# Patient Record
Sex: FEMALE | Race: WHITE | NOT HISPANIC OR LATINO | Employment: OTHER | URBAN - NONMETROPOLITAN AREA
[De-identification: names, ages, dates, MRNs, and addresses within clinical notes are randomized per-mention and may not be internally consistent; named-entity substitution may affect disease eponyms.]

---

## 2017-01-03 ENCOUNTER — OFFICE VISIT (OUTPATIENT)
Dept: CARDIAC SURGERY | Facility: CLINIC | Age: 72
End: 2017-01-03

## 2017-01-03 VITALS
OXYGEN SATURATION: 98 % | DIASTOLIC BLOOD PRESSURE: 60 MMHG | HEART RATE: 79 BPM | WEIGHT: 119.6 LBS | SYSTOLIC BLOOD PRESSURE: 125 MMHG | TEMPERATURE: 97.7 F | BODY MASS INDEX: 22.01 KG/M2 | HEIGHT: 62 IN

## 2017-01-03 DIAGNOSIS — Z09 FOLLOW-UP SURGERY CARE: Primary | ICD-10-CM

## 2017-01-03 DIAGNOSIS — I77.0 ARTERIOVENOUS FISTULA (HCC): ICD-10-CM

## 2017-01-03 PROCEDURE — 99024 POSTOP FOLLOW-UP VISIT: CPT | Performed by: NURSE PRACTITIONER

## 2017-01-03 RX ORDER — PAROXETINE 30 MG/1
30 TABLET, FILM COATED ORAL EVERY MORNING
Status: ON HOLD | COMMUNITY
End: 2017-10-05

## 2017-01-03 RX ORDER — HYDROXYZINE PAMOATE 25 MG/1
25 CAPSULE ORAL 2 TIMES DAILY PRN
Status: ON HOLD | COMMUNITY
End: 2017-10-05

## 2017-01-03 RX ORDER — HYDROCODONE BITARTRATE AND ACETAMINOPHEN 7.5; 325 MG/1; MG/1
1 TABLET ORAL EVERY 4 HOURS PRN
Qty: 50 TABLET | Refills: 0 | Status: ON HOLD | OUTPATIENT
Start: 2017-01-03 | End: 2017-11-16

## 2017-01-03 RX ORDER — OMEPRAZOLE 20 MG/1
20 CAPSULE, DELAYED RELEASE ORAL DAILY
Status: ON HOLD | COMMUNITY
End: 2017-10-05

## 2017-01-03 NOTE — MR AVS SNAPSHOT
Efrem Radford Crispin   1/3/2017 3:00 PM   Office Visit    Dept Phone:  [phone]   Encounter #:  [id number]    Provider:  ELI Alanis   Department:  [facility] CARDIOTHORACIC AND VASCULAR SURGERY                Your Full Care Plan              Today's Medication Changes          These changes are accurate as of: 1/3/17  3:47 PM.  If you have any questions, ask your nurse or doctor.               New Medication(s)Ordered:     HYDROcodone-acetaminophen 7.5-325 MG per tablet   Commonly known as:  NORCO   Take 1 tablet by mouth Every 4 (Four) Hours As Needed for moderate pain (4-6) (for LLE pain post).   Started by:  ELI Alanis         Medication(s)that have changed:     PARoxetine 30 MG tablet   Commonly known as:  PAXIL   Take 30 mg by mouth Every Morning.   What changed:  Another medication with the same name was removed. Continue taking this medication, and follow the directions you see here.   Changed by:  ELI Alanis         Stop taking medication(s)listed here:     DEXILANT 60 MG capsule   Generic drug:  dexlansoprazole   Stopped by:  ELI Alanis           PERCOCET PO   Stopped by:  ELI Alanis                Where to Get Your Medications      You can get these medications from any pharmacy     Bring a paper prescription for each of these medications     HYDROcodone-acetaminophen 7.5-325 MG per tablet                  Your Updated Medication List          This list is accurate as of: 1/3/17  3:47 PM.  Always use your most recent med list.                APRISO PO       aspirin 81 MG EC tablet       atorvastatin 40 MG tablet   Commonly known as:  LIPITOR       COLACE PO       ELIQUIS PO       fentaNYL 25 MCG/HR patch   Commonly known as:  DURAGESIC       gabapentin 100 MG capsule   Commonly known as:  NEURONTIN       hydrALAZINE 100 MG tablet   Commonly known as:  APRESOLINE       HYDROcodone-acetaminophen 7.5-325 MG per  tablet   Commonly known as:  NORCO   Take 1 tablet by mouth Every 4 (Four) Hours As Needed for moderate pain (4-6) (for LLE pain post).       hydrOXYzine 25 MG capsule   Commonly known as:  VISTARIL       levothyroxine 88 MCG tablet   Commonly known as:  SYNTHROID, LEVOTHROID       LORAZEPAM PO       nitroglycerin 0.4 MG SL tablet   Commonly known as:  NITROSTAT       * OMEPRAZOLE PO       * omeprazole 20 MG capsule   Commonly known as:  priLOSEC       PARoxetine 30 MG tablet   Commonly known as:  PAXIL       polyethylene glycol packet   Commonly known as:  MIRALAX       QUETIAPINE FUMARATE PO       NGA CAPS PO       vitamin D 90262 UNITS capsule capsule   Commonly known as:  ERGOCALCIFEROL       * Notice:  This list has 2 medication(s) that are the same as other medications prescribed for you. Read the directions carefully, and ask your doctor or other care provider to review them with you.            You Were Diagnosed With        Codes Comments    Follow-up surgery care    -  Primary ICD-10-CM: Z09  ICD-9-CM: V67.00     Arteriovenous fistula     ICD-10-CM: I77.0  ICD-9-CM: 447.0       Instructions    Fistula duplex shows good flow  Open fistula  1/9/17 May start to use fistula  After successfully used for 2 weeks, Dialysis Center will notify [facility] for return for tunnel catheter removal  Last RX for Norco for surgical pain      Patient Instructions History      Upcoming Appointments     Visit Type Date Time Department    OFFICE VISIT 1/3/2017  3:00 PM [facility] CT VAS SURGERY MAD    OFFICE VISIT 1/24/2017 11:00 AM [facility]  [facility]     Our records indicate that your [facility] [place] [facility] account has been deactivated. If you would like to reactivate your account, please email [email or web address] or call [phone] to talk to our [employer] staff.             Other Info from Your Visit           Your Appointments     Jan 24, 2017 11:00 AM CST   Office Visit with Marcos Cooney MD   [facility], [place]  "[facility] GASTROENTEROLOGY (--)    [address] [place] [address] [address]  [address] [facility] [address] [phone]           Arrive 15 minutes prior to appointment.              Allergies     Ambien [Zolpidem Tartrate]      Benadryl [Diphenhydramine]      Penicillins        Reason for Visit     F/U Fistula U/S           Vital Signs     Blood Pressure Pulse Temperature Height Weight Oxygen Saturation    125/60 (BP Location: Left arm, Patient Position: Sitting) 79 97.7 °F (36.5 °C) (Oral) 62\" (157.5 cm) 119 lb 9.6 oz (54.3 kg) 98%    Body Mass Index Smoking Status                21.88 kg/m2 Former Smoker          Problems and Diagnoses Noted     Arteriovenous fistula    Encounter for examination following surgery        "

## 2017-01-03 NOTE — PATIENT INSTRUCTIONS
Fistula duplex shows good flow  Open fistula  1/9/17 May start to use fistula  After successfully used for 2 weeks, Dialysis Center will notify [facility] for return for tunnel catheter removal  Last RX for Norco for surgical pain

## 2017-01-03 NOTE — LETTER
January 4, 2017     Rogers Perez MD  [address] [address]    Patient: Efrem Roa   YOB: 1945   Date of Visit: 1/3/2017       Dear Dr. Ana MD:    Efrem Roa was in my office today. Below are the relevant portions of my assessment and plan of care.      Independent Review of Radiographic Studies:    Fistula patent.  01/03/16   Left AV Fistula  Duplex:  Patent fistula. maxPSV 586cm/s (arterial anast). Size 3.4-5.9mm. Flows 767-1842 ml/m    1. Arteriovenous fistula  Healing  May cannulate 1/9/17  Orders will be sent to dialysis center.  After successfully using for 2 weeks, Dialysis center will notify [facility] for scheduling of tunnel catheter removal.      2. Follow-up surgery care  Wound care continue present care    PO LLE Pain Limited RX for norco for PO LLE pain  Last RX provided and pt notifed.    Detailed discussion regarding risks, benefits, and treatment plan.  Patient understands, agrees, and wishes to proceed with plan.                 If you have questions, please do not hesitate to call me. I look forward to following Efrem along with you.         Sincerely,        ELI Grossman        CC: No Recipients

## 2017-01-04 NOTE — PROGRESS NOTES
Subjective   Patient ID: Efrem Roa is a 71 y.o. female is here today for follow-up for Left thigh AV fistula.  CC:   LLE VAS 10  Bruising resolved.    History of Present Illness  Dialysis:  End stage renal disease, No clotted access or problems during dialysis, Tunnel cath femoral  L Femoral AV fistula  PCP:  Dr Perez  Nephrology:  Dr Mireles, [facility]    71yr woman with ESRD on hemodialysis, HTN, COPD, sleep apnea, Stroke, GERD, LEFT arm fracture 20yrs ago.  moderate chronic pain lower back, LEFT leg.  occasional bleeding after decannulation.  recent clotted graft, unable to declot.  possible central venous occlusion.  LEFT femoral tunnel catheter. Recent new AV fistula placement. Returns for wound check.     7/29/2013 Upper extremity vein mapping:  RIGHT cephalic 1.3-4.5mm, basilic 2.5-3.5mm.  LEFT cephalic 2.0-3.3mm, basilic 2.1-3.2mm.  8/19/13: Placement RIGHT chest Tunnel Cath  10/15/13 RIGHT Brachial-Axillary Vein AV graft  11/26/13 RIGHT AV duplex: Anast dist 517, dist 446, mid 93, prx 146, Ax Anast 183. Lg hematoma prx arm >7cm  3/24/14: RIGHT AV duplex: Patent fistula. maxPSV 870cm/s (distal). Size 3.2-6.6mm. Flows 887-2394ml/m. Prx hematoma 3.96cm  7/30/14  RIGHT AV duplex: Patent fistula. maxPSV 869cm/s (distal). Size 9.3mm. Flows 2472ml/m.  elevated velocity at elbow level  1/6/15: LEFT Brachial-axillary AV artegraft  3/11/15: LEFT AV Duplex: Patent fistula. maxPSV 605cm/s. Size 1.7-6.9mm. Flows 165-1219ml/m.   8/3/2015 LEFT AV Duplex: Patent fistula. maxPSV 255cm/s. Size 2.1-7.5mm. Flows 193-1007ml/m.   2/2016 LEFT fistulagram:  fistula end of life.  tunnel cath placed.  10/4/16: LEFT Leg AV graft SFA-SFV  12/08/16  Left AV Fistula  Duplex:  Patent fistula. maxPSV 446cm/s (arterial anast). Size 2.3-5.6mm. Flows 236-1319 ml/m.  01/03/16   Left AV Fistula  Duplex:  Patent fistula. maxPSV 586cm/s (arterial anast). Size 3.4-5.9mm. Flows 767-1842 ml/m.    The following portions of the  patient's history were reviewed and updated as appropriate: allergies, current medications, past family history, past medical history, past social history, past surgical history and problem list.    Current Outpatient Prescriptions:   •  Apixaban (ELIQUIS PO), Take 1 tablet by mouth 2 (two) times a day., Disp: , Rfl:   •  aspirin 81 MG EC tablet, Take 81 mg by mouth daily., Disp: , Rfl:   •  atorvastatin (LIPITOR) 40 MG tablet, Take 40 mg by mouth every night., Disp: , Rfl:   •  B Complex-C-Folic Acid (NGA CAPS PO), Take 1 capsule by mouth daily., Disp: , Rfl:   •  Docusate Sodium (COLACE PO), Take 1 capsule by mouth 3 (three) times a day as needed., Disp: , Rfl:   •  fentaNYL (DURAGESIC) 25 MCG/HR patch, Place 1 patch on the skin every third day., Disp: , Rfl:   •  gabapentin (NEURONTIN) 100 MG capsule, Take 100 mg by mouth 3 (three) times a day., Disp: , Rfl:   •  hydrALAZINE (APRESOLINE) 100 MG tablet, Take 100 mg by mouth 3 (three) times a day., Disp: , Rfl:   •  hydrOXYzine (VISTARIL) 25 MG capsule, Take 25 mg by mouth 2 (Two) Times a Day As Needed., Disp: , Rfl:   •  levothyroxine (SYNTHROID, LEVOTHROID) 88 MCG tablet, Take 88 mcg by mouth daily., Disp: , Rfl:   •  LORAZEPAM PO, Take 1 tablet by mouth daily., Disp: , Rfl:   •  Mesalamine (APRISO PO), Take 1 capsule by mouth daily., Disp: , Rfl:   •  nitroglycerin (NITROSTAT) 0.4 MG SL tablet, Place 0.4 mg under the tongue every 5 (five) minutes as needed for chest pain. Take no more than 3 doses in 15 minutes., Disp: , Rfl:   •  omeprazole (priLOSEC) 20 MG capsule, Take 20 mg by mouth Daily., Disp: , Rfl:   •  OMEPRAZOLE PO, Take 1 tablet by mouth daily., Disp: , Rfl:   •  PARoxetine (PAXIL) 30 MG tablet, Take 30 mg by mouth Every Morning., Disp: , Rfl:   •  polyethylene glycol (MIRALAX) packet, Take 17 g by mouth 2 (two) times a day., Disp: , Rfl:   •  QUETIAPINE FUMARATE PO, Take 1 tablet by mouth every night., Disp: , Rfl:   •  vitamin D (ERGOCALCIFEROL)  65534 UNITS capsule capsule, Take 50,000 Units by mouth 1 (one) time per week., Disp: , Rfl:   •  HYDROcodone-acetaminophen (NORCO) 7.5-325 MG per tablet, Take 1 tablet by mouth Every 4 (Four) Hours As Needed for moderate pain (4-6) (for LLE pain post)., Disp: 50 tablet, Rfl: 0    Review of Systems   Constitution: Negative for chills, decreased appetite and fever.   HENT: Negative for headaches and nosebleeds.    Eyes: Negative for visual disturbance.   Cardiovascular: Positive for leg swelling. Negative for chest pain, cyanosis and syncope.   Respiratory: Negative for cough and shortness of breath.    Hematologic/Lymphatic: Negative for bleeding problem. Bruises/bleeds easily.   Skin: Negative for color change, flushing and poor wound healing.   Musculoskeletal: Positive for myalgias. Negative for muscle cramps and muscle weakness. Falls: Recovering from phone.        LLE with discomfort in  incisional area.   States unchanged    Gastrointestinal: Negative for change in bowel habit, heartburn, hematemesis and melena.   Neurological: Negative for brief paralysis, focal weakness, numbness, paresthesias and tremors.        LLE        Objective   Physical Exam   Constitutional: She is oriented to person, place, and time. She appears well-nourished.   HENT:   Head: Normocephalic.   Bilateral facial bruising  In resolution    Eyes: Conjunctivae are normal.   Neck: No JVD present.   Cardiovascular: Normal rate, regular rhythm, normal heart sounds and intact distal pulses.    Fistula Present LLExtremity: (+)thrill/(+)bruit/(+)pulse. Aneurysmal (N). Water Hammer Pulse (N). Thinning (N).  Turner's Test <3sec (+). Flows <800ml/min (+).  Skin warm/dry/pink/intact (+). Radial Pulse (+).   Pulmonary/Chest: Effort normal and breath sounds normal.   Musculoskeletal: She exhibits tenderness (Left lat thigh). She exhibits no edema.   Neurological: She is alert and oriented to person, place, and time.   Skin: Skin is warm and dry. No  erythema. No pallor.   LLE femoral AV fistula well healed   Edema decrease  Hematoma decreased.    Tunnel Cath in place   Nursing note and vitals reviewed.      Assessment/Plan   Independent Review of Radiographic Studies:    Fistula patent.  01/03/16   Left AV Fistula  Duplex:  Patent fistula. maxPSV 586cm/s (arterial anast). Size 3.4-5.9mm. Flows 767-1842 ml/m    1. Arteriovenous fistula  Healing  May cannulate 1/9/17  Orders will be sent to dialysis center.  After successfully using for 2 weeks, Dialysis center will notify [facility] for scheduling of tunnel catheter removal.      2. Follow-up surgery care  Wound care continue present care    PO LLE Pain Limited RX for norco for PO LLE pain  Last RX provided and pt notifed.    Detailed discussion regarding risks, benefits, and treatment plan.  Patient understands, agrees, and wishes to proceed with plan.

## 2017-01-24 ENCOUNTER — OFFICE VISIT (OUTPATIENT)
Dept: GASTROENTEROLOGY | Facility: CLINIC | Age: 72
End: 2017-01-24

## 2017-01-24 VITALS — HEIGHT: 62 IN | DIASTOLIC BLOOD PRESSURE: 63 MMHG | HEART RATE: 68 BPM | SYSTOLIC BLOOD PRESSURE: 105 MMHG

## 2017-01-24 DIAGNOSIS — K59.00 CONSTIPATION, UNSPECIFIED CONSTIPATION TYPE: Primary | ICD-10-CM

## 2017-01-24 PROCEDURE — 99213 OFFICE O/P EST LOW 20 MIN: CPT | Performed by: INTERNAL MEDICINE

## 2017-01-24 NOTE — PROGRESS NOTES
[facility]      Chief Complaint:   Chief Complaint   Patient presents with   • Constipation     3 month follow up       Subjective     HPI:   Patient with constipation.  Symptoms have markedly improved with MiraLAX patient no longer complaint constipation at this time.  Patient has been eating well no abdominal pain.    Plan; while patient follow up as needed if any further problems.    Past Medical History:   Past Medical History   Diagnosis Date   • Abnormal x-ray      Standard chest x ray, f/u pneumonia xray   • Acquired hypothyroidism    • Amblyopia    • Anemia of renal disease    • Anxiety    • Arteriovenous fistula      Placed 10/15/2014   • Benign essential hypertension    • Bipolar affective disorder      Current episode depression   • Bipolar disorder    • Cerebrovascular accident    • Chest wall pain    • Chronic angle-closure glaucoma    • Chronic kidney disease      Stage 4-approaching hemodialysis   • Chronic pain syndrome    • Chronic renal failure    • Combined drug dependence excluding opioids    • Constipation    • COPD (chronic obstructive pulmonary disease)    • Coronary arteriosclerosis    • Cough    • Degeneration of cervical intervertebral disc    • Dementia      Multi-infarct, uncomplicated   • Depression    • Diabetes mellitus      No retinopathy   • Diabetes mellitus      Without mention of complication, type 2 or unspecified type, not stated as uncontrolled   • Diabetic renal disease    • Disc disorder of lumbar region    • DJD (degenerative joint disease)      Involving multiple joints   • Edema    • End stage renal failure on dialysis      LUE AV FISTULA 1/2015   • Epigastric pain    • Essential hypertension    • Exotropia    • Gastritis    • Generalized abdominal pain    • GERD (gastroesophageal reflux disease)      With Esophagitis   • Gout    • H/O screening mammography    • Hiatal hernia    • Hyperlipidemia    • Hypermetropia    • Insomnia    • Iron deficiency  anemia    • Low back pain    • Lumbago    • Non-cardiac chest pain    • Nuclear cataract    • Osteoporosis    • Panic disorder without agoraphobia    • Peripheral nervous system disorder    • Radiculitis    • RLQ abdominal pain    • RUQ pain    • Sprain of ankle    • Sprain of knee      Resolving   • Superficial injury of shoulder and upper arm    • Surgical follow-up care    • Type 2 diabetes mellitus    • UTI (urinary tract infection)    • Visit for suture removal    • Vitamin D deficiency    • Vulvovaginitis        Family History:  Family History   Problem Relation Age of Onset   • Coronary artery disease Other        Social History:   reports that she has quit smoking. She has never used smokeless tobacco. She reports that she does not drink alcohol or use illicit drugs.    Medications:   Current Outpatient Prescriptions   Medication Sig Dispense Refill   • Apixaban (ELIQUIS PO) Take 1 tablet by mouth 2 (two) times a day.     • aspirin 81 MG EC tablet Take 81 mg by mouth daily.     • atorvastatin (LIPITOR) 40 MG tablet Take 40 mg by mouth every night.     • B Complex-C-Folic Acid (NGA CAPS PO) Take 1 capsule by mouth daily.     • Docusate Sodium (COLACE PO) Take 1 capsule by mouth 3 (three) times a day as needed.     • fentaNYL (DURAGESIC) 25 MCG/HR patch Place 1 patch on the skin every third day.     • gabapentin (NEURONTIN) 100 MG capsule Take 100 mg by mouth 3 (three) times a day.     • hydrALAZINE (APRESOLINE) 100 MG tablet Take 100 mg by mouth 3 (three) times a day.     • HYDROcodone-acetaminophen (NORCO) 7.5-325 MG per tablet Take 1 tablet by mouth Every 4 (Four) Hours As Needed for moderate pain (4-6) (for LLE pain post). 50 tablet 0   • hydrOXYzine (VISTARIL) 25 MG capsule Take 25 mg by mouth 2 (Two) Times a Day As Needed.     • levothyroxine (SYNTHROID, LEVOTHROID) 88 MCG tablet Take 88 mcg by mouth daily.     • LORAZEPAM PO Take 1 tablet by mouth daily.     • Mesalamine (APRISO PO) Take 1 capsule by  "mouth daily.     • nitroglycerin (NITROSTAT) 0.4 MG SL tablet Place 0.4 mg under the tongue every 5 (five) minutes as needed for chest pain. Take no more than 3 doses in 15 minutes.     • omeprazole (priLOSEC) 20 MG capsule Take 20 mg by mouth Daily.     • OMEPRAZOLE PO Take 1 tablet by mouth daily.     • PARoxetine (PAXIL) 30 MG tablet Take 30 mg by mouth Every Morning.     • polyethylene glycol (MIRALAX) packet Take 17 g by mouth 2 (two) times a day.     • QUETIAPINE FUMARATE PO Take 1 tablet by mouth every night.     • vitamin D (ERGOCALCIFEROL) 33757 UNITS capsule capsule Take 50,000 Units by mouth 1 (one) time per week.       No current facility-administered medications for this visit.        Allergies:  Ambien [zolpidem tartrate]; Benadryl [diphenhydramine]; and Penicillins    ROS:    Review of Systems   HENT: Negative for congestion, sore throat and trouble swallowing.    Respiratory: Negative for apnea, cough, choking, chest tightness, shortness of breath, wheezing and stridor.    Cardiovascular: Negative for chest pain, palpitations and leg swelling.   Gastrointestinal: Negative for abdominal distention, abdominal pain, anal bleeding, blood in stool, constipation, diarrhea, nausea, rectal pain and vomiting.   Skin: Negative for color change, pallor, rash and wound.   Neurological: Negative for dizziness, syncope, weakness and headaches.   Psychiatric/Behavioral: Negative for agitation, behavioral problems, confusion and decreased concentration.     Objective     Blood pressure 105/63, pulse 68, height 62\" (157.5 cm).    Physical Exam   Constitutional: She is oriented to person, place, and time. She appears well-developed and well-nourished. No distress.   HENT:   Head: Normocephalic and atraumatic.   Cardiovascular: Normal rate, regular rhythm, normal heart sounds and intact distal pulses.  Exam reveals no gallop and no friction rub.    No murmur heard.  Pulmonary/Chest: Breath sounds normal. No respiratory " distress. She has no wheezes. She has no rales. She exhibits no tenderness.   Abdominal: Soft. Bowel sounds are normal. She exhibits no distension and no mass. There is no tenderness. There is no rebound and no guarding. No hernia.   Musculoskeletal: Normal range of motion. She exhibits no edema.   Neurological: She is alert and oriented to person, place, and time.   Skin: Skin is warm and dry. No rash noted. She is not diaphoretic. No erythema. No pallor.   Psychiatric: She has a normal mood and affect. Her behavior is normal. Judgment and thought content normal.        Assessment/Plan   Efrem was seen today for constipation.    Diagnoses and all orders for this visit:    Constipation, unspecified constipation type        * Surgery not found *     Diagnosis Plan   1. Constipation, unspecified constipation type         Anticipated Surgical Procedure:  No orders of the defined types were placed in this encounter.      The risks, benefits, and alternatives of this procedure have been discussed with the patient or the responsible party- the patient understands and agrees to proceed.

## 2017-01-24 NOTE — MR AVS SNAPSHOT
Erfem Roa   1/24/2017 11:00 AM   Office Visit    Dept Phone:  [phone]   Encounter #:  [id number]    Provider:  Marcos Cooney MD   Department:  [facility] GASTROENTEROLOGY                Your Full Care Plan              Your Updated Medication List          This list is accurate as of: 1/24/17 11:26 AM.  Always use your most recent med list.                APRISO PO       aspirin 81 MG EC tablet       atorvastatin 40 MG tablet   Commonly known as:  LIPITOR       COLACE PO       ELIQUIS PO       fentaNYL 25 MCG/HR patch   Commonly known as:  DURAGESIC       gabapentin 100 MG capsule   Commonly known as:  NEURONTIN       hydrALAZINE 100 MG tablet   Commonly known as:  APRESOLINE       HYDROcodone-acetaminophen 7.5-325 MG per tablet   Commonly known as:  NORCO   Take 1 tablet by mouth Every 4 (Four) Hours As Needed for moderate pain (4-6) (for LLE pain post).       hydrOXYzine 25 MG capsule   Commonly known as:  VISTARIL       levothyroxine 88 MCG tablet   Commonly known as:  SYNTHROID, LEVOTHROID       LORAZEPAM PO       nitroglycerin 0.4 MG SL tablet   Commonly known as:  NITROSTAT       * OMEPRAZOLE PO       * omeprazole 20 MG capsule   Commonly known as:  priLOSEC       PARoxetine 30 MG tablet   Commonly known as:  PAXIL       polyethylene glycol packet   Commonly known as:  MIRALAX       QUETIAPINE FUMARATE PO       NGA CAPS PO       vitamin D 27764 UNITS capsule capsule   Commonly known as:  ERGOCALCIFEROL       * Notice:  This list has 2 medication(s) that are the same as other medications prescribed for you. Read the directions carefully, and ask your doctor or other care provider to review them with you.            You Were Diagnosed With        Codes Comments    Constipation, unspecified constipation type    -  Primary ICD-10-CM: K59.00  ICD-9-CM: 564.00       Instructions     None    Patient Instructions History      Upcoming Appointments     Visit  "Type Date Time Department    OFFICE VISIT 2017 11:00 AM FILIPPO Mathias Signup     [facility] [employer] allows you to send messages to your doctor, view your test results, renew your prescriptions, schedule appointments, and more. To sign up, go to [employer] and click on the Sign Up Now link in the New User? box. Enter your [employer] Activation Code exactly as it appears below along with the last four digits of your Social Security Number and your Date of Birth () to complete the sign-up process. If you do not sign up before the expiration date, you must request a new code.    [employer] Activation Code: [email or web address]  Expires: 2017 11:26 AM    If you have questions, you can email [email or web address] or call [phone] to talk to our [employer] staff. Remember, [employer] is NOT to be used for urgent needs. For medical emergencies, dial 911.               Other Info from Your Visit           Allergies     Ambien [Zolpidem Tartrate]      Benadryl [Diphenhydramine]      Penicillins        Reason for Visit     Constipation 3 month follow up      Vital Signs     Blood Pressure Pulse Height Smoking Status          105/63 (BP Location: Right arm, Patient Position: Sitting, Cuff Size: Adult) 68 62\" (157.5 cm) Former Smoker        Problems and Diagnoses Noted     Constipation        "

## 2017-01-24 NOTE — LETTER
January 24, 2017     Rogers Perez MD  [address] [facility] [address]    Patient: Efrem Roa   YOB: 1945   Date of Visit: 1/24/2017       Dear Dr. Ana MD:    Thank you for referring Efrem Roa to me for evaluation. Below are the relevant portions of my assessment and plan of care.      Efrem was seen today for constipation.    Diagnoses and all orders for this visit:    Constipation, unspecified constipation type        * Surgery not found *     Diagnosis Plan   1. Constipation, unspecified constipation type         Anticipated Surgical Procedure:  No orders of the defined types were placed in this encounter.      The risks, benefits, and alternatives of this procedure have been discussed with the patient or the responsible party- the patient understands and agrees to proceed.                                                                              If you have questions, please do not hesitate to call me. I look forward to following Efrem along with you.         Sincerely,        Marcos Cooney MD        CC: No Recipients

## 2017-01-24 NOTE — LETTER
January 24, 2017     Rogers Perez MD  [address] [place] [id number]    Patient: Efrem Roa   YOB: 1945   Date of Visit: 1/24/2017       Dear Dr. Ana MD:    Thank you for referring Efrem Roa to me for evaluation. Below is a copy of my consult note.    If you have questions, please do not hesitate to call me. I look forward to following Efrem along with you.         Sincerely,        Marcos Cooney MD        CC: Ellen Gerard    [facility]      Chief Complaint:   Chief Complaint   Patient presents with   • Constipation     3 month follow up       Subjective     HPI:   Patient with constipation.  Symptoms have markedly improved with MiraLAX patient no longer complaint constipation at this time.  Patient has been eating well no abdominal pain.    Plan; while patient follow up as needed if any further problems.    Past Medical History:   Past Medical History   Diagnosis Date   • Abnormal x-ray      Standard chest x ray, f/u pneumonia xray   • Acquired hypothyroidism    • Amblyopia    • Anemia of renal disease    • Anxiety    • Arteriovenous fistula      Placed 10/15/2014   • Benign essential hypertension    • Bipolar affective disorder      Current episode depression   • Bipolar disorder    • Cerebrovascular accident    • Chest wall pain    • Chronic angle-closure glaucoma    • Chronic kidney disease      Stage 4-approaching hemodialysis   • Chronic pain syndrome    • Chronic renal failure    • Combined drug dependence excluding opioids    • Constipation    • COPD (chronic obstructive pulmonary disease)    • Coronary arteriosclerosis    • Cough    • Degeneration of cervical intervertebral disc    • Dementia      Multi-infarct, uncomplicated   • Depression    • Diabetes mellitus      No retinopathy   • Diabetes mellitus      Without mention of complication, type 2 or unspecified type, not stated as uncontrolled   • Diabetic renal disease    • Disc  disorder of lumbar region    • DJD (degenerative joint disease)      Involving multiple joints   • Edema    • End stage renal failure on dialysis      LUE AV FISTULA 1/2015   • Epigastric pain    • Essential hypertension    • Exotropia    • Gastritis    • Generalized abdominal pain    • GERD (gastroesophageal reflux disease)      With Esophagitis   • Gout    • H/O screening mammography    • Hiatal hernia    • Hyperlipidemia    • Hypermetropia    • Insomnia    • Iron deficiency anemia    • Low back pain    • Lumbago    • Non-cardiac chest pain    • Nuclear cataract    • Osteoporosis    • Panic disorder without agoraphobia    • Peripheral nervous system disorder    • Radiculitis    • RLQ abdominal pain    • RUQ pain    • Sprain of ankle    • Sprain of knee      Resolving   • Superficial injury of shoulder and upper arm    • Surgical follow-up care    • Type 2 diabetes mellitus    • UTI (urinary tract infection)    • Visit for suture removal    • Vitamin D deficiency    • Vulvovaginitis        Family History:  Family History   Problem Relation Age of Onset   • Coronary artery disease Other        Social History:   reports that she has quit smoking. She has never used smokeless tobacco. She reports that she does not drink alcohol or use illicit drugs.    Medications:   Current Outpatient Prescriptions   Medication Sig Dispense Refill   • Apixaban (ELIQUIS PO) Take 1 tablet by mouth 2 (two) times a day.     • aspirin 81 MG EC tablet Take 81 mg by mouth daily.     • atorvastatin (LIPITOR) 40 MG tablet Take 40 mg by mouth every night.     • B Complex-C-Folic Acid (NGA CAPS PO) Take 1 capsule by mouth daily.     • Docusate Sodium (COLACE PO) Take 1 capsule by mouth 3 (three) times a day as needed.     • fentaNYL (DURAGESIC) 25 MCG/HR patch Place 1 patch on the skin every third day.     • gabapentin (NEURONTIN) 100 MG capsule Take 100 mg by mouth 3 (three) times a day.     • hydrALAZINE (APRESOLINE) 100 MG tablet Take 100  mg by mouth 3 (three) times a day.     • HYDROcodone-acetaminophen (NORCO) 7.5-325 MG per tablet Take 1 tablet by mouth Every 4 (Four) Hours As Needed for moderate pain (4-6) (for LLE pain post). 50 tablet 0   • hydrOXYzine (VISTARIL) 25 MG capsule Take 25 mg by mouth 2 (Two) Times a Day As Needed.     • levothyroxine (SYNTHROID, LEVOTHROID) 88 MCG tablet Take 88 mcg by mouth daily.     • LORAZEPAM PO Take 1 tablet by mouth daily.     • Mesalamine (APRISO PO) Take 1 capsule by mouth daily.     • nitroglycerin (NITROSTAT) 0.4 MG SL tablet Place 0.4 mg under the tongue every 5 (five) minutes as needed for chest pain. Take no more than 3 doses in 15 minutes.     • omeprazole (priLOSEC) 20 MG capsule Take 20 mg by mouth Daily.     • OMEPRAZOLE PO Take 1 tablet by mouth daily.     • PARoxetine (PAXIL) 30 MG tablet Take 30 mg by mouth Every Morning.     • polyethylene glycol (MIRALAX) packet Take 17 g by mouth 2 (two) times a day.     • QUETIAPINE FUMARATE PO Take 1 tablet by mouth every night.     • vitamin D (ERGOCALCIFEROL) 47508 UNITS capsule capsule Take 50,000 Units by mouth 1 (one) time per week.       No current facility-administered medications for this visit.        Allergies:  Ambien [zolpidem tartrate]; Benadryl [diphenhydramine]; and Penicillins    ROS:    Review of Systems   HENT: Negative for congestion, sore throat and trouble swallowing.    Respiratory: Negative for apnea, cough, choking, chest tightness, shortness of breath, wheezing and stridor.    Cardiovascular: Negative for chest pain, palpitations and leg swelling.   Gastrointestinal: Negative for abdominal distention, abdominal pain, anal bleeding, blood in stool, constipation, diarrhea, nausea, rectal pain and vomiting.   Skin: Negative for color change, pallor, rash and wound.   Neurological: Negative for dizziness, syncope, weakness and headaches.   Psychiatric/Behavioral: Negative for agitation, behavioral problems, confusion and decreased  "concentration.     Objective     Blood pressure 105/63, pulse 68, height 62\" (157.5 cm).    Physical Exam   Constitutional: She is oriented to person, place, and time. She appears well-developed and well-nourished. No distress.   HENT:   Head: Normocephalic and atraumatic.   Cardiovascular: Normal rate, regular rhythm, normal heart sounds and intact distal pulses.  Exam reveals no gallop and no friction rub.    No murmur heard.  Pulmonary/Chest: Breath sounds normal. No respiratory distress. She has no wheezes. She has no rales. She exhibits no tenderness.   Abdominal: Soft. Bowel sounds are normal. She exhibits no distension and no mass. There is no tenderness. There is no rebound and no guarding. No hernia.   Musculoskeletal: Normal range of motion. She exhibits no edema.   Neurological: She is alert and oriented to person, place, and time.   Skin: Skin is warm and dry. No rash noted. She is not diaphoretic. No erythema. No pallor.   Psychiatric: She has a normal mood and affect. Her behavior is normal. Judgment and thought content normal.        Assessment/Plan   Efrem was seen today for constipation.    Diagnoses and all orders for this visit:    Constipation, unspecified constipation type        * Surgery not found *     Diagnosis Plan   1. Constipation, unspecified constipation type         Anticipated Surgical Procedure:  No orders of the defined types were placed in this encounter.      The risks, benefits, and alternatives of this procedure have been discussed with the patient or the responsible party- the patient understands and agrees to proceed.                                                                "

## 2017-01-31 ENCOUNTER — OUTSIDE FACILITY SERVICE (OUTPATIENT)
Dept: FAMILY MEDICINE CLINIC | Facility: CLINIC | Age: 72
End: 2017-01-31

## 2017-01-31 PROCEDURE — 99307 SBSQ NF CARE SF MDM 10: CPT | Performed by: FAMILY MEDICINE

## 2017-02-16 ENCOUNTER — OUTSIDE FACILITY SERVICE (OUTPATIENT)
Dept: FAMILY MEDICINE CLINIC | Facility: CLINIC | Age: 72
End: 2017-02-16

## 2017-02-16 PROCEDURE — 99308 SBSQ NF CARE LOW MDM 20: CPT | Performed by: FAMILY MEDICINE

## 2017-02-23 ENCOUNTER — OFFICE VISIT (OUTPATIENT)
Dept: CARDIAC SURGERY | Facility: CLINIC | Age: 72
End: 2017-02-23

## 2017-02-23 VITALS
OXYGEN SATURATION: 95 % | HEIGHT: 62 IN | HEART RATE: 62 BPM | DIASTOLIC BLOOD PRESSURE: 68 MMHG | SYSTOLIC BLOOD PRESSURE: 128 MMHG | WEIGHT: 115 LBS | TEMPERATURE: 97.7 F | BODY MASS INDEX: 21.16 KG/M2

## 2017-02-23 DIAGNOSIS — N18.6 END STAGE RENAL FAILURE ON DIALYSIS (HCC): ICD-10-CM

## 2017-02-23 DIAGNOSIS — Z99.2 END STAGE RENAL FAILURE ON DIALYSIS (HCC): ICD-10-CM

## 2017-02-23 DIAGNOSIS — E11.22 CONTROLLED TYPE 2 DIABETES MELLITUS WITH CHRONIC KIDNEY DISEASE ON CHRONIC DIALYSIS, WITHOUT LONG-TERM CURRENT USE OF INSULIN (HCC): ICD-10-CM

## 2017-02-23 DIAGNOSIS — N18.6 CONTROLLED TYPE 2 DIABETES MELLITUS WITH CHRONIC KIDNEY DISEASE ON CHRONIC DIALYSIS, WITHOUT LONG-TERM CURRENT USE OF INSULIN (HCC): ICD-10-CM

## 2017-02-23 DIAGNOSIS — I77.0 ARTERIOVENOUS FISTULA (HCC): Primary | ICD-10-CM

## 2017-02-23 DIAGNOSIS — Z99.2 CONTROLLED TYPE 2 DIABETES MELLITUS WITH CHRONIC KIDNEY DISEASE ON CHRONIC DIALYSIS, WITHOUT LONG-TERM CURRENT USE OF INSULIN (HCC): ICD-10-CM

## 2017-02-23 DIAGNOSIS — J43.1 PANLOBULAR EMPHYSEMA (HCC): ICD-10-CM

## 2017-02-23 DIAGNOSIS — K59.00 CONSTIPATION, UNSPECIFIED CONSTIPATION TYPE: ICD-10-CM

## 2017-02-23 DIAGNOSIS — I25.10 CORONARY ARTERY DISEASE INVOLVING NATIVE CORONARY ARTERY OF NATIVE HEART WITHOUT ANGINA PECTORIS: ICD-10-CM

## 2017-02-23 PROCEDURE — 99213 OFFICE O/P EST LOW 20 MIN: CPT | Performed by: THORACIC SURGERY (CARDIOTHORACIC VASCULAR SURGERY)

## 2017-02-23 PROCEDURE — 36589 REMOVAL TUNNELED CV CATH: CPT | Performed by: THORACIC SURGERY (CARDIOTHORACIC VASCULAR SURGERY)

## 2017-03-04 ENCOUNTER — DOCUMENTATION (OUTPATIENT)
Dept: CARDIAC SURGERY | Facility: CLINIC | Age: 72
End: 2017-03-04

## 2017-03-04 DIAGNOSIS — N18.6 ESRD (END STAGE RENAL DISEASE) (HCC): Primary | ICD-10-CM

## 2017-03-04 PROBLEM — I25.10 CORONARY ARTERY DISEASE INVOLVING NATIVE CORONARY ARTERY OF NATIVE HEART WITHOUT ANGINA PECTORIS: Status: ACTIVE | Noted: 2017-03-04

## 2017-03-04 PROBLEM — Z99.2 CONTROLLED TYPE 2 DIABETES MELLITUS WITH CHRONIC KIDNEY DISEASE ON CHRONIC DIALYSIS, WITHOUT LONG-TERM CURRENT USE OF INSULIN (HCC): Status: ACTIVE | Noted: 2017-03-04

## 2017-03-04 PROBLEM — E11.22 CONTROLLED TYPE 2 DIABETES MELLITUS WITH CHRONIC KIDNEY DISEASE ON CHRONIC DIALYSIS, WITHOUT LONG-TERM CURRENT USE OF INSULIN (HCC): Status: ACTIVE | Noted: 2017-03-04

## 2017-03-04 PROBLEM — J43.1 PANLOBULAR EMPHYSEMA (HCC): Status: ACTIVE | Noted: 2017-03-04

## 2017-03-04 NOTE — PROGRESS NOTES
OPERATIVE NOTE    Efrem Roa  1945 2/23/2017    PREOP DIAGNOSES:  ESRD    POSTOP DIAGNOSES:  ESRD    PROCEDURE:   Removal tunnelled central venous catheter with cuff    SURGEON: RASHAAD Fernandez MD [facility]    ASSISTANT: Lacey Muñoz CFA    ANESTHESIA: local 1% lidocaine    ESTIMATED BLOOD LOSS: minimal    COMPLICATIONS: none    DESCRIPTION OF OPERATION: taken to procedure room, placed supine position, prepped draped sterile fashion.  1% lidocaine local anesthesia around cuff.  1cm incision make over cuff.  Distal catheter dissected free, removed intact.  Manual pressure held at vein insertion site with good hemostasis.  Cuff dissected free, catheter divided.  Proximal portion removed out exit site intact.  Incision closed with 4-0 nylon vertical mattress sutures x2, exit site left open to drain.  Pressure dressing applied.  Tolerated procedure well, home.       Blane Fernandez MD     2/23/2017 1600

## 2017-03-04 NOTE — PROGRESS NOTES
Subjective   Patient ID: Efrem Roa is a 71 y.o. female is here today for follow-up for Left thigh AV fistula.  CC:   LLE VAS 3  Bruising resolved.    Chronic Renal Failure   This is a chronic problem. Associated symptoms include myalgias. Pertinent negatives include no change in bowel habit, chest pain, chills, coughing, fever, headaches or numbness.     PCP:  Dr Perez  Nephrology:  Dr Mireles, [facility]    71yr woman with ESRD on hemodialysis, HTN, COPD, sleep apnea, Stroke, GERD, LEFT arm fracture 20yrs ago.  moderate chronic pain lower back, LEFT leg.  occasional bleeding after decannulation.  recent clotted graft, unable to declot.  possible central venous occlusion.  LEFT femoral tunnel catheter. Recent new AV fistula placement. . End stage renal disease, No clotted access or problems during dialysis, Tunnel cath femoral  L Femoral AV graft functioning well.    7/29/2013 Upper extremity vein mapping:  RIGHT cephalic 1.3-4.5mm, basilic 2.5-3.5mm.  LEFT cephalic 2.0-3.3mm, basilic 2.1-3.2mm.  8/19/13: Placement RIGHT chest Tunnel Cath  10/15/13 RIGHT Brachial-Axillary Vein AV graft  11/26/13 RIGHT AV duplex: Anast dist 517, dist 446, mid 93, prx 146, Ax Anast 183. Lg hematoma prx arm >7cm  3/24/14: RIGHT AV duplex: Patent fistula. maxPSV 870cm/s (distal). Size 3.2-6.6mm. Flows 887-2394ml/m. Prx hematoma 3.96cm  7/30/14  RIGHT AV duplex: Patent fistula. maxPSV 869cm/s (distal). Size 9.3mm. Flows 2472ml/m.  elevated velocity at elbow level  1/6/15: LEFT Brachial-axillary AV artegraft  3/11/15: LEFT AV Duplex: Patent fistula. maxPSV 605cm/s. Size 1.7-6.9mm. Flows 165-1219ml/m.   8/3/2015 LEFT AV Duplex: Patent fistula. maxPSV 255cm/s. Size 2.1-7.5mm. Flows 193-1007ml/m.   2/2016 LEFT fistulagram:  fistula end of life.  tunnel cath placed.  10/4/16: LEFT Leg AV graft SFA-SFV  12/08/16  Left AV Fistula  Duplex:  Patent fistula. maxPSV 446cm/s (arterial anast). Size 2.3-5.6mm. Flows 236-1315  ml/m.  01/03/16   Left AV Fistula  Duplex:  Patent fistula. maxPSV 586cm/s (arterial anast). Size 3.4-5.9mm. Flows 767-1842 ml/m.    The following portions of the patient's history were reviewed and updated as appropriate: allergies, current medications, past family history, past medical history, past social history, past surgical history and problem list.    Current Outpatient Prescriptions:   •  Apixaban (ELIQUIS PO), Take 1 tablet by mouth 2 (two) times a day., Disp: , Rfl:   •  aspirin 81 MG EC tablet, Take 81 mg by mouth daily., Disp: , Rfl:   •  atorvastatin (LIPITOR) 40 MG tablet, Take 40 mg by mouth every night., Disp: , Rfl:   •  B Complex-C-Folic Acid (NGA CAPS PO), Take 1 capsule by mouth daily., Disp: , Rfl:   •  Docusate Sodium (COLACE PO), Take 1 capsule by mouth 3 (three) times a day as needed., Disp: , Rfl:   •  fentaNYL (DURAGESIC) 25 MCG/HR patch, Place 1 patch on the skin every third day., Disp: , Rfl:   •  gabapentin (NEURONTIN) 100 MG capsule, Take 100 mg by mouth 3 (three) times a day., Disp: , Rfl:   •  hydrALAZINE (APRESOLINE) 100 MG tablet, Take 100 mg by mouth 3 (three) times a day., Disp: , Rfl:   •  HYDROcodone-acetaminophen (NORCO) 7.5-325 MG per tablet, Take 1 tablet by mouth Every 4 (Four) Hours As Needed for moderate pain (4-6) (for LLE pain post)., Disp: 50 tablet, Rfl: 0  •  hydrOXYzine (VISTARIL) 25 MG capsule, Take 25 mg by mouth 2 (Two) Times a Day As Needed., Disp: , Rfl:   •  levothyroxine (SYNTHROID, LEVOTHROID) 88 MCG tablet, Take 88 mcg by mouth daily., Disp: , Rfl:   •  LORAZEPAM PO, Take 1 tablet by mouth daily., Disp: , Rfl:   •  Mesalamine (APRISO PO), Take 1 capsule by mouth daily., Disp: , Rfl:   •  nitroglycerin (NITROSTAT) 0.4 MG SL tablet, Place 0.4 mg under the tongue every 5 (five) minutes as needed for chest pain. Take no more than 3 doses in 15 minutes., Disp: , Rfl:   •  omeprazole (priLOSEC) 20 MG capsule, Take 20 mg by mouth Daily., Disp: , Rfl:   •   OMEPRAZOLE PO, Take 1 tablet by mouth daily., Disp: , Rfl:   •  PARoxetine (PAXIL) 30 MG tablet, Take 30 mg by mouth Every Morning., Disp: , Rfl:   •  polyethylene glycol (MIRALAX) packet, Take 17 g by mouth 2 (two) times a day., Disp: , Rfl:   •  QUETIAPINE FUMARATE PO, Take 1 tablet by mouth every night., Disp: , Rfl:   •  vitamin D (ERGOCALCIFEROL) 54813 UNITS capsule capsule, Take 50,000 Units by mouth 1 (one) time per week., Disp: , Rfl:     Review of Systems   Constitution: Negative for chills, decreased appetite and fever.   HENT: Negative for headaches and nosebleeds.    Eyes: Negative for visual disturbance.   Cardiovascular: Positive for leg swelling. Negative for chest pain, cyanosis and syncope.   Respiratory: Negative for cough and shortness of breath.    Hematologic/Lymphatic: Negative for bleeding problem. Bruises/bleeds easily.   Skin: Negative for color change, flushing and poor wound healing.   Musculoskeletal: Positive for myalgias. Negative for muscle cramps and muscle weakness. Falls: Recovering from phone.        LLE with discomfort in  incisional area.   States unchanged    Gastrointestinal: Negative for change in bowel habit, heartburn, hematemesis and melena.   Neurological: Negative for brief paralysis, focal weakness, numbness, paresthesias and tremors.        LLE        Objective   Physical Exam   Constitutional: She is oriented to person, place, and time. She appears well-nourished.   HENT:   Head: Normocephalic.   Bilateral facial bruising  In resolution    Eyes: Conjunctivae are normal.   Neck: No JVD present.   Cardiovascular: Normal rate, regular rhythm, normal heart sounds and intact distal pulses.    Fistula Present LLExtremity: (+)thrill/(+)bruit/(+)pulse. Aneurysmal (N). Water Hammer Pulse (N). Thinning (N).  Turner's Test <3sec (+). Flows <800ml/min (+).  Skin warm/dry/pink/intact (+). Radial Pulse (+).   Pulmonary/Chest: Effort normal and breath sounds normal.   Musculoskeletal:  She exhibits tenderness (Left lat thigh). She exhibits no edema.   Neurological: She is alert and oriented to person, place, and time.   Skin: Skin is warm and dry. No erythema. No pallor.   LLE femoral AV fistula well healed   Edema decrease  Hematoma decreased.    Tunnel Cath in place   Nursing note and vitals reviewed.      Assessment/Plan   Independent Review of Radiographic Studies:     Healing  May cannulate 1/9/17  Orders will be sent to dialysis center.  After successfully using for 2 weeks, Dialysis center will notify [facility] for scheduling of tunnel catheter removal.      Detailed discussion with Ms Roa regarding situation and options.  Functional long term dialysis access.  ready for tunnel cath removal.      Risks:  bleeding, catheter breakage which may require retrieval.  Benefits:  catheter removal, decrease incidence infection, blood clots.  Options:  none.  Understands and wishes to proceed.    Remove tunnel cath today.  Return in 3 months with fistula duplex.    Thank you for opportunity to participate in her care..

## 2017-03-19 ENCOUNTER — HOSPITAL ENCOUNTER (EMERGENCY)
Facility: HOSPITAL | Age: 72
Discharge: NURSING, REHAB OR LONG-TERM CARE | End: 2017-03-19
Attending: FAMILY MEDICINE | Admitting: FAMILY MEDICINE

## 2017-03-19 VITALS
OXYGEN SATURATION: 98 % | WEIGHT: 115 LBS | BODY MASS INDEX: 21.16 KG/M2 | RESPIRATION RATE: 20 BRPM | HEART RATE: 65 BPM | HEIGHT: 62 IN | SYSTOLIC BLOOD PRESSURE: 136 MMHG | TEMPERATURE: 98 F | DIASTOLIC BLOOD PRESSURE: 63 MMHG

## 2017-03-19 DIAGNOSIS — T14.8XXA HEMATOMA: Primary | ICD-10-CM

## 2017-03-19 PROCEDURE — 99283 EMERGENCY DEPT VISIT LOW MDM: CPT

## 2017-03-19 NOTE — ED PROVIDER NOTES
Subjective   HPI Comments: Pt reported she noticed a knot on her left thigh where Dr. Fernandez removed HD catheter.   Reviewed the records, Dr. Fernandez  Performed removal tunnelled central venous catheter with cuff on 02/23/2017.     Patient is a 71 y.o. female presenting with general illness.   History provided by:  Patient  Illness   Location:  Left thigh  Onset quality:  Sudden  Duration: since today.  Associated symptoms: no abdominal pain, no chest pain, no cough, no diarrhea, no fever, no nausea, no shortness of breath and no vomiting        Review of Systems   Constitutional: Negative.  Negative for chills and fever.   Eyes: Negative.    Respiratory: Negative.  Negative for cough and shortness of breath.    Cardiovascular: Negative.  Negative for chest pain, palpitations and leg swelling.   Gastrointestinal: Negative.  Negative for abdominal pain, diarrhea, nausea and vomiting.   Genitourinary: Negative.    Musculoskeletal: Negative.    Skin:        A knot on the left thigh   Hematological: Negative.    Psychiatric/Behavioral: Negative.    All other systems reviewed and are negative.      Past Medical History   Diagnosis Date   • Abnormal x-ray      Standard chest x ray, f/u pneumonia xray   • Acquired hypothyroidism    • Amblyopia    • Anemia of renal disease    • Anxiety    • Arteriovenous fistula      Placed 10/15/2014   • Benign essential hypertension    • Bipolar affective disorder      Current episode depression   • Bipolar disorder    • Cerebrovascular accident    • Chest wall pain    • Chronic angle-closure glaucoma    • Chronic kidney disease      Stage 4-approaching hemodialysis   • Chronic pain syndrome    • Chronic renal failure    • Combined drug dependence excluding opioids    • Constipation    • COPD (chronic obstructive pulmonary disease)    • Coronary arteriosclerosis    • Cough    • Degeneration of cervical intervertebral disc    • Dementia      Multi-infarct, uncomplicated   • Depression     • Diabetes mellitus      No retinopathy   • Diabetes mellitus      Without mention of complication, type 2 or unspecified type, not stated as uncontrolled   • Diabetic renal disease    • Disc disorder of lumbar region    • DJD (degenerative joint disease)      Involving multiple joints   • Edema    • End stage renal failure on dialysis      LUE AV FISTULA 2015   • Epigastric pain    • Essential hypertension    • Exotropia    • Gastritis    • Generalized abdominal pain    • GERD (gastroesophageal reflux disease)      With Esophagitis   • Gout    • H/O screening mammography    • Hiatal hernia    • Hyperlipidemia    • Hypermetropia    • Insomnia    • Iron deficiency anemia    • Low back pain    • Lumbago    • Non-cardiac chest pain    • Nuclear cataract    • Osteoporosis    • Panic disorder without agoraphobia    • Peripheral nervous system disorder    • Radiculitis    • RLQ abdominal pain    • RUQ pain    • Sprain of ankle    • Sprain of knee      Resolving   • Superficial injury of shoulder and upper arm    • Surgical follow-up care    • Type 2 diabetes mellitus    • UTI (urinary tract infection)    • Visit for suture removal    • Vitamin D deficiency    • Vulvovaginitis        Allergies   Allergen Reactions   • Ambien [Zolpidem Tartrate]    • Benadryl [Diphenhydramine]    • Penicillins        Past Surgical History   Procedure Laterality Date   • Appendectomy     • Injection of medication  2011     Aranesp   • Bypass graft  2015     Left brachial artery to axillary vein arteriovenous graft. Venous ultrasound unilateral extremity   •  section       x2   • Cholecystectomy     • Endoscopy and colonoscopy     • Colonoscopy w/ polypectomy  2015     Colitis found in the cecum. Biopsy taken. A diverticulum was found in the sigmoid colon.   • Removal peritoneal dialysis catheter  2014     Removal of tunneled hemodialysis catheter with cuff   • Transesophageal echocardiogram (hazel)   03/24/2016     Mild left atrial enlargement with mild to moderate CLVH with normal aortic root size.LV systolic function well preserved with Ef of 55-60%.Mitral valve thickened.Av thickened.Aortic sclerosis.Mild mitral regurg.mild to moderate tricuspid regurg.   • Transesophageal echocardiogram (hazel)  02/22/2012     Normal left ventricular systolic function. Moderate tricuspid regurgitation with evidenceof pulmonary hypertension   • Esophagoscopy / egd  06/17/2015     Normal esophagus. Gastritis found in the stomach. Biopsy taken. Duodenitis found in the duodenum. Biopsy take.   • Esophagoscopy / egd  1945     With tube-Esophagus appeared normal. Nonerosive gastritis. Duodenum appeared normal   • Injection of medication  06/06/2011     Kenalog   • Leg surgery  06/13/2000     Cancelled. Due to uncontrolled hypertension   • Back surgery       x2   • Other surgical history  07/07/2016     Tissue plasminogen activator catheter thrombolysis   • Hysterectomy         Family History   Problem Relation Age of Onset   • Coronary artery disease Other        Social History     Social History   • Marital status:      Spouse name: N/A   • Number of children: N/A   • Years of education: N/A     Social History Main Topics   • Smoking status: Former Smoker   • Smokeless tobacco: Never Used   • Alcohol use No   • Drug use: No   • Sexual activity: Not Asked     Other Topics Concern   • None     Social History Narrative           Objective   Physical Exam   Constitutional: She is oriented to person, place, and time. No distress.   Cardiovascular: Normal rate, regular rhythm, normal heart sounds and intact distal pulses.    Pulmonary/Chest: Effort normal and breath sounds normal.   Abdominal: Soft. Bowel sounds are normal.   Musculoskeletal: Normal range of motion. She exhibits no edema or tenderness.   Neurological: She is alert and oriented to person, place, and time.   Skin: Skin is warm and dry.        Psychiatric: She  has a normal mood and affect. Her behavior is normal. Judgment and thought content normal.   Nursing note and vitals reviewed.      Procedures         ED Course  ED Course   Comment By Time   I consulted Dr. Kraus regarding pt's condition, he recommended to call Dr. Fernandez, Per Dr. Fernandez, it is most likely a hematoma. Pt is on ASA and eliquis. He does not think US is helpful. He can f/u with pt in his office this week. APURVA Bose 03/19 1158   Dr. Kraus examed  pt and agreed it is hematoma, ok to d/c home. APURVA Bose 03/19 1304                  MDM  Number of Diagnoses or Management Options  Hematoma: new and does not require workup     Amount and/or Complexity of Data Reviewed  Discuss the patient with other providers: yes    Risk of Complications, Morbidity, and/or Mortality  Presenting problems: moderate  Diagnostic procedures: minimal  Management options: minimal    Patient Progress  Patient progress: stable      Final diagnoses:   Hematoma            APURVA Bose  03/19/17 1310

## 2017-03-21 ENCOUNTER — OUTSIDE FACILITY SERVICE (OUTPATIENT)
Dept: FAMILY MEDICINE CLINIC | Facility: CLINIC | Age: 72
End: 2017-03-21

## 2017-03-21 PROCEDURE — 99307 SBSQ NF CARE SF MDM 10: CPT | Performed by: FAMILY MEDICINE

## 2017-03-23 ENCOUNTER — OFFICE VISIT (OUTPATIENT)
Dept: CARDIAC SURGERY | Facility: CLINIC | Age: 72
End: 2017-03-23

## 2017-03-23 VITALS
HEART RATE: 69 BPM | SYSTOLIC BLOOD PRESSURE: 90 MMHG | DIASTOLIC BLOOD PRESSURE: 50 MMHG | HEIGHT: 62 IN | OXYGEN SATURATION: 93 % | WEIGHT: 116 LBS | BODY MASS INDEX: 21.35 KG/M2 | TEMPERATURE: 96.4 F

## 2017-03-23 DIAGNOSIS — Z99.2 END STAGE RENAL FAILURE ON DIALYSIS (HCC): Primary | ICD-10-CM

## 2017-03-23 DIAGNOSIS — N18.6 END STAGE RENAL FAILURE ON DIALYSIS (HCC): Primary | ICD-10-CM

## 2017-03-23 DIAGNOSIS — I77.0 ARTERIOVENOUS FISTULA (HCC): ICD-10-CM

## 2017-03-23 PROCEDURE — 99213 OFFICE O/P EST LOW 20 MIN: CPT | Performed by: NURSE PRACTITIONER

## 2017-03-23 NOTE — PROGRESS NOTES
Subjective   Patient ID: Efrem Roa is a 71 y.o. female is here today for follow-up.  NH called concerned about painful lump in LEFT thigh.    CC:  Painful lump in LEFT thigh.  VAS 10  Tender  Not pulsating   History of Present Illness  PCP:  Dr Perez  Nephrology:  Dr Mireles, [facility]    71yr woman with ESRD on hemodialysis, HTN, COPD, sleep apnea, Stroke, GERD, LEFT arm fracture 20yrs ago.  moderate chronic pain lower back, LEFT leg.  occasional bleeding after decannulation.  recent clotted graft, unable to declot.  possible central venous occlusion.  LEFT femoral tunnel catheter. Recent new AV fistula placement. . End stage renal disease, No clotted access or problems during dialysis,   L Femoral AV graft functioning well.  Noticed Saturday after Friday dialysis painful lump Left thigh.  Went to ED.  Lump has not resolved and Dialysis center and NH called requesting Ms Roa been seen.  She was directed to come in for evaluation.     7/29/2013 Upper extremity vein mapping:  RIGHT cephalic 1.3-4.5mm, basilic 2.5-3.5mm.  LEFT cephalic 2.0-3.3mm, basilic 2.1-3.2mm.  8/19/13: Placement RIGHT chest Tunnel Cath  10/15/13 RIGHT Brachial-Axillary Vein AV graft  11/26/13 RIGHT AV duplex: Anast dist 517, dist 446, mid 93, prx 146, Ax Anast 183. Lg hematoma prx arm >7cm  3/24/14: RIGHT AV duplex: Patent fistula. maxPSV 870cm/s (distal). Size 3.2-6.6mm. Flows 887-2394ml/m. Prx hematoma 3.96cm  7/30/14  RIGHT AV duplex: Patent fistula. maxPSV 869cm/s (distal). Size 9.3mm. Flows 2472ml/m.  elevated velocity at elbow level  1/6/15: LEFT Brachial-axillary AV artegraft  3/11/15: LEFT AV Duplex: Patent fistula. maxPSV 605cm/s. Size 1.7-6.9mm. Flows 165-1219ml/m.   8/3/2015 LEFT AV Duplex: Patent fistula. maxPSV 255cm/s. Size 2.1-7.5mm. Flows 193-1007ml/m.   2/2016 LEFT fistulagram:  fistula end of life.  tunnel cath placed.  10/4/16: LEFT Leg AV graft SFA-SFV  12/08/16  Left AV Fistula  Duplex:  Patent  fistula. maxPSV 446cm/s (arterial anast). Size 2.3-5.6mm. Flows 236-1319 ml/m.  01/03/16   Left AV Fistula  Duplex:  Patent fistula. maxPSV 586cm/s (arterial anast). Size 3.4-5.9mm. Flows 767-1567 ml/m.  02/23/17  Tunneled catheter removed.  03/23/17   Left AV Fistula  Duplex:  Patent fistula. maxPSV 398cm/s (mid ). Size 0.31-0.59 cm. Flows 549-1842 ml/m.  Sm pseudoaneurysm 1.5cm  Hematoma distal surrounding graft 5.64cm   The following portions of the patient's history were reviewed and updated as appropriate: allergies, current medications, past family history, past medical history, past social history, past surgical history and problem list.    Current Outpatient Prescriptions:   •  Apixaban (ELIQUIS PO), Take 1 tablet by mouth 2 (two) times a day., Disp: , Rfl:   •  aspirin 81 MG EC tablet, Take 81 mg by mouth daily., Disp: , Rfl:   •  atorvastatin (LIPITOR) 40 MG tablet, Take 40 mg by mouth every night., Disp: , Rfl:   •  B Complex-C-Folic Acid (NGA CAPS PO), Take 1 capsule by mouth daily., Disp: , Rfl:   •  Docusate Sodium (COLACE PO), Take 1 capsule by mouth 3 (three) times a day as needed., Disp: , Rfl:   •  fentaNYL (DURAGESIC) 25 MCG/HR patch, Place 1 patch on the skin every third day., Disp: , Rfl:   •  gabapentin (NEURONTIN) 100 MG capsule, Take 100 mg by mouth 3 (three) times a day., Disp: , Rfl:   •  hydrALAZINE (APRESOLINE) 100 MG tablet, Take 100 mg by mouth 3 (three) times a day., Disp: , Rfl:   •  HYDROcodone-acetaminophen (NORCO) 7.5-325 MG per tablet, Take 1 tablet by mouth Every 4 (Four) Hours As Needed for moderate pain (4-6) (for LLE pain post)., Disp: 50 tablet, Rfl: 0  •  hydrOXYzine (VISTARIL) 25 MG capsule, Take 25 mg by mouth 2 (Two) Times a Day As Needed., Disp: , Rfl:   •  levothyroxine (SYNTHROID, LEVOTHROID) 88 MCG tablet, Take 88 mcg by mouth daily., Disp: , Rfl:   •  LORAZEPAM PO, Take 1 tablet by mouth daily., Disp: , Rfl:   •  Mesalamine (APRISO PO), Take 1 capsule by mouth  daily., Disp: , Rfl:   •  nitroglycerin (NITROSTAT) 0.4 MG SL tablet, Place 0.4 mg under the tongue every 5 (five) minutes as needed for chest pain. Take no more than 3 doses in 15 minutes., Disp: , Rfl:   •  omeprazole (priLOSEC) 20 MG capsule, Take 20 mg by mouth Daily., Disp: , Rfl:   •  PARoxetine (PAXIL) 30 MG tablet, Take 30 mg by mouth Every Morning., Disp: , Rfl:   •  polyethylene glycol (MIRALAX) packet, Take 17 g by mouth 2 (two) times a day., Disp: , Rfl:   •  QUETIAPINE FUMARATE PO, Take 1 tablet by mouth every night., Disp: , Rfl:   •  vitamin D (ERGOCALCIFEROL) 72808 UNITS capsule capsule, Take 50,000 Units by mouth 1 (one) time per week., Disp: , Rfl:     Review of Systems   Constitution: Positive for weakness and malaise/fatigue. Negative for chills, decreased appetite and fever.   HENT: Negative for congestion, ear pain, headaches, hoarse voice, nosebleeds and sore throat.    Eyes: Negative for visual disturbance.   Cardiovascular: Positive for leg swelling. Negative for chest pain, claudication, cyanosis and dyspnea on exertion.   Respiratory: Positive for cough and shortness of breath. Negative for hemoptysis.    Hematologic/Lymphatic: Bruises/bleeds easily.   Skin: Positive for dry skin. Negative for color change, flushing, itching, poor wound healing and rash.   Musculoskeletal: Positive for arthritis, back pain and myalgias. Negative for falls, joint swelling and muscle cramps.   Gastrointestinal: Negative for abdominal pain, anorexia, change in bowel habit, hematemesis, melena and nausea.   Neurological: Negative for brief paralysis, focal weakness, numbness, paresthesias and sensory change.   Psychiatric/Behavioral: Negative for altered mental status.        Objective   Physical Exam   Constitutional: She is oriented to person, place, and time. She appears well-nourished.   HENT:   Head: Normocephalic.   Mouth/Throat: Oropharynx is clear and moist.   Eyes: Conjunctivae are normal. Pupils are  equal, round, and reactive to light.   Constricted    Neck: Neck supple. No JVD present.   Cardiovascular: Normal rate, regular rhythm, normal heart sounds and intact distal pulses.    Fistula Present LL Extremity: (Y)thrill/(Y)bruit/(Y)pulse. Aneurysmal (N). Water Hammer Pulse (Y). Thinning (N).  Flows <800ml/min (Y). Flows < previous (Y). Skin warm/dry/pink/intact (Y).   Pulmonary/Chest: Effort normal and breath sounds normal. No stridor. She has no wheezes.   Abdominal: Soft. Bowel sounds are normal.   Musculoskeletal: She exhibits edema (ankle pedal ) and tenderness (Left thigh hematoma site. ).   Neurological: She is alert and oriented to person, place, and time.   Skin: Skin is warm and dry. No rash noted. No erythema. No pallor.   Left thigh AV fistula well healed  Distal hematoma 5.6 cm circular  NON pulsating  + tender   Confirmed by US.   L.    Fistula +thrill bruit pulse.  Distal  pulse +  + sensation and mobility LEG   warm pink    Psychiatric: Judgment normal.   Nursing note and vitals reviewed.            Assessment/Plan   Independent Review of Radiographic Studies:    03/23/17   Left AV Fistula  Duplex:  Patent fistula. maxPSV 398cm/s (mid ). Size 0.31-0.59 cm. Flows 549-1842 ml/m.  Sm pseudoaneurysm 1.5cm  Hematoma distal surrounding graft 5.64cm   1. End stage renal failure on dialysis  Fistula functioning well with dialysis.  May continue to use fistula.  Access above hematoma.      2. Arteriovenous fistula  With hematoma.  Apply ice for 72 hrs then nothing for 24 hrs then mild heat.  Orders sent to NH.    Continue to monitor size of hematoma.   - Duplex Hemodialysis Access CAR    Follow up as scheduled in June.  Detailed discussion regarding risks, benefits, and treatment plan.  Patient understands, agrees, and wishes to proceed with plan.

## 2017-03-23 NOTE — PATIENT INSTRUCTIONS
Vascular test:  Fistula is open and flowing well.  Sm 1.5 cm pseudo mid thigh  Distal thigh hematoma  Not active  Stick above hematoma  ICE to area 4 times a day thru MOnday  Mild heat Tuesday 4 times a day to help absorption

## 2017-04-03 LAB
BH CV DIALYSIS ACCESS ARTERIAL ANASTOMOSIS DIAMETER MID: 0.31 CM
BH CV DIALYSIS ACCESS ARTERIAL ANASTOMOSIS FLOW VOLUME MID: 549.5 ML/MIN
BH CV DIALYSIS ACCESS ARTERIAL ANASTOMOSIS VELOCITY MID: 398.2 CM/SEC
BH CV DIALYSIS ACCESS GRAFT DIAMETER DISTAL: 0.58 CM
BH CV DIALYSIS ACCESS GRAFT DIAMETER MID DISTAL: 0.57 CM
BH CV DIALYSIS ACCESS GRAFT DIAMETER MID: 0.57 CM
BH CV DIALYSIS ACCESS GRAFT DIAMETER PROXIMAL MID: 0.59 CM
BH CV DIALYSIS ACCESS GRAFT DIAMETER PROXIMAL: 0.5 CM
BH CV DIALYSIS ACCESS GRAFT FLOW VOLUME DISTAL: 11.57 ML/MIN
BH CV DIALYSIS ACCESS GRAFT FLOW VOLUME MID DISTAL: 1027 ML/MIN
BH CV DIALYSIS ACCESS GRAFT FLOW VOLUME MID: 1048 ML/MIN
BH CV DIALYSIS ACCESS GRAFT FLOW VOLUME PROXIMAL MID: 1567 ML/MIN
BH CV DIALYSIS ACCESS GRAFT FLOW VOLUME PROXIMAL: 972.8 ML/MIN
BH CV DIALYSIS ACCESS GRAFT VELOCITY DISTAL: 160.3 CM/SEC
BH CV DIALYSIS ACCESS GRAFT VELOCITY MID DISTAL: 183.9 CM/SEC
BH CV DIALYSIS ACCESS GRAFT VELOCITY MID: 197.5 CM/SEC
BH CV DIALYSIS ACCESS GRAFT VELOCITY PROXIMAL MID: 249.7 CM/SEC
BH CV DIALYSIS ACCESS GRAFT VELOCITY PROXIMAL: 262.7 CM/SEC
BH CV DIALYSIS ACCESS VENOUS ANASTOMOSIS DIAMETER MID: 0.33 CM
BH CV DIALYSIS ACCESS VENOUS ANASTOMOSIS FLOW VOLUME MID: 1346 ML/MIN
BH CV DIALYSIS ACCESS VENOUS ANASTOMOSIS VELOCITY MID: 416.1 CM/SEC

## 2017-04-18 ENCOUNTER — OUTSIDE FACILITY SERVICE (OUTPATIENT)
Dept: FAMILY MEDICINE CLINIC | Facility: CLINIC | Age: 72
End: 2017-04-18

## 2017-04-18 PROCEDURE — 99309 SBSQ NF CARE MODERATE MDM 30: CPT | Performed by: FAMILY MEDICINE

## 2017-05-09 ENCOUNTER — OUTSIDE FACILITY SERVICE (OUTPATIENT)
Dept: FAMILY MEDICINE CLINIC | Facility: CLINIC | Age: 72
End: 2017-05-09

## 2017-05-09 PROCEDURE — 99307 SBSQ NF CARE SF MDM 10: CPT | Performed by: FAMILY MEDICINE

## 2017-06-06 ENCOUNTER — OFFICE VISIT (OUTPATIENT)
Dept: CARDIAC SURGERY | Facility: CLINIC | Age: 72
End: 2017-06-06

## 2017-06-06 VITALS
OXYGEN SATURATION: 97 % | HEART RATE: 66 BPM | HEIGHT: 62 IN | TEMPERATURE: 97.4 F | DIASTOLIC BLOOD PRESSURE: 70 MMHG | BODY MASS INDEX: 21.16 KG/M2 | WEIGHT: 115 LBS | SYSTOLIC BLOOD PRESSURE: 130 MMHG

## 2017-06-06 DIAGNOSIS — Z99.2 END STAGE RENAL FAILURE ON DIALYSIS (HCC): ICD-10-CM

## 2017-06-06 DIAGNOSIS — N18.6 END STAGE RENAL FAILURE ON DIALYSIS (HCC): ICD-10-CM

## 2017-06-06 DIAGNOSIS — I77.0 ARTERIOVENOUS FISTULA (HCC): Primary | ICD-10-CM

## 2017-06-06 PROCEDURE — 99213 OFFICE O/P EST LOW 20 MIN: CPT | Performed by: NURSE PRACTITIONER

## 2017-06-06 RX ORDER — LIDOCAINE AND PRILOCAINE 25; 25 MG/G; MG/G
CREAM TOPICAL
COMMUNITY
End: 2017-11-16 | Stop reason: HOSPADM

## 2017-06-06 NOTE — PATIENT INSTRUCTIONS
Fistula open and flowing well.  Sm inner pouching (pseudoaneuyrsm) at top portion  Please avoid sticking there.  Repeat 3 months to check   Continue dialysis as scheduled. If problems should arise including difficulty with access, high pressure alarms, or inability to complete dialysis please [facility] immediately for evaluation.

## 2017-06-12 NOTE — PROGRESS NOTES
Subjective   Patient ID: Efrem Roa is a 71 y.o. female is here today for scheduled follow-up for fistula evaluation.   CC:  Twisted/sprained LEFT ankle.  Denies any problems with dialysis.   History of Present Illness  PCP:  Dr Perez  Nephrology:  Dr Mireles, [facility]    71yr woman with ESRD on hemodialysis, HTN, COPD, sleep apnea, Stroke, GERD, LEFT arm fracture 20yrs ago.  moderate chronic pain lower back, LEFT leg.  occasional bleeding after decannulation.  recent clotted graft, unable to declot.  possible central venous occlusion.  LEFT femoral tunnel catheter. Recent new AV fistula placement. . End stage renal disease, No clotted access or problems during dialysis,   L Femoral AV graft functioning well.  Returning in scheduled FU.     7/29/2013 Upper extremity vein mapping:  RIGHT cephalic 1.3-4.5mm, basilic 2.5-3.5mm.  LEFT cephalic 2.0-3.3mm, basilic 2.1-3.2mm.  8/19/13: Placement RIGHT chest Tunnel Cath  10/15/13 RIGHT Brachial-Axillary Vein AV graft  11/26/13 RIGHT AV duplex: Anast dist 517, dist 446, mid 93, prx 146, Ax Anast 183. Lg hematoma prx arm >7cm  3/24/14: RIGHT AV duplex: Patent fistula. maxPSV 870cm/s (distal). Size 3.2-6.6mm. Flows 887-2394ml/m. Prx hematoma 3.96cm  7/30/14  RIGHT AV duplex: Patent fistula. maxPSV 869cm/s (distal). Size 9.3mm. Flows 2472ml/m.  elevated velocity at elbow level  1/6/15: LEFT Brachial-axillary AV artegraft  3/11/15: LEFT AV Duplex: Patent fistula. maxPSV 605cm/s. Size 1.7-6.9mm. Flows 165-1219ml/m.   8/3/2015 LEFT AV Duplex: Patent fistula. maxPSV 255cm/s. Size 2.1-7.5mm. Flows 193-1007ml/m.   2/2016 LEFT fistulagram:  fistula end of life.  tunnel cath placed.  10/4/16: LEFT Leg AV graft SFA-SFV  12/08/16  Left AV Fistula  Duplex:  Patent fistula. maxPSV 446cm/s (arterial anast). Size 2.3-5.6mm. Flows 236-1319 ml/m.  01/03/16   Left AV Fistula  Duplex:  Patent fistula. maxPSV 586cm/s (arterial anast). Size 3.4-5.9mm. Flows 767-1567  ml/m.  02/23/17  Tunneled catheter removed.  03/23/17   Left AV Fistula  Duplex:  Patent fistula. maxPSV 398cm/s (mid ). Size 0.31-0.59 cm. Flows 549-1842 ml/m.  Sm pseudoaneurysm 1.5cm  Hematoma distal surrounding graft 5.64cm   06/06/17  Left AV Fistula  Duplex:  Patent fistula. maxPSV 384cm/s (mid ). Size 0.31-0.59 cm. Flows 549-1842 ml/m.  Sm pseudoaneurysm 1.1cm  Hematoma distal surrounding graft 4.1cm, second hematoma 2.2 (distal)     The following portions of the patient's history were reviewed and updated as appropriate: allergies, current medications, past family history, past medical history, past social history, past surgical history and problem list.    Current Outpatient Prescriptions:   •  Apixaban (ELIQUIS PO), Take 1 tablet by mouth 2 (two) times a day., Disp: , Rfl:   •  aspirin 81 MG EC tablet, Take 81 mg by mouth daily., Disp: , Rfl:   •  atorvastatin (LIPITOR) 40 MG tablet, Take 40 mg by mouth every night., Disp: , Rfl:   •  B Complex-C-Folic Acid (NGA CAPS PO), Take 1 capsule by mouth daily., Disp: , Rfl:   •  Docusate Sodium (COLACE PO), Take 1 capsule by mouth 3 (three) times a day as needed., Disp: , Rfl:   •  fentaNYL (DURAGESIC) 25 MCG/HR patch, Place 1 patch on the skin every third day., Disp: , Rfl:   •  gabapentin (NEURONTIN) 100 MG capsule, Take 100 mg by mouth 3 (three) times a day., Disp: , Rfl:   •  hydrALAZINE (APRESOLINE) 100 MG tablet, Take 100 mg by mouth 3 (three) times a day., Disp: , Rfl:   •  HYDROcodone-acetaminophen (NORCO) 7.5-325 MG per tablet, Take 1 tablet by mouth Every 4 (Four) Hours As Needed for moderate pain (4-6) (for LLE pain post)., Disp: 50 tablet, Rfl: 0  •  hydrOXYzine (VISTARIL) 25 MG capsule, Take 25 mg by mouth 2 (Two) Times a Day As Needed., Disp: , Rfl:   •  levothyroxine (SYNTHROID, LEVOTHROID) 88 MCG tablet, Take 88 mcg by mouth daily., Disp: , Rfl:   •  lidocaine-prilocaine (EMLA) 2.5-2.5 % cream, Apply  topically Every 2 (Two) Hours As Needed for  Mild Pain (1-3) (as needed prior to dialysis)., Disp: , Rfl:   •  LORAZEPAM PO, Take 1 tablet by mouth daily., Disp: , Rfl:   •  Mesalamine (APRISO PO), Take 1 capsule by mouth daily., Disp: , Rfl:   •  nitroglycerin (NITROSTAT) 0.4 MG SL tablet, Place 0.4 mg under the tongue every 5 (five) minutes as needed for chest pain. Take no more than 3 doses in 15 minutes., Disp: , Rfl:   •  omeprazole (priLOSEC) 20 MG capsule, Take 20 mg by mouth Daily., Disp: , Rfl:   •  PARoxetine (PAXIL) 30 MG tablet, Take 30 mg by mouth Every Morning., Disp: , Rfl:   •  polyethylene glycol (MIRALAX) packet, Take 17 g by mouth 2 (two) times a day., Disp: , Rfl:   •  QUETIAPINE FUMARATE PO, Take 1 tablet by mouth every night., Disp: , Rfl:   •  vitamin D (ERGOCALCIFEROL) 82442 UNITS capsule capsule, Take 50,000 Units by mouth 1 (one) time per week., Disp: , Rfl:     Review of Systems   Constitution: Positive for weakness and malaise/fatigue. Negative for chills, decreased appetite and fever.   HENT: Negative for congestion, ear pain, headaches, hoarse voice, nosebleeds and sore throat.    Eyes: Negative for visual disturbance.   Cardiovascular: Positive for leg swelling. Negative for chest pain, claudication, cyanosis and dyspnea on exertion.        Fistula bleeding:  N   Fistula pain:Discomfort  Extremity pain:  N Extremity discoloration:  N   Extremity numbness/tingling:  N   Respiratory: Positive for cough and shortness of breath. Negative for hemoptysis.    Hematologic/Lymphatic: Bruises/bleeds easily.   Skin: Positive for dry skin. Negative for color change, flushing, itching, poor wound healing and rash.   Musculoskeletal: Positive for arthritis, back pain and myalgias. Negative for falls, joint swelling and muscle cramps.   Gastrointestinal: Negative for abdominal pain, anorexia, change in bowel habit, hematemesis, melena and nausea.   Neurological: Negative for brief paralysis, focal weakness, numbness, paresthesias and sensory  change.   Psychiatric/Behavioral: Negative for altered mental status.        Objective   Physical Exam   Constitutional: She is oriented to person, place, and time. She appears well-nourished.   HENT:   Head: Normocephalic.   Mouth/Throat: Oropharynx is clear and moist.   Eyes: Conjunctivae are normal. Pupils are equal, round, and reactive to light.   Constricted    Neck: Neck supple. No JVD present.   Cardiovascular: Normal rate, regular rhythm, normal heart sounds and intact distal pulses.    Fistula Present LL Extremity: (Y)thrill/(Y)bruit/(Y)pulse. Aneurysmal (N). Water Hammer Pulse (Y). Thinning (N).  Flows <800ml/min (Y). Flows < previous (N). Skin warm/dry/pink/intact (Y).   Pulmonary/Chest: Effort normal and breath sounds normal. No stridor. She has no wheezes.   Abdominal: Soft. Bowel sounds are normal.   Musculoskeletal: She exhibits edema (ankle pedal ) and tenderness (Left thigh hematoma site. ).   Neurological: She is alert and oriented to person, place, and time.   Skin: Skin is warm and dry. No rash noted. No erythema. No pallor.   Left thigh AV fistula well healed  Distal hematoma 4.1cm circular  NON pulsating.   Fistula +thrill bruit pulse.  Distal  pulse +  + sensation and mobility LEG   warm pink    Psychiatric: Judgment normal.   Nursing note and vitals reviewed.            Assessment/Plan   Independent Review of Radiographic Studies:    Left AV Fistula  Duplex:  Patent fistula. maxPSV 384cm/s (mid ). Size 0.31-0.59 cm. Flows 549-1842 ml/m.  Sm pseudoaneurysm 1.1cm  Hematoma distal surrounding graft 4.1cm, second hematoma 2.2 (distal)     1. End stage renal failure on dialysis  Fistula functioning well with dialysis.  May continue to use fistula.      2. Arteriovenous fistula  Hematoma improved.  Second sm hematoma at cannulation site.   Fistula open and flowing well.  Sm inner pouching (pseudoaneuyrsm) at top portion  Please avoid sticking there.  Repeat 3 months to check   Continue dialysis as  scheduled. If problems should arise including difficulty with access, high pressure alarms, or inability to complete dialysis please [facility] immediately for evaluation.

## 2017-06-15 ENCOUNTER — OUTSIDE FACILITY SERVICE (OUTPATIENT)
Dept: FAMILY MEDICINE CLINIC | Facility: CLINIC | Age: 72
End: 2017-06-15

## 2017-06-15 PROCEDURE — 99308 SBSQ NF CARE LOW MDM 20: CPT | Performed by: FAMILY MEDICINE

## 2017-08-15 ENCOUNTER — OUTSIDE FACILITY SERVICE (OUTPATIENT)
Dept: FAMILY MEDICINE CLINIC | Facility: CLINIC | Age: 72
End: 2017-08-15

## 2017-08-15 PROCEDURE — 99308 SBSQ NF CARE LOW MDM 20: CPT | Performed by: FAMILY MEDICINE

## 2017-09-12 ENCOUNTER — OUTSIDE FACILITY SERVICE (OUTPATIENT)
Dept: FAMILY MEDICINE CLINIC | Facility: CLINIC | Age: 72
End: 2017-09-12

## 2017-09-12 PROCEDURE — 99307 SBSQ NF CARE SF MDM 10: CPT | Performed by: FAMILY MEDICINE

## 2017-09-26 DIAGNOSIS — Z99.2 ESRD (END STAGE RENAL DISEASE) ON DIALYSIS (HCC): Primary | ICD-10-CM

## 2017-09-26 DIAGNOSIS — N18.6 ESRD (END STAGE RENAL DISEASE) ON DIALYSIS (HCC): Primary | ICD-10-CM

## 2017-09-27 PROBLEM — Z99.2 ESRD (END STAGE RENAL DISEASE) ON DIALYSIS (HCC): Status: ACTIVE | Noted: 2017-09-27

## 2017-09-27 PROBLEM — N18.6 ESRD (END STAGE RENAL DISEASE) ON DIALYSIS (HCC): Status: ACTIVE | Noted: 2017-09-27

## 2017-10-05 ENCOUNTER — HOSPITAL ENCOUNTER (OUTPATIENT)
Facility: HOSPITAL | Age: 72
Setting detail: HOSPITAL OUTPATIENT SURGERY
Discharge: NURSING, REHAB OR LONG-TERM CARE | End: 2017-10-05
Attending: THORACIC SURGERY (CARDIOTHORACIC VASCULAR SURGERY) | Admitting: THORACIC SURGERY (CARDIOTHORACIC VASCULAR SURGERY)

## 2017-10-05 VITALS
DIASTOLIC BLOOD PRESSURE: 64 MMHG | TEMPERATURE: 96.8 F | RESPIRATION RATE: 20 BRPM | HEART RATE: 77 BPM | BODY MASS INDEX: 19.88 KG/M2 | HEIGHT: 62 IN | SYSTOLIC BLOOD PRESSURE: 135 MMHG | WEIGHT: 108 LBS | OXYGEN SATURATION: 100 %

## 2017-10-05 DIAGNOSIS — N18.6 ESRD (END STAGE RENAL DISEASE) ON DIALYSIS (HCC): ICD-10-CM

## 2017-10-05 DIAGNOSIS — Z99.2 ESRD (END STAGE RENAL DISEASE) ON DIALYSIS (HCC): ICD-10-CM

## 2017-10-05 PROCEDURE — 36902 INTRO CATH DIALYSIS CIRCUIT: CPT | Performed by: THORACIC SURGERY (CARDIOTHORACIC VASCULAR SURGERY)

## 2017-10-05 PROCEDURE — 25010000002 MIDAZOLAM PER 1 MG: Performed by: THORACIC SURGERY (CARDIOTHORACIC VASCULAR SURGERY)

## 2017-10-05 PROCEDURE — 25010000002 FENTANYL CITRATE (PF) 100 MCG/2ML SOLUTION: Performed by: THORACIC SURGERY (CARDIOTHORACIC VASCULAR SURGERY)

## 2017-10-05 PROCEDURE — C1894 INTRO/SHEATH, NON-LASER: HCPCS | Performed by: THORACIC SURGERY (CARDIOTHORACIC VASCULAR SURGERY)

## 2017-10-05 PROCEDURE — 0 IOPAMIDOL 61 % SOLUTION: Performed by: THORACIC SURGERY (CARDIOTHORACIC VASCULAR SURGERY)

## 2017-10-05 PROCEDURE — 25010000002 HEPARIN (PORCINE) PER 1000 UNITS: Performed by: THORACIC SURGERY (CARDIOTHORACIC VASCULAR SURGERY)

## 2017-10-05 PROCEDURE — C1769 GUIDE WIRE: HCPCS | Performed by: THORACIC SURGERY (CARDIOTHORACIC VASCULAR SURGERY)

## 2017-10-05 PROCEDURE — C2623 CATH, TRANSLUMIN, DRUG-COAT: HCPCS | Performed by: THORACIC SURGERY (CARDIOTHORACIC VASCULAR SURGERY)

## 2017-10-05 RX ORDER — HEPARIN SODIUM 1000 [USP'U]/ML
INJECTION, SOLUTION INTRAVENOUS; SUBCUTANEOUS AS NEEDED
Status: DISCONTINUED | OUTPATIENT
Start: 2017-10-05 | End: 2017-10-05 | Stop reason: HOSPADM

## 2017-10-05 RX ORDER — ACETAMINOPHEN 325 MG/1
650 TABLET ORAL EVERY 4 HOURS PRN
Status: CANCELLED | OUTPATIENT
Start: 2017-10-05

## 2017-10-05 RX ORDER — HYDROCODONE BITARTRATE AND ACETAMINOPHEN 7.5; 325 MG/1; MG/1
1 TABLET ORAL EVERY 4 HOURS PRN
Status: CANCELLED | OUTPATIENT
Start: 2017-10-05 | End: 2017-10-15

## 2017-10-05 RX ORDER — MIRTAZAPINE 15 MG/1
15 TABLET, FILM COATED ORAL NIGHTLY
COMMUNITY
End: 2018-01-01

## 2017-10-05 RX ORDER — MIDAZOLAM HYDROCHLORIDE 1 MG/ML
INJECTION INTRAMUSCULAR; INTRAVENOUS AS NEEDED
Status: DISCONTINUED | OUTPATIENT
Start: 2017-10-05 | End: 2017-10-05 | Stop reason: HOSPADM

## 2017-10-05 RX ORDER — FAMOTIDINE 40 MG/1
40 TABLET, FILM COATED ORAL DAILY
COMMUNITY
End: 2018-01-01

## 2017-10-05 RX ORDER — FENTANYL CITRATE 50 UG/ML
INJECTION, SOLUTION INTRAMUSCULAR; INTRAVENOUS AS NEEDED
Status: DISCONTINUED | OUTPATIENT
Start: 2017-10-05 | End: 2017-10-05 | Stop reason: HOSPADM

## 2017-10-05 NOTE — H&P
Patient ID: Efrem Roa is a 71 y.o. female is here today for scheduled follow-up for fistula evaluation.   CC:  Twisted/sprained LEFT ankle.  Denies any problems with dialysis.   History of Present Illness  PCP:  Dr Perez  Nephrology:  Dr Mireles, [facility]     71yr woman with ESRD on hemodialysis, HTN, COPD, sleep apnea, Stroke, GERD, LEFT arm fracture 20yrs ago.  moderate chronic pain lower back, LEFT leg.  occasional bleeding after decannulation.  recent clotted graft, unable to declot.  possible central venous occlusion.  LEFT femoral tunnel catheter. Recent new AV fistula placement. . End stage renal disease, No clotted access or problems during dialysis,   L Femoral AV graft functioning well.  Returning in scheduled FU.      7/29/2013 Upper extremity vein mapping:  RIGHT cephalic 1.3-4.5mm, basilic 2.5-3.5mm.  LEFT cephalic 2.0-3.3mm, basilic 2.1-3.2mm.  8/19/13: Placement RIGHT chest Tunnel Cath  10/15/13 RIGHT Brachial-Axillary Vein AV graft  11/26/13 RIGHT AV duplex: Anast dist 517, dist 446, mid 93, prx 146, Ax Anast 183. Lg hematoma prx arm >7cm  3/24/14: RIGHT AV duplex: Patent fistula. maxPSV 870cm/s (distal). Size 3.2-6.6mm. Flows 887-2394ml/m. Prx hematoma 3.96cm  7/30/14  RIGHT AV duplex: Patent fistula. maxPSV 869cm/s (distal). Size 9.3mm. Flows 2472ml/m.  elevated velocity at elbow level  1/6/15: LEFT Brachial-axillary AV artegraft  3/11/15: LEFT AV Duplex: Patent fistula. maxPSV 605cm/s. Size 1.7-6.9mm. Flows 165-1219ml/m.   8/3/2015 LEFT AV Duplex: Patent fistula. maxPSV 255cm/s. Size 2.1-7.5mm. Flows 193-1007ml/m.   2/2016 LEFT fistulagram:  fistula end of life.  tunnel cath placed.  10/4/16: LEFT Leg AV graft SFA-SFV  12/08/16  Left AV Fistula  Duplex:  Patent fistula. maxPSV 446cm/s (arterial anast). Size 2.3-5.6mm. Flows 236-1319 ml/m.  01/03/16   Left AV Fistula  Duplex:  Patent fistula. maxPSV 586cm/s (arterial anast). Size 3.4-5.9mm. Flows 767-1567 ml/m.  02/23/17   Tunneled catheter removed.  03/23/17   Left AV Fistula  Duplex:  Patent fistula. maxPSV 398cm/s (mid ). Size 0.31-0.59 cm. Flows 549-1842 ml/m.  Sm pseudoaneurysm 1.5cm  Hematoma distal surrounding graft 5.64cm   06/06/17  Left AV Fistula  Duplex:  Patent fistula. maxPSV 384cm/s (mid ). Size 0.31-0.59 cm. Flows 549-1842 ml/m.  Sm pseudoaneurysm 1.1cm  Hematoma distal surrounding graft 4.1cm, second hematoma 2.2 (distal)      The following portions of the patient's history were reviewed and updated as appropriate: allergies, current medications, past family history, past medical history, past social history, past surgical history and problem list.     Current Outpatient Prescriptions:   •  Apixaban (ELIQUIS PO), Take 1 tablet by mouth 2 (two) times a day., Disp: , Rfl:   •  aspirin 81 MG EC tablet, Take 81 mg by mouth daily., Disp: , Rfl:   •  atorvastatin (LIPITOR) 40 MG tablet, Take 40 mg by mouth every night., Disp: , Rfl:   •  B Complex-C-Folic Acid (NGA CAPS PO), Take 1 capsule by mouth daily., Disp: , Rfl:   •  Docusate Sodium (COLACE PO), Take 1 capsule by mouth 3 (three) times a day as needed., Disp: , Rfl:   •  fentaNYL (DURAGESIC) 25 MCG/HR patch, Place 1 patch on the skin every third day., Disp: , Rfl:   •  gabapentin (NEURONTIN) 100 MG capsule, Take 100 mg by mouth 3 (three) times a day., Disp: , Rfl:   •  hydrALAZINE (APRESOLINE) 100 MG tablet, Take 100 mg by mouth 3 (three) times a day., Disp: , Rfl:   •  HYDROcodone-acetaminophen (NORCO) 7.5-325 MG per tablet, Take 1 tablet by mouth Every 4 (Four) Hours As Needed for moderate pain (4-6) (for LLE pain post)., Disp: 50 tablet, Rfl: 0  •  hydrOXYzine (VISTARIL) 25 MG capsule, Take 25 mg by mouth 2 (Two) Times a Day As Needed., Disp: , Rfl:   •  levothyroxine (SYNTHROID, LEVOTHROID) 88 MCG tablet, Take 88 mcg by mouth daily., Disp: , Rfl:   •  lidocaine-prilocaine (EMLA) 2.5-2.5 % cream, Apply  topically Every 2 (Two) Hours As Needed for Mild Pain (1-3)  (as needed prior to dialysis)., Disp: , Rfl:   •  LORAZEPAM PO, Take 1 tablet by mouth daily., Disp: , Rfl:   •  Mesalamine (APRISO PO), Take 1 capsule by mouth daily., Disp: , Rfl:   •  nitroglycerin (NITROSTAT) 0.4 MG SL tablet, Place 0.4 mg under the tongue every 5 (five) minutes as needed for chest pain. Take no more than 3 doses in 15 minutes., Disp: , Rfl:   •  omeprazole (priLOSEC) 20 MG capsule, Take 20 mg by mouth Daily., Disp: , Rfl:   •  PARoxetine (PAXIL) 30 MG tablet, Take 30 mg by mouth Every Morning., Disp: , Rfl:   •  polyethylene glycol (MIRALAX) packet, Take 17 g by mouth 2 (two) times a day., Disp: , Rfl:   •  QUETIAPINE FUMARATE PO, Take 1 tablet by mouth every night., Disp: , Rfl:   •  vitamin D (ERGOCALCIFEROL) 07364 UNITS capsule capsule, Take 50,000 Units by mouth 1 (one) time per week., Disp: , Rfl:      Review of Systems   Constitution: Positive for weakness and malaise/fatigue. Negative for chills, decreased appetite and fever.   HENT: Negative for congestion, ear pain, headaches, hoarse voice, nosebleeds and sore throat.    Eyes: Negative for visual disturbance.   Cardiovascular: Positive for leg swelling. Negative for chest pain, claudication, cyanosis and dyspnea on exertion.        Fistula bleeding:  N   Fistula pain:Discomfort  Extremity pain:  N Extremity discoloration:  N   Extremity numbness/tingling:  N   Respiratory: Positive for cough and shortness of breath. Negative for hemoptysis.    Hematologic/Lymphatic: Bruises/bleeds easily.   Skin: Positive for dry skin. Negative for color change, flushing, itching, poor wound healing and rash.   Musculoskeletal: Positive for arthritis, back pain and myalgias. Negative for falls, joint swelling and muscle cramps.   Gastrointestinal: Negative for abdominal pain, anorexia, change in bowel habit, hematemesis, melena and nausea.   Neurological: Negative for brief paralysis, focal weakness, numbness, paresthesias and sensory change.    Psychiatric/Behavioral: Negative for altered mental status.            Objective       Physical Exam   Constitutional: She is oriented to person, place, and time. She appears well-nourished.   HENT:   Head: Normocephalic.   Mouth/Throat: Oropharynx is clear and moist.   Eyes: Conjunctivae are normal. Pupils are equal, round, and reactive to light.   Constricted    Neck: Neck supple. No JVD present.   Cardiovascular: Normal rate, regular rhythm, normal heart sounds and intact distal pulses.    Fistula Present LL Extremity: (Y)thrill/(Y)bruit/(Y)pulse. Aneurysmal (N). Water Hammer Pulse (Y). Thinning (N).  Flows <800ml/min (Y). Flows < previous (N). Skin warm/dry/pink/intact (Y).   Pulmonary/Chest: Effort normal and breath sounds normal. No stridor. She has no wheezes.   Abdominal: Soft. Bowel sounds are normal.   Musculoskeletal: She exhibits edema (ankle pedal ) and tenderness (Left thigh hematoma site. ).   Neurological: She is alert and oriented to person, place, and time.   Skin: Skin is warm and dry. No rash noted. No erythema. No pallor.   Left thigh AV fistula well healed  Distal hematoma 4.1cm circular  NON pulsating.   Fistula +thrill bruit pulse.  Distal  pulse +  + sensation and mobility LEG   warm pink    Psychiatric: Judgment normal.   Nursing note and vitals reviewed.     Assessment/Plan      Independent Review of Radiographic Studies:    Left AV Fistula  Duplex:  Patent fistula. maxPSV 384cm/s (mid ). Size 0.31-0.59 cm. Flows 549-1842 ml/m.  Sm pseudoaneurysm 1.1cm  Hematoma distal surrounding graft 4.1cm, second hematoma 2.2 (distal)      1. End stage renal failure on dialysis  Fistula functioning well with dialysis.  May continue to use fistula.       2. Arteriovenous fistula  Sm inner pouching (pseudoaneuyrsm) at top portion  Please avoid sticking there.  Continue dialysis as scheduled. If problems should arise including difficulty with access, high pressure alarms, or inability to complete  dialysis please [facility] immediately for evaluation.   Detailed discussion with Efrem Roa regarding situation and options.  increased velocity by duplex ultrasound indicating significant stenosis and impending graft occlusion. and functional problems at dialysis (increased venous pressures, recirculation, low flows on circuit, difficulty with bleeding after treatment)    Risks including but not limited to bleeding, infection, blood vessel or nerve injury, inability to maintain patent graft, need for tunnel catheter placement, open revision of fistula.  Benefits:  maintenance of patent dialysis access.  Options:  surgical vs endovascular evaluation and treatment.  Understands and wishes to proceed.    LEFT fistulagram, possible thrombolysis, angioplasty, stent, tunnel cath.  Local/IVsedation.  SDS.  10/5/2017

## 2017-10-10 ENCOUNTER — OUTSIDE FACILITY SERVICE (OUTPATIENT)
Dept: FAMILY MEDICINE CLINIC | Facility: CLINIC | Age: 72
End: 2017-10-10

## 2017-10-10 PROCEDURE — 99307 SBSQ NF CARE SF MDM 10: CPT | Performed by: FAMILY MEDICINE

## 2017-10-17 ENCOUNTER — OUTSIDE FACILITY SERVICE (OUTPATIENT)
Dept: FAMILY MEDICINE CLINIC | Facility: CLINIC | Age: 72
End: 2017-10-17

## 2017-10-17 PROCEDURE — 99308 SBSQ NF CARE LOW MDM 20: CPT | Performed by: FAMILY MEDICINE

## 2017-10-30 ENCOUNTER — APPOINTMENT (OUTPATIENT)
Dept: CT IMAGING | Facility: HOSPITAL | Age: 72
End: 2017-10-30

## 2017-10-30 ENCOUNTER — APPOINTMENT (OUTPATIENT)
Dept: GENERAL RADIOLOGY | Facility: HOSPITAL | Age: 72
End: 2017-10-30

## 2017-10-30 ENCOUNTER — HOSPITAL ENCOUNTER (INPATIENT)
Facility: HOSPITAL | Age: 72
LOS: 17 days | Discharge: NURSING, REHAB OR LONG-TERM CARE | End: 2017-11-16
Attending: EMERGENCY MEDICINE | Admitting: INTERNAL MEDICINE

## 2017-10-30 DIAGNOSIS — J18.9 HCAP (HEALTHCARE-ASSOCIATED PNEUMONIA): Primary | ICD-10-CM

## 2017-10-30 DIAGNOSIS — Z74.09 IMPAIRED FUNCTIONAL MOBILITY, BALANCE, GAIT, AND ENDURANCE: ICD-10-CM

## 2017-10-30 DIAGNOSIS — I10 ESSENTIAL HYPERTENSION: ICD-10-CM

## 2017-10-30 DIAGNOSIS — Z78.9 IMPAIRED MOBILITY AND ADLS: ICD-10-CM

## 2017-10-30 DIAGNOSIS — Z78.9 IMPAIRED MOBILITY AND ACTIVITIES OF DAILY LIVING: ICD-10-CM

## 2017-10-30 DIAGNOSIS — Z74.09 IMPAIRED MOBILITY AND ADLS: ICD-10-CM

## 2017-10-30 DIAGNOSIS — N18.6 ESRD (END STAGE RENAL DISEASE) (HCC): ICD-10-CM

## 2017-10-30 DIAGNOSIS — I11.9 HYPERTENSIVE HEART DISEASE WITHOUT HEART FAILURE: ICD-10-CM

## 2017-10-30 DIAGNOSIS — R13.12 OROPHARYNGEAL DYSPHAGIA: ICD-10-CM

## 2017-10-30 DIAGNOSIS — I21.4 NSTEMI (NON-ST ELEVATED MYOCARDIAL INFARCTION) (HCC): ICD-10-CM

## 2017-10-30 DIAGNOSIS — Z74.09 IMPAIRED MOBILITY AND ACTIVITIES OF DAILY LIVING: ICD-10-CM

## 2017-10-30 DIAGNOSIS — A41.9 SEPSIS, DUE TO UNSPECIFIED ORGANISM: ICD-10-CM

## 2017-10-30 LAB
ALBUMIN SERPL-MCNC: 3.8 G/DL (ref 3.4–4.8)
ALBUMIN SERPL-MCNC: 3.9 G/DL (ref 3.4–4.8)
ALBUMIN/GLOB SERPL: 1 G/DL (ref 1.1–1.8)
ALP SERPL-CCNC: 141 U/L (ref 38–126)
ALT SERPL W P-5'-P-CCNC: 42 U/L (ref 9–52)
ANION GAP SERPL CALCULATED.3IONS-SCNC: 16 MMOL/L (ref 5–15)
ANION GAP SERPL CALCULATED.3IONS-SCNC: 19 MMOL/L (ref 5–15)
APTT PPP: 60.4 SECONDS (ref 20–40.3)
ARTERIAL PATENCY WRIST A: ABNORMAL
AST SERPL-CCNC: 86 U/L (ref 14–36)
ATMOSPHERIC PRESS: ABNORMAL MMHG
BASE EXCESS BLDA CALC-SCNC: -0.8 MMOL/L (ref -2.4–2.4)
BASOPHILS # BLD AUTO: 0.01 10*3/MM3 (ref 0–0.2)
BASOPHILS # BLD AUTO: 0.02 10*3/MM3 (ref 0–0.2)
BASOPHILS NFR BLD AUTO: 0.1 % (ref 0–2)
BASOPHILS NFR BLD AUTO: 0.1 % (ref 0–2)
BDY SITE: ABNORMAL
BILIRUB SERPL-MCNC: 0.7 MG/DL (ref 0.2–1.3)
BUN BLD-MCNC: 65 MG/DL (ref 7–21)
BUN BLD-MCNC: 65 MG/DL (ref 7–21)
BUN/CREAT SERPL: 13.1 (ref 7–25)
BUN/CREAT SERPL: 13.3 (ref 7–25)
CA-I BLD-MCNC: 4.3 MG/DL (ref 4.5–4.9)
CALCIUM SPEC-SCNC: 8.3 MG/DL (ref 8.4–10.2)
CALCIUM SPEC-SCNC: 8.4 MG/DL (ref 8.4–10.2)
CHLORIDE SERPL-SCNC: 96 MMOL/L (ref 95–110)
CHLORIDE SERPL-SCNC: 99 MMOL/L (ref 95–110)
CK MB SERPL-CCNC: 11.9 NG/ML (ref 0–5)
CK SERPL-CCNC: 674 U/L (ref 30–135)
CO2 BLDA-SCNC: 25.2 MMOL/L (ref 23–27)
CO2 SERPL-SCNC: 22 MMOL/L (ref 22–31)
CO2 SERPL-SCNC: 22 MMOL/L (ref 22–31)
CREAT BLD-MCNC: 4.88 MG/DL (ref 0.5–1)
CREAT BLD-MCNC: 4.98 MG/DL (ref 0.5–1)
D-LACTATE SERPL-SCNC: 1.1 MMOL/L (ref 0.5–2)
D-LACTATE SERPL-SCNC: 2.4 MMOL/L (ref 0.5–2)
DEPRECATED RDW RBC AUTO: 56.9 FL (ref 36.4–46.3)
DEPRECATED RDW RBC AUTO: 58.9 FL (ref 36.4–46.3)
EOSINOPHIL # BLD AUTO: 0 10*3/MM3 (ref 0–0.7)
EOSINOPHIL # BLD AUTO: 0 10*3/MM3 (ref 0–0.7)
EOSINOPHIL NFR BLD AUTO: 0 % (ref 0–7)
EOSINOPHIL NFR BLD AUTO: 0 % (ref 0–7)
ERYTHROCYTE [DISTWIDTH] IN BLOOD BY AUTOMATED COUNT: 16.9 % (ref 11.5–14.5)
ERYTHROCYTE [DISTWIDTH] IN BLOOD BY AUTOMATED COUNT: 17.2 % (ref 11.5–14.5)
GFR SERPL CREATININE-BSD FRML MDRD: 9 ML/MIN/1.73 (ref 39–90)
GFR SERPL CREATININE-BSD FRML MDRD: 9 ML/MIN/1.73 (ref 60–90)
GLOBULIN UR ELPH-MCNC: 4 GM/DL (ref 2.3–3.5)
GLUCOSE BLD-MCNC: 148 MG/DL (ref 60–100)
GLUCOSE BLD-MCNC: 152 MG/DL (ref 60–100)
GLUCOSE BLDA-MCNC: 153 MMOL/L
HCO3 BLDA-SCNC: 23.9 MMOL/L (ref 22–26)
HCT VFR BLD AUTO: 35.7 % (ref 35–45)
HCT VFR BLD AUTO: 37.8 % (ref 35–45)
HCT VFR BLD CALC: 37 % (ref 38–47)
HGB BLD-MCNC: 11.3 G/DL (ref 12–15.5)
HGB BLD-MCNC: 12.3 G/DL (ref 12–15.5)
HGB BLDA-MCNC: 12.5 G/DL (ref 12–16)
HOLD SPECIMEN: NORMAL
IMM GRANULOCYTES # BLD: 0.29 10*3/MM3 (ref 0–0.02)
IMM GRANULOCYTES # BLD: 0.54 10*3/MM3 (ref 0–0.02)
IMM GRANULOCYTES NFR BLD: 2.2 % (ref 0–0.5)
IMM GRANULOCYTES NFR BLD: 3.9 % (ref 0–0.5)
INR PPP: 1.86 (ref 0.8–1.2)
LIPASE SERPL-CCNC: <10 U/L (ref 23–300)
LYMPHOCYTES # BLD AUTO: 0.69 10*3/MM3 (ref 0.6–4.2)
LYMPHOCYTES # BLD AUTO: 0.73 10*3/MM3 (ref 0.6–4.2)
LYMPHOCYTES NFR BLD AUTO: 5.2 % (ref 10–50)
LYMPHOCYTES NFR BLD AUTO: 5.3 % (ref 10–50)
MCH RBC QN AUTO: 29.6 PG (ref 26.5–34)
MCH RBC QN AUTO: 30.1 PG (ref 26.5–34)
MCHC RBC AUTO-ENTMCNC: 31.7 G/DL (ref 31.4–36)
MCHC RBC AUTO-ENTMCNC: 32.5 G/DL (ref 31.4–36)
MCV RBC AUTO: 92.4 FL (ref 80–98)
MCV RBC AUTO: 93.5 FL (ref 80–98)
MODALITY: ABNORMAL
MONOCYTES # BLD AUTO: 0.92 10*3/MM3 (ref 0–0.9)
MONOCYTES # BLD AUTO: 0.94 10*3/MM3 (ref 0–0.9)
MONOCYTES NFR BLD AUTO: 6.6 % (ref 0–12)
MONOCYTES NFR BLD AUTO: 7.3 % (ref 0–12)
NEUTROPHILS # BLD AUTO: 11.01 10*3/MM3 (ref 2–8.6)
NEUTROPHILS # BLD AUTO: 11.71 10*3/MM3 (ref 2–8.6)
NEUTROPHILS NFR BLD AUTO: 84.2 % (ref 37–80)
NEUTROPHILS NFR BLD AUTO: 85.1 % (ref 37–80)
PCO2 BLDA: 40.1 MM HG (ref 35–45)
PH BLDA: 7.39 PH UNITS (ref 7.35–7.45)
PHOSPHATE SERPL-MCNC: 4.3 MG/DL (ref 2.4–4.4)
PLATELET # BLD AUTO: 208 10*3/MM3 (ref 150–450)
PLATELET # BLD AUTO: 217 10*3/MM3 (ref 150–450)
PMV BLD AUTO: 10.8 FL (ref 8–12)
PMV BLD AUTO: 11.2 FL (ref 8–12)
PO2 BLDA: 69.4 MM HG (ref 80–105)
POTASSIUM BLD-SCNC: 5.1 MMOL/L (ref 3.5–5.1)
POTASSIUM BLD-SCNC: 5.2 MMOL/L (ref 3.5–5.1)
POTASSIUM BLDA-SCNC: 5.13 MMOL/L (ref 3.6–4.9)
PROT SERPL-MCNC: 7.9 G/DL (ref 6.3–8.6)
PROTHROMBIN TIME: 21.5 SECONDS (ref 11.1–15.3)
RBC # BLD AUTO: 3.82 10*6/MM3 (ref 3.77–5.16)
RBC # BLD AUTO: 4.09 10*6/MM3 (ref 3.77–5.16)
SAO2 % BLDCOA: 93.1 % (ref 94–100)
SODIUM BLD-SCNC: 137 MMOL/L (ref 137–145)
SODIUM BLD-SCNC: 137 MMOL/L (ref 137–145)
SODIUM BLDA-SCNC: 135.2 MMOL/L (ref 138–146)
TROPONIN I SERPL-MCNC: 0.29 NG/ML
TROPONIN I SERPL-MCNC: 0.3 NG/ML
TROPONIN I SERPL-MCNC: 0.32 NG/ML
TROPONIN I SERPL-MCNC: 0.4 NG/ML
WBC NRBC COR # BLD: 12.94 10*3/MM3 (ref 3.2–9.8)
WBC NRBC COR # BLD: 13.92 10*3/MM3 (ref 3.2–9.8)
WHOLE BLOOD HOLD SPECIMEN: NORMAL

## 2017-10-30 PROCEDURE — 85610 PROTHROMBIN TIME: CPT | Performed by: EMERGENCY MEDICINE

## 2017-10-30 PROCEDURE — 70450 CT HEAD/BRAIN W/O DYE: CPT

## 2017-10-30 PROCEDURE — 63510000001 EPOETIN ALFA PER 1000 UNITS: Performed by: INTERNAL MEDICINE

## 2017-10-30 PROCEDURE — 70486 CT MAXILLOFACIAL W/O DYE: CPT

## 2017-10-30 PROCEDURE — 87040 BLOOD CULTURE FOR BACTERIA: CPT | Performed by: EMERGENCY MEDICINE

## 2017-10-30 PROCEDURE — 87150 DNA/RNA AMPLIFIED PROBE: CPT | Performed by: EMERGENCY MEDICINE

## 2017-10-30 PROCEDURE — 83605 ASSAY OF LACTIC ACID: CPT | Performed by: EMERGENCY MEDICINE

## 2017-10-30 PROCEDURE — 83690 ASSAY OF LIPASE: CPT | Performed by: EMERGENCY MEDICINE

## 2017-10-30 PROCEDURE — 94640 AIRWAY INHALATION TREATMENT: CPT

## 2017-10-30 PROCEDURE — 94760 N-INVAS EAR/PLS OXIMETRY 1: CPT

## 2017-10-30 PROCEDURE — 85025 COMPLETE CBC W/AUTO DIFF WBC: CPT | Performed by: INTERNAL MEDICINE

## 2017-10-30 PROCEDURE — 99285 EMERGENCY DEPT VISIT HI MDM: CPT

## 2017-10-30 PROCEDURE — 71010 HC CHEST PA OR AP: CPT

## 2017-10-30 PROCEDURE — 25010000002 LEVOFLOXACIN PER 250 MG: Performed by: EMERGENCY MEDICINE

## 2017-10-30 PROCEDURE — 80053 COMPREHEN METABOLIC PANEL: CPT | Performed by: EMERGENCY MEDICINE

## 2017-10-30 PROCEDURE — 87340 HEPATITIS B SURFACE AG IA: CPT | Performed by: INTERNAL MEDICINE

## 2017-10-30 PROCEDURE — 82553 CREATINE MB FRACTION: CPT | Performed by: EMERGENCY MEDICINE

## 2017-10-30 PROCEDURE — 96367 TX/PROPH/DG ADDL SEQ IV INF: CPT

## 2017-10-30 PROCEDURE — 87147 CULTURE TYPE IMMUNOLOGIC: CPT | Performed by: EMERGENCY MEDICINE

## 2017-10-30 PROCEDURE — 72125 CT NECK SPINE W/O DYE: CPT

## 2017-10-30 PROCEDURE — 93010 ELECTROCARDIOGRAM REPORT: CPT | Performed by: INTERNAL MEDICINE

## 2017-10-30 PROCEDURE — 94799 UNLISTED PULMONARY SVC/PX: CPT

## 2017-10-30 PROCEDURE — 87186 SC STD MICRODIL/AGAR DIL: CPT | Performed by: EMERGENCY MEDICINE

## 2017-10-30 PROCEDURE — 5A1D70Z PERFORMANCE OF URINARY FILTRATION, INTERMITTENT, LESS THAN 6 HOURS PER DAY: ICD-10-PCS | Performed by: INTERNAL MEDICINE

## 2017-10-30 PROCEDURE — 87077 CULTURE AEROBIC IDENTIFY: CPT | Performed by: EMERGENCY MEDICINE

## 2017-10-30 PROCEDURE — 83605 ASSAY OF LACTIC ACID: CPT | Performed by: INTERNAL MEDICINE

## 2017-10-30 PROCEDURE — 82550 ASSAY OF CK (CPK): CPT | Performed by: EMERGENCY MEDICINE

## 2017-10-30 PROCEDURE — 25010000002 VANCOMYCIN PER 500 MG: Performed by: EMERGENCY MEDICINE

## 2017-10-30 PROCEDURE — 85730 THROMBOPLASTIN TIME PARTIAL: CPT | Performed by: EMERGENCY MEDICINE

## 2017-10-30 PROCEDURE — 93005 ELECTROCARDIOGRAM TRACING: CPT | Performed by: EMERGENCY MEDICINE

## 2017-10-30 PROCEDURE — 85025 COMPLETE CBC W/AUTO DIFF WBC: CPT | Performed by: EMERGENCY MEDICINE

## 2017-10-30 PROCEDURE — 80069 RENAL FUNCTION PANEL: CPT | Performed by: INTERNAL MEDICINE

## 2017-10-30 PROCEDURE — 82803 BLOOD GASES ANY COMBINATION: CPT | Performed by: EMERGENCY MEDICINE

## 2017-10-30 PROCEDURE — 84484 ASSAY OF TROPONIN QUANT: CPT | Performed by: INTERNAL MEDICINE

## 2017-10-30 PROCEDURE — 96365 THER/PROPH/DIAG IV INF INIT: CPT

## 2017-10-30 PROCEDURE — 84484 ASSAY OF TROPONIN QUANT: CPT | Performed by: EMERGENCY MEDICINE

## 2017-10-30 RX ORDER — ASPIRIN 81 MG/1
81 TABLET ORAL DAILY
Status: DISCONTINUED | OUTPATIENT
Start: 2017-10-30 | End: 2017-11-16 | Stop reason: HOSPADM

## 2017-10-30 RX ORDER — IPRATROPIUM BROMIDE AND ALBUTEROL SULFATE 2.5; .5 MG/3ML; MG/3ML
3 SOLUTION RESPIRATORY (INHALATION)
Status: DISCONTINUED | OUTPATIENT
Start: 2017-10-30 | End: 2017-11-09

## 2017-10-30 RX ORDER — SODIUM CHLORIDE 9 MG/ML
30 INJECTION, SOLUTION INTRAVENOUS CONTINUOUS
Status: DISCONTINUED | OUTPATIENT
Start: 2017-10-30 | End: 2017-11-01

## 2017-10-30 RX ORDER — LEVOFLOXACIN 5 MG/ML
250 INJECTION, SOLUTION INTRAVENOUS ONCE
Status: COMPLETED | OUTPATIENT
Start: 2017-10-30 | End: 2017-10-30

## 2017-10-30 RX ORDER — POLYETHYLENE GLYCOL 3350 17 G/17G
17 POWDER, FOR SOLUTION ORAL 2 TIMES DAILY
Status: DISCONTINUED | OUTPATIENT
Start: 2017-10-30 | End: 2017-11-16 | Stop reason: HOSPADM

## 2017-10-30 RX ORDER — ACETAMINOPHEN 650 MG/1
650 SUPPOSITORY RECTAL ONCE
Status: COMPLETED | OUTPATIENT
Start: 2017-10-30 | End: 2017-10-30

## 2017-10-30 RX ORDER — SODIUM CHLORIDE 0.9 % (FLUSH) 0.9 %
10 SYRINGE (ML) INJECTION AS NEEDED
Status: DISCONTINUED | OUTPATIENT
Start: 2017-10-30 | End: 2017-11-16 | Stop reason: HOSPADM

## 2017-10-30 RX ORDER — LEVOTHYROXINE SODIUM 88 UG/1
88 TABLET ORAL DAILY
Status: DISCONTINUED | OUTPATIENT
Start: 2017-10-30 | End: 2017-11-16 | Stop reason: HOSPADM

## 2017-10-30 RX ORDER — ASPIRIN 300 MG/1
300 SUPPOSITORY RECTAL ONCE
Status: COMPLETED | OUTPATIENT
Start: 2017-10-30 | End: 2017-10-30

## 2017-10-30 RX ORDER — HYDROCODONE BITARTRATE AND ACETAMINOPHEN 7.5; 325 MG/1; MG/1
1 TABLET ORAL EVERY 4 HOURS PRN
Status: DISCONTINUED | OUTPATIENT
Start: 2017-10-30 | End: 2017-11-16 | Stop reason: HOSPADM

## 2017-10-30 RX ORDER — SODIUM CHLORIDE 9 MG/ML
65 INJECTION, SOLUTION INTRAVENOUS CONTINUOUS
Status: DISCONTINUED | OUTPATIENT
Start: 2017-10-30 | End: 2017-10-30

## 2017-10-30 RX ORDER — FENTANYL 25 UG/H
1 PATCH TRANSDERMAL
Status: DISCONTINUED | OUTPATIENT
Start: 2017-10-30 | End: 2017-10-30

## 2017-10-30 RX ORDER — GABAPENTIN 100 MG/1
100 CAPSULE ORAL 3 TIMES DAILY
Status: DISCONTINUED | OUTPATIENT
Start: 2017-10-30 | End: 2017-10-30

## 2017-10-30 RX ORDER — ATORVASTATIN CALCIUM 40 MG/1
40 TABLET, FILM COATED ORAL NIGHTLY
Status: DISCONTINUED | OUTPATIENT
Start: 2017-10-30 | End: 2017-11-16 | Stop reason: HOSPADM

## 2017-10-30 RX ORDER — HYDRALAZINE HYDROCHLORIDE 50 MG/1
100 TABLET, FILM COATED ORAL 3 TIMES DAILY
Status: DISCONTINUED | OUTPATIENT
Start: 2017-10-30 | End: 2017-11-06

## 2017-10-30 RX ORDER — FAMOTIDINE 40 MG/1
40 TABLET, FILM COATED ORAL DAILY
Status: DISCONTINUED | OUTPATIENT
Start: 2017-10-30 | End: 2017-11-16 | Stop reason: HOSPADM

## 2017-10-30 RX ORDER — NITROGLYCERIN 0.4 MG/1
0.4 TABLET SUBLINGUAL
Status: DISCONTINUED | OUTPATIENT
Start: 2017-10-30 | End: 2017-11-16 | Stop reason: HOSPADM

## 2017-10-30 RX ORDER — MIRTAZAPINE 15 MG/1
15 TABLET, FILM COATED ORAL NIGHTLY
Status: DISCONTINUED | OUTPATIENT
Start: 2017-10-30 | End: 2017-11-16 | Stop reason: HOSPADM

## 2017-10-30 RX ORDER — LEVOFLOXACIN 5 MG/ML
250 INJECTION, SOLUTION INTRAVENOUS EVERY 24 HOURS
Status: DISCONTINUED | OUTPATIENT
Start: 2017-10-31 | End: 2017-11-01

## 2017-10-30 RX ADMIN — VANCOMYCIN HYDROCHLORIDE 1000 MG: 1 INJECTION, POWDER, LYOPHILIZED, FOR SOLUTION INTRAVENOUS at 16:05

## 2017-10-30 RX ADMIN — ERYTHROPOIETIN 8000 UNITS: 10000 INJECTION, SOLUTION INTRAVENOUS; SUBCUTANEOUS at 19:12

## 2017-10-30 RX ADMIN — ASPIRIN 300 MG: 300 SUPPOSITORY RECTAL at 15:24

## 2017-10-30 RX ADMIN — IPRATROPIUM BROMIDE AND ALBUTEROL SULFATE 3 ML: 2.5; .5 SOLUTION RESPIRATORY (INHALATION) at 19:40

## 2017-10-30 RX ADMIN — SODIUM CHLORIDE 500 MG: 9 INJECTION, SOLUTION INTRAVENOUS at 15:43

## 2017-10-30 RX ADMIN — ACETAMINOPHEN 650 MG: 650 SUPPOSITORY RECTAL at 14:22

## 2017-10-30 RX ADMIN — SODIUM CHLORIDE 65 ML/HR: 9 INJECTION, SOLUTION INTRAVENOUS at 14:27

## 2017-10-30 RX ADMIN — IPRATROPIUM BROMIDE AND ALBUTEROL SULFATE 3 ML: 2.5; .5 SOLUTION RESPIRATORY (INHALATION) at 23:00

## 2017-10-30 RX ADMIN — SODIUM CHLORIDE 30 ML/HR: 900 INJECTION, SOLUTION INTRAVENOUS at 17:26

## 2017-10-30 RX ADMIN — LEVOFLOXACIN 250 MG: 5 INJECTION, SOLUTION INTRAVENOUS at 15:08

## 2017-10-31 ENCOUNTER — OUTSIDE FACILITY SERVICE (OUTPATIENT)
Dept: FAMILY MEDICINE CLINIC | Facility: CLINIC | Age: 72
End: 2017-10-31

## 2017-10-31 ENCOUNTER — APPOINTMENT (OUTPATIENT)
Dept: CARDIOLOGY | Facility: HOSPITAL | Age: 72
End: 2017-10-31
Attending: INTERNAL MEDICINE

## 2017-10-31 LAB
ALBUMIN SERPL-MCNC: 3.4 G/DL (ref 3.4–4.8)
ANION GAP SERPL CALCULATED.3IONS-SCNC: 12 MMOL/L (ref 5–15)
ANISOCYTOSIS BLD QL: ABNORMAL
BACTERIA BLD CULT: ABNORMAL
BASOPHILS # BLD MANUAL: 0.15 10*3/MM3 (ref 0–0.2)
BASOPHILS NFR BLD AUTO: 1 % (ref 0–2)
BUN BLD-MCNC: 31 MG/DL (ref 7–21)
BUN/CREAT SERPL: 10.8 (ref 7–25)
CALCIUM SPEC-SCNC: 8.6 MG/DL (ref 8.4–10.2)
CHLORIDE SERPL-SCNC: 99 MMOL/L (ref 95–110)
CO2 SERPL-SCNC: 28 MMOL/L (ref 22–31)
CREAT BLD-MCNC: 2.87 MG/DL (ref 0.5–1)
DEPRECATED RDW RBC AUTO: 56.8 FL (ref 36.4–46.3)
ERYTHROCYTE [DISTWIDTH] IN BLOOD BY AUTOMATED COUNT: 16.7 % (ref 11.5–14.5)
GFR SERPL CREATININE-BSD FRML MDRD: 16 ML/MIN/1.73 (ref 60–90)
GLUCOSE BLD-MCNC: 94 MG/DL (ref 60–100)
HCT VFR BLD AUTO: 32.8 % (ref 35–45)
HGB BLD-MCNC: 10.6 G/DL (ref 12–15.5)
HYPOCHROMIA BLD QL: ABNORMAL
LYMPHOCYTES # BLD MANUAL: 0.44 10*3/MM3 (ref 0.6–4.2)
LYMPHOCYTES NFR BLD MANUAL: 14 % (ref 0–12)
LYMPHOCYTES NFR BLD MANUAL: 3 % (ref 10–50)
MCH RBC QN AUTO: 29.8 PG (ref 26.5–34)
MCHC RBC AUTO-ENTMCNC: 32.3 G/DL (ref 31.4–36)
MCV RBC AUTO: 92.1 FL (ref 80–98)
METAMYELOCYTES NFR BLD MANUAL: 2 % (ref 0–0)
MONOCYTES # BLD AUTO: 2.06 10*3/MM3 (ref 0–0.9)
NEUTROPHILS # BLD AUTO: 11.64 10*3/MM3 (ref 2–8.6)
NEUTROPHILS NFR BLD MANUAL: 57 % (ref 37–80)
NEUTS BAND NFR BLD MANUAL: 22 % (ref 0–5)
OVALOCYTES BLD QL SMEAR: ABNORMAL
PHOSPHATE SERPL-MCNC: 3.5 MG/DL (ref 2.4–4.4)
PLATELET # BLD AUTO: 166 10*3/MM3 (ref 150–450)
PMV BLD AUTO: 10.3 FL (ref 8–12)
POLYCHROMASIA BLD QL SMEAR: ABNORMAL
POTASSIUM BLD-SCNC: 3.8 MMOL/L (ref 3.5–5.1)
RBC # BLD AUTO: 3.56 10*6/MM3 (ref 3.77–5.16)
SMALL PLATELETS BLD QL SMEAR: ADEQUATE
SODIUM BLD-SCNC: 139 MMOL/L (ref 137–145)
TARGETS BLD QL SMEAR: ABNORMAL
TROPONIN I SERPL-MCNC: 0.24 NG/ML
TROPONIN I SERPL-MCNC: 0.25 NG/ML
TROPONIN I SERPL-MCNC: 0.27 NG/ML
TROPONIN I SERPL-MCNC: 0.29 NG/ML
TROPONIN I SERPL-MCNC: 0.31 NG/ML
TROPONIN I SERPL-MCNC: 0.31 NG/ML
VARIANT LYMPHS NFR BLD MANUAL: 1 % (ref 0–5)
WBC MORPH BLD: NORMAL
WBC NRBC COR # BLD: 14.73 10*3/MM3 (ref 3.2–9.8)

## 2017-10-31 PROCEDURE — 84484 ASSAY OF TROPONIN QUANT: CPT | Performed by: INTERNAL MEDICINE

## 2017-10-31 PROCEDURE — 94799 UNLISTED PULMONARY SVC/PX: CPT

## 2017-10-31 PROCEDURE — 85007 BL SMEAR W/DIFF WBC COUNT: CPT | Performed by: INTERNAL MEDICINE

## 2017-10-31 PROCEDURE — 80069 RENAL FUNCTION PANEL: CPT | Performed by: INTERNAL MEDICINE

## 2017-10-31 PROCEDURE — 93306 TTE W/DOPPLER COMPLETE: CPT

## 2017-10-31 PROCEDURE — 25010000002 LEVOFLOXACIN PER 250 MG: Performed by: INTERNAL MEDICINE

## 2017-10-31 PROCEDURE — 94760 N-INVAS EAR/PLS OXIMETRY 1: CPT

## 2017-10-31 PROCEDURE — 85025 COMPLETE CBC W/AUTO DIFF WBC: CPT | Performed by: INTERNAL MEDICINE

## 2017-10-31 RX ADMIN — SODIUM CHLORIDE 30 ML/HR: 900 INJECTION, SOLUTION INTRAVENOUS at 18:35

## 2017-10-31 RX ADMIN — MIRTAZAPINE 15 MG: 15 TABLET, FILM COATED ORAL at 21:36

## 2017-10-31 RX ADMIN — IPRATROPIUM BROMIDE AND ALBUTEROL SULFATE 3 ML: 2.5; .5 SOLUTION RESPIRATORY (INHALATION) at 19:07

## 2017-10-31 RX ADMIN — APIXABAN 2.5 MG: 2.5 TABLET, FILM COATED ORAL at 18:32

## 2017-10-31 RX ADMIN — LEVOTHYROXINE SODIUM 88 MCG: 88 TABLET ORAL at 08:41

## 2017-10-31 RX ADMIN — APIXABAN 2.5 MG: 2.5 TABLET, FILM COATED ORAL at 08:19

## 2017-10-31 RX ADMIN — POLYETHYLENE GLYCOL 3350 17 G: 17 POWDER, FOR SOLUTION ORAL at 18:31

## 2017-10-31 RX ADMIN — HYDRALAZINE HYDROCHLORIDE 100 MG: 50 TABLET ORAL at 08:18

## 2017-10-31 RX ADMIN — ASPIRIN 81 MG: 81 TABLET, COATED ORAL at 08:18

## 2017-10-31 RX ADMIN — IPRATROPIUM BROMIDE AND ALBUTEROL SULFATE 3 ML: 2.5; .5 SOLUTION RESPIRATORY (INHALATION) at 14:19

## 2017-10-31 RX ADMIN — HYDROCODONE BITARTRATE AND ACETAMINOPHEN 1 TABLET: 7.5; 325 TABLET ORAL at 11:45

## 2017-10-31 RX ADMIN — SERTRALINE HYDROCHLORIDE 50 MG: 50 TABLET ORAL at 08:18

## 2017-10-31 RX ADMIN — ATORVASTATIN CALCIUM 40 MG: 40 TABLET, FILM COATED ORAL at 21:36

## 2017-10-31 RX ADMIN — POLYETHYLENE GLYCOL 3350 17 G: 17 POWDER, FOR SOLUTION ORAL at 08:18

## 2017-10-31 RX ADMIN — FAMOTIDINE 40 MG: 40 TABLET ORAL at 08:41

## 2017-10-31 RX ADMIN — IPRATROPIUM BROMIDE AND ALBUTEROL SULFATE 3 ML: 2.5; .5 SOLUTION RESPIRATORY (INHALATION) at 06:57

## 2017-10-31 RX ADMIN — IPRATROPIUM BROMIDE AND ALBUTEROL SULFATE 3 ML: 2.5; .5 SOLUTION RESPIRATORY (INHALATION) at 10:34

## 2017-10-31 RX ADMIN — HYDRALAZINE HYDROCHLORIDE 100 MG: 50 TABLET ORAL at 21:36

## 2017-10-31 RX ADMIN — IPRATROPIUM BROMIDE AND ALBUTEROL SULFATE 3 ML: 2.5; .5 SOLUTION RESPIRATORY (INHALATION) at 03:42

## 2017-10-31 RX ADMIN — IPRATROPIUM BROMIDE AND ALBUTEROL SULFATE 3 ML: 2.5; .5 SOLUTION RESPIRATORY (INHALATION) at 23:04

## 2017-10-31 RX ADMIN — LEVOFLOXACIN 250 MG: 5 INJECTION, SOLUTION INTRAVENOUS at 18:31

## 2017-11-01 LAB
ALBUMIN SERPL-MCNC: 3.3 G/DL (ref 3.4–4.8)
ANION GAP SERPL CALCULATED.3IONS-SCNC: 14 MMOL/L (ref 5–15)
BASOPHILS # BLD AUTO: 0.05 10*3/MM3 (ref 0–0.2)
BASOPHILS NFR BLD AUTO: 0.2 % (ref 0–2)
BH CV ECHO MEAS - BSA(HAYCOCK): 1.4 M^2
BH CV ECHO MEAS - BSA: 1.4 M^2
BH CV ECHO MEAS - BZI_BMI: 21.2 KILOGRAMS/M^2
BH CV ECHO MEAS - BZI_METRIC_HEIGHT: 152 CM
BH CV ECHO MEAS - BZI_METRIC_WEIGHT: 49 KG
BUN BLD-MCNC: 54 MG/DL (ref 7–21)
BUN/CREAT SERPL: 13.6 (ref 7–25)
CALCIUM SPEC-SCNC: 8.6 MG/DL (ref 8.4–10.2)
CHLORIDE SERPL-SCNC: 97 MMOL/L (ref 95–110)
CO2 SERPL-SCNC: 25 MMOL/L (ref 22–31)
CREAT BLD-MCNC: 3.98 MG/DL (ref 0.5–1)
DEPRECATED RDW RBC AUTO: 55.4 FL (ref 36.4–46.3)
EOSINOPHIL # BLD AUTO: 0 10*3/MM3 (ref 0–0.7)
EOSINOPHIL NFR BLD AUTO: 0 % (ref 0–7)
ERYTHROCYTE [DISTWIDTH] IN BLOOD BY AUTOMATED COUNT: 16.8 % (ref 11.5–14.5)
GFR SERPL CREATININE-BSD FRML MDRD: 11 ML/MIN/1.73 (ref 60–90)
GLUCOSE BLD-MCNC: 115 MG/DL (ref 60–100)
HBV SURFACE AG SERPL QL IA: NEGATIVE
HCT VFR BLD AUTO: 31.7 % (ref 35–45)
HGB BLD-MCNC: 10.1 G/DL (ref 12–15.5)
IMM GRANULOCYTES # BLD: 0.19 10*3/MM3 (ref 0–0.02)
IMM GRANULOCYTES NFR BLD: 0.8 % (ref 0–0.5)
LYMPHOCYTES # BLD AUTO: 0.36 10*3/MM3 (ref 0.6–4.2)
LYMPHOCYTES NFR BLD AUTO: 1.6 % (ref 10–50)
MAXIMAL PREDICTED HEART RATE: 147 BPM
MCH RBC QN AUTO: 29.1 PG (ref 26.5–34)
MCHC RBC AUTO-ENTMCNC: 31.9 G/DL (ref 31.4–36)
MCV RBC AUTO: 91.4 FL (ref 80–98)
MONOCYTES # BLD AUTO: 1.33 10*3/MM3 (ref 0–0.9)
MONOCYTES NFR BLD AUTO: 5.8 % (ref 0–12)
NEUTROPHILS # BLD AUTO: 21 10*3/MM3 (ref 2–8.6)
NEUTROPHILS NFR BLD AUTO: 91.6 % (ref 37–80)
PHOSPHATE SERPL-MCNC: 4.3 MG/DL (ref 2.4–4.4)
PLATELET # BLD AUTO: 191 10*3/MM3 (ref 150–450)
PMV BLD AUTO: 10.3 FL (ref 8–12)
POTASSIUM BLD-SCNC: 4.4 MMOL/L (ref 3.5–5.1)
RBC # BLD AUTO: 3.47 10*6/MM3 (ref 3.77–5.16)
SODIUM BLD-SCNC: 136 MMOL/L (ref 137–145)
STRESS TARGET HR: 125 BPM
WBC NRBC COR # BLD: 22.93 10*3/MM3 (ref 3.2–9.8)

## 2017-11-01 PROCEDURE — 63510000001 EPOETIN ALFA PER 1000 UNITS: Performed by: INTERNAL MEDICINE

## 2017-11-01 PROCEDURE — 94799 UNLISTED PULMONARY SVC/PX: CPT

## 2017-11-01 PROCEDURE — 85025 COMPLETE CBC W/AUTO DIFF WBC: CPT | Performed by: INTERNAL MEDICINE

## 2017-11-01 PROCEDURE — 80069 RENAL FUNCTION PANEL: CPT | Performed by: INTERNAL MEDICINE

## 2017-11-01 PROCEDURE — 94760 N-INVAS EAR/PLS OXIMETRY 1: CPT

## 2017-11-01 PROCEDURE — 25010000002 LEVOFLOXACIN PER 250 MG: Performed by: INTERNAL MEDICINE

## 2017-11-01 RX ORDER — LEVOFLOXACIN 5 MG/ML
250 INJECTION, SOLUTION INTRAVENOUS
Status: DISCONTINUED | OUTPATIENT
Start: 2017-11-03 | End: 2017-11-02

## 2017-11-01 RX ORDER — QUETIAPINE FUMARATE 25 MG/1
50 TABLET, FILM COATED ORAL NIGHTLY
Status: DISCONTINUED | OUTPATIENT
Start: 2017-11-01 | End: 2017-11-16 | Stop reason: HOSPADM

## 2017-11-01 RX ADMIN — LEVOFLOXACIN 250 MG: 5 INJECTION, SOLUTION INTRAVENOUS at 14:55

## 2017-11-01 RX ADMIN — APIXABAN 2.5 MG: 2.5 TABLET, FILM COATED ORAL at 12:13

## 2017-11-01 RX ADMIN — Medication 10 ML: at 17:28

## 2017-11-01 RX ADMIN — IPRATROPIUM BROMIDE AND ALBUTEROL SULFATE 3 ML: 2.5; .5 SOLUTION RESPIRATORY (INHALATION) at 14:37

## 2017-11-01 RX ADMIN — SERTRALINE HYDROCHLORIDE 50 MG: 50 TABLET ORAL at 12:13

## 2017-11-01 RX ADMIN — IPRATROPIUM BROMIDE AND ALBUTEROL SULFATE 3 ML: 2.5; .5 SOLUTION RESPIRATORY (INHALATION) at 02:51

## 2017-11-01 RX ADMIN — POLYETHYLENE GLYCOL 3350 17 G: 17 POWDER, FOR SOLUTION ORAL at 12:20

## 2017-11-01 RX ADMIN — APIXABAN 2.5 MG: 2.5 TABLET, FILM COATED ORAL at 17:39

## 2017-11-01 RX ADMIN — ERYTHROPOIETIN 6000 UNITS: 10000 INJECTION, SOLUTION INTRAVENOUS; SUBCUTANEOUS at 09:49

## 2017-11-01 RX ADMIN — MIRTAZAPINE 15 MG: 15 TABLET, FILM COATED ORAL at 20:44

## 2017-11-01 RX ADMIN — LEVOTHYROXINE SODIUM 88 MCG: 88 TABLET ORAL at 12:12

## 2017-11-01 RX ADMIN — ASPIRIN 81 MG: 81 TABLET, COATED ORAL at 12:16

## 2017-11-01 RX ADMIN — QUETIAPINE FUMARATE 50 MG: 25 TABLET, FILM COATED ORAL at 20:44

## 2017-11-01 RX ADMIN — HYDRALAZINE HYDROCHLORIDE 100 MG: 50 TABLET ORAL at 20:44

## 2017-11-01 RX ADMIN — IPRATROPIUM BROMIDE AND ALBUTEROL SULFATE 3 ML: 2.5; .5 SOLUTION RESPIRATORY (INHALATION) at 22:44

## 2017-11-01 RX ADMIN — HYDRALAZINE HYDROCHLORIDE 100 MG: 50 TABLET ORAL at 17:38

## 2017-11-01 RX ADMIN — ATORVASTATIN CALCIUM 40 MG: 40 TABLET, FILM COATED ORAL at 20:44

## 2017-11-01 RX ADMIN — IPRATROPIUM BROMIDE AND ALBUTEROL SULFATE 3 ML: 2.5; .5 SOLUTION RESPIRATORY (INHALATION) at 07:33

## 2017-11-01 RX ADMIN — FAMOTIDINE 40 MG: 40 TABLET ORAL at 12:12

## 2017-11-01 NOTE — PROGRESS NOTES
[facility] Medicine Services  INPATIENT PROGRESS NOTE    Length of Stay: 2  Date of Admission: 10/30/2017  Primary Care Physician: Rogers Perez MD    Subjective   Chief Complaint: back pain  HPI:  72 year old  female with past medical history of ESRD on HD, Bipolar disorder, multi-infarct dementia, CVA, Type 2 DM, HTN, Hyperlipidemia, Paroxysmal atrial fibrillation, chronic pain, CAD who presented from nursing home with altered mental status and shortness of breath.  She was noted to have fever of 101.6 as well in dialysis and was sent to hospital for further workup.  She is currently being treated for HCAP as well as MRSA bacteremia which was noted on blood cultures this morning.  The patient has no complaints during today's visit other than her chronic back and hip pain.  She remains confused but is much more alert than was documented by previous providers.      Review of Systems   Unable to perform ROS: Dementia        All pertinent negatives and positives are as above. All other systems have been reviewed and are negative unless otherwise stated.     Objective    Temp:  [97.8 °F (36.6 °C)] 97.8 °F (36.6 °C)  Heart Rate:  [] 108  Resp:  [16-20] 20  BP: (106-158)/(54-82) 158/65    Physical Exam   HENT:   Head: Normocephalic.   Eyes: Conjunctivae are normal.   Neck: Neck supple.   Cardiovascular: Normal rate, normal heart sounds and intact distal pulses.    Pulmonary/Chest: Effort normal. She has decreased breath sounds in the right lower field and the left lower field.   Abdominal: Soft. Bowel sounds are normal. She exhibits no distension. There is no tenderness.   Musculoskeletal: Normal range of motion. She exhibits no edema.   Neurological: She is alert.   Oriented to person only    Skin: Skin is warm and dry. There is pallor.   Psychiatric: She has a normal mood and affect.   Vitals reviewed.          Results Review:  I have reviewed the labs,  radiology results, and diagnostic studies.    Laboratory Data:     Results from last 7 days  Lab Units 11/01/17  0610 10/31/17  0208 10/30/17  1349   SODIUM mmol/L 136* 139 137  137   SODIUM, ARTERIAL mmol/L  --   --  135.2*   POTASSIUM mmol/L 4.4 3.8 5.2*  5.1   CHLORIDE mmol/L 97 99 99  96   CO2 mmol/L 25.0 28.0 22.0  22.0   BUN mg/dL 54* 31* 65*  65*   CREATININE mg/dL 3.98* 2.87* 4.98*  4.88*   GLUCOSE mg/dL 115* 94 148*  152*   GLUCOSE, ARTERIAL mmol/L  --   --  153   CALCIUM mg/dL 8.6 8.6 8.3*  8.4   BILIRUBIN mg/dL  --   --  0.7   ALK PHOS U/L  --   --  141*   ALT (SGPT) U/L  --   --  42   AST (SGOT) U/L  --   --  86*   ANION GAP mmol/L 14.0 12.0 16.0*  19.0*     Estimated Creatinine Clearance: 10.1 mL/min (by C-G formula based on Cr of 3.98).    Results from last 7 days  Lab Units 11/01/17  0610 10/31/17  0208 10/30/17  1349   PHOSPHORUS mg/dL 4.3 3.5 4.3           Results from last 7 days  Lab Units 11/01/17  0610 10/31/17  0208 10/30/17  1922 10/30/17  1349   WBC 10*3/mm3 22.93* 14.73* 12.94* 13.92*   HEMOGLOBIN g/dL 10.1* 10.6* 12.3 11.3*   HEMATOCRIT % 31.7* 32.8* 37.8 35.7   PLATELETS 10*3/mm3 191 166 217 208       Results from last 7 days  Lab Units 10/30/17  1349   INR  1.86*       Culture Data:   Blood Culture   Date Value Ref Range Status   10/30/2017 No growth at 24 hours  Preliminary   10/30/2017 Staphylococcus aureus, MRSA (C)  Preliminary     Comment:       Methicillin resistant Staphylococcus aureus, Patient may be an isolation risk.     No results found for: URINECX  No results found for: RESPCX  No results found for: WOUNDCX  No results found for: STOOLCX  No components found for: BODYFLD    Radiology Data:   Imaging Results (last 24 hours)     ** No results found for the last 24 hours. **          I have reviewed the patient current medications.     Assessment/Plan     1. MRSA bacteremia: Vancomycin per pharmacy.  Medication to be dosed post dialysis.  Follow cultures.   2. HCAP:  Vancomycin per pharmacy, Levaquin.  Levaquin dose adjusted per pharmacy recommendations r/t renal function.   3. ESRD on HD: Dr. Mireles following.  Dialysis schedule on Monday, Wednesday, Fridays.  4. HTN: Hydralazine  5. CAD: ASA, statin,   6. Hx Multi-infarct dementia  7. Hx Bipolar disorder:   8. Hyperlipidemia: Lipitor  9. Type 2 DM: FSBS AC and HS with SSI.    10. Hypothyroidism: continue home dose of Synthroid  11. Paroxysmal atrial fibrillation: rate controlled.  Currently on Eliquis for anticoagulation.       Discharge Planning: Discharge plan will depend upon when clearance of MRSA from blood cultures is achieved.  Patient will require at least 14 days of IV antibiotics after first negative blood culture.         This document has been electronically signed by ELI Sylvester on November 1, 2017 3:01 PM

## 2017-11-01 NOTE — PROGRESS NOTES
"Pharmacokinetics by Pharmacy - Vancomycin    Efrem Roa is a 72 y.o. female  [Ht: 60\" (152.4 cm); Wt: 111 lb 8 oz (50.6 kg)]    Estimated Creatinine Clearance: 10.1 mL/min (by C-G formula based on Cr of 3.98).   Lab Results   Component Value Date    CREATININE 3.98 (H) 11/01/2017    CREATININE 2.87 (H) 10/31/2017    CREATININE 4.88 (H) 10/30/2017    CREATININE 4.98 (H) 10/30/2017      Lab Results   Component Value Date    WBC 22.93 (H) 11/01/2017    WBC 14.73 (H) 10/31/2017    WBC 12.94 (H) 10/30/2017      Temp Readings from Last 1 Encounters:   11/01/17 97.8 °F (36.6 °C) (Temporal Artery )      Lab Results   Component Value Date    VANCORANDOM 18.0 03/29/2016         Culture Results:  Microbiology Results (last 10 days)       Procedure Component Value - Date/Time      Blood Culture - Blood, [[id number]]  (Normal) Collected:  10/30/17 1505    Lab Status:  Preliminary result Specimen:  Blood from Arm, Right Updated:  10/31/17 1516     Blood Culture No growth at 24 hours    Blood Culture - Blood, [[id number]]  (Abnormal) Collected:  10/30/17 1349    Lab Status:  Preliminary result Specimen:  Blood from Arm, Right Updated:  11/01/17 0648     Blood Culture --      Staphylococcus aureus, MRSA (C)        Methicillin resistant Staphylococcus aureus, Patient may be an isolation risk.        Isolated from Anaerobic Bottle     Gram Stain Result Anaerobic Bottle Gram positive cocci in clusters      1 positive of 4 drawn       Blood Culture ID, PCR - Blood, [[id number]]  (Abnormal) Collected:  10/30/17 1349    Lab Status:  Final result Specimen:  Blood from Arm, Right Updated:  10/31/17 1522     BCID, PCR Staphylococcus aureus. mecA (methicillin resistance gene) detected. Identification by BCID PCR. (C)    Narrative:         Sensitivity to Follow Lab Comment: CONTACT PRECAUTIONS INITIATED    Multi Drug Resistant Organism                  Indication for use: MRSA pna    Current Vancomycin Dose:  pulse dosing with HD, day 3 of " therapy.      Assessment/Plan:  Pharmacy consult added today for vancomycin dosing. Patient received a 1000 mg dose on 10/30 @ 1600. Last HD session was on 10/30, per progress notes. Will get a random tomorrow morning with AM labs and dose appropriately.     Pharmacy will continue to monitor renal function and adjust dose accordingly.    Jonatan Harding RPH   11/01/17 3:04 PM

## 2017-11-01 NOTE — PLAN OF CARE
Problem: Sepsis (Adult)  Goal: Signs and Symptoms of Listed Potential Problems Will be Absent or Manageable (Sepsis)  Outcome: Ongoing (interventions implemented as appropriate)    Problem: Fall Risk (Adult)  Goal: Absence of Falls  Outcome: Ongoing (interventions implemented as appropriate)    Problem: Renal Replacement, Continuous (Adult)  Goal: Signs and Symptoms of Listed Potential Problems Will be Absent or Manageable (Renal Replacement, Continuous)  Outcome: Ongoing (interventions implemented as appropriate)    Problem: Patient Care Overview (Adult)  Goal: Plan of Care Review  Outcome: Ongoing (interventions implemented as appropriate)    11/01/17 0358   Coping/Psychosocial Response Interventions   Plan Of Care Reviewed With patient   Patient Care Overview   Progress improving   Outcome Evaluation   Outcome Summary/Follow up Plan Pt rested well throughout the shift; no s/s pain or distress noted; pt requested seroquel and zoloft be added to her PRN medication list; vital signs stable; will continue to monitor       Goal: Adult Individualization and Mutuality  Outcome: Ongoing (interventions implemented as appropriate)  Goal: Discharge Needs Assessment  Outcome: Ongoing (interventions implemented as appropriate)

## 2017-11-01 NOTE — PROGRESS NOTES
Nephrology Progress Note:    Patient had hemodialysis earlier today.  She states feeling better.  States cough with no significant expectoration.  Appetite is poor.  No nausea or vomiting.  Blood cultures are growing Staphylococcus aureus.    Patient Active Problem List   Diagnosis   • Constipation   • Arteriovenous fistula   • End stage renal failure on dialysis   • Controlled type 2 diabetes mellitus with chronic kidney disease on chronic dialysis, without long-term current use of insulin   • Coronary artery disease involving native coronary artery of native heart without angina pectoris   • Panlobular emphysema   • ESRD (end stage renal disease) on dialysis   • HCAP (healthcare-associated pneumonia)       Medications:    apixaban 2.5 mg Oral BID   aspirin 81 mg Oral Daily   atorvastatin 40 mg Oral Nightly   famotidine 40 mg Oral Daily   hydrALAZINE 100 mg Oral TID   ipratropium-albuterol 3 mL Nebulization Q4H - RT   [START ON 11/3/2017] levoFLOXacin 250 mg Intravenous Q48H   levothyroxine 88 mcg Oral Daily   mirtazapine 15 mg Oral Nightly   polyethylene glycol 17 g Oral BID   sertraline 50 mg Oral Daily       Pharmacy to dose vancomycin     sodium chloride 30 mL/hr Last Rate: 30 mL/hr (10/31/17 1835)     Vitals:    11/01/17 0733 11/01/17 0753 11/01/17 1437 11/01/17 1632   BP:    113/48   BP Location:    Right leg   Patient Position:    Lying   Pulse: 100 98 108 98   Resp: 16  20 18   Temp:    99.6 °F (37.6 °C)   TempSrc:    Oral   SpO2: 93%  92% 97%   Weight:       Height:         I/O last 3 completed shifts:  In: 1080 [P.O.:540; I.V.:540]  Out: 1800 [Other:1800]  I/O this shift:  In: -   Out: 1700 [Other:1700]    On examination:  General:Comfortable, alert and oriented, lying down in bed  HEENT: Pallor, no icterus, eye movements are normal.  Chest: Decreased breath sounds at the lung bases.  Occasional wheeze at the left lung base.  CVS: Heart sounds are irregular, no pericardial rub or gallop  Abdomen: Abdomen  is soft, nontender, normal bowel sounds  Extremities: No edema of the lower extremities.  Left thigh AV graft  Neuro: No change in neurological status.  Grade 4 power  Mentation: She is alert and oriented    Past medical illness, social history, medications, previous notes reviewed.       Laboratory results:      Recent Results (from the past 24 hour(s))   Renal Function Panel    Collection Time: 11/01/17  6:10 AM   Result Value Ref Range    Glucose 115 (H) 60 - 100 mg/dL    BUN 54 (H) 7 - 21 mg/dL    Creatinine 3.98 (H) 0.50 - 1.00 mg/dL    Sodium 136 (L) 137 - 145 mmol/L    Potassium 4.4 3.5 - 5.1 mmol/L    Chloride 97 95 - 110 mmol/L    CO2 25.0 22.0 - 31.0 mmol/L    Calcium 8.6 8.4 - 10.2 mg/dL    Albumin 3.30 (L) 3.40 - 4.80 g/dL    Phosphorus 4.3 2.4 - 4.4 mg/dL    Anion Gap 14.0 5.0 - 15.0 mmol/L    BUN/Creatinine Ratio 13.6 7.0 - 25.0    eGFR Non African Amer 11 (L) >60 mL/min/1.73   CBC Auto Differential    Collection Time: 11/01/17  6:10 AM   Result Value Ref Range    WBC 22.93 (H) 3.20 - 9.80 10*3/mm3    RBC 3.47 (L) 3.77 - 5.16 10*6/mm3    Hemoglobin 10.1 (L) 12.0 - 15.5 g/dL    Hematocrit 31.7 (L) 35.0 - 45.0 %    MCV 91.4 80.0 - 98.0 fL    MCH 29.1 26.5 - 34.0 pg    MCHC 31.9 31.4 - 36.0 g/dL    RDW 16.8 (H) 11.5 - 14.5 %    RDW-SD 55.4 (H) 36.4 - 46.3 fl    MPV 10.3 8.0 - 12.0 fL    Platelets 191 150 - 450 10*3/mm3    Neutrophil % 91.6 (H) 37.0 - 80.0 %    Lymphocyte % 1.6 (L) 10.0 - 50.0 %    Monocyte % 5.8 0.0 - 12.0 %    Eosinophil % 0.0 0.0 - 7.0 %    Basophil % 0.2 0.0 - 2.0 %    Immature Grans % 0.8 (H) 0.0 - 0.5 %    Neutrophils, Absolute 21.00 (H) 2.00 - 8.60 10*3/mm3    Lymphocytes, Absolute 0.36 (L) 0.60 - 4.20 10*3/mm3    Monocytes, Absolute 1.33 (H) 0.00 - 0.90 10*3/mm3    Eosinophils, Absolute 0.00 0.00 - 0.70 10*3/mm3    Basophils, Absolute 0.05 0.00 - 0.20 10*3/mm3    Immature Grans, Absolute 0.19 (H) 0.00 - 0.02 10*3/mm3   ]    Imaging Results (last 24 hours)     ** No results found  for the last 24 hours. **            Assessment:     End-stage renal disease  Left lower lobe pneumonia  Staphylococcal bacteremia  Altered mental status, improved  Essential hypertension  Febrile illness  Type 2 diabetes mellitus    Plan:     ESRD:  Patient had hemodialysis today.  Potassium and fluid status are stable.  We are using left thigh AV graft.  No signs or symptoms of infection around the graft site.  Patient has staphylococcal bacteremia, and access site to be closely monitored.  Next    Staphylococcal bacteremia, left lower lobe pneumonia.  Patient is on IV antibiotics.  I have discussed the dosing of vancomycin with the primary team.  Repeat cultures need to be monitored.  If persistent bacteremia, the left thigh AV graft site may need to be evaluated.    Pneumonia: On IV antibiotic.  Follow cultures.    Altered mental status, delirium.  Much improved.  Imaging studies were negative.      Anemia of chronic kidney disease: Hemoglobin and hematocrit levels need to be closely monitored with acute illness.  Patient is on erythropoietin injections with dialysis.    Once oral intake is resumed, discontinue IV fluids.    Essential hypertension: Patient is on hydralazine.  Blood pressure readings are stable.      Chi Holden MD

## 2017-11-02 ENCOUNTER — APPOINTMENT (OUTPATIENT)
Dept: CT IMAGING | Facility: HOSPITAL | Age: 72
End: 2017-11-02

## 2017-11-02 LAB
ALBUMIN SERPL-MCNC: 3.6 G/DL (ref 3.4–4.8)
ANION GAP SERPL CALCULATED.3IONS-SCNC: 18 MMOL/L (ref 5–15)
BACTERIA SPEC AEROBE CULT: ABNORMAL
BACTERIA SPEC AEROBE CULT: ABNORMAL
BACTERIA UR QL AUTO: ABNORMAL /HPF
BASOPHILS # BLD AUTO: 0.03 10*3/MM3 (ref 0–0.2)
BASOPHILS NFR BLD AUTO: 0.1 % (ref 0–2)
BILIRUB UR QL STRIP: ABNORMAL
BUN BLD-MCNC: 29 MG/DL (ref 7–21)
BUN/CREAT SERPL: 10.2 (ref 7–25)
CALCIUM SPEC-SCNC: 9 MG/DL (ref 8.4–10.2)
CHLORIDE SERPL-SCNC: 98 MMOL/L (ref 95–110)
CLARITY UR: ABNORMAL
CO2 SERPL-SCNC: 25 MMOL/L (ref 22–31)
COARSE GRAN CASTS URNS QL MICRO: ABNORMAL /LPF
COLOR UR: ABNORMAL
CREAT BLD-MCNC: 2.85 MG/DL (ref 0.5–1)
DEPRECATED RDW RBC AUTO: 60.5 FL (ref 36.4–46.3)
EOSINOPHIL # BLD AUTO: 0 10*3/MM3 (ref 0–0.7)
EOSINOPHIL NFR BLD AUTO: 0 % (ref 0–7)
ERYTHROCYTE [DISTWIDTH] IN BLOOD BY AUTOMATED COUNT: 17.5 % (ref 11.5–14.5)
GFR SERPL CREATININE-BSD FRML MDRD: 16 ML/MIN/1.73 (ref 39–90)
GLUCOSE BLD-MCNC: 96 MG/DL (ref 60–100)
GLUCOSE UR STRIP-MCNC: NEGATIVE MG/DL
GRAM STN SPEC: ABNORMAL
HCT VFR BLD AUTO: 37.3 % (ref 35–45)
HGB BLD-MCNC: 12 G/DL (ref 12–15.5)
HGB UR QL STRIP.AUTO: NEGATIVE
HOLD SPECIMEN: NORMAL
HYALINE CASTS UR QL AUTO: ABNORMAL /LPF
IMM GRANULOCYTES # BLD: 0.13 10*3/MM3 (ref 0–0.02)
IMM GRANULOCYTES NFR BLD: 0.6 % (ref 0–0.5)
ISOLATED FROM: ABNORMAL
KETONES UR QL STRIP: ABNORMAL
L PNEUMO1 AG UR QL IA: NEGATIVE
LEUKOCYTE ESTERASE UR QL STRIP.AUTO: ABNORMAL
LYMPHOCYTES # BLD AUTO: 0.45 10*3/MM3 (ref 0.6–4.2)
LYMPHOCYTES NFR BLD AUTO: 2.1 % (ref 10–50)
MCH RBC QN AUTO: 30.2 PG (ref 26.5–34)
MCHC RBC AUTO-ENTMCNC: 32.2 G/DL (ref 31.4–36)
MCV RBC AUTO: 94 FL (ref 80–98)
MONOCYTES # BLD AUTO: 1.63 10*3/MM3 (ref 0–0.9)
MONOCYTES NFR BLD AUTO: 7.7 % (ref 0–12)
NEUTROPHILS # BLD AUTO: 18.92 10*3/MM3 (ref 2–8.6)
NEUTROPHILS NFR BLD AUTO: 89.5 % (ref 37–80)
NITRITE UR QL STRIP: NEGATIVE
NRBC BLD MANUAL-RTO: 0 /100 WBC (ref 0–0)
PH UR STRIP.AUTO: <=5 [PH] (ref 5–9)
PHOSPHATE SERPL-MCNC: 2.8 MG/DL (ref 2.4–4.4)
PLATELET # BLD AUTO: 213 10*3/MM3 (ref 150–450)
PMV BLD AUTO: 11 FL (ref 8–12)
POTASSIUM BLD-SCNC: 3.6 MMOL/L (ref 3.5–5.1)
PROT UR QL STRIP: ABNORMAL
RBC # BLD AUTO: 3.97 10*6/MM3 (ref 3.77–5.16)
RBC # UR: ABNORMAL /HPF
REF LAB TEST METHOD: ABNORMAL
S PNEUM AG SPEC QL LA: NEGATIVE
SODIUM BLD-SCNC: 141 MMOL/L (ref 137–145)
SP GR UR STRIP: 1.03 (ref 1–1.03)
SQUAMOUS #/AREA URNS HPF: ABNORMAL /HPF
UROBILINOGEN UR QL STRIP: ABNORMAL
VANCOMYCIN SERPL-MCNC: 5.78 MCG/ML
WBC NRBC COR # BLD: 21.16 10*3/MM3 (ref 3.2–9.8)
WBC UR QL AUTO: ABNORMAL /HPF
WHOLE BLOOD HOLD SPECIMEN: NORMAL

## 2017-11-02 PROCEDURE — 80069 RENAL FUNCTION PANEL: CPT | Performed by: INTERNAL MEDICINE

## 2017-11-02 PROCEDURE — 97162 PT EVAL MOD COMPLEX 30 MIN: CPT

## 2017-11-02 PROCEDURE — 87040 BLOOD CULTURE FOR BACTERIA: CPT | Performed by: PHYSICIAN ASSISTANT

## 2017-11-02 PROCEDURE — 94799 UNLISTED PULMONARY SVC/PX: CPT

## 2017-11-02 PROCEDURE — 87581 M.PNEUMON DNA AMP PROBE: CPT | Performed by: PHYSICIAN ASSISTANT

## 2017-11-02 PROCEDURE — 87150 DNA/RNA AMPLIFIED PROBE: CPT | Performed by: PHYSICIAN ASSISTANT

## 2017-11-02 PROCEDURE — 25010000002 VANCOMYCIN PER 500 MG: Performed by: INTERNAL MEDICINE

## 2017-11-02 PROCEDURE — G8978 MOBILITY CURRENT STATUS: HCPCS

## 2017-11-02 PROCEDURE — G8996 SWALLOW CURRENT STATUS: HCPCS | Performed by: SPEECH-LANGUAGE PATHOLOGIST

## 2017-11-02 PROCEDURE — G8997 SWALLOW GOAL STATUS: HCPCS | Performed by: SPEECH-LANGUAGE PATHOLOGIST

## 2017-11-02 PROCEDURE — 85025 COMPLETE CBC W/AUTO DIFF WBC: CPT | Performed by: INTERNAL MEDICINE

## 2017-11-02 PROCEDURE — 94760 N-INVAS EAR/PLS OXIMETRY 1: CPT

## 2017-11-02 PROCEDURE — G8979 MOBILITY GOAL STATUS: HCPCS

## 2017-11-02 PROCEDURE — 87086 URINE CULTURE/COLONY COUNT: CPT | Performed by: PHYSICIAN ASSISTANT

## 2017-11-02 PROCEDURE — 93010 ELECTROCARDIOGRAM REPORT: CPT | Performed by: INTERNAL MEDICINE

## 2017-11-02 PROCEDURE — 87147 CULTURE TYPE IMMUNOLOGIC: CPT | Performed by: PHYSICIAN ASSISTANT

## 2017-11-02 PROCEDURE — 0 IOPAMIDOL 61 % SOLUTION: Performed by: INTERNAL MEDICINE

## 2017-11-02 PROCEDURE — 97166 OT EVAL MOD COMPLEX 45 MIN: CPT

## 2017-11-02 PROCEDURE — 93005 ELECTROCARDIOGRAM TRACING: CPT | Performed by: PHYSICIAN ASSISTANT

## 2017-11-02 PROCEDURE — 80202 ASSAY OF VANCOMYCIN: CPT | Performed by: INTERNAL MEDICINE

## 2017-11-02 PROCEDURE — G8988 SELF CARE GOAL STATUS: HCPCS

## 2017-11-02 PROCEDURE — 87077 CULTURE AEROBIC IDENTIFY: CPT | Performed by: PHYSICIAN ASSISTANT

## 2017-11-02 PROCEDURE — G8998 SWALLOW D/C STATUS: HCPCS | Performed by: SPEECH-LANGUAGE PATHOLOGIST

## 2017-11-02 PROCEDURE — 87899 AGENT NOS ASSAY W/OPTIC: CPT | Performed by: PHYSICIAN ASSISTANT

## 2017-11-02 PROCEDURE — 87186 SC STD MICRODIL/AGAR DIL: CPT | Performed by: PHYSICIAN ASSISTANT

## 2017-11-02 PROCEDURE — 25010000002 CEFTRIAXONE PER 250 MG: Performed by: PHYSICIAN ASSISTANT

## 2017-11-02 PROCEDURE — 71275 CT ANGIOGRAPHY CHEST: CPT

## 2017-11-02 PROCEDURE — 81001 URINALYSIS AUTO W/SCOPE: CPT | Performed by: PHYSICIAN ASSISTANT

## 2017-11-02 PROCEDURE — 92610 EVALUATE SWALLOWING FUNCTION: CPT | Performed by: SPEECH-LANGUAGE PATHOLOGIST

## 2017-11-02 PROCEDURE — G8987 SELF CARE CURRENT STATUS: HCPCS

## 2017-11-02 RX ADMIN — IPRATROPIUM BROMIDE AND ALBUTEROL SULFATE 3 ML: 2.5; .5 SOLUTION RESPIRATORY (INHALATION) at 11:23

## 2017-11-02 RX ADMIN — IPRATROPIUM BROMIDE AND ALBUTEROL SULFATE 3 ML: 2.5; .5 SOLUTION RESPIRATORY (INHALATION) at 02:45

## 2017-11-02 RX ADMIN — SERTRALINE HYDROCHLORIDE 50 MG: 50 TABLET ORAL at 09:18

## 2017-11-02 RX ADMIN — IPRATROPIUM BROMIDE AND ALBUTEROL SULFATE 3 ML: 2.5; .5 SOLUTION RESPIRATORY (INHALATION) at 22:48

## 2017-11-02 RX ADMIN — APIXABAN 2.5 MG: 2.5 TABLET, FILM COATED ORAL at 18:33

## 2017-11-02 RX ADMIN — FAMOTIDINE 40 MG: 40 TABLET ORAL at 09:16

## 2017-11-02 RX ADMIN — IOPAMIDOL 50 ML: 612 INJECTION, SOLUTION INTRAVENOUS at 16:15

## 2017-11-02 RX ADMIN — MIRTAZAPINE 15 MG: 15 TABLET, FILM COATED ORAL at 21:56

## 2017-11-02 RX ADMIN — IPRATROPIUM BROMIDE AND ALBUTEROL SULFATE 3 ML: 2.5; .5 SOLUTION RESPIRATORY (INHALATION) at 07:14

## 2017-11-02 RX ADMIN — ASPIRIN 81 MG: 81 TABLET, COATED ORAL at 09:16

## 2017-11-02 RX ADMIN — HYDRALAZINE HYDROCHLORIDE 100 MG: 50 TABLET ORAL at 09:16

## 2017-11-02 RX ADMIN — IPRATROPIUM BROMIDE AND ALBUTEROL SULFATE 3 ML: 2.5; .5 SOLUTION RESPIRATORY (INHALATION) at 18:46

## 2017-11-02 RX ADMIN — QUETIAPINE FUMARATE 50 MG: 25 TABLET, FILM COATED ORAL at 21:56

## 2017-11-02 RX ADMIN — CEFTRIAXONE 1 G: 1 INJECTION, POWDER, FOR SOLUTION INTRAMUSCULAR; INTRAVENOUS at 17:10

## 2017-11-02 RX ADMIN — APIXABAN 2.5 MG: 2.5 TABLET, FILM COATED ORAL at 09:16

## 2017-11-02 RX ADMIN — HYDRALAZINE HYDROCHLORIDE 100 MG: 50 TABLET ORAL at 18:33

## 2017-11-02 RX ADMIN — Medication 10 ML: at 14:38

## 2017-11-02 RX ADMIN — VANCOMYCIN HYDROCHLORIDE 1000 MG: 1 INJECTION, POWDER, LYOPHILIZED, FOR SOLUTION INTRAVENOUS at 17:56

## 2017-11-02 RX ADMIN — HYDRALAZINE HYDROCHLORIDE 100 MG: 50 TABLET ORAL at 21:56

## 2017-11-02 RX ADMIN — LEVOTHYROXINE SODIUM 88 MCG: 88 TABLET ORAL at 10:22

## 2017-11-02 RX ADMIN — ATORVASTATIN CALCIUM 40 MG: 40 TABLET, FILM COATED ORAL at 22:14

## 2017-11-02 NOTE — PLAN OF CARE
Problem: Patient Care Overview (Adult)  Goal: Plan of Care Review  Outcome: Ongoing (interventions implemented as appropriate)    11/02/17 1042   Coping/Psychosocial Response Interventions   Plan Of Care Reviewed With patient   Patient Care Overview   Progress improving   Outcome Evaluation   Outcome Summary/Follow up Plan Evaluation completed; no tx recommended at this time. Pt is independent with regular diet consistencies with loose fornt dentition. Pt aware of need for dentition removal. Pt can masticate and prepare bolus without difficulty at this time.

## 2017-11-02 NOTE — PLAN OF CARE
Problem: Patient Care Overview (Adult)  Goal: Plan of Care Review  Outcome: Ongoing (interventions implemented as appropriate)    11/02/17 1713   Coping/Psychosocial Response Interventions   Plan Of Care Reviewed With patient   Outcome Evaluation   Outcome Summary/Follow up Plan PT evaluation completed. Function limited primarily by decreased strength and tolerance for functional mobility and activities. Pt will benefi from skilled PT to regain lost function as pt improves medically.. Anticipate return to SNF at discharge with continued therapy services.         Problem: Inpatient Physical Therapy  Goal: Transfer Training Goal 1 LTG- PT  Outcome: Ongoing (interventions implemented as appropriate)    11/02/17 1713   Transfer Training PT LTG   Transfer Training PT LTG, Date Established 11/02/17   Transfer Training PT LTG, Time to Achieve by discharge   Transfer Training PT LTG, Activity Type bed to chair /chair to bed;sit to stand/stand to sit   Transfer Training PT LTG, [place] Level conditional independence   Transfer Training PT LTG, Outcome goal ongoing       Goal: Gait Training Goal LTG- PT  Outcome: Ongoing (interventions implemented as appropriate)    11/02/17 1713   Gait Training PT LTG   Gait Training Goal PT LTG, Date Established 11/02/17   Gait Training Goal PT LTG, Time to Achieve by discharge   Gait Training Goal PT LTG, [place] Level conditional independence   Gait Training Goal PT LTG, Distance to Achieve 150ft;O2 sats will not drop below 92%   Gait Training Goal PT LTG, Outcome goal ongoing       Goal: Strength Goal LTG- PT  Outcome: Ongoing (interventions implemented as appropriate)    11/02/17 1713   Strength Goal PT LTG   Strength Goal PT LTG, Date Established 11/02/17   Strength Goal PT LTG, Time to Achieve by discharge   Strength Goal PT LTG, Measure to Achieve Pt will tolerate 15 reps of AROM exercises in sitting   Strength Goal PT LTG, Outcome goal ongoing       Goal: Patient Education  Goal LTG- PT  Outcome: Ongoing (interventions implemented as appropriate)    11/02/17 1713   Patient Education PT LTG   Patient Education PT LTG, Date Established 11/02/17   Patient Education PT LTG, Time to Achieve by discharge   Patient Education PT LTG, Education Type gait;transfers;home safety   Patient Education PT LTG Outcome goal ongoing

## 2017-11-02 NOTE — PLAN OF CARE
Problem: Sepsis (Adult)  Goal: Signs and Symptoms of Listed Potential Problems Will be Absent or Manageable (Sepsis)  Outcome: Ongoing (interventions implemented as appropriate)    Problem: Fall Risk (Adult)  Goal: Absence of Falls  Outcome: Ongoing (interventions implemented as appropriate)    Problem: Renal Replacement, Continuous (Adult)  Goal: Signs and Symptoms of Listed Potential Problems Will be Absent or Manageable (Renal Replacement, Continuous)  Outcome: Ongoing (interventions implemented as appropriate)    Problem: Patient Care Overview (Adult)  Goal: Plan of Care Review  Outcome: Ongoing (interventions implemented as appropriate)    11/02/17 0245   Patient Care Overview   Progress declining   Outcome Evaluation   Outcome Summary/Follow up Plan pt has become increasingly confused during the shift and has attempted to get out of bed without assistance several times; pt reoriented after each instance; currently sleeping after PM dose of seroquel; vital signs stable; will continue to monitor       Goal: Adult Individualization and Mutuality  Outcome: Ongoing (interventions implemented as appropriate)  Goal: Discharge Needs Assessment  Outcome: Ongoing (interventions implemented as appropriate)

## 2017-11-02 NOTE — PLAN OF CARE
Problem: Patient Care Overview (Adult)  Goal: Plan of Care Review  Outcome: Ongoing (interventions implemented as appropriate)    11/02/17 1556   Coping/Psychosocial Response Interventions   Plan Of Care Reviewed With patient   Outcome Evaluation   Outcome Summary/Follow up Plan OT eval complete on this date. Pt required min A for sup to sit to sup. Pt deferred standing due to pain and fatigue. Pt participated with bath/dress @ NH - required min A . She states she was independent with toileting. Pt could benefit from OT services to increase independence with ADLs and functional mobilitiy/transfers.       Goal: Discharge Needs Assessment  Outcome: Ongoing (interventions implemented as appropriate)    11/02/17 1556   Discharge Needs Assessment   Discharge Facility/Level Of Care Needs nursing facility, skilled         Problem: Inpatient Occupational Therapy  Goal: Transfer Training Goal 2 LTG- OT  Outcome: Ongoing (interventions implemented as appropriate)    11/02/17 1556   Transfer Training 2 OT LTG   Transfer Training 2 OT LTG, Date Established 11/02/17   Transfer Training 2 OT LTG, Time to Achieve by discharge   Transfer Training 2 OT LTG, Activity Type all transfers   Transfer Training 2 OT LTG, [place] Level supervision required;contact guard assist   Transfer Training 2 OT LTG, Assist Device walker, rolling       Goal: Strength Goal LTG- OT  Outcome: Ongoing (interventions implemented as appropriate)    11/02/17 1556   Strength OT LTG   Strength Goal OT LTG, Date Established 11/02/17   Strength Goal OT LTG, Measure to Achieve 1-2 sets of 10 reps all planes (except L shoulder flexion) with 1 lb wrist wt or dumbell for B UE strengthening to increase independence with functional mobility and ADLs.       Goal: Static Standing Balance Goal LTG- OT  Outcome: Ongoing (interventions implemented as appropriate)    11/02/17 1556   Static Standing Balance OT LTG   Static Standing Balance OT LTG, Date Established  11/02/17   Static Standing Balance OT LTG, Time to Achieve by discharge   Static Standing Balance OT LTG, [place] Level supervision required;contact guard assist  (5 minutes with functional activity)   Static Standing Balance OT LTG, Assist Device assistive device  (R/W.)       Goal: ADL Goal LTG- OT  Outcome: Ongoing (interventions implemented as appropriate)    11/02/17 1556   ADL OT LTG   ADL OT LTG, Date Established 11/02/17   ADL OT LTG, Time to Achieve by discharge   ADL OT LTG, Activity Type ADL skills  (Sponge bath and dress or walk-in shower.)   ADL OT LTG, [place] Level min assist;contact guard;assistive device  (R/W.)

## 2017-11-02 NOTE — THERAPY DISCHARGE NOTE
Acute Care - Speech Language Pathology Initial Eval/Discharge  [facility]     Patient Name: Efrem Roa  : 1945  MRN: [id number]  Today's Date: 2017        Referring Physician: Zofia Ba      Admit Date: 10/30/2017     Visit Dx:    ICD-10-CM ICD-9-CM   1. HCAP (healthcare-associated pneumonia) J18.9 486   2. Sepsis, due to unspecified organism A41.9 038.9     995.91   3. NSTEMI (non-ST elevated myocardial infarction) I21.4 410.70   4. ESRD (end stage renal disease) N18.6 585.6   5. Essential hypertension I10 401.9   6. Hypertensive heart disease without heart failure I11.9 402.90   7. Oropharyngeal dysphagia R13.12 787.22     Patient Active Problem List   Diagnosis   • Constipation   • Arteriovenous fistula   • End stage renal failure on dialysis   • Controlled type 2 diabetes mellitus with chronic kidney disease on chronic dialysis, without long-term current use of insulin   • Coronary artery disease involving native coronary artery of native heart without angina pectoris   • Panlobular emphysema   • ESRD (end stage renal disease) on dialysis   • HCAP (healthcare-associated pneumonia)     Past Medical History:   Diagnosis Date   • Abnormal x-ray     Standard chest x ray, f/u pneumonia xray   • Acquired hypothyroidism    • Amblyopia    • Anemia of renal disease    • Anxiety    • Arteriovenous fistula     Placed 10/15/2014   • Benign essential hypertension    • Bipolar affective disorder     Current episode depression   • Bipolar disorder    • Cerebrovascular accident    • Chest wall pain    • Chronic angle-closure glaucoma    • Chronic kidney disease     Stage 4-approaching hemodialysis   • Chronic pain syndrome    • Chronic renal failure    • Combined drug dependence excluding opioids    • Constipation    • COPD (chronic obstructive pulmonary disease)    • Coronary arteriosclerosis    • Cough    • Degeneration of cervical intervertebral disc    • Dementia     Multi-infarct, uncomplicated    • Depression    • Diabetes mellitus     No retinopathy   • Diabetes mellitus     Without mention of complication, type 2 or unspecified type, not stated as uncontrolled   • Diabetic renal disease    • Disc disorder of lumbar region    • DJD (degenerative joint disease)     Involving multiple joints   • Edema    • End stage renal failure on dialysis     LUE AV FISTULA 2015   • Epigastric pain    • Essential hypertension    • Exotropia    • Gastritis    • Generalized abdominal pain    • GERD (gastroesophageal reflux disease)     With Esophagitis   • Gout    • H/O screening mammography    • Hiatal hernia    • Hyperlipidemia    • Hypermetropia    • Insomnia    • Iron deficiency anemia    • Low back pain    • Lumbago    • Non-cardiac chest pain    • Nuclear cataract    • Osteoporosis    • Panic disorder without agoraphobia    • Peripheral nervous system disorder    • Radiculitis    • RLQ abdominal pain    • RUQ pain    • Sprain of ankle    • Sprain of knee     Resolving   • Superficial injury of shoulder and upper arm    • Surgical follow-up care    • Type 2 diabetes mellitus    • UTI (urinary tract infection)    • Visit for suture removal    • Vitamin D deficiency    • Vulvovaginitis      Past Surgical History:   Procedure Laterality Date   • APPENDECTOMY     • BACK SURGERY      x2   • BYPASS GRAFT  2015    Left brachial artery to axillary vein arteriovenous graft. Venous ultrasound unilateral extremity   •  SECTION      x2   • CHOLECYSTECTOMY     • COLONOSCOPY W/ POLYPECTOMY  2015    Colitis found in the cecum. Biopsy taken. A diverticulum was found in the sigmoid colon.   • ENDOSCOPY AND COLONOSCOPY     • ESOPHAGOSCOPY / EGD  2015    Normal esophagus. Gastritis found in the stomach. Biopsy taken. Duodenitis found in the duodenum. Biopsy take.   • ESOPHAGOSCOPY / EGD  1945    With tube-Esophagus appeared normal. Nonerosive gastritis. Duodenum appeared normal   • HYSTERECTOMY      • INJECTION OF MEDICATION  06/06/2011    Aranesp   • INJECTION OF MEDICATION  06/06/2011    Kenalog   • INTERVENTIONAL RADIOLOGY PROCEDURE Left 10/5/2017    Procedure: IR dialysis fistulagram;  Surgeon: Blane Fernandez MD;  Location: [facility] LOCATION;  Service:    • LEG SURGERY  06/13/2000    Cancelled. Due to uncontrolled hypertension   • OTHER SURGICAL HISTORY  07/07/2016    Tissue plasminogen activator catheter thrombolysis   • REMOVAL PERITONEAL DIALYSIS CATHETER  01/16/2014    Removal of tunneled hemodialysis catheter with cuff   • TRANSESOPHAGEAL ECHOCARDIOGRAM (ANTHONY)  03/24/2016    Mild left atrial enlargement with mild to moderate CLVH with normal aortic root size.LV systolic function well preserved with Ef of 55-60%.Mitral valve thickened.Av thickened.Aortic sclerosis.Mild mitral regurg.mild to moderate tricuspid regurg.   • TRANSESOPHAGEAL ECHOCARDIOGRAM (ANTHONY)  02/22/2012    Normal left ventricular systolic function. Moderate tricuspid regurgitation with evidenceof pulmonary hypertension            EDUCATION  The patient has been educated in the following areas:   Dysphagia (Swallowing Impairment).    SLP Recommendation and Plan              Anticipated Discharge Disposition: skilled nursing facility     Plan of Care Review  Plan Of Care Reviewed With: (P) patient  Progress: (P) improving  Outcome Summary/Follow up Plan: (P) Evaluation completed; no tx recommended at this time. Pt is independent with regular diet consistencies with loose fornt dentition. Pt aware of need for dentition removal. Pt can masticate and prepare bolus without difficulty at this time.             Time Calculation:         Time Calculation- SLP       11/02/17 1033          Time Calculation- SLP    SLP Start Time 1025  -EK      SLP Stop Time 1043  -EK      SLP Time Calculation (min) 18 min  -EK      SLP Received On 11/02/17  -EK        User Key  (r) = Recorded By, (t) = Taken By, (c) = Cosigned By    Initials  Name Provider Type    EK ADDIE Gutierrez Speech and Language Pathologist          Therapy Charges for Today     Code Description Service Date Service Provider Modifiers Qty    89075949552 HC ST EVAL ORAL PHARYNG SWALLOW 1 11/2/2017 ADDIE Gutierrez GN 1    14910975596 HC ST SWALLOWING CURRENT STATUS 11/2/2017 ADDIE Gutierrez GN, CI 1    [id number] HC ST SWALLOWING PROJECTED 11/2/2017 ADDIE Gutierrez GN, CI 1    [id number] HC ST SWALLOWING DISCHARGE 11/2/2017 ADDIE Gutierrez GN, CI 1              SLP G-Codes  Functional Limitations: Swallowing  Swallow Current Status (): At least 1 percent but less than 20 percent impaired, limited or restricted  Swallow Goal Status (): At least 1 percent but less than 20 percent impaired, limited or restricted  Swallow Discharge Status (): At least 1 percent but less than 20 percent impaired, limited or restricted    SLP Discharge Summary  Anticipated Discharge Disposition: skilled nursing facility  Reason for Discharge: All goals achieved  Discharge Destination: SNF    ADDIE Gutierrez  11/2/2017

## 2017-11-02 NOTE — THERAPY EVALUATION
Acute Care - Physical Therapy Initial Evaluation  [facility]     Patient Name: Efrem Roa  : 1945  MRN: [id number]  Today's Date: 2017   Onset of Illness/Injury or Date of Surgery Date: 10/30/17  Date of Referral to PT: 17  Referring Physician: LOS Rose      Admit Date: 10/30/2017     Visit Dx:    ICD-10-CM ICD-9-CM   1. HCAP (healthcare-associated pneumonia) J18.9 486   2. Sepsis, due to unspecified organism A41.9 038.9     995.91   3. NSTEMI (non-ST elevated myocardial infarction) I21.4 410.70   4. ESRD (end stage renal disease) N18.6 585.6   5. Essential hypertension I10 401.9   6. Hypertensive heart disease without heart failure I11.9 402.90   7. Oropharyngeal dysphagia R13.12 787.22   8. Impaired mobility and activities of daily living Z74.09 799.89   9. Impaired functional mobility, balance, gait, and endurance Z74.09 V49.89     Patient Active Problem List   Diagnosis   • Constipation   • Arteriovenous fistula   • End stage renal failure on dialysis   • Controlled type 2 diabetes mellitus with chronic kidney disease on chronic dialysis, without long-term current use of insulin   • Coronary artery disease involving native coronary artery of native heart without angina pectoris   • Panlobular emphysema   • ESRD (end stage renal disease) on dialysis   • HCAP (healthcare-associated pneumonia)     Past Medical History:   Diagnosis Date   • Abnormal x-ray     Standard chest x ray, f/u pneumonia xray   • Acquired hypothyroidism    • Amblyopia    • Anemia of renal disease    • Anxiety    • Arteriovenous fistula     Placed 10/15/2014   • Benign essential hypertension    • Bipolar affective disorder     Current episode depression   • Bipolar disorder    • Cerebrovascular accident    • Chest wall pain    • Chronic angle-closure glaucoma    • Chronic kidney disease     Stage 4-approaching hemodialysis   • Chronic pain syndrome    • Chronic renal failure    • Combined drug dependence  excluding opioids    • Constipation    • COPD (chronic obstructive pulmonary disease)    • Coronary arteriosclerosis    • Cough    • Degeneration of cervical intervertebral disc    • Dementia     Multi-infarct, uncomplicated   • Depression    • Diabetes mellitus     No retinopathy   • Diabetes mellitus     Without mention of complication, type 2 or unspecified type, not stated as uncontrolled   • Diabetic renal disease    • Disc disorder of lumbar region    • DJD (degenerative joint disease)     Involving multiple joints   • Edema    • End stage renal failure on dialysis     LUE AV FISTULA 2015   • Epigastric pain    • Essential hypertension    • Exotropia    • Gastritis    • Generalized abdominal pain    • GERD (gastroesophageal reflux disease)     With Esophagitis   • Gout    • H/O screening mammography    • Hiatal hernia    • Hyperlipidemia    • Hypermetropia    • Insomnia    • Iron deficiency anemia    • Low back pain    • Lumbago    • Non-cardiac chest pain    • Nuclear cataract    • Osteoporosis    • Panic disorder without agoraphobia    • Peripheral nervous system disorder    • Radiculitis    • RLQ abdominal pain    • RUQ pain    • Sprain of ankle    • Sprain of knee     Resolving   • Superficial injury of shoulder and upper arm    • Surgical follow-up care    • Type 2 diabetes mellitus    • UTI (urinary tract infection)    • Visit for suture removal    • Vitamin D deficiency    • Vulvovaginitis      Past Surgical History:   Procedure Laterality Date   • APPENDECTOMY     • BACK SURGERY      x2   • BYPASS GRAFT  2015    Left brachial artery to axillary vein arteriovenous graft. Venous ultrasound unilateral extremity   •  SECTION      x2   • CHOLECYSTECTOMY     • COLONOSCOPY W/ POLYPECTOMY  2015    Colitis found in the cecum. Biopsy taken. A diverticulum was found in the sigmoid colon.   • ENDOSCOPY AND COLONOSCOPY     • ESOPHAGOSCOPY / EGD  2015    Normal esophagus. Gastritis  found in the stomach. Biopsy taken. Duodenitis found in the duodenum. Biopsy take.   • ESOPHAGOSCOPY / EGD  1945    With tube-Esophagus appeared normal. Nonerosive gastritis. Duodenum appeared normal   • HYSTERECTOMY     • INJECTION OF MEDICATION  06/06/2011    Aranesp   • INJECTION OF MEDICATION  06/06/2011    Kenalog   • INTERVENTIONAL RADIOLOGY PROCEDURE Left 10/5/2017    Procedure: IR dialysis fistulagram;  Surgeon: Blane Fernandez MD;  Location: [facility]GIO INVASIVE LOCATION;  Service:    • LEG SURGERY  06/13/2000    Cancelled. Due to uncontrolled hypertension   • OTHER SURGICAL HISTORY  07/07/2016    Tissue plasminogen activator catheter thrombolysis   • REMOVAL PERITONEAL DIALYSIS CATHETER  01/16/2014    Removal of tunneled hemodialysis catheter with cuff   • TRANSESOPHAGEAL ECHOCARDIOGRAM (ANTHONY)  03/24/2016    Mild left atrial enlargement with mild to moderate CLVH with normal aortic root size.LV systolic function well preserved with Ef of 55-60%.Mitral valve thickened.Av thickened.Aortic sclerosis.Mild mitral regurg.mild to moderate tricuspid regurg.   • TRANSESOPHAGEAL ECHOCARDIOGRAM (ANTHONY)  02/22/2012    Normal left ventricular systolic function. Moderate tricuspid regurgitation with evidenceof pulmonary hypertension          PT ASSESSMENT (last 72 hours)      PT Evaluation       11/02/17 1445 11/02/17 1444    Rehab Evaluation    Document Type evaluation  -KORI evaluation  -RB    Subjective Information agree to therapy;complains of;weakness;pain  -KORI agree to therapy;complains of;pain;weakness  -RB    Patient Effort, Rehab Treatment adequate  -KORI adequate  -RB    Symptoms Noted During/After Treatment fatigue  -KORI fatigue  -RB    General Information    Patient Profile Review yes  -KORI yes  -RB    Onset of Illness/Injury or Date of Surgery Date 10/30/17  -KORI 10/30/17  -RB    Referring Physician LOS Rose  -KORI Land  -SHIV    General Observations Pt supine;noted O2 per nasal cannula  on 1 1/2 L/min, IV, telemetry, bed exit armed  -KORI Supine in bed with telemetry, IV and nasal canula.  -RB    Pertinent History Of Current Problem Pt admitted to [facility] with worsening AMS with HCAP and MRSA bacteremia  -KORI Hosp with SOB, fever and AMS.  Dx with community aquired pneumonia.  Pt on dialysis.  -RB    Precautions/Limitations fall precautions;oxygen therapy device and L/min  -KORI fall precautions;oxygen therapy device and L/min  -RB    Prior Level of Function independent:;feeding;transfer;gait;min assist:;bathing;dressing  -KORI     Equipment Currently Used at Home rollator;wheelchair;hospital bed;shower chair;grab bar  -KORI rollator;wheelchair;hospital bed;shower chair;grab bar  -RB    Plans/Goals Discussed With patient  -KORI patient  -RB    Risks Reviewed patient:  -KORI patient:  -RB    Benefits Reviewed patient:  -KORI patient:  -RB    Barriers to Rehab medically complex  -KORI medically complex  -RB    Living Environment    Lives With facility resident  -KORI facility resident  -RB    Living Arrangements residential facility   [facility]  -KORI residential facility  -RB    Home Accessibility no concerns  -KORI no concerns  -RB    Transportation Available  ambulance  -RB    Clinical Impression    Date of Referral to PT 11/02/17  -KORI     PT Diagnosis impaired mobility due to HCAP  -KORI     Prognosis per MD  -KORI     Patient/Family Goals Statement Be stronger;able to assist with care  -KORI     Criteria for Skilled Therapeutic Interventions Met yes  -KORI     Rehab Potential good, to achieve stated therapy goals  -KORI     Predicted Duration of Therapy Intervention (days/wks) until discharge or goals met  -KORI     Vital Signs    Pre Systolic BP Rehab 138  -KORI 138  -RB    Pre Treatment Diastolic BP 62  -KORI 62  -RB    Post Systolic BP Rehab 130  -KORI 130  -RB    Post Treatment Diastolic BP 68  -KORI 68  -RB    Pretreatment Heart Rate (beats/min) 101  -KORI 101  -RB    Posttreatment Heart Rate (beats/min) 102  -KORI 102  -RB     Pre SpO2 (%) 90  -KORI 90   1.5 L  -RB    O2 Delivery Pre Treatment supplemental O2  -KORI supplemental O2  -RB    Intra SpO2 (%) 94  -KORI     O2 Delivery Intra Treatment supplemental O2  -KORI     Post SpO2 (%) 92  -KORI 92  -RB    O2 Delivery Post Treatment supplemental O2  -KORI supplemental O2  -RB    Pre Patient Position Supine  -KORI Supine  -RB    Intra Patient Position Sitting  -KORI Sitting  -RB    Post Patient Position Supine  -KORI Supine  -RB    Pain Assessment    Pain Assessment 0-10  -KORI 0-10  -RB    Pain Score 9  -KORI 9  -RB    Post Pain Score 8  -KORI 8  -RB    Pain Type  Chronic pain  -RB    Pain Location Generalized  -KORI Generalized   All over.  -RB    Pain Intervention(s) Repositioned  -KORI Repositioned   Called for pain meds.  -RB    Vision Assessment/Intervention    Visual Impairment WFL with corrective lenses  -KORI WFL with corrective lenses  -RB    Cognitive Assessment/Intervention    Current Cognitive/Communication Assessment functional  -KORI functional  -RB    Orientation Status oriented x 4  -KORI oriented x 4  -RB    Follows Commands/Answers Questions 100% of the time  -KORI 100% of the time  -RB    Personal Safety WNL/WFL  -KORI WNL/WFL  -RB    Personal Safety Interventions fall prevention program maintained  -KORI supervised activity  -RB    ROM (Range of Motion)    General ROM upper extremity range of motion deficits identified  -KORI upper extremity range of motion deficits identified  -RB    General ROM Detail AROM WFL BLE's  -KORI Trace movement only for L shld flex, internal/external rotation.  Rest is WFL.  -RB    General UE Assessment    ROM Detail Please refer to OT evaluation for BUE range detail  -KORI     MMT (Manual Muscle Testing)    General MMT Assessment upper extremity strength deficits identified;lower extremity strength deficits identified  -KORI upper extremity strength deficits identified  -RB    General MMT Assessment Detail  1/5 for L shoulder flex. and rest of B UE str is 4-/5.  -RB    Upper Extremity     Upper Ext Manual Muscle Testing Detail Please refer to OT evaluation for BUE strength detail  -     Lower Extremity    Lower Ext Manual Muscle Testing Detail RLE: 4-/5 dori/foot, knee, hip;LLE: 3+/5 ankle/foot, 3/5 knee, hip  -     Muscle Tone Assessment    Muscle Tone Assessment LUE  -     Shoulder Subluxation left shoulder:;moderate impairment  -     LUE Muscle Tone Assessment moderately increased tone   from past CVA  -KORI     Bed Mobility, Assessment/Treatment    Bed Mobility, Assistive Device bed rails;head of bed elevated  -KORI bed rails;head of bed elevated  -RB    Bed Mob, Sit to Supine, Gary minimum assist (75% patient effort)  - minimum assist (75% patient effort)  -RB    Bed Mob, Sidelying to Sit, Gary minimum assist (75% patient effort)  - minimum assist (75% patient effort)  -RB    Transfer Assessment/Treatment    Transfer, Comment deferred transfer;ambulation per pt request due to pain and weakness.   -KORI Deferred standing per pt request   -RB    Sensory Assessment/Intervention    Light Touch LLE;RLE  -KORI LUE;RUE  -RB    LUE Light Touch WNL  -KORI WNL  -RB    RUE Light Touch WNL  - WNL  -RB    Edema Management    Edema Amount none  -     Positioning and Restraints    Pre-Treatment Position in bed  -KORI in bed  -RB    Post Treatment Position bed  - bed  -RB    In Bed call light within reach;encouraged to call for assist;exit alarm on  -KORI supine;call light within reach;encouraged to call for assist  -RB      11/02/17 1025 10/31/17 1055    Rehab Evaluation    Document Type evaluation  -EK     Subjective Information no complaints;agree to therapy  -EK     General Information    Equipment Currently Used at Home  wheelchair;walker, standard;hospital bed;commode;shower chair  -CM    Living Environment    Lives With  facility resident   [facility]  -CM    Home Accessibility  no concerns  -CM    Type of Financial/Environmental Concern  none  -CM    Transportation  Available  ambulance  -CM    Pain Assessment    Pain Assessment 0-10  -EK     Pain Score 8  -EK     Post Pain Score 8  -EK     Pain Type Chronic pain  -EK     Pain Location Back  -EK     Pain Intervention(s) Repositioned  -EK     Positioning and Restraints    Pre-Treatment Position in bed  -EK     Post Treatment Position bed  -EK     In Bed exit alarm on  -EK       10/30/17 1800       General Information    Equipment Currently Used at Home walker, rolling  -WB     Living Environment    Lives With other (see comments)   nursing home  -WB     Living Arrangements extended care facility  -WB     Home Accessibility other (see comments)  -WB     Stair Railings at Home other (see comments)  -WB     Type of Financial/Environmental Concern other (see comments)  -WB     Transportation Available other (see comments)  -WB       User Key  (r) = Recorded By, (t) = Taken By, (c) = Cosigned By    Initials Name Provider Type    RB Quan Tapia, OT Occupational Therapist    EK Alka Padron, CCC-SLP Speech and Language Pathologist    KORI Andrade, PT Physical Therapist    WB Amador Arrieta, RN Registered Nurse    KATERYNA ChowdhuryW               PT Recommendation and Plan  Anticipated Discharge Disposition: skilled nursing facility  Planned Therapy Interventions: balance training, bed mobility training, gait training, patient/family education, strengthening, transfer training  PT Frequency: other (see comments) (5-14 times per week)  Plan of Care Review  Plan Of Care Reviewed With: patient  Outcome Summary/Follow up Plan: PT evaluation completed. Function limited primarily by decreased strength and tolerance for functional mobility and activities. Pt will benefi from skilled PT to regain lost function as pt improves medically.. Anticipate return to SNF at discharge with continued therapy services.          IP PT Goals       11/02/17 1713          Transfer Training PT LTG    Transfer Training PT  LTG, Date Established 11/02/17  -      Transfer Training PT LTG, Time to Achieve by discharge  -      Transfer Training PT LTG, Activity Type bed to chair /chair to bed;sit to stand/stand to sit  -      Transfer Training PT LTG, [place] Level conditional independence  -      Transfer Training PT LTG, Outcome goal ongoing  -      Gait Training PT LTG    Gait Training Goal PT LTG, Date Established 11/02/17  -      Gait Training Goal PT LTG, Time to Achieve by discharge  -      Gait Training Goal PT LTG, [place] Level conditional independence  -      Gait Training Goal PT LTG, Distance to Achieve 150ft;O2 sats will not drop below 92%  -      Gait Training Goal PT LTG, Outcome goal ongoing  [facility]      Strength Goal PT LTG    Strength Goal PT LTG, Date Established 11/02/17  -      Strength Goal PT LTG, Time to Achieve by discharge  -      Strength Goal PT LTG, Measure to Achieve Pt will tolerate 15 reps of AROM exercises in sitting  -      Strength Goal PT LTG, Outcome goal [facility]      Patient Education PT LTG    Patient Education PT LTG, Date Established 11/02/17  -      Patient Education PT LTG, Time to Achieve by discharge  -      Patient Education PT LTG, Education Type gait;transfers;home safety  -      Patient Education PT LTG Outcome goal [facility]        User Key  (r) = Recorded By, (t) = Taken By, (c) = Cosigned By    Initials Name Provider Type    KORI Andrade, PT Physical Therapist                Outcome Measures       11/02/17 1445 11/02/17 1444       How much help from another person do you currently need...    Turning from your back to your side while in flat bed without using bedrails? 4  -KORI      Moving from lying on back to sitting on the side of a flat bed without bedrails? 3  -KORI      Moving to and from a bed to a chair (including a wheelchair)? 3  -KORI      Standing up from a chair using your arms (e.g., wheelchair, bedside chair)? 3  -KORI      Climbing  3-5 steps with a railing? 2  -KORI      To walk in hospital room? 3  -KORI      AM-PAC 6 Clicks Score 18  -KORI      How much help from another is currently needed...    Putting on and taking off regular lower body clothing?  2  -RB     Bathing (including washing, rinsing, and drying)  2  -RB     Toileting (which includes using toilet bed pan or urinal)  2  -RB     Putting on and taking off regular upper body clothing  3  -RB     Taking care of personal grooming (such as brushing teeth)  3  -RB     Eating meals  4  -RB     Score  16  -RB     Functional Assessment    Outcome Measure Options AM-PAC 6 Clicks Basic Mobility (PT)  -KORI AM-PAC 6 Clicks Daily Activity (OT)  -RB       User Key  (r) = Recorded By, (t) = Taken By, (c) = Cosigned By    Initials Name Provider Type    SHIV Tapia, OT Occupational Therapist    KORI Andrade, PT Physical Therapist           Time Calculation:         PT Charges       11/02/17 1717          Time Calculation    Start Time 1445  -KORI      Stop Time 1509  -      Time Calculation (min) 24 min  -      PT Received On 11/02/17  -      PT Goal Re-Cert Due Date 11/15/17  -      Time Calculation- PT    Total Timed Code Minutes- PT 0 minute(s)   co-eval with OT  -KORI        User Key  (r) = Recorded By, (t) = Taken By, (c) = Cosigned By    Initials Name Provider Type    KORI Trupti Andrade, PT Physical Therapist          Therapy Charges for Today     Code Description Service Date Service Provider Modifiers Qty    14890381940 HC PT MOBILITY CURRENT 11/2/2017 Trupti Andrade, PT GP, CK 1    23997324815 HC PT MOBILITY PROJECTED 11/2/2017 Trupti Andrade, PT GP, CI 1    73841181389 HC PT EVAL MOD COMPLEXITY 1 11/2/2017 Trupti Andrade, PT GP 1          PT G-Codes  PT Professional Judgement Used?: Yes  Outcome Measure Options: AM-PAC 6 Clicks Basic Mobility (PT)  Score: 18  Functional Limitation: Mobility: Walking and moving around  Mobility: Walking and Moving Around Current Status (): At least  40 percent but less than 60 percent impaired, limited or restricted  Mobility: Walking and Moving Around Goal Status (): At least 1 percent but less than 20 percent impaired, limited or restricted      Trupti Andrade, PT  11/2/2017

## 2017-11-02 NOTE — PROGRESS NOTES
"Pharmacokinetics by Pharmacy - Vancomycin    Efrem Roa is a 72 y.o. female  [Ht: 60\" (152.4 cm); Wt: 105 lb 8 oz (47.9 kg)]    Estimated Creatinine Clearance: 13.3 mL/min (by C-G formula based on Cr of 2.85).   Lab Results   Component Value Date    CREATININE 2.85 (H) 11/02/2017    CREATININE 3.98 (H) 11/01/2017    CREATININE 2.87 (H) 10/31/2017      Lab Results   Component Value Date    WBC 21.16 (H) 11/02/2017    WBC 22.93 (H) 11/01/2017    WBC 14.73 (H) 10/31/2017      Temp Readings from Last 1 Encounters:   11/02/17 97.6 °F (36.4 °C) (Temporal Artery )      Lab Results   Component Value Date    VANCORANDOM 5.78 11/02/2017         Culture Results:  Microbiology Results (last 10 days)       Procedure Component Value - Date/Time      Blood Culture - Blood, [[id number]]  (Normal) Collected:  10/30/17 1505    Lab Status:  Preliminary result Specimen:  Blood from Arm, Right Updated:  11/01/17 1516     Blood Culture No growth at 2 days    Blood Culture - Blood, [[id number]]  (Abnormal)  (Susceptibility) Collected:  10/30/17 1349    Lab Status:  Final result Specimen:  Blood from Arm, Right Updated:  11/02/17 0603     Blood Culture --      Staphylococcus aureus, MRSA (C)        Methicillin resistant Staphylococcus aureus, Patient may be an isolation risk.        Isolated from Anaerobic Bottle     Gram Stain Result Anaerobic Bottle Gram positive cocci in clusters      1 positive of 4 drawn       Susceptibility        Staphylococcus aureus, MRSA     [place]     Clindamycin <=0.5 ug/ml Susceptible     Erythromycin >4 ug/ml Resistant     Gentamicin <=4 ug/ml Susceptible     Oxacillin >2 ug/ml Resistant     Penicillin G >8 ug/ml Resistant     Rifampin <=1 ug/ml Susceptible     Trimethoprim + Sulfamethoxazole <=0.5/9.5 ug/ml Susceptible     Vancomycin 1 ug/ml Susceptible                      Blood Culture ID, PCR - Blood, [[id number]]  (Abnormal) Collected:  10/30/17 1349    Lab Status:  Final result Specimen:  Blood from " Arm, Right Updated:  10/31/17 1522     BCID, PCR Staphylococcus aureus. mecA (methicillin resistance gene) detected. Identification by BCID PCR. (C)    Narrative:         Sensitivity to Follow Lab Comment: CONTACT PRECAUTIONS INITIATED    Multi Drug Resistant Organism                  Indication for use: PNA, MRSA bacteremia    Current Vancomycin Dose:  1000 mg IVPB once, day 2 of therapy.      Assessment/Plan:  Random level this morning was subtherapeutic. Will order a one time dose of 1000mg and recheck random tomorrow morning with AM labs due to poor renal function.     Pharmacy will continue to monitor renal function and adjust dose accordingly.    Jonatan Harding RPH   11/02/17 2:49 PM

## 2017-11-02 NOTE — PLAN OF CARE
Problem: Sepsis (Adult)  Goal: Signs and Symptoms of Listed Potential Problems Will be Absent or Manageable (Sepsis)  Outcome: Ongoing (interventions implemented as appropriate)    11/01/17 2015   Sepsis   Problems Assessed (Sepsis) all         Problem: Fall Risk (Adult)  Goal: Absence of Falls  Outcome: Ongoing (interventions implemented as appropriate)    Problem: Renal Replacement, Continuous (Adult)  Goal: Signs and Symptoms of Listed Potential Problems Will be Absent or Manageable (Renal Replacement, Continuous)  Outcome: Ongoing (interventions implemented as appropriate)    Problem: Patient Care Overview (Adult)  Goal: Adult Individualization and Mutuality  Outcome: Ongoing (interventions implemented as appropriate)  Goal: Discharge Needs Assessment  Outcome: Ongoing (interventions implemented as appropriate)    Problem: Pressure Ulcer (Adult)  Goal: Signs and Symptoms of Listed Potential Problems Will be Absent or Manageable (Pressure Ulcer)  Outcome: Ongoing (interventions implemented as appropriate)

## 2017-11-02 NOTE — THERAPY EVALUATION
Acute Care - Occupational Therapy Initial Evaluation  [facility]     Patient Name: Efrem Roa  : 1945  MRN: [id number]  Today's Date: 2017  Onset of Illness/Injury or Date of Surgery Date: 10/30/17  Date of Referral to OT: 17  Referring Physician: LOS Rose    Admit Date: 10/30/2017       ICD-10-CM ICD-9-CM   1. HCAP (healthcare-associated pneumonia) J18.9 486   2. Sepsis, due to unspecified organism A41.9 038.9     995.91   3. NSTEMI (non-ST elevated myocardial infarction) I21.4 410.70   4. ESRD (end stage renal disease) N18.6 585.6   5. Essential hypertension I10 401.9   6. Hypertensive heart disease without heart failure I11.9 402.90   7. Oropharyngeal dysphagia R13.12 787.22   8. Impaired mobility and activities of daily living Z74.09 799.89     Patient Active Problem List   Diagnosis   • Constipation   • Arteriovenous fistula   • End stage renal failure on dialysis   • Controlled type 2 diabetes mellitus with chronic kidney disease on chronic dialysis, without long-term current use of insulin   • Coronary artery disease involving native coronary artery of native heart without angina pectoris   • Panlobular emphysema   • ESRD (end stage renal disease) on dialysis   • HCAP (healthcare-associated pneumonia)     Past Medical History:   Diagnosis Date   • Abnormal x-ray     Standard chest x ray, f/u pneumonia xray   • Acquired hypothyroidism    • Amblyopia    • Anemia of renal disease    • Anxiety    • Arteriovenous fistula     Placed 10/15/2014   • Benign essential hypertension    • Bipolar affective disorder     Current episode depression   • Bipolar disorder    • Cerebrovascular accident    • Chest wall pain    • Chronic angle-closure glaucoma    • Chronic kidney disease     Stage 4-approaching hemodialysis   • Chronic pain syndrome    • Chronic renal failure    • Combined drug dependence excluding opioids    • Constipation    • COPD (chronic obstructive pulmonary disease)     • Coronary arteriosclerosis    • Cough    • Degeneration of cervical intervertebral disc    • Dementia     Multi-infarct, uncomplicated   • Depression    • Diabetes mellitus     No retinopathy   • Diabetes mellitus     Without mention of complication, type 2 or unspecified type, not stated as uncontrolled   • Diabetic renal disease    • Disc disorder of lumbar region    • DJD (degenerative joint disease)     Involving multiple joints   • Edema    • End stage renal failure on dialysis     LUE AV FISTULA 2015   • Epigastric pain    • Essential hypertension    • Exotropia    • Gastritis    • Generalized abdominal pain    • GERD (gastroesophageal reflux disease)     With Esophagitis   • Gout    • H/O screening mammography    • Hiatal hernia    • Hyperlipidemia    • Hypermetropia    • Insomnia    • Iron deficiency anemia    • Low back pain    • Lumbago    • Non-cardiac chest pain    • Nuclear cataract    • Osteoporosis    • Panic disorder without agoraphobia    • Peripheral nervous system disorder    • Radiculitis    • RLQ abdominal pain    • RUQ pain    • Sprain of ankle    • Sprain of knee     Resolving   • Superficial injury of shoulder and upper arm    • Surgical follow-up care    • Type 2 diabetes mellitus    • UTI (urinary tract infection)    • Visit for suture removal    • Vitamin D deficiency    • Vulvovaginitis      Past Surgical History:   Procedure Laterality Date   • APPENDECTOMY     • BACK SURGERY      x2   • BYPASS GRAFT  2015    Left brachial artery to axillary vein arteriovenous graft. Venous ultrasound unilateral extremity   •  SECTION      x2   • CHOLECYSTECTOMY     • COLONOSCOPY W/ POLYPECTOMY  2015    Colitis found in the cecum. Biopsy taken. A diverticulum was found in the sigmoid colon.   • ENDOSCOPY AND COLONOSCOPY     • ESOPHAGOSCOPY / EGD  2015    Normal esophagus. Gastritis found in the stomach. Biopsy taken. Duodenitis found in the duodenum. Biopsy take.   •  ESOPHAGOSCOPY / EGD  1945    With tube-Esophagus appeared normal. Nonerosive gastritis. Duodenum appeared normal   • HYSTERECTOMY     • INJECTION OF MEDICATION  06/06/2011    Aranesp   • INJECTION OF MEDICATION  06/06/2011    Kenalog   • INTERVENTIONAL RADIOLOGY PROCEDURE Left 10/5/2017    Procedure: IR dialysis fistulagram;  Surgeon: Blane Fernandez MD;  Location: [facility] LOCATION;  Service:    • LEG SURGERY  06/13/2000    Cancelled. Due to uncontrolled hypertension   • OTHER SURGICAL HISTORY  07/07/2016    Tissue plasminogen activator catheter thrombolysis   • REMOVAL PERITONEAL DIALYSIS CATHETER  01/16/2014    Removal of tunneled hemodialysis catheter with cuff   • TRANSESOPHAGEAL ECHOCARDIOGRAM (ANTHONY)  03/24/2016    Mild left atrial enlargement with mild to moderate CLVH with normal aortic root size.LV systolic function well preserved with Ef of 55-60%.Mitral valve thickened.Av thickened.Aortic sclerosis.Mild mitral regurg.mild to moderate tricuspid regurg.   • TRANSESOPHAGEAL ECHOCARDIOGRAM (ANTHONY)  02/22/2012    Normal left ventricular systolic function. Moderate tricuspid regurgitation with evidenceof pulmonary hypertension          OT ASSESSMENT FLOWSHEET (last 72 hours)      OT Evaluation       11/02/17 1445 11/02/17 1444 11/02/17 1025 10/31/17 1055 10/31/17 1054    Rehab Evaluation    Document Type evaluation  -KORI evaluation  -RB evaluation  -EK      Subjective Information agree to therapy;complains of;weakness;pain  -KORI agree to therapy;complains of;pain;weakness  -RB no complaints;agree to therapy  -EK      Patient Effort, Rehab Treatment adequate  -KORI adequate  -RB       Symptoms Noted During/After Treatment fatigue  -KORI fatigue  -RB       General Information    Patient Profile Review yes  -KORI yes  -RB       Onset of Illness/Injury or Date of Surgery Date 10/30/17  -KORI 10/30/17  -RB       Referring Physician LOS Rose  -RB       General Observations  Pt supine;noted O2 per nasal cannula on 1 1/2 L/min, IV, telemetry, bed exit armed  -KORI Supine in bed with telemetry, IV and nasal canula.  -RB       Pertinent History Of Current Problem Pt admitted to [facility] with worsening AMS with HCAP and MRSA bacteremia  - Hosp with SOB, fever and AMS.  Dx with community aquired pneumonia.  Pt on dialysis.  -RB       Precautions/Limitations fall precautions;oxygen therapy device and L/min  -KORI fall precautions;oxygen therapy device and L/min  -RB       Prior Level of Function independent:;feeding;transfer;gait;min assist:;bathing;dressing  -KORI        Equipment Currently Used at Home rollator;wheelchair;hospital bed;shower chair;grab bar  -KORI rollator;wheelchair;hospital bed;shower chair;grab bar  -RB  wheelchair;walker, standard;hospital bed;commode;shower chair  -CM     Plans/Goals Discussed With patient  -KORI patient  -RB       Risks Reviewed patient:  -KORI patient:  -RB       Benefits Reviewed patient:  -KORI patient:  -RB       Barriers to Rehab medically complex  -KORI medically complex  -RB       Living Environment    Lives With facility resident  -KORI facility resident  -RB  facility resident   [facility]  -     Living Arrangements residential facility   [facility]  - residential facility  -RB       Home Accessibility no concerns  -KORI no concerns  -RB  no concerns  -CM     Type of Financial/Environmental Concern    none  -CM     Transportation Available  ambulance  -RB  ambulance  -CM     Clinical Impression    Date of Referral to OT  11/02/17  -RB       OT Diagnosis  Impaired mobility and ADLs.  -RB       Functional Level At Time Of Evaluation  Impaired mobility and ADLs.  -RB       Impairments Found (describe specific impairments)  gait, locomotion, and balance;ROM  -RB       Criteria for Skilled Therapeutic Interventions Met  yes;treatment indicated  -RB       Rehab Potential  fair, will monitor progress closely  -RB       Therapy Frequency  --    3-14x/wk.  -RB       Predicted Duration of Therapy Intervention (days/wks)  Until D/C.  -RB       Anticipated Discharge Disposition  skilled nursing facility  -RB       Functional Level Prior    Ambulation  3-->assistive equipment and person  -RB   3-->assistive equipment and person  -CM    Transferring  3-->assistive equipment and person  -RB   3-->assistive equipment and person  -CM    Toileting  1-->assistive equipment  -RB   3-->assistive equipment and person  -CM    Bathing  3-->assistive equipment and person  -RB   3-->assistive equipment and person  -CM    Dressing  3-->assistive equipment and person  -RB   2-->assistive person  -CM    Eating  0-->independent  -RB   2-->assistive person  -CM    Communication  0-->understands/communicates without difficulty  -RB   2-->difficulty understanding (not related to language barrier)  -CM    Swallowing  0-->swallows foods/liquids without difficulty  -RB   0-->swallows foods/liquids without difficulty  -CM    Vital Signs    Pre Systolic BP Rehab  138  -RB       Pre Treatment Diastolic BP  62  -RB       Post Systolic BP Rehab  130  -RB       Post Treatment Diastolic BP  68  -RB       Pretreatment Heart Rate (beats/min)  101  -RB       Posttreatment Heart Rate (beats/min)  102  -RB       Pre SpO2 (%)  90   1.5 L  -RB       O2 Delivery Pre Treatment  supplemental O2  -RB       Post SpO2 (%)  92  -RB       O2 Delivery Post Treatment  supplemental O2  -RB       Pre Patient Position  Supine  -RB       Intra Patient Position  Sitting  -RB       Post Patient Position  Supine  -RB       Pain Assessment    Pain Assessment  0-10  -RB 0-10  -EK      Pain Score  9  -RB 8  -EK      Post Pain Score  8  -RB 8  -EK      Pain Type  Chronic pain  -RB Chronic pain  -EK      Pain Location  Generalized   All over.  -RB Back  -EK      Pain Intervention(s)  Repositioned   Called for pain meds.  -RB Repositioned  -EK      Vision Assessment/Intervention    Visual Impairment  WFL with  corrective lenses  -RB       Cognitive Assessment/Intervention    Current Cognitive/Communication Assessment  functional  -RB       Orientation Status  oriented x 4  -RB       Follows Commands/Answers Questions  100% of the time  -RB       Personal Safety  WNL/WFL  -RB       Personal Safety Interventions  supervised activity  -RB       ROM (Range of Motion)    General ROM  upper extremity range of motion deficits identified  -RB       General ROM Detail  Trace movement only for L shld flex, internal/external rotation.  Rest is WFL.  -RB       MMT (Manual Muscle Testing)    General MMT Assessment  upper extremity strength deficits identified  -RB       General MMT Assessment Detail  1/5 for L shoulder flex. and rest of B UE str is 4-/5.  -RB       Bed Mobility, Assessment/Treatment    Bed Mobility, Assistive Device  bed rails;head of bed elevated  -RB       Bed Mob, Sit to Supine, [place]  minimum assist (75% patient effort)  -RB       Bed Mob, Sidelying to Sit, [place]  minimum assist (75% patient effort)  -RB       Transfer Assessment/Treatment    Transfer, Comment  Deferred standing per pt request   -RB       Sensory Assessment/Intervention    Light Touch  LUE;RUE  -RB       LUE Light Touch  WNL  -RB       RUE Light Touch  WNL  -RB       General Therapy Interventions    Planned Therapy Interventions  activity intolerance;ADL retraining;balance training;bed mobility training;energy conservation;transfer training;strengthening  -RB       Activity Intolerance  fair  -RB       Positioning and Restraints    Pre-Treatment Position  in bed  -RB in bed  -EK      Post Treatment Position  bed  -RB bed  -EK      In Bed  supine;call light within reach;encouraged to call for assist  -RB exit alarm on  -EK        10/30/17 1800                General Information    Equipment Currently Used at Home walker, rolling  -WB        Living Environment    Lives With other (see comments)   nursing home  -WB        Living  Arrangements extended care facility  -WB        Home Accessibility other (see comments)  -WB        Stair Railings at Home other (see comments)  -WB        Type of Financial/Environmental Concern other (see comments)  -WB        Transportation Available other (see comments)  -WB        Functional Level Prior    Ambulation 2-->assistive person  -WB        Transferring 2-->assistive person  -WB        Toileting 2-->assistive person  -WB        Bathing 2-->assistive person  -WB        Dressing 2-->assistive person  -WB        Eating 0-->independent  -WB        Communication 2-->difficulty understanding (not related to language barrier)  -WB        Swallowing 0-->swallows foods/liquids without difficulty  -WB        Prior Functional Level Comment interdependent  -WB          User Key  (r) = Recorded By, (t) = Taken By, (c) = Cosigned By    Initials Name Effective Dates     Quan Tapia OT 06/15/16 -     EK Alka Padron, [facility]-SLP 04/06/17 -     KORI Andrade, PT 10/17/16 -     WB Amador Arrieta, RN 10/17/16 -     HAMILTON Leija, [facility] 01/04/17 -            Occupational Therapy Education     Title: PT OT SLP Therapies (Active)     Topic: Occupational Therapy (Active)     Point: Precautions (Done)    Description: Instruct learner(s) on prescribed precautions during self-care and functional transfers.    Learning Progress Summary    Learner Readiness Method Response Comment Documented by Status   Patient Acceptance E VU,NR Edu on no out of bed without assist.  11/02/17 1553 Done                      User Key     Initials Effective Dates Name Provider Type Discipline     06/15/16 -  Quan Tapia OT Occupational Therapist OT                  OT Recommendation and Plan  Anticipated Discharge Disposition: skilled nursing facility  Planned Therapy Interventions: activity intolerance, ADL retraining, balance training, bed mobility training, energy conservation, transfer training, strengthening  Therapy  Frequency:  (3-14x/wk.)  Plan of Care Review  Plan Of Care Reviewed With: patient  Outcome Summary/Follow up Plan: OT eval complete on this date.  Pt required min A for sup to sit to sup.  Pt deferred standing due to pain and fatigue.  Pt participated with bath/dress @ NH - required min A .  She states she was independent with toileting.  Pt could benefit from OT services to increase independence with ADLs and functional mobilitiy/transfers.          OT Goals       11/02/17 1556          Transfer Training 2 OT LTG    Transfer Training 2 OT LTG, Date Established 11/02/17  -RB      Transfer Training 2 OT LTG, Time to Achieve by discharge  -RB      Transfer Training 2 OT LTG, Activity Type all transfers  -RB      Transfer Training 2 OT LTG, [place] Level supervision required;contact guard assist  -RB      Transfer Training 2 OT LTG, Assist Device walker, rolling  -RB      Strength OT LTG    Strength Goal OT LTG, Date Established 11/02/17  -RB      Strength Goal OT LTG, Measure to Achieve 1-2 sets of 10 reps all planes (except L shoulder flexion) with 1 lb wrist wt or dumbell for B UE strengthening to increase independence with functional mobility and ADLs.  -RB      Static Standing Balance OT LTG    Static Standing Balance OT LTG, Date Established 11/02/17  -RB      Static Standing Balance OT LTG, Time to Achieve by discharge  -RB      Static Standing Balance OT LTG, [place] Level supervision required;contact guard assist   5 minutes with functional activity  -RB      Static Standing Balance OT LTG, Assist Device assistive device   R/W.  -RB      ADL OT LTG    ADL OT LTG, Date Established 11/02/17  -RB      ADL OT LTG, Time to Achieve by discharge  -RB      ADL OT LTG, Activity Type ADL skills   Sponge bath and dress or walk-in shower.  -RB      ADL OT LTG, [place] Level min assist;contact guard;assistive device   R/W.  -RB        User Key  (r) = Recorded By, (t) = Taken By, (c) = Cosigned By    Initials  Name Provider Type    SIHV Tapia OT Occupational Therapist                Outcome Measures       11/02/17 1444          How much help from another is currently needed...    Putting on and taking off regular lower body clothing? 2  -RB      Bathing (including washing, rinsing, and drying) 2  -RB      Toileting (which includes using toilet bed pan or urinal) 2  -RB      Putting on and taking off regular upper body clothing 3  -RB      Taking care of personal grooming (such as brushing teeth) 3  -RB      Eating meals 4  -RB      Score 16  -RB      Functional Assessment    Outcome Measure Options AM-PAC 6 Clicks Daily Activity (OT)  -RB        User Key  (r) = Recorded By, (t) = Taken By, (c) = Cosigned By    Initials Name Provider Type    RB Quan Tapia OT Occupational Therapist          Time Calculation:   OT Start Time: 1444  OT Stop Time: 1509  OT Time Calculation (min): 25 min    Therapy Charges for Today     Code Description Service Date Service Provider Modifiers Qty    [id number]  OT SELFCARE CURRENT 11/2/2017 SANTI Tyson,  1    [id number]  OT SELFCARE PROJECTED 11/2/2017 SANTI Tyson  1    [id number]  OT EVAL MOD COMPLEXITY 1 11/2/2017 Quan Tapia OT GO 1          OT G-codes  OT Professional Judgement Used?: Yes  OT Functional Scales Options: AM-PAC 6 Clicks Daily Activity (OT)  Score: 16  Functional Limitation: Self care  Self Care Current Status (): At least 40 percent but less than 60 percent impaired, limited or restricted  Self Care Goal Status (): At least 20 percent but less than 40 percent impaired, limited or restricted    Quan Tapia OT  11/2/2017

## 2017-11-02 NOTE — PROGRESS NOTES
Nephrology Progress Note:    Complains of cough with no significant expectoration.  No shortness of breath.  Patient had a CT angiogram earlier today which did not show any pulmonary embolism.  Has left lower lobe pneumonia.  Left thigh AV graft without any pain.  She complained about the bed, and being sore.    Patient Active Problem List   Diagnosis   • Constipation   • Arteriovenous fistula   • End stage renal failure on dialysis   • Controlled type 2 diabetes mellitus with chronic kidney disease on chronic dialysis, without long-term current use of insulin   • Coronary artery disease involving native coronary artery of native heart without angina pectoris   • Panlobular emphysema   • ESRD (end stage renal disease) on dialysis   • HCAP (healthcare-associated pneumonia)       Medications:    apixaban 2.5 mg Oral BID   aspirin 81 mg Oral Daily   atorvastatin 40 mg Oral Nightly   ceftriaxone 1 g Intravenous Q24H   famotidine 40 mg Oral Daily   hydrALAZINE 100 mg Oral TID   ipratropium-albuterol 3 mL Nebulization Q4H - RT   levothyroxine 88 mcg Oral Daily   mirtazapine 15 mg Oral Nightly   polyethylene glycol 17 g Oral BID   QUEtiapine 50 mg Oral Nightly   sertraline 50 mg Oral Daily   vancomycin 20 mg/kg Intravenous Once       Pharmacy to dose vancomycin      Vitals:    11/02/17 0839 11/02/17 1123 11/02/17 1137 11/02/17 1507   BP: 140/62      BP Location: Right arm      Patient Position: Lying      Pulse: 111 (P) 107  98   Resp: 18 (P) 18     Temp: 97.6 °F (36.4 °C)      TempSrc: Temporal Artery       SpO2: 93% (P) 92% (P) 92%    Weight:       Height:         I/O last 3 completed shifts:  In: 360 [P.O.:360]  Out: 1700 [Other:1700]  I/O this shift:  In: 480 [P.O.:480]  Out: 100 [Urine:100]    On examination:  General:Comfortable, alert and oriented, lying down in bed  HEENT: Pallor, no icterus, eye movements are normal.  Chest: Decreased breath sounds at the lung bases.  Coarse breath sounds at the left lung  base..  CVS: Heart sounds are irregular, no pericardial rub or gallop  Abdomen: Abdomen is soft, nontender, normal bowel sounds, no tenderness  Extremities: No edema of the lower extremities.  Left thigh AV graft, no tenderness, good bruit  Neuro: No change in neurological status.  Grade 4 power  Mentation: She is alert and oriented    Past medical illness, social history, medications, previous notes reviewed.       Laboratory results:      Recent Results (from the past 24 hour(s))   Renal Function Panel    Collection Time: 11/02/17  5:30 AM   Result Value Ref Range    Glucose 96 60 - 100 mg/dL    BUN 29 (H) 7 - 21 mg/dL    Creatinine 2.85 (H) 0.50 - 1.00 mg/dL    Sodium 141 137 - 145 mmol/L    Potassium 3.6 3.5 - 5.1 mmol/L    Chloride 98 95 - 110 mmol/L    CO2 25.0 22.0 - 31.0 mmol/L    Calcium 9.0 8.4 - 10.2 mg/dL    Albumin 3.60 3.40 - 4.80 g/dL    Phosphorus 2.8 2.4 - 4.4 mg/dL    Anion Gap 18.0 (H) 5.0 - 15.0 mmol/L    BUN/Creatinine Ratio 10.2 7.0 - 25.0    eGFR Non  Amer 16 (L) 39 - 90 mL/min/1.73   CBC Auto Differential    Collection Time: 11/02/17  5:30 AM   Result Value Ref Range    WBC 21.16 (H) 3.20 - 9.80 10*3/mm3    RBC 3.97 3.77 - 5.16 10*6/mm3    Hemoglobin 12.0 12.0 - 15.5 g/dL    Hematocrit 37.3 35.0 - 45.0 %    MCV 94.0 80.0 - 98.0 fL    MCH 30.2 26.5 - 34.0 pg    MCHC 32.2 31.4 - 36.0 g/dL    RDW 17.5 (H) 11.5 - 14.5 %    RDW-SD 60.5 (H) 36.4 - 46.3 fl    MPV 11.0 8.0 - 12.0 fL    Platelets 213 150 - 450 10*3/mm3    Neutrophil % 89.5 (H) 37.0 - 80.0 %    Lymphocyte % 2.1 (L) 10.0 - 50.0 %    Monocyte % 7.7 0.0 - 12.0 %    Eosinophil % 0.0 0.0 - 7.0 %    Basophil % 0.1 0.0 - 2.0 %    Immature Grans % 0.6 (H) 0.0 - 0.5 %    Neutrophils, Absolute 18.92 (H) 2.00 - 8.60 10*3/mm3    Lymphocytes, Absolute 0.45 (L) 0.60 - 4.20 10*3/mm3    Monocytes, Absolute 1.63 (H) 0.00 - 0.90 10*3/mm3    Eosinophils, Absolute 0.00 0.00 - 0.70 10*3/mm3    Basophils, Absolute 0.03 0.00 - 0.20 10*3/mm3     Immature Grans, Absolute 0.13 (H) 0.00 - 0.02 10*3/mm3    nRBC 0.0 0.0 - 0.0 /100 WBC   Vancomycin, Random    Collection Time: 11/02/17  7:31 AM   Result Value Ref Range    Vancomycin Random 5.78 mcg/mL   Lavender Top    Collection Time: 11/02/17  2:10 PM   Result Value Ref Range    Extra Tube hold for add-on    Gold Top - SST    Collection Time: 11/02/17  2:10 PM   Result Value Ref Range    Extra Tube Hold for add-ons.    S. Pneumo Ag Urine or CSF - Urine, Urine, Clean Catch    Collection Time: 11/02/17  2:37 PM   Result Value Ref Range    Strep Pneumo Ag Negative Negative   Legionella Antigen, Urine - Urine, Clean Catch    Collection Time: 11/02/17  2:37 PM   Result Value Ref Range    LEGIONELLA ANTIGEN, URINE Negative Negative   Urinalysis With / Microscopic If Indicated - Urine, Clean Catch    Collection Time: 11/02/17  2:37 PM   Result Value Ref Range    Color, UA Red (A) Yellow, Straw, Dark Yellow, Jaye    Appearance, UA Cloudy (A) Clear    pH, UA <=5.0 5.0 - 9.0    Specific Huntley, UA 1.026 1.003 - 1.030    Glucose, UA Negative Negative    Ketones, UA Trace (A) Negative    Bilirubin, UA Large (3+) (A) Negative    Blood, UA Negative Negative    Protein,  mg/dL (2+) (A) Negative    Leuk Esterase, UA Moderate (2+) (A) Negative    Nitrite, UA Negative Negative    Urobilinogen, UA 1.0 E.U./dL 0.2 - 1.0 E.U./dL   Urinalysis, Microscopic Only - Urine, Clean Catch    Collection Time: 11/02/17  2:37 PM   Result Value Ref Range    RBC, UA 0-2 (A) None Seen /HPF    WBC, UA 13-20 (A) None Seen, 0-2, 3-5 /HPF    Bacteria, UA Trace (A) None Seen /HPF    Squamous Epithelial Cells, UA 3-5 (A) None Seen, 0-2 /HPF    Hyaline Casts, UA 7-12 None Seen /LPF    Coarse Granular Casts, UA 7-12 None Seen /LPF    Methodology Manual Light Microscopy    ]    Imaging Results (last 24 hours)     Procedure Component Value Units Date/Time    CT Angiogram Chest With Contrast [[id number]] Collected:  11/02/17 1530     Updated:   11/02/17 1610    Narrative:       CT chest with contrast. CTA thorax. Pulmonary embolism study.    CLINICAL INDICATION: Hypoxia and tachycardia. Shortness of breath    COMPARISON: Chest x-ray October 30, 2017    chest x-ray November 24, 2016.    TECHNIQUE: Nonionic IV contrast. 50 mL Isovue 370. Helical  scanning with axial and coronal reformations. Soft tissue, lung,  liver, and bone windows reviewed. Computer generated  3-D images,  CT angiography including MIP images are obtained.    This exam was performed according to our departmental  dose-optimization program, which includes automated exposure  control, adjustment of the mA and/or kV according to patient size  and/or use of iterative reconstruction technique.    CHEST CT FINDINGS:  No evidence for pulmonary emboli. Normal  aorta. No evidence of pathologically enlarged nodes.     Dense consolidation left lower lobe basilar segments especially  the lateral anterior basal segment as well as infiltrative  changes, consolidation superior segment. Small left-sided pleural  effusion. Fluid dissecting into the major fissure on the left.  Moderate size hiatus hernia. More subtle infiltrative changes  right lower lobe and right middle lobe.      Impression:       CONCLUSION: No evidence for pulmonary emboli. Extensive  infiltrative changes, dense consolidation mostly in the left  lower lobe compatible with extensive pneumonitis. Unfavorable  change since prior exams.    Electronically signed by:  Wiliam Pearce MD  11/2/2017 4:09 PM CDT  Workstation: [id number]            Assessment:     End-stage renal disease  Left lower lobe pneumonia  Staphylococcal bacteremia  Altered mental status, improved  Essential hypertension  Febrile illness  Type 2 diabetes mellitus    Plan:     ESRD:   Patient is on hemodialysis on Monday Wednesday and Friday.  Potassium levels are stable.  Fluid status appears stable.  Patient had CT angiogram this afternoon, we will plan dialysis tomorrow.   Patient has a left thigh AV graft.    Staphylococcal bacteremia, left lower lobe pneumonia.  Echocardiogram was negative.  Follow-up repeat cultures.  Patient is on IV antibiotics.    Pneumonia: On IV antibiotic.  Follow cultures.    Altered mental status, delirium.  Much improved.  Imaging studies were negative.      Anemia of chronic kidney disease:   Hemoglobin and hematocrit levels are stable.    Essential hypertension: Patient is on hydralazine.  Blood pressure readings are stable.    Discussed with patient.      Chi Holden MD

## 2017-11-02 NOTE — PROGRESS NOTES
[facility] Medicine Services  INPATIENT PROGRESS NOTE    Length of Stay: 3  Date of Admission: 10/30/2017  Primary Care Physician: Rogers Perez MD    Subjective   Chief Complaint/HPI:  This 72-year-old  female was admitted to hospitalist services secondary to a worsening level of altered mental status.  Patient also had fever.  CT of the head was within normal limits.  Chest x-ray demonstrated left lower lobe pneumonia.  Patient has been treated with empiric antibiotics.  Patient was found to have MRSA bacteremia, no vegetation demonstrated on echocardiogram.  Echocardiogram is transthoracic.  Though patient is in no apparent distress, she continues to be tachycardic and hypoxic.  Patient currently on vancomycin and Levaquin.  Patient will be changed to vancomycin and Zosyn per renal dosing.  EKG reordered.  Initial EKG demonstrated sinus tachycardia.  EKG rule out atrial fibrillation with RVR.  No chest pain, did have an elevated troponin, however troponin has been fairly consistent and is actually trending down.  Troponin likely secondary to ESRD and sepsis.  Have spoken with patient's nephrologist, Dr. Mireles, he is agreeable to CTA today.  We'll also order physical therapy and occupational therapy.    Review of Systems   Constitutional: Negative for chills and fever.   Gastrointestinal: Negative for abdominal pain, nausea and vomiting.   Genitourinary: Negative for dysuria.   Musculoskeletal: Positive for arthralgias and myalgias.   Neurological: Negative for dizziness, tremors, seizures, syncope, facial asymmetry, speech difficulty, weakness, light-headedness, numbness and headaches.   Psychiatric/Behavioral: Positive for confusion.      Review of symptoms is partially compromised by the fact the patient is confused.  This confusion may be baseline or maybe worse than baseline.      Objective    Temp:  [97.6 °F (36.4 °C)-99.6 °F (37.6 °C)] 97.6 °F (36.4  °C)  Heart Rate:  [] (P) 107  Resp:  [16-20] (P) 18  BP: (113-140)/(45-62) 140/62    Physical Exam   Constitutional: She appears well-developed. No distress.   HENT:   Head: Normocephalic and atraumatic.   Eyes: Conjunctivae are normal. Pupils are equal, round, and reactive to light.   Cardiovascular:   Tachycardia   Pulmonary/Chest: Effort normal. No respiratory distress. She has no wheezes.   Abdominal: Soft. Bowel sounds are normal. She exhibits no distension. There is no tenderness.   Neurological: She is alert.   Skin: Skin is warm and dry. She is not diaphoretic.     Results Review:  I have reviewed the labs, radiology results, and diagnostic studies.    Laboratory Data:     Results from last 7 days  Lab Units 11/02/17  0530 11/01/17  0610 10/31/17  0208 10/30/17  1349   SODIUM mmol/L 141 136* 139 137  137   SODIUM, ARTERIAL mmol/L  --   --   --  135.2*   POTASSIUM mmol/L 3.6 4.4 3.8 5.2*  5.1   CHLORIDE mmol/L 98 97 99 99  96   CO2 mmol/L 25.0 25.0 28.0 22.0  22.0   BUN mg/dL 29* 54* 31* 65*  65*   CREATININE mg/dL 2.85* 3.98* 2.87* 4.98*  4.88*   GLUCOSE mg/dL 96 115* 94 148*  152*   GLUCOSE, ARTERIAL mmol/L  --   --   --  153   CALCIUM mg/dL 9.0 8.6 8.6 8.3*  8.4   BILIRUBIN mg/dL  --   --   --  0.7   ALK PHOS U/L  --   --   --  141*   ALT (SGPT) U/L  --   --   --  42   AST (SGOT) U/L  --   --   --  86*   ANION GAP mmol/L 18.0* 14.0 12.0 16.0*  19.0*     Estimated Creatinine Clearance: 13.3 mL/min (by C-G formula based on Cr of 2.85).    Results from last 7 days  Lab Units 11/02/17  0530 11/01/17  0610 10/31/17  0208   PHOSPHORUS mg/dL 2.8 4.3 3.5           Results from last 7 days  Lab Units 11/02/17  0530 11/01/17  0610 10/31/17  0208 10/30/17  1922 10/30/17  1349   WBC 10*3/mm3 21.16* 22.93* 14.73* 12.94* 13.92*   HEMOGLOBIN g/dL 12.0 10.1* 10.6* 12.3 11.3*   HEMATOCRIT % 37.3 31.7* 32.8* 37.8 35.7   PLATELETS 10*3/mm3 213 191 166 217 208       Results from last 7 days  Lab Units  10/30/17  1349   INR  1.86*       Culture Data:   Blood Culture   Date Value Ref Range Status   10/30/2017 No growth at 2 days  Preliminary   10/30/2017 Staphylococcus aureus, MRSA (C)  Final     Comment:       Methicillin resistant Staphylococcus aureus, Patient may be an isolation risk.     No results found for: URINECX  No results found for: RESPCX  No results found for: WOUNDCX  No results found for: STOOLCX  No components found for: BODYFLD    Radiology Data:   Imaging Results (last 24 hours)     ** No results found for the last 24 hours. **          I have reviewed the patient current medications.     Assessment/Plan     Hospital Problem List     HCAP (healthcare-associated pneumonia)          Plan:  Repeat blood cultures, EKG, CTA of pulmonary arteries, escalate from Levaquin to Zosyn, continue vancomycin, PT and OT, continue to treat as hospital course dictates.                This document has been electronically signed by LOS Paredes on November 2, 2017 1:44 PM

## 2017-11-03 LAB
ALBUMIN SERPL-MCNC: 3.3 G/DL (ref 3.4–4.8)
ANION GAP SERPL CALCULATED.3IONS-SCNC: 15 MMOL/L (ref 5–15)
BACTERIA BLD CULT: ABNORMAL
BACTERIA SPEC AEROBE CULT: NORMAL
BACTERIA SPEC AEROBE CULT: NORMAL
BASOPHILS # BLD AUTO: 0.03 10*3/MM3 (ref 0–0.2)
BASOPHILS NFR BLD AUTO: 0.2 % (ref 0–2)
BUN BLD-MCNC: 45 MG/DL (ref 7–21)
BUN/CREAT SERPL: 11.8 (ref 7–25)
CALCIUM SPEC-SCNC: 8.4 MG/DL (ref 8.4–10.2)
CHLORIDE SERPL-SCNC: 99 MMOL/L (ref 95–110)
CO2 SERPL-SCNC: 26 MMOL/L (ref 22–31)
CREAT BLD-MCNC: 3.82 MG/DL (ref 0.5–1)
DEPRECATED RDW RBC AUTO: 59.1 FL (ref 36.4–46.3)
EOSINOPHIL # BLD AUTO: 0 10*3/MM3 (ref 0–0.7)
EOSINOPHIL NFR BLD AUTO: 0 % (ref 0–7)
ERYTHROCYTE [DISTWIDTH] IN BLOOD BY AUTOMATED COUNT: 17.5 % (ref 11.5–14.5)
GFR SERPL CREATININE-BSD FRML MDRD: 12 ML/MIN/1.73 (ref 39–90)
GLUCOSE BLD-MCNC: 110 MG/DL (ref 60–100)
HCT VFR BLD AUTO: 30.9 % (ref 35–45)
HGB BLD-MCNC: 10 G/DL (ref 12–15.5)
IMM GRANULOCYTES # BLD: 0.1 10*3/MM3 (ref 0–0.02)
IMM GRANULOCYTES NFR BLD: 0.8 % (ref 0–0.5)
LYMPHOCYTES # BLD AUTO: 0.57 10*3/MM3 (ref 0.6–4.2)
LYMPHOCYTES NFR BLD AUTO: 4.3 % (ref 10–50)
MCH RBC QN AUTO: 29.5 PG (ref 26.5–34)
MCHC RBC AUTO-ENTMCNC: 32.4 G/DL (ref 31.4–36)
MCV RBC AUTO: 91.2 FL (ref 80–98)
MONOCYTES # BLD AUTO: 1.48 10*3/MM3 (ref 0–0.9)
MONOCYTES NFR BLD AUTO: 11.1 % (ref 0–12)
MYCOPLASMAE PNEUMONIAE BY PCR: NEGATIVE
NEUTROPHILS # BLD AUTO: 11.15 10*3/MM3 (ref 2–8.6)
NEUTROPHILS NFR BLD AUTO: 83.6 % (ref 37–80)
NRBC BLD MANUAL-RTO: 0 /100 WBC (ref 0–0)
PHOSPHATE SERPL-MCNC: 3.1 MG/DL (ref 2.4–4.4)
PLATELET # BLD AUTO: 202 10*3/MM3 (ref 150–450)
PMV BLD AUTO: 10.4 FL (ref 8–12)
POTASSIUM BLD-SCNC: 3.7 MMOL/L (ref 3.5–5.1)
RBC # BLD AUTO: 3.39 10*6/MM3 (ref 3.77–5.16)
SODIUM BLD-SCNC: 140 MMOL/L (ref 137–145)
VANCOMYCIN SERPL-MCNC: 16.55 MCG/ML
WBC NRBC COR # BLD: 13.33 10*3/MM3 (ref 3.2–9.8)

## 2017-11-03 PROCEDURE — 97110 THERAPEUTIC EXERCISES: CPT

## 2017-11-03 PROCEDURE — 85025 COMPLETE CBC W/AUTO DIFF WBC: CPT | Performed by: INTERNAL MEDICINE

## 2017-11-03 PROCEDURE — 80202 ASSAY OF VANCOMYCIN: CPT | Performed by: INTERNAL MEDICINE

## 2017-11-03 PROCEDURE — 94799 UNLISTED PULMONARY SVC/PX: CPT

## 2017-11-03 PROCEDURE — 94640 AIRWAY INHALATION TREATMENT: CPT

## 2017-11-03 PROCEDURE — 97530 THERAPEUTIC ACTIVITIES: CPT

## 2017-11-03 PROCEDURE — 80069 RENAL FUNCTION PANEL: CPT | Performed by: INTERNAL MEDICINE

## 2017-11-03 PROCEDURE — 63510000001 EPOETIN ALFA PER 1000 UNITS: Performed by: INTERNAL MEDICINE

## 2017-11-03 PROCEDURE — 25010000002 CEFTRIAXONE: Performed by: PHYSICIAN ASSISTANT

## 2017-11-03 PROCEDURE — 94760 N-INVAS EAR/PLS OXIMETRY 1: CPT

## 2017-11-03 RX ADMIN — FAMOTIDINE 40 MG: 40 TABLET ORAL at 12:25

## 2017-11-03 RX ADMIN — IPRATROPIUM BROMIDE AND ALBUTEROL SULFATE 3 ML: 2.5; .5 SOLUTION RESPIRATORY (INHALATION) at 22:42

## 2017-11-03 RX ADMIN — HYDROCODONE BITARTRATE AND ACETAMINOPHEN 1 TABLET: 7.5; 325 TABLET ORAL at 20:15

## 2017-11-03 RX ADMIN — APIXABAN 2.5 MG: 2.5 TABLET, FILM COATED ORAL at 17:50

## 2017-11-03 RX ADMIN — ATORVASTATIN CALCIUM 40 MG: 40 TABLET, FILM COATED ORAL at 20:15

## 2017-11-03 RX ADMIN — HYDRALAZINE HYDROCHLORIDE 100 MG: 50 TABLET ORAL at 21:19

## 2017-11-03 RX ADMIN — VANCOMYCIN HYDROCHLORIDE 1000 MG: 1 INJECTION, POWDER, LYOPHILIZED, FOR SOLUTION INTRAVENOUS at 17:50

## 2017-11-03 RX ADMIN — LEVOTHYROXINE SODIUM 88 MCG: 88 TABLET ORAL at 16:26

## 2017-11-03 RX ADMIN — ERYTHROPOIETIN 8000 UNITS: 10000 INJECTION, SOLUTION INTRAVENOUS; SUBCUTANEOUS at 08:52

## 2017-11-03 RX ADMIN — CEFTRIAXONE 1 G: 1 INJECTION, POWDER, FOR SOLUTION INTRAMUSCULAR; INTRAVENOUS at 13:57

## 2017-11-03 RX ADMIN — SERTRALINE HYDROCHLORIDE 50 MG: 50 TABLET ORAL at 12:25

## 2017-11-03 RX ADMIN — QUETIAPINE FUMARATE 50 MG: 25 TABLET, FILM COATED ORAL at 21:19

## 2017-11-03 RX ADMIN — IPRATROPIUM BROMIDE AND ALBUTEROL SULFATE 3 ML: 2.5; .5 SOLUTION RESPIRATORY (INHALATION) at 18:30

## 2017-11-03 RX ADMIN — MIRTAZAPINE 15 MG: 15 TABLET, FILM COATED ORAL at 20:15

## 2017-11-03 RX ADMIN — IPRATROPIUM BROMIDE AND ALBUTEROL SULFATE 3 ML: 2.5; .5 SOLUTION RESPIRATORY (INHALATION) at 06:47

## 2017-11-03 RX ADMIN — IPRATROPIUM BROMIDE AND ALBUTEROL SULFATE 3 ML: 2.5; .5 SOLUTION RESPIRATORY (INHALATION) at 02:47

## 2017-11-03 RX ADMIN — HYDRALAZINE HYDROCHLORIDE 100 MG: 50 TABLET ORAL at 16:25

## 2017-11-03 RX ADMIN — ASPIRIN 81 MG: 81 TABLET, COATED ORAL at 12:25

## 2017-11-03 NOTE — SIGNIFICANT NOTE
11/03/17 0935   Rehab Treatment   Discipline occupational therapist   Treatment Not Performed patient unavailable for treatment  (pt off floor for dialysis. OT will check back at a later time. )

## 2017-11-03 NOTE — PLAN OF CARE
Problem: Sepsis (Adult)  Goal: Signs and Symptoms of Listed Potential Problems Will be Absent or Manageable (Sepsis)  Outcome: Ongoing (interventions implemented as appropriate)    Problem: Fall Risk (Adult)  Goal: Absence of Falls  Outcome: Ongoing (interventions implemented as appropriate)    Problem: Renal Replacement, Continuous (Adult)  Goal: Signs and Symptoms of Listed Potential Problems Will be Absent or Manageable (Renal Replacement, Continuous)  Outcome: Ongoing (interventions implemented as appropriate)    Problem: Patient Care Overview (Adult)  Goal: Plan of Care Review  Outcome: Ongoing (interventions implemented as appropriate)    11/03/17 0414   Coping/Psychosocial Response Interventions   Plan Of Care Reviewed With patient   Patient Care Overview   Progress no change   Outcome Evaluation   Outcome Summary/Follow up Plan pt has rested well throughout the shift after admin of seroquel; has been alert and oriented when awake; vital signs stable; will continue to monitor       Goal: Adult Individualization and Mutuality  Outcome: Ongoing (interventions implemented as appropriate)  Goal: Discharge Needs Assessment  Outcome: Ongoing (interventions implemented as appropriate)

## 2017-11-03 NOTE — THERAPY TREATMENT NOTE
Acute Care - Physical Therapy Treatment Note  [facility]     Patient Name: Efrem Roa  : 1945  MRN: [id number]  Today's Date: 11/3/2017  Onset of Illness/Injury or Date of Surgery Date: 10/30/17  Date of Referral to PT: 17  Referring Physician: LOS Rose    Admit Date: 10/30/2017    Visit Dx:    ICD-10-CM ICD-9-CM   1. HCAP (healthcare-associated pneumonia) J18.9 486   2. Sepsis, due to unspecified organism A41.9 038.9     995.91   3. NSTEMI (non-ST elevated myocardial infarction) I21.4 410.70   4. ESRD (end stage renal disease) N18.6 585.6   5. Essential hypertension I10 401.9   6. Hypertensive heart disease without heart failure I11.9 402.90   7. Oropharyngeal dysphagia R13.12 787.22   8. Impaired mobility and activities of daily living Z74.09 799.89   9. Impaired functional mobility, balance, gait, and endurance Z74.09 V49.89     Patient Active Problem List   Diagnosis   • Constipation   • Arteriovenous fistula   • End stage renal failure on dialysis   • Controlled type 2 diabetes mellitus with chronic kidney disease on chronic dialysis, without long-term current use of insulin   • Coronary artery disease involving native coronary artery of native heart without angina pectoris   • Panlobular emphysema   • ESRD (end stage renal disease) on dialysis   • HCAP (healthcare-associated pneumonia)               Adult Rehabilitation Note       17 1321 17 1025       Rehab Assessment/Intervention    Discipline physical therapy assistant  -KORI speech language pathologist  -EK     Document Type therapy note (daily note)  -KORI      Subjective Information agree to therapy  -KORI      Patient Effort, Rehab Treatment adequate  -KORI      Patient Effort, Rehab Treatment Comment pt states she is weak from dialysis and defers OOB despite encouragement.   -KORI      Symptoms Noted During/After Treatment fatigue;other (see comments)   pt w/ rattled cough  -KORI      Precautions/Limitations fall  precautions;oxygen therapy device and L/min  -KORI      Recorded by [KORI] Nemseio Malik PTA [EK] Alka Padron, CCC-SLP     Vital Signs    Pre Systolic BP Rehab 128  -KORI      Pre Treatment Diastolic BP 70  -KORI      Post Systolic BP Rehab --   pt declines ending BP taken due to the pain it causes.   -KORI      Pretreatment Heart Rate (beats/min) 82  -KORI      Posttreatment Heart Rate (beats/min) 110  -KORI      Pre SpO2 (%) 98  -KORI      O2 Delivery Pre Treatment supplemental O2  -KORI      Post SpO2 (%) 90  -KORI      O2 Delivery Post Treatment supplemental O2  -KORI      Recorded by [KORI] Nemesio Malik PTA      Pain Assessment    Pain Assessment --   pt did not give pain rating. no c/o pain  -KORI      Recorded by [KORI] Nemesio Malik PTA      Vision Assessment/Intervention    Visual Impairment WFL with corrective lenses  -KORI      Recorded by [KORI] Nemesio Malik PTA      Cognitive Assessment/Intervention    Current Cognitive/Communication Assessment functional  -KORI      Orientation Status oriented x 4  -KORI      Follows Commands/Answers Questions 100% of the time  -KORI      Personal Safety WNL/WFL  -KORI      Personal Safety Interventions gait belt;muscle strengthening facilitated;nonskid shoes/slippers when out of bed;supervised activity  -KORI      Recorded by [KORI] Nemesio Malik PTA      Bed Mobility, Assessment/Treatment    Bed Mobility, Assistive Device bed rails;head of bed elevated  -      Bed Mobility, Roll Left, Dunn moderate assist (50% patient effort)  -      Bed Mobility, Roll Right, Dunn moderate assist (50% patient effort)  -      Bed Mobility, Scoot/Bridge, Dunn dependent (less than 25% patient effort);2 person assist required  -      Bed Mob, Supine to Sit, Dunn moderate assist (50% patient effort);maximum assist (25% patient effort)  -      Bed Mob, Sit to Supine, Dunn maximum assist (25% patient effort)  -      Bed Mobility, Comment pt sat EOB  20 mins  -KORI      Recorded by [KORI] Nemesio Malik PTA      Transfer Assessment/Treatment    Transfers, Bed-Chair Alpine not tested  -KORI      Recorded by [KORI] Nemesio Malik PTA      Gait Assessment/Treatment    Gait, Alpine Level not tested  -KORI      Recorded by [KORI] Nemesio Malik PTA      Therapy Exercises    Bilateral Lower Extremities AROM:;15 reps;sitting;ankle pumps/circles;LAQ;hip flexion;hip abduction/adduction;glut sets  -KORI      Recorded by [KORI] Nemesio Malik PTA      Positioning and Restraints    Pre-Treatment Position in bed  -KORI      Post Treatment Position bed  -KORI      In Bed supine;call light within reach;encouraged to call for assist;exit alarm on   all needs isaias.  -KORI      Recorded by [KORI] Nemesio Malik PTA        User Key  (r) = Recorded By, (t) = Taken By, (c) = Cosigned By    Initials Name Effective Dates    EK Alka Padron, CCC-SLP 04/06/17 -     KORI Nemesio Malik PTA 10/17/16 -                 IP PT Goals       11/03/17 1321 11/02/17 1713       Transfer Training PT LTG    Transfer Training PT LTG, Date Established  11/02/17  -     Transfer Training PT LTG, Time to Achieve  by discharge  -     Transfer Training PT LTG, Activity Type  bed to chair /chair to bed;sit to stand/stand to sit  -     Transfer Training PT LTG, Alpine Level  conditional independence  -     Transfer Training PT  LTG, Date Goal Reviewed 11/03/17  -A      Transfer Training PT LTG, Outcome goal ongoing  -[facility] goal ongoing  -     Gait Training PT LTG    Gait Training Goal PT LTG, Date Established  11/02/17  -     Gait Training Goal PT LTG, Time to Achieve  by discharge  -     Gait Training Goal PT LTG, Alpine Level  conditional independence  -KORI     Gait Training Goal PT LTG, Distance to Achieve  150ft;O2 sats will not drop below 92%  -     Gait Training Goal PT LTG, Date Goal Reviewed 11/03/17  -A      Gait Training Goal PT LTG, Outcome goal ongoing   -A goal ongoing  -     Strength Goal PT LTG    Strength Goal PT LTG, Date Established  11/02/17  -     Strength Goal PT LTG, Time to Achieve  by discharge  -     Strength Goal PT LTG, Measure to Achieve  Pt will tolerate 15 reps of AROM exercises in sitting  -     Strength Goal PT LTG, Date Goal Reviewed 11/03/17  -A      Strength Goal PT LTG, Outcome goal met  -A goal ongoing  -     Patient Education PT LTG    Patient Education PT LTG, Date Established  11/02/17  -     Patient Education PT LTG, Time to Achieve  by discharge  -     Patient Education PT LTG, Education Type  gait;transfers;home safety  -     Patient Education PT LTG, Date Goal Reviewed 11/03/17  -[facility] [place]      Patient Education PT LTG Outcome goal ongoing  -A goal ongoing  -       User Key  (r) = Recorded By, (t) = Taken By, (c) = Cosigned By    Initials Name Provider Type    KORI Trupti Andrade, PT Physical Therapist    CONNIE Malik, NIYAH Physical Therapy Assistant          Physical Therapy Education     Title: PT OT SLP Therapies (Active)     Topic: Physical Therapy (Active)     Point: Mobility training (Active)    Learning Progress Summary    Learner Readiness Method Response Comment Documented by Status   Patient Acceptance E NR pt edu on benefits of OOB  11/03/17 1409 Active                      User Key     Initials Effective Dates Name Provider Type Discipline     10/17/16 -  Nemesio Malik PTA Physical Therapy Assistant PT                    PT Recommendation and Plan  Anticipated Discharge Disposition: skilled nursing facility  Planned Therapy Interventions: balance training, bed mobility training, gait training, patient/family education, strengthening, transfer training  PT Frequency: other (see comments) (5-14 times per week)  Plan of Care Review  Plan Of Care Reviewed With: patient  Progress: progress toward functional goals as expected  Outcome Summary/Follow up Plan: tx limited by pt participation. pt  required increased assist for bed mob. pt met strength LTG this tx. pt would continue to benefit from PT services. Recommend SNF upon DC.           Outcome Measures       11/03/17 1321 11/02/17 1445 11/02/17 1444    How much help from another person do you currently need...    Turning from your back to your side while in flat bed without using bedrails? 2  -KORI 4  -JCA     Moving from lying on back to sitting on the side of a flat bed without bedrails? 2  -KORI 3  -JCA     Moving to and from a bed to a chair (including a wheelchair)? 2  -KORI 3  -JCA     Standing up from a chair using your arms (e.g., wheelchair, bedside chair)? 2  -KORI 3  -JCA     Climbing 3-5 steps with a railing? 1  -KORI 2  -JCA     To walk in hospital room? 2  -KORI 3  -JCA     AM-PAC 6 Clicks Score 11  -KORI 18  -JCA     How much help from another is currently needed...    Putting on and taking off regular lower body clothing?   2  -RB    Bathing (including washing, rinsing, and drying)   2  -RB    Toileting (which includes using toilet bed pan or urinal)   2  -RB    Putting on and taking off regular upper body clothing   3  -RB    Taking care of personal grooming (such as brushing teeth)   3  -RB    Eating meals   4  -RB    Score   16  -RB    Functional Assessment    Outcome Measure Options AM-PAC 6 Clicks Basic Mobility (PT)  -KORI AM-PAC 6 Clicks Basic Mobility (PT)  -JCA AM-PAC 6 Clicks Daily Activity (OT)  -RB      User Key  (r) = Recorded By, (t) = Taken By, (c) = Cosigned By    Initials Name Provider Type    RB Quan Tapia, OT Occupational Therapist    CONNIE Andrade, PT Physical Therapist    KORI Malik, PTA Physical Therapy Assistant           Time Calculation:         PT Charges       11/03/17 1412          Time Calculation    Start Time 1321  -KORI      Stop Time 1355  -KORI      Time Calculation (min) 34 min  -KORI      Time Calculation- PT    Total Timed Code Minutes- PT 34 minute(s)  -KORI        User Key  (r) = Recorded By, (t) = Taken By,  (c) = Cosigned By    Initials Name Provider Type    KORI Nemesio Malik PTA Physical Therapy Assistant          Therapy Charges for Today     Code Description Service Date Service Provider Modifiers Qty    98600382611 HC PT THER PROC EA 15 MIN 11/3/2017 Nemesio Malik PTA GP 1    02318489208 HC PT THERAPEUTIC ACT EA 15 MIN 11/3/2017 Nemesio Malik PTA GP 1          PT G-Codes  PT Professional Judgement Used?: Yes  Outcome Measure Options: AM-PAC 6 Clicks Basic Mobility (PT)  Score: 18  Functional Limitation: Mobility: Walking and moving around  Mobility: Walking and Moving Around Current Status (): At least 40 percent but less than 60 percent impaired, limited or restricted  Mobility: Walking and Moving Around Goal Status (): At least 1 percent but less than 20 percent impaired, limited or restricted    Nemesio Malik PTA  11/3/2017

## 2017-11-03 NOTE — CONSULTS
Cardiology Consultation Note.        Patient Name: Efrem Roa  Age/Sex: 72 y.o. female  : 1945  MRN: [id number]    Date of consultation: 11/3/2017  Consulting Physician: Sung Adan MD  Primary care Physician: Rogers Perez MD  Requesting Physician:  LOS Paredes     Reason for consultation: Elevated troponin rule out vegetation with persistent bacteremia.      Subjective:       Chief Complaint: Shortness of breath     History of Present Illness:  Efrem Roa is a 72 y.o. female     Body mass index is 20.33 kg/(m^2). with a past medical history significant for chest pain with previous coronary angiogram done in , which had not revealed any evidence of any obstructive epicardial coronary artery disease and a negative adenosine  Cardiolite stress test done in  for any evidence of any stress- induced ischemia. History of arterial hypertension, hypertensive heart disease, preserved left ventricular systolic function with an ejection fraction of 55%, with mild-to-moderate mitral regurgitation, mild tricuspid regurgitation, concentric left ventricular hypertrophy. History of hyperlipidemia, cerebrovascular accident with right temporooccipital infarct, chronic kidney disease on hemodialysis, vitamin D deficiency, hiatal hernia, chronic obstructive lung disease, gastroesophageal reflux disease, anxiety, depression, lumbar stenosis with chronic lumbar pain who was hospitalized with symptoms of shortness of breath.    Patient was hospitalized on 10/30/2017 with fever or shortness of breath and altered mental status.  Patient subsequently had bacteremia.  Patient underwent a transthoracic echocardiogram earlier which did not reveal off any evidence of any vegetation.  Patient has had episodes of MRSA bacteremia and currently does not have any indwelling lines or catheters.  Cardiology was consulted for possible vegetation.  Patient has remained tachycardic without any fever.    On my  evaluation patient currently appears to be comfortable.  Patient denies any symptoms of chest pain.  Patient is end-stage renal disease on hemodialysis.  Patient denies any symptoms of chest pain.  Patient mental status is awake alert and oriented ×3.  Patient denies any open sores or any wound.  Patient denies any productive cough.  Patient does complain of having fatigue.  Patient had undergone previous coronary angiogram which had not revealed off any evidence of any obstructive epicardial coronary artery disease and had undergone a nuclear stress tests in 2012 which was also negative.  Patient does not appear to be toxic and has not had any febrile episode.    Patient denies any bleeding diastasis.  Patient 10 point review of system except for what is stated in the history of present illness is negative.          Past Medical History:  1.   Shortness of breath  2.   Paroxysmal atrial fibrillation.  3.   Previous coronary angiogram done in 2005, which had not revealed any evidence of any obstructive epicardial coronary artery disease.  4.   Negative adenosine Cardiolite stress test done in 2012 for any evidence of any stress-induced ischemia.  5.   Arterial hypertension.  6.   Hypertensive heart disease.  7.   Concentric left ventricular hypertrophy with diastolic dysfunction.  8.   Mitral valve thickening with mitral annular calcification.  9.   Mild tricuspid regurgitation on the last echocardiogram with mild pulmonary hypertension.  10.  Hyperlipidemia.  11.  Poorly controlled diabetes.  12.  Cerebrovascular accident with right temporooccipital infarct.  13.  Chronic kidney disease, on hemodialysis, via the right femoral [place] dialysis catheter with left upper extremity dialysis shunt catheter in place.  14.  Vitamin D deficiency.  15.  Hiatal hernia.  16.  Chronic obstructive lung disease with previous tobacco abuse.  17.  Gastroesophageal reflux disease.  18.  Anxiety and depression.  19.  Lumbar  stenosis with lower back pain.          PAST SURGICAL HISTORY:  1.   Colonoscopy.  2.   Esophagogastroduodenoscopy.  3.   Cholecystectomy.  4.   Hysterectomy.  5.   Right breast biopsy.  6.   Right ankle surgery.  7.   Vertebroplasty.  8.   Back surgery.  9.   Right femoral non-tunneled dialysis catheter.  10.  Left upper extremity dialysis catheter.  11.  Coronary angiogram done in 2005.          SOCIAL HISTORY:  Previous history of tobacco abuse. Denies any current tobacco or alcohol intake.      FAMILY HISTORY:  Significant for brother who had coronary artery disease with bypass surgery and pacemaker.      CARDIAC RISK FACTORS:  1.   Postmenopausal.  2.   Arterial hypertension.  3.   Hyperlipidemia.  4.   Diabetes.  5.   Previous history of tobacco abuse.  6.   Family history for coronary artery disease.    Allergies:  Allergies   Allergen Reactions   • Ambien [Zolpidem Tartrate]    • Benadryl [Diphenhydramine]    • Penicillins        Medication::  Prescriptions Prior to Admission   Medication Sig Dispense Refill Last Dose   • Apixaban (ELIQUIS PO) Take 2.5 mg by mouth 2 (Two) Times a Day.   10/2/2017 at 0700   • aspirin 81 MG EC tablet Take 81 mg by mouth daily.   10/4/2017 at 0700   • atorvastatin (LIPITOR) 40 MG tablet Take 40 mg by mouth every night.   10/4/2017 at 1930   • B Complex-C-Folic Acid (NGA CAPS PO) Take 1 capsule by mouth daily.   10/4/2017 at 0700   • Docusate Sodium (COLACE PO) Take 1 capsule by mouth 3 (three) times a day as needed.   More than a month at Unknown time   • famotidine (PEPCID) 40 MG tablet Take 40 mg by mouth Daily.   10/4/2017 at 1930   • fentaNYL (DURAGESIC) 25 MCG/HR patch Place 1 patch on the skin every third day.   10/3/2017   • gabapentin (NEURONTIN) 100 MG capsule Take 100 mg by mouth 3 (three) times a day.   10/4/2017 at 1930   • hydrALAZINE (APRESOLINE) 100 MG tablet Take 100 mg by mouth 3 (three) times a day.   10/5/2017 at 0630   • HYDROcodone-acetaminophen (NORCO)  7.5-325 MG per tablet Take 1 tablet by mouth Every 4 (Four) Hours As Needed for moderate pain (4-6) (for LLE pain post). 50 tablet 0 10/4/2017 at 2300   • levothyroxine (SYNTHROID, LEVOTHROID) 88 MCG tablet Take 88 mcg by mouth daily.   10/4/2017 at 0600   • lidocaine-prilocaine (EMLA) 2.5-2.5 % cream Apply  topically Every 2 (Two) Hours As Needed for Mild Pain (1-3) (as needed prior to dialysis).   10/4/2017   • Mesalamine (APRISO PO) Take 1 capsule by mouth daily.   10/4/2017 at 0700   • mirtazapine (REMERON) 15 MG tablet Take 15 mg by mouth Every Night.   10/4/2017 at 1930   • nitroglycerin (NITROSTAT) 0.4 MG SL tablet Place 0.4 mg under the tongue every 5 (five) minutes as needed for chest pain. Take no more than 3 doses in 15 minutes.   More than a month at Unknown time   • polyethylene glycol (MIRALAX) packet Take 17 g by mouth 2 (two) times a day.   10/4/2017 at 1930   • QUETIAPINE FUMARATE PO Take 50 mg by mouth Every Night.   10/4/2017 at 1930   • sertraline (ZOLOFT) 50 MG tablet Take 50 mg by mouth Daily.   10/4/2017 at 0700   • vitamin D (ERGOCALCIFEROL) 82726 UNITS capsule capsule Take 50,000 Units by mouth 1 (one) time per week.   10/2/2017 at 0900           Review of Systems:       Constitutional:  Denies recent weight loss, weight gain, fever or chills, no change in exercise tolerance     HENT:  Denies any hearing loss, epistaxis, hoarseness, or difficulty speaking.     Eyes: Wears eyeglasses or contact lenses     Respiratory:  Denies dyspnea with exertion,no cough, wheezing, or hemoptysis.     Cardiovascular: Negative for palpitations, chest pain, orthopnea, PND, peripheral edema, syncope, or claudication.     Gastrointestinal:  Denies change in bowel habits, dyspepsia, ulcer disease, hematochezia, or melena.  No nausea, no vomiting, no hematemesis, no diarrhea or constipation, no melena      Endocrine: Negative for cold intolerance, heat intolerance, polydipsia, polyphagia and polyuria. Denies any  history of weight change, heat/cold intolerance, polydipsia, polyuria     Genitourinary: Negative for hematuria.      Musculoskeletal: Denies any history of arthritic symptoms or back problems .  No joint pain, joint stiffness, joint swelling, muscle pain, muscle weakness and neck pain    Skin:  Denies any change in hair or nails, rashes, or skin lesions.     Allergic/Immunologic: Negative.  Negative for environmental allergies, food allergies and immunocompromised state.     Neurological:  Denies any history of recurrent headaches, strokes, TIA, or seizure disorder.     Hematological: Denies excessive bleeding, easy bruising, fatigue, lymphadenopathy and petechiae or any bleeding disorders, or lymphadenopathy.     Psychiatric/Behavioral: Denies any history of depression, substance abuse, or change in cognitive function. Denies any psychomotor reaction or tangential thought.  No depression, homicidal ideations and suicidal ideations    Endocrine: No frequent urination and nocturia, temperature intolerance, weight gain, unintended and weight loss, unintended            Objective:     Objective:  Vitals:    11/03/17 1549   BP: 139/56   Pulse: 104   Resp: 18   Temp: 98.2 °F (36.8 °C)   SpO2: 98%     .    Body mass index is 20.33 kg/(m^2).           Physical Exam:   General Appearance:    Alert, oriented, cooperative, in no acute distress   Head:    Normocephalic, atraumatic, without obvious abnormality   Eyes:           LIZA  Lids and lashes normal, conjunctivae and sclerae normal, no icterus, no pallor   Ears:    Ears appear intact with no abnormalities noted   Throat:   Mucous membranes pink and moist   Neck:   Supple, trachea midline, no carotid bruit, no organomegaly or JVD   Lungs:     Clear to auscultation and percussion, respirations regular, even and Unlabored. No wheezes, rales, rhonchi    Heart:    Regular rhythm and normal rate, normal S1 and S2, no            murmur, no gallop, no rub, no click   Abdomen:      Soft, non-tender, non-distended, no guarding, no rebound tenderness, Normal bowel sounds in all four quadrants, no masses, liver and spleen nonpalpable,    Genitalia:    Deferred   Extremities:   Moves all extremities well, no edema, no cyanosis, no              Redness, no clubbing   Pulses:   Pulses palpable and equal bilaterally   Skin:   Moist and warm. No bleeding, bruising or rash   Neurologic/Psychiatric:   Alert and oriented to person, place, and time.  Motor, power and tone in upper and lower extremities are grossly intact.  No focal neurological deficits. Normal cognitive function. No psychomotor reaction or tangential thought. No depression, homicidal ideations and suicidal ideations           Lab Review:       Results from last 7 days  Lab Units 11/03/17  0557  10/30/17  1349   SODIUM mmol/L 140  < > 137  137   SODIUM, ARTERIAL mmol/L  --   --  135.2*   POTASSIUM mmol/L 3.7  < > 5.2*  5.1   CHLORIDE mmol/L 99  < > 99  96   CO2 mmol/L 26.0  < > 22.0  22.0   BUN mg/dL 45*  < > 65*  65*   CREATININE mg/dL 3.82*  < > 4.98*  4.88*   CALCIUM mg/dL 8.4  < > 8.3*  8.4   BILIRUBIN mg/dL  --   --  0.7   ALK PHOS U/L  --   --  141*   ALT (SGPT) U/L  --   --  42   AST (SGOT) U/L  --   --  86*   GLUCOSE mg/dL 110*  < > 148*  152*   GLUCOSE, ARTERIAL mmol/L  --   --  153   < > = values in this interval not displayed.    Results from last 7 days  Lab Units 10/31/17  1645 10/31/17  1346 10/31/17  1137  10/30/17  1349   CK TOTAL U/L  --   --   --   --  674*   TROPONIN I ng/mL 0.236* 0.251* 0.274*  < > 0.319*   < > = values in this interval not displayed.        Results from last 7 days  Lab Units 11/03/17  0557   WBC 10*3/mm3 13.33*   HEMOGLOBIN g/dL 10.0*   HEMATOCRIT % 30.9*   PLATELETS 10*3/mm3 202       Results from last 7 days  Lab Units 10/30/17  1349   INR  1.86*   APTT seconds 60.4*                   Invalid input(s):  T4,  FREET4    EKG:   ECG/EMG Results (last 24 hours)     Procedure Component Value  Units Date/Time    SCANNED EKG [[id number]] Resulted:  10/30/17      Updated:  11/03/17 1009          Imaging:  Imaging Results (last 24 hours)     ** No results found for the last 24 hours. **          I personally viewed and interpreted the patient's EKG/Telemetry data.    Assessment:   1.  MRSA bacteremia.  2.  Indeterminate troponin suggestive for non-Q myocardial infarction.  3.  Previous coronary angiogram which had not revealed off any evidence of any obstructive epicardial coronary artery disease.  4.  End-stage renal disease on hemodialysis.  5.  CTA of the chest revealed dense consolidation in the left lower lobe with small left-sided pleural effusion.          Plan:   1.  Indeterminate troponin suggestive of non-Q myocardial infarction.  Patient currently is not having any symptoms of severe substernal chest pain suggestive of angina.  Patient indeterminate troponin is probably secondary to end-stage renal disease with hypoxemia on initial presentation.  Patient had undergone previous coronary angiogram which had not revealed off any evidence of any obstructive epicardial coronary artery disease.  Patient at the present time would not be subjected to any invasive evaluation to rule out coronary artery disease.  2.  MRSA bacteremia with initial transthoracic echocardiogram which had not revealed off any evidence of any vegetation.  Patient does not have any fever patient white blood cell count is decreasing and patient has left lower lobe consolidation suggestive of pneumonia.  Doubt the patient has any vegetation as the patient does not have any new heart murmur.  At the present time have discussed with the patient the treatment option for the transesophageal echocardiogram but the patient is not very keen to proceed with the transesophageal echocardiogram.  As the index of suspicion is low at the present time would repeat a transthoracic echocardiogram.  As the patient white blood cell count is decreasing  and the patient does have objective finding on a CT scan which revealed dense consolidation of the left lower lobe with not consider a transesophageal echo echocardiogram at the present time.     3.  Paroxysmal atrial fibrillation.  Patient resting cardiogram had revealed sinus tachycardia.  Patient has not complained of having any symptoms of palpitation.  4.  Arterial hypertension.  Patient blood pressure is currently 131/59.  Patient at the present time would be continued on the present antihypertensive medication.  5.  Hypertensive heart disease with mitral and tricuspid regurgitation.  Clinically patient is not in congestive heart failure.  6.  End-stage renal disease on hemodialysis.  Patient is currently being followed by Dr. Mireles.  7.  Chronic obstructive lung disease with previous history of tobacco abuse is noted.    Patient at the present time would be subjected to a repeat transthoracic echocardiogram.  As the patient did not have any stigmata for endocarditis and does not have any new heart murmur and the patient white blood cell count is decreasing and patient does not appear to be toxic doubt the patient has bacteremia with endocarditis.  At the present time patient would not be subjected to a transesophageal echocardiogram.  Next    The above plan of management were discussed with the patient.          Time: time spent in face-to-face evaluation of greater than 55 minutes and interacting and formulating examining and discussing the plan with the patient with 50% of greater time spent in face-to-face interaction.    Sung Adan MD  11/03/17  5:35 PM    EMR Dragon/Transcription disclaimer:   Some of this note may be an electronic transcription/translation of spoken language to printed text. The electronic translation of spoken language may permit erroneous, or at times, nonsensical words or phrases to be inadvertently transcribed; Although I have reviewed the note for such errors, some may still  exist.

## 2017-11-03 NOTE — PLAN OF CARE
Problem: Patient Care Overview (Adult)  Goal: Plan of Care Review  Outcome: Ongoing (interventions implemented as appropriate)    11/03/17 1321   Coping/Psychosocial Response Interventions   Plan Of Care Reviewed With patient   Patient Care Overview   Progress progress toward functional goals as expected   Outcome Evaluation   Outcome Summary/Follow up Plan tx limited by pt participation. pt required increased assist for bed mob. pt met strength LTG this tx. pt would continue to benefit from PT services. Recommend SNF upon DC.        Goal: Discharge Needs Assessment  Outcome: Ongoing (interventions implemented as appropriate)    10/31/17 1055 11/02/17 1444 11/02/17 1445   Discharge Needs Assessment   Concerns To Be Addressed adjustment to diagnosis/illness concerns --  --    [facility] Name[facility]) [facility] Pt travels via PACS.. --  --    Discharge Facility/Level Of Care Needs --  --  --    Current Discharge Risk chronically ill --  --    Discharge Disposition still a patient --  --    Discharge Planning Comments LSW spoke with [facility] pt has bed hold and may return to facility once cleared medically. --  --    Current Health   Anticipated Changes Related to Illness none --  --    Living Environment   Transportation Available --  ambulance --    Self-Care   Equipment Currently Used at Home --  --  rollator;wheelchair;hospital bed;shower chair;grab bar     11/02/17 1228   Discharge Needs Assessment   Concerns To Be Addressed --    Community Agency Name(S) --    Discharge Facility/Level Of Care Needs nursing facility, skilled   Current Discharge Risk --    Discharge Disposition --    Discharge Planning Comments --    Current Health   Anticipated Changes Related to Illness --    Living Environment   Transportation Available --    Self-Care   Equipment Currently Used at Home --          Problem: Inpatient Physical Therapy  Goal: Transfer Training Goal 1 LTG- PT  Outcome: Ongoing (interventions implemented as  appropriate)    11/02/17 1713 11/03/17 1321   Transfer Training PT LTG   Transfer Training PT LTG, Date Established 11/02/17 --    Transfer Training PT LTG, Time to Achieve by discharge --    Transfer Training PT LTG, Activity Type bed to chair /chair to bed;sit to stand/stand to sit --    Transfer Training PT LTG, [place] Level conditional independence --    Transfer Training PT LTG, Date Goal Reviewed --  11/03/17   Transfer Training PT LTG, Outcome --  goal ongoing       Goal: Gait Training Goal LTG- PT  Outcome: Ongoing (interventions implemented as appropriate)    11/02/17 1713 11/03/17 1321   Gait Training PT LTG   Gait Training Goal PT LTG, Date Established 11/02/17 --    Gait Training Goal PT LTG, Time to Achieve by discharge --    Gait Training Goal PT LTG, [place] Level conditional independence --    Gait Training Goal PT LTG, Distance to Achieve 150ft;O2 sats will not drop below 92% --    Gait Training Goal PT LTG, Date Goal Reviewed --  11/03/17   Gait Training Goal PT LTG, Outcome --  goal ongoing       Goal: Strength Goal LTG- PT  Outcome: Outcome(s) achieved Date Met:  11/03/17 11/02/17 1713 11/03/17 1321   Strength Goal PT LTG   Strength Goal PT LTG, Date Established 11/02/17 --    Strength Goal PT LTG, Time to Achieve by discharge --    Strength Goal PT LTG, Measure to Achieve Pt will tolerate 15 reps of AROM exercises in sitting --    Strength Goal PT LTG, Date Goal Reviewed --  11/03/17   Strength Goal PT LTG, Outc       11/02/17 1713 11/03/17 1321   Strength Goal PT LTG   Strength Goal PT LTG, Date Established 11/02/17 --    Strength Goal PT LTG, Time to Achieve by discharge --    Strength Goal PT LTG, Measure to Achieve Pt will tolerate 15 reps of AROM exercises in sitting --    Strength Goal PT LTG, Date Goal Reviewed --  11/03/17   Strength Goal PT LTG, Outcome --  goal met   ome --  goal ongoing       Goal: Patient Education Goal LTG- PT  Outcome: Ongoing (interventions  implemented as appropriate)    11/02/17 1713 11/03/17 1321   Patient Education PT LTG   Patient Education PT LTG, Date Established 11/02/17 --    Patient Education PT LTG, Time to Achieve by discharge --    Patient Education PT LTG, Education Type gait;transfers;home safety --    Patient Education PT LTG, Date Goal Reviewed --  11/03/17   Patient Education PT LTG Outcome --  goal ongoing

## 2017-11-03 NOTE — PROGRESS NOTES
Nephrology Progress Note:    Patient was seen and examined on dialysis.  Complains of cough.  No significant expectoration.  No shortness of breath.  CT scan of the chest was negative for PE.  Denies abdominal pain or discomfort.    Patient Active Problem List   Diagnosis   • Constipation   • Arteriovenous fistula   • End stage renal failure on dialysis   • Controlled type 2 diabetes mellitus with chronic kidney disease on chronic dialysis, without long-term current use of insulin   • Coronary artery disease involving native coronary artery of native heart without angina pectoris   • Panlobular emphysema   • ESRD (end stage renal disease) on dialysis   • HCAP (healthcare-associated pneumonia)       Medications:    apixaban 2.5 mg Oral BID   aspirin 81 mg Oral Daily   atorvastatin 40 mg Oral Nightly   ceftriaxone 1 g Intravenous Q24H   famotidine 40 mg Oral Daily   hydrALAZINE 100 mg Oral TID   ipratropium-albuterol 3 mL Nebulization Q4H - RT   levothyroxine 88 mcg Oral Daily   mirtazapine 15 mg Oral Nightly   polyethylene glycol 17 g Oral BID   QUEtiapine 50 mg Oral Nightly   sertraline 50 mg Oral Daily   vancomycin 20 mg/kg Intravenous Once       Pharmacy to dose vancomycin      Vitals:    11/03/17 0656 11/03/17 0724 11/03/17 0751 11/03/17 1549   BP:   137/60 139/56   BP Location:   Right leg Right leg   Patient Position:   Lying Sitting   Pulse: 95 104 101 104   Resp: 18  18 18   Temp:   97.6 °F (36.4 °C) 98.2 °F (36.8 °C)   TempSrc:   Temporal Artery  Temporal Artery    SpO2:   94% 98%   Weight:       Height:         I/O last 3 completed shifts:  In: 480 [P.O.:480]  Out: 100 [Urine:100]  I/O this shift:  In: 400 [P.O.:400]  Out: 2000 [Other:2000]    On examination:  General:Comfortable, alert and oriented, lying down in bed, Seen and examined on dialysis  HEENT: Pallor, no icterus, eye movements are normal.  Chest: Decreased breath sounds at the lung bases.  Coarse breath sounds at the left lung base..  CVS:  Heart sounds are irregular, no pericardial rub or gallop  Abdomen: Abdomen is soft, nontender, normal bowel sounds, no tenderness  Extremities: No edema of the lower extremities.  Left thigh AV graft, no tenderness, good bruit  Neuro: No change in neurological status.  Grade 3 power both upper and lower extremities  Mentation: She is alert and oriented    Past medical illness, social history, medications, previous notes reviewed.       Laboratory results:      Recent Results (from the past 24 hour(s))   Renal Function Panel    Collection Time: 11/03/17  5:57 AM   Result Value Ref Range    Glucose 110 (H) 60 - 100 mg/dL    BUN 45 (H) 7 - 21 mg/dL    Creatinine 3.82 (H) 0.50 - 1.00 mg/dL    Sodium 140 137 - 145 mmol/L    Potassium 3.7 3.5 - 5.1 mmol/L    Chloride 99 95 - 110 mmol/L    CO2 26.0 22.0 - 31.0 mmol/L    Calcium 8.4 8.4 - 10.2 mg/dL    Albumin 3.30 (L) 3.40 - 4.80 g/dL    Phosphorus 3.1 2.4 - 4.4 mg/dL    Anion Gap 15.0 5.0 - 15.0 mmol/L    BUN/Creatinine Ratio 11.8 7.0 - 25.0    eGFR Non  Amer 12 (L) 39 - 90 mL/min/1.73   Vancomycin, Random    Collection Time: 11/03/17  5:57 AM   Result Value Ref Range    Vancomycin Random 16.55 mcg/mL   CBC Auto Differential    Collection Time: 11/03/17  5:57 AM   Result Value Ref Range    WBC 13.33 (H) 3.20 - 9.80 10*3/mm3    RBC 3.39 (L) 3.77 - 5.16 10*6/mm3    Hemoglobin 10.0 (L) 12.0 - 15.5 g/dL    Hematocrit 30.9 (L) 35.0 - 45.0 %    MCV 91.2 80.0 - 98.0 fL    MCH 29.5 26.5 - 34.0 pg    MCHC 32.4 31.4 - 36.0 g/dL    RDW 17.5 (H) 11.5 - 14.5 %    RDW-SD 59.1 (H) 36.4 - 46.3 fl    MPV 10.4 8.0 - 12.0 fL    Platelets 202 150 - 450 10*3/mm3    Neutrophil % 83.6 (H) 37.0 - 80.0 %    Lymphocyte % 4.3 (L) 10.0 - 50.0 %    Monocyte % 11.1 0.0 - 12.0 %    Eosinophil % 0.0 0.0 - 7.0 %    Basophil % 0.2 0.0 - 2.0 %    Immature Grans % 0.8 (H) 0.0 - 0.5 %    Neutrophils, Absolute 11.15 (H) 2.00 - 8.60 10*3/mm3    Lymphocytes, Absolute 0.57 (L) 0.60 - 4.20 10*3/mm3     Monocytes, Absolute 1.48 (H) 0.00 - 0.90 10*3/mm3    Eosinophils, Absolute 0.00 0.00 - 0.70 10*3/mm3    Basophils, Absolute 0.03 0.00 - 0.20 10*3/mm3    Immature Grans, Absolute 0.10 (H) 0.00 - 0.02 10*3/mm3    nRBC 0.0 0.0 - 0.0 /100 WBC   ]    Imaging Results (last 24 hours)     Procedure Component Value Units Date/Time    CT Angiogram Chest With Contrast [[id number]] Collected:  11/02/17 1530     Updated:  11/02/17 1610    Narrative:       CT chest with contrast. CTA thorax. Pulmonary embolism study.    CLINICAL INDICATION: Hypoxia and tachycardia. Shortness of breath    COMPARISON: Chest x-ray October 30, 2017    chest x-ray November 24, 2016.    TECHNIQUE: Nonionic IV contrast. 50 mL Isovue 370. Helical  scanning with axial and coronal reformations. Soft tissue, lung,  liver, and bone windows reviewed. Computer generated  3-D images,  CT angiography including MIP images are obtained.    This exam was performed according to our departmental  dose-optimization program, which includes automated exposure  control, adjustment of the mA and/or kV according to patient size  and/or use of iterative reconstruction technique.    CHEST CT FINDINGS:  No evidence for pulmonary emboli. Normal  aorta. No evidence of pathologically enlarged nodes.     Dense consolidation left lower lobe basilar segments especially  the lateral anterior basal segment as well as infiltrative  changes, consolidation superior segment. Small left-sided pleural  effusion. Fluid dissecting into the major fissure on the left.  Moderate size hiatus hernia. More subtle infiltrative changes  right lower lobe and right middle lobe.      Impression:       CONCLUSION: No evidence for pulmonary emboli. Extensive  infiltrative changes, dense consolidation mostly in the left  lower lobe compatible with extensive pneumonitis. Unfavorable  change since prior exams.    Electronically signed by:  Wiliam Pearce MD  11/2/2017 4:09 PM CDT  Workstation: [id number]             Assessment:     End-stage renal disease  Left lower lobe pneumonia  Staphylococcal bacteremia  Altered mental status, improved  Essential hypertension  Febrile illness  Type 2 diabetes mellitus    Plan:     ESRD:   Patient is on hemodialysis on Monday Wednesday and Friday. Patient was seen and examined on hemodialysis.  We are using left thigh AV graft.  Hemodialysis orders were reviewed.  Fluid removal on hemodialysis as tolerated.  Discussed with patient in dialysis.    Staphylococcal bacteremia, left lower lobe pneumonia.  Echocardiogram was negative.  Follow-up repeat cultures.  Patient is on IV antibiotics, Vancomycin    Pneumonia: On IV antibiotic.  Follow cultures.    Altered mental status, delirium: Improved.   imaging studies were negative.    Anemia of chronic kidney disease:   Hemoglobin and hematocrit levels are stable.  On 8000 units of Epogen with dialysis.    Essential hypertension: Patient is on hydralazine.  Blood pressure readings are stable.    Discussed with patient.      Chi Holden MD

## 2017-11-03 NOTE — PLAN OF CARE
Problem: Fall Risk (Adult)  Goal: Absence of Falls  Outcome: Ongoing (interventions implemented as appropriate)    11/02/17 2030   Fall Risk (Adult)   Absence of Falls making progress toward outcome         Problem: Renal Replacement, Continuous (Adult)  Goal: Signs and Symptoms of Listed Potential Problems Will be Absent or Manageable (Renal Replacement, Continuous)  Outcome: Ongoing (interventions implemented as appropriate)    Problem: Patient Care Overview (Adult)  Goal: Adult Individualization and Mutuality  Outcome: Ongoing (interventions implemented as appropriate)    Problem: Pressure Ulcer (Adult)  Goal: Signs and Symptoms of Listed Potential Problems Will be Absent or Manageable (Pressure Ulcer)  Outcome: Ongoing (interventions implemented as appropriate)

## 2017-11-03 NOTE — PROGRESS NOTES
[facility]  INPATIENT PROGRESS NOTE    Length of Stay: 4  Date of Admission: 10/30/2017  Primary Care Physician: Rogers Perez MD    Subjective   Please note that all previous progress notes, physical exam findings, medication changes, lab findings, and radiographical findings have been updated and noted as appropriate.    11/3/2017: Patient has no acute complaints today.  Patient continues to demonstrate MRSA bacteremia despite appropriate antibiotic treatment.  Have spoken with Dr. Adan of cardiology who is agreed to evaluate the patient for possible ANTHONY.  No indwelling lines or catheters.  All other possible sources fistula.  Continue antibiotic treatment.  Attempt sputum culture if possible.  No fevers or chills, dialysis is today.  Patient tolerating by mouth intake.  Essentially complains of soreness.  Patient tachycardia much improved, heart rate is 101.  Respirations are normal with no fever.  Patient hypoxia improving, 94 on 1.5 L.    Chief Complaint/HPI:  This 72-year-old  female was admitted to hospitalist services secondary to a worsening level of altered mental status.  Patient also had fever.  CT of the head was within normal limits.  Chest x-ray demonstrated left lower lobe pneumonia.  Patient has been treated with empiric antibiotics.  Patient was found to have MRSA bacteremia, no vegetation demonstrated on echocardiogram.  Echocardiogram is transthoracic.  Though patient is in no apparent distress, she continues to be tachycardic and hypoxic.  Patient currently on vancomycin and Levaquin.  Patient will be changed to vancomycin and Zosyn per renal dosing.  EKG reordered.  Initial EKG demonstrated sinus tachycardia.  EKG rule out atrial fibrillation with RVR.  No chest pain, did have an elevated troponin, however troponin has been fairly consistent and is actually trending down.  Troponin likely secondary to ESRD and sepsis.   Have spoken with patient's nephrologist, Dr. Mireles, he is agreeable to CTA today.  We'll also order physical therapy and occupational therapy.    Review of Systems   Constitutional: Negative for chills and fever.   Gastrointestinal: Negative for abdominal pain, nausea and vomiting.   Genitourinary: Negative for dysuria.   Musculoskeletal: Positive for arthralgias and myalgias.   Neurological: Negative for dizziness, tremors, seizures, syncope, facial asymmetry, speech difficulty, weakness, light-headedness, numbness and headaches.      Review of symptoms is partially compromised by dementia      Objective    Temp:  [97.6 °F (36.4 °C)-98.5 °F (36.9 °C)] 97.6 °F (36.4 °C)  Heart Rate:  [] 101  Resp:  [16-18] 18  BP: (112-137)/(60-86) 137/60    Physical Exam   Constitutional: She appears well-developed. No distress.   HENT:   Head: Normocephalic and atraumatic.   Eyes: Conjunctivae are normal. Pupils are equal, round, and reactive to light.   Cardiovascular:   Tachycardia   Pulmonary/Chest: Effort normal. No respiratory distress. She has no wheezes.   Abdominal: Soft. Bowel sounds are normal. She exhibits no distension. There is no tenderness.   Neurological: She is alert.   Skin: Skin is warm and dry. She is not diaphoretic.     Results Review:  I have reviewed the labs, radiology results, and diagnostic studies.    Laboratory Data:     Results from last 7 days  Lab Units 11/03/17  0557 11/02/17  0530 11/01/17  0610  10/30/17  1349   SODIUM mmol/L 140 141 136*  < > 137  137   SODIUM, ARTERIAL mmol/L  --   --   --   --  135.2*   POTASSIUM mmol/L 3.7 3.6 4.4  < > 5.2*  5.1   CHLORIDE mmol/L 99 98 97  < > 99  96   CO2 mmol/L 26.0 25.0 25.0  < > 22.0  22.0   BUN mg/dL 45* 29* 54*  < > 65*  65*   CREATININE mg/dL 3.82* 2.85* 3.98*  < > 4.98*  4.88*   GLUCOSE mg/dL 110* 96 115*  < > 148*  152*   GLUCOSE, ARTERIAL mmol/L  --   --   --   --  153   CALCIUM mg/dL 8.4 9.0 8.6  < > 8.3*  8.4   BILIRUBIN mg/dL  --    --   --   --  0.7   ALK PHOS U/L  --   --   --   --  141*   ALT (SGPT) U/L  --   --   --   --  42   AST (SGOT) U/L  --   --   --   --  86*   ANION GAP mmol/L 15.0 18.0* 14.0  < > 16.0*  19.0*   < > = values in this interval not displayed.  Estimated Creatinine Clearance: 9.8 mL/min (by C-G formula based on Cr of 3.82).    Results from last 7 days  Lab Units 11/03/17  0557 11/02/17  0530 11/01/17  0610   PHOSPHORUS mg/dL 3.1 2.8 4.3           Results from last 7 days  Lab Units 11/03/17  0557 11/02/17  0530 11/01/17  0610 10/31/17  0208 10/30/17  1922   WBC 10*3/mm3 13.33* 21.16* 22.93* 14.73* 12.94*   HEMOGLOBIN g/dL 10.0* 12.0 10.1* 10.6* 12.3   HEMATOCRIT % 30.9* 37.3 31.7* 32.8* 37.8   PLATELETS 10*3/mm3 202 213 191 166 217       Results from last 7 days  Lab Units 10/30/17  1349   INR  1.86*       Culture Data:   Blood Culture   Date Value Ref Range Status   11/02/2017 No growth at 24 hours  Preliminary   11/02/2017 No growth at less than 24 hours  Preliminary     Urine Culture   Date Value Ref Range Status   11/02/2017 Mixed Umu Isolated  Corrected     No results found for: RESPCX  No results found for: WOUNDCX  No results found for: STOOLCX  No components found for: BODYFLD    Radiology Data:   Imaging Results (last 24 hours)     Procedure Component Value Units Date/Time    CT Angiogram Chest With Contrast [[id number]] Collected:  11/02/17 1530     Updated:  11/02/17 1610    Narrative:       CT chest with contrast. CTA thorax. Pulmonary embolism study.    CLINICAL INDICATION: Hypoxia and tachycardia. Shortness of breath    COMPARISON: Chest x-ray October 30, 2017    chest x-ray November 24, 2016.    TECHNIQUE: Nonionic IV contrast. 50 mL Isovue 370. Helical  scanning with axial and coronal reformations. Soft tissue, lung,  liver, and bone windows reviewed. Computer generated  3-D images,  CT angiography including MIP images are obtained.    This exam was performed according to our  departmental  dose-optimization program, which includes automated exposure  control, adjustment of the mA and/or kV according to patient size  and/or use of iterative reconstruction technique.    CHEST CT FINDINGS:  No evidence for pulmonary emboli. Normal  aorta. No evidence of pathologically enlarged nodes.     Dense consolidation left lower lobe basilar segments especially  the lateral anterior basal segment as well as infiltrative  changes, consolidation superior segment. Small left-sided pleural  effusion. Fluid dissecting into the major fissure on the left.  Moderate size hiatus hernia. More subtle infiltrative changes  right lower lobe and right middle lobe.      Impression:       CONCLUSION: No evidence for pulmonary emboli. Extensive  infiltrative changes, dense consolidation mostly in the left  lower lobe compatible with extensive pneumonitis. Unfavorable  change since prior exams.    Electronically signed by:  Wiliam Pearce MD  11/2/2017 4:09 PM CDT  Workstation: [id number]          I have reviewed the patient current medications.     Assessment/Plan     Hospital Problem List     HCAP (healthcare-associated pneumonia)      MRSA bacteremia      Plan:  Due to persistent MRSA bacteremia, Dr. Adan of cardiology has agreed to see the patient evaluate for possible need of ANTHONY.  Patient has no indwelling catheters or lines.  CTA was negative for pulmonary malaise and, however demonstrated severe pneumonia.              This document has been electronically signed by LOS Paredes on November 3, 2017 3:12 PM

## 2017-11-03 NOTE — PROGRESS NOTES
"Pharmacokinetics by Pharmacy - Vancomycin    Efrem Roa is a 72 y.o. female  [Ht: 60\" (152.4 cm); Wt: 104 lb 1.6 oz (47.2 kg)]    Estimated Creatinine Clearance: 9.8 mL/min (by C-G formula based on Cr of 3.82).   Lab Results   Component Value Date    CREATININE 3.82 (H) 11/03/2017    CREATININE 2.85 (H) 11/02/2017    CREATININE 3.98 (H) 11/01/2017      Lab Results   Component Value Date    WBC 13.33 (H) 11/03/2017    WBC 21.16 (H) 11/02/2017    WBC 22.93 (H) 11/01/2017      Temp Readings from Last 1 Encounters:   11/03/17 97.6 °F (36.4 °C) (Temporal Artery )      Lab Results   Component Value Date    VANCORANDOM 16.55 11/03/2017         Culture Results:  Microbiology Results (last 10 days)       Procedure Component Value - Date/Time      S. Pneumo Ag Urine or CSF - Urine, Urine, Clean Catch [[id number]]  (Normal) Collected:  11/02/17 1437    Lab Status:  Final result Specimen:  Urine from Urine, Clean Catch Updated:  11/02/17 1526     Strep Pneumo Ag Negative    Urine Culture - Urine, Urine, Clean Catch [[id number]] Collected:  11/02/17 1437    Lab Status:  Edited Result - FINAL Specimen:  Urine from Urine, Clean Catch Updated:  11/03/17 1352     Urine Culture --      Mixed Umu Isolated    Narrative:         Specimen contains mixed organisms of questionable pathogenicity which indicates contamination with commensal umu.  Further identification is unlikely to provide clinically useful information.  Suggest recollection.    Blood Culture - Blood, [[id number]]  (Normal) Collected:  11/02/17 1422    Lab Status:  Preliminary result Specimen:  Blood from Hand, Left Updated:  11/03/17 0246     Blood Culture No growth at less than 24 hours    Blood Culture - Blood, [[id number]]  (Normal) Collected:  11/02/17 1410    Lab Status:  Preliminary result Specimen:  Blood from Arm, Left Updated:  11/03/17 0301     Blood Culture No growth at less than 24 hours    Blood Culture ID, PCR - Blood, [[id number]]  (Abnormal) " Collected:  11/02/17 1410    Lab Status:  Final result Specimen:  Blood from Arm, Left Updated:  11/03/17 1352     BCID, PCR Staphylococcus aureus. mecA (methicillin resistance gene) detected. Identification by BCID PCR. (C)    Narrative:         Sensitivity to Follow    Mycoplasma Pneumoniae PCR - Swab, Throat [[id number]] Collected:  11/02/17 1353    Lab Status:  Final result Specimen:  Swab from Throat Updated:  11/03/17 0839     MYCOPLASMAE PNEUMONIAE BY PCR Negative    Narrative:       This test is performed by utilizing real time polymerace chain recation (PCR).   This test is performed by utilizing real time polymerace chain recation (PCR).     Blood Culture - Blood, [[id number]]  (Normal) Collected:  10/30/17 1505    Lab Status:  Preliminary result Specimen:  Blood from Arm, Right Updated:  11/02/17 1516     Blood Culture No growth at 3 days    Blood Culture - Blood, [[id number]]  (Abnormal)  (Susceptibility) Collected:  10/30/17 1349    Lab Status:  Final result Specimen:  Blood from Arm, Right Updated:  11/02/17 0603     Blood Culture --      Staphylococcus aureus, MRSA (C)        Methicillin resistant Staphylococcus aureus, Patient may be an isolation risk.        Isolated from Anaerobic Bottle     Gram Stain Result Anaerobic Bottle Gram positive cocci in clusters      1 positive of 4 drawn       Susceptibility        Staphylococcus aureus, MRSA     [place]     Clindamycin <=0.5 ug/ml Susceptible     Erythromycin >4 ug/ml Resistant     Gentamicin <=4 ug/ml Susceptible     Oxacillin >2 ug/ml Resistant     Penicillin G >8 ug/ml Resistant     Rifampin <=1 ug/ml Susceptible     Trimethoprim + Sulfamethoxazole <=0.5/9.5 ug/ml Susceptible     Vancomycin 1 ug/ml Susceptible                      Blood Culture ID, PCR - Blood, [[id number]]  (Abnormal) Collected:  10/30/17 1349    Lab Status:  Final result Specimen:  Blood from Arm, Right Updated:  10/31/17 1522     BCID, PCR Staphylococcus aureus. mecA (methicillin  resistance gene) detected. Identification by BCID PCR. (C)    Narrative:         Sensitivity to Follow Lab Comment: CONTACT PRECAUTIONS INITIATED    Multi Drug Resistant Organism                  Indication for use: PNA    Current Vancomycin Dose:  1000 mg IVPB post dialysis today. day 3 of therapy.      Assessment/Plan:  Vancomycin random level 16.55, post-dialysis level estimated to be 11.6. Will give vancomycin 1 gm today and get a random level on Monday prior to dialysis. Pharmacy will continue to monitor renal function and adjust dose accordingly.    Arabella Flower RPH   11/03/17 2:18 PM

## 2017-11-03 NOTE — PLAN OF CARE
Problem: Inpatient Occupational Therapy  Goal: Transfer Training Goal 2 LTG- OT  Outcome: Ongoing (interventions implemented as appropriate)    11/03/17 1526   Transfer Training 2 OT LTG   Transfer Training 2 OT LTG, Date Goal Reviewed 11/03/17   Transfer Training 2 OT LTG, Outcome goal ongoing         11/03/17 1526   Transfer Training 2 OT LTG   Transfer Training 2 OT LTG, Date Goal Reviewed 11/03/17   Transfer Training 2 OT LTG, Outcome goal ongoing       Goal: Strength Goal LTG- OT  Outcome: Ongoing (interventions implemented as appropriate)    11/03/17 1526   Strength OT LTG   Strength Goal OT LTG, Date Goal Reviewed 11/03/17   Strength Goal OT LTG, Outcome goal ongoing       Goal: Static Standing Balance Goal LTG- OT  Outcome: Ongoing (interventions implemented as appropriate)    11/03/17 1526   Static Standing Balance OT LTG   Static Standing Balance OT LTG, Date Established 11/03/17   Static Standing Balance OT LTG, Outcome goal ongoing       Goal: ADL Goal LTG- OT    11/02/17 1556 11/03/17 1526   ADL OT LTG   ADL OT LTG, Date Established --  11/03/17   ADL OT LTG, Time to Achieve by discharge --    ADL OT LTG, Activity Type ADL skills  (Sponge bath and dress or walk-in shower.) --    ADL OT LTG, [place] Level min assist;contact guard;assistive device  (R/W.) --    ADL OT LTG, Date Goal Reviewed --  11/03/17   ADL OT LTG, Outcome --  goal partially met

## 2017-11-04 ENCOUNTER — APPOINTMENT (OUTPATIENT)
Dept: CARDIOLOGY | Facility: HOSPITAL | Age: 72
End: 2017-11-04
Attending: INTERNAL MEDICINE

## 2017-11-04 LAB
ALBUMIN SERPL-MCNC: 3.3 G/DL (ref 3.4–4.8)
ANION GAP SERPL CALCULATED.3IONS-SCNC: 12 MMOL/L (ref 5–15)
BACTERIA SPEC AEROBE CULT: NORMAL
BASOPHILS # BLD AUTO: 0.03 10*3/MM3 (ref 0–0.2)
BASOPHILS NFR BLD AUTO: 0.2 % (ref 0–2)
BH CV ECHO MEAS - BSA(HAYCOCK): 1.4 M^2
BH CV ECHO MEAS - BSA: 1.4 M^2
BH CV ECHO MEAS - BZI_BMI: 19.5 KILOGRAMS/M^2
BH CV ECHO MEAS - BZI_METRIC_HEIGHT: 152.4 CM
BH CV ECHO MEAS - BZI_METRIC_WEIGHT: 45.4 KG
BUN BLD-MCNC: 27 MG/DL (ref 7–21)
BUN/CREAT SERPL: 9.4 (ref 7–25)
CALCIUM SPEC-SCNC: 8.8 MG/DL (ref 8.4–10.2)
CHLORIDE SERPL-SCNC: 102 MMOL/L (ref 95–110)
CO2 SERPL-SCNC: 28 MMOL/L (ref 22–31)
CREAT BLD-MCNC: 2.88 MG/DL (ref 0.5–1)
DEPRECATED RDW RBC AUTO: 56.1 FL (ref 36.4–46.3)
EOSINOPHIL # BLD AUTO: 0.04 10*3/MM3 (ref 0–0.7)
EOSINOPHIL NFR BLD AUTO: 0.3 % (ref 0–7)
ERYTHROCYTE [DISTWIDTH] IN BLOOD BY AUTOMATED COUNT: 16.9 % (ref 11.5–14.5)
GFR SERPL CREATININE-BSD FRML MDRD: 16 ML/MIN/1.73 (ref 60–90)
GLUCOSE BLD-MCNC: 110 MG/DL (ref 60–100)
HCT VFR BLD AUTO: 30 % (ref 35–45)
HGB BLD-MCNC: 9.5 G/DL (ref 12–15.5)
IMM GRANULOCYTES # BLD: 0.09 10*3/MM3 (ref 0–0.02)
IMM GRANULOCYTES NFR BLD: 0.7 % (ref 0–0.5)
LV EF 2D ECHO EST: 60 %
LYMPHOCYTES # BLD AUTO: 0.97 10*3/MM3 (ref 0.6–4.2)
LYMPHOCYTES NFR BLD AUTO: 7.8 % (ref 10–50)
MAXIMAL PREDICTED HEART RATE: 147 BPM
MCH RBC QN AUTO: 29 PG (ref 26.5–34)
MCHC RBC AUTO-ENTMCNC: 31.7 G/DL (ref 31.4–36)
MCV RBC AUTO: 91.5 FL (ref 80–98)
MONOCYTES # BLD AUTO: 1.06 10*3/MM3 (ref 0–0.9)
MONOCYTES NFR BLD AUTO: 8.6 % (ref 0–12)
NEUTROPHILS # BLD AUTO: 10.18 10*3/MM3 (ref 2–8.6)
NEUTROPHILS NFR BLD AUTO: 82.4 % (ref 37–80)
PHOSPHATE SERPL-MCNC: 2.5 MG/DL (ref 2.4–4.4)
PLATELET # BLD AUTO: 218 10*3/MM3 (ref 150–450)
PMV BLD AUTO: 11 FL (ref 8–12)
POTASSIUM BLD-SCNC: 3.2 MMOL/L (ref 3.5–5.1)
RBC # BLD AUTO: 3.28 10*6/MM3 (ref 3.77–5.16)
SODIUM BLD-SCNC: 142 MMOL/L (ref 137–145)
STRESS TARGET HR: 125 BPM
WBC NRBC COR # BLD: 12.37 10*3/MM3 (ref 3.2–9.8)

## 2017-11-04 PROCEDURE — 97110 THERAPEUTIC EXERCISES: CPT

## 2017-11-04 PROCEDURE — 94799 UNLISTED PULMONARY SVC/PX: CPT

## 2017-11-04 PROCEDURE — 80069 RENAL FUNCTION PANEL: CPT | Performed by: INTERNAL MEDICINE

## 2017-11-04 PROCEDURE — 93308 TTE F-UP OR LMTD: CPT

## 2017-11-04 PROCEDURE — 25010000002 CEFTRIAXONE PER 250 MG: Performed by: INTERNAL MEDICINE

## 2017-11-04 PROCEDURE — 85025 COMPLETE CBC W/AUTO DIFF WBC: CPT | Performed by: INTERNAL MEDICINE

## 2017-11-04 PROCEDURE — 94760 N-INVAS EAR/PLS OXIMETRY 1: CPT

## 2017-11-04 PROCEDURE — 97535 SELF CARE MNGMENT TRAINING: CPT

## 2017-11-04 RX ORDER — POTASSIUM CHLORIDE 750 MG/1
20 CAPSULE, EXTENDED RELEASE ORAL ONCE
Status: COMPLETED | OUTPATIENT
Start: 2017-11-04 | End: 2017-11-04

## 2017-11-04 RX ORDER — HYDRALAZINE HYDROCHLORIDE 20 MG/ML
10 INJECTION INTRAMUSCULAR; INTRAVENOUS EVERY 6 HOURS PRN
Status: DISCONTINUED | OUTPATIENT
Start: 2017-11-04 | End: 2017-11-16 | Stop reason: HOSPADM

## 2017-11-04 RX ADMIN — IPRATROPIUM BROMIDE AND ALBUTEROL SULFATE 3 ML: 2.5; .5 SOLUTION RESPIRATORY (INHALATION) at 02:57

## 2017-11-04 RX ADMIN — MIRTAZAPINE 15 MG: 15 TABLET, FILM COATED ORAL at 21:04

## 2017-11-04 RX ADMIN — IPRATROPIUM BROMIDE AND ALBUTEROL SULFATE 3 ML: 2.5; .5 SOLUTION RESPIRATORY (INHALATION) at 10:45

## 2017-11-04 RX ADMIN — ASPIRIN 81 MG: 81 TABLET, COATED ORAL at 09:23

## 2017-11-04 RX ADMIN — POLYETHYLENE GLYCOL 3350 17 G: 17 POWDER, FOR SOLUTION ORAL at 09:22

## 2017-11-04 RX ADMIN — IPRATROPIUM BROMIDE AND ALBUTEROL SULFATE 3 ML: 2.5; .5 SOLUTION RESPIRATORY (INHALATION) at 18:39

## 2017-11-04 RX ADMIN — APIXABAN 2.5 MG: 2.5 TABLET, FILM COATED ORAL at 18:21

## 2017-11-04 RX ADMIN — IPRATROPIUM BROMIDE AND ALBUTEROL SULFATE 3 ML: 2.5; .5 SOLUTION RESPIRATORY (INHALATION) at 06:45

## 2017-11-04 RX ADMIN — HYDRALAZINE HYDROCHLORIDE 100 MG: 50 TABLET ORAL at 09:23

## 2017-11-04 RX ADMIN — QUETIAPINE FUMARATE 50 MG: 25 TABLET, FILM COATED ORAL at 21:04

## 2017-11-04 RX ADMIN — POTASSIUM CHLORIDE 20 MEQ: 750 CAPSULE, EXTENDED RELEASE ORAL at 11:13

## 2017-11-04 RX ADMIN — HYDRALAZINE HYDROCHLORIDE 100 MG: 50 TABLET ORAL at 21:04

## 2017-11-04 RX ADMIN — HYDROCODONE BITARTRATE AND ACETAMINOPHEN 1 TABLET: 7.5; 325 TABLET ORAL at 00:06

## 2017-11-04 RX ADMIN — FAMOTIDINE 40 MG: 40 TABLET ORAL at 09:23

## 2017-11-04 RX ADMIN — IPRATROPIUM BROMIDE AND ALBUTEROL SULFATE 3 ML: 2.5; .5 SOLUTION RESPIRATORY (INHALATION) at 14:28

## 2017-11-04 RX ADMIN — APIXABAN 2.5 MG: 2.5 TABLET, FILM COATED ORAL at 09:22

## 2017-11-04 RX ADMIN — ATORVASTATIN CALCIUM 40 MG: 40 TABLET, FILM COATED ORAL at 21:04

## 2017-11-04 RX ADMIN — HYDROCODONE BITARTRATE AND ACETAMINOPHEN 1 TABLET: 7.5; 325 TABLET ORAL at 21:04

## 2017-11-04 RX ADMIN — CEFTRIAXONE 1 G: 1 INJECTION, POWDER, FOR SOLUTION INTRAMUSCULAR; INTRAVENOUS at 14:14

## 2017-11-04 RX ADMIN — LEVOTHYROXINE SODIUM 88 MCG: 88 TABLET ORAL at 11:13

## 2017-11-04 RX ADMIN — SERTRALINE HYDROCHLORIDE 50 MG: 50 TABLET ORAL at 09:23

## 2017-11-04 NOTE — PLAN OF CARE
Problem: Sepsis (Adult)  Goal: Signs and Symptoms of Listed Potential Problems Will be Absent or Manageable (Sepsis)  Outcome: Ongoing (interventions implemented as appropriate)    11/04/17 0409   Sepsis   Problems Assessed (Sepsis) all   Problems Present (Sepsis) hypoxia/hypoxemia         Problem: Fall Risk (Adult)  Goal: Absence of Falls  Outcome: Ongoing (interventions implemented as appropriate)    Problem: Renal Replacement, Continuous (Adult)  Goal: Signs and Symptoms of Listed Potential Problems Will be Absent or Manageable (Renal Replacement, Continuous)  Outcome: Ongoing (interventions implemented as appropriate)    Problem: Pressure Ulcer (Adult)  Goal: Signs and Symptoms of Listed Potential Problems Will be Absent or Manageable (Pressure Ulcer)  Outcome: Ongoing (interventions implemented as appropriate)

## 2017-11-04 NOTE — PROGRESS NOTES
Nephrology Progress Note:    Feeling better.   Cough.   Therapy at bedside.   No fever.   Did not want to have ANTHONY.       Patient Active Problem List   Diagnosis   • Constipation   • Arteriovenous fistula   • End stage renal failure on dialysis   • Controlled type 2 diabetes mellitus with chronic kidney disease on chronic dialysis, without long-term current use of insulin   • Coronary artery disease involving native coronary artery of native heart without angina pectoris   • Panlobular emphysema   • ESRD (end stage renal disease) on dialysis   • HCAP (healthcare-associated pneumonia)       Medications:    apixaban 2.5 mg Oral BID   aspirin 81 mg Oral Daily   atorvastatin 40 mg Oral Nightly   ceftriaxone 1 g Intravenous Q24H   [START ON 11/5/2017] ceftriaxone 1 g Intravenous Q24H   famotidine 40 mg Oral Daily   hydrALAZINE 100 mg Oral TID   ipratropium-albuterol 3 mL Nebulization Q4H - RT   levothyroxine 88 mcg Oral Daily   mirtazapine 15 mg Oral Nightly   polyethylene glycol 17 g Oral BID   QUEtiapine 50 mg Oral Nightly   sertraline 50 mg Oral Daily       Pharmacy to dose vancomycin      Vitals:    11/04/17 0731 11/04/17 0738 11/04/17 0922 11/04/17 1046   BP:   (!) 151/118    BP Location: Right leg      Patient Position: Lying      Pulse: 93 92  102   Resp: 17 17   Temp: 97.9 °F (36.6 °C)      TempSrc: Temporal Artery       SpO2: 91%   94%   Weight:       Height:         I/O last 3 completed shifts:  In: 400 [P.O.:400]  Out: 2000 [Other:2000]  I/O this shift:  In: 250 [P.O.:250]  Out: -     On examination:  General:Comfortable, alert and oriented, sitting up at edge of bed.    HEENT: Pallor, no icterus, eye movements are normal.  Chest: Decreased breath sounds at the lung bases.  Coarse breath sounds at the left lung base. Occ wheeze  CVS: Heart sounds are irregular, no pericardial rub or gallop  Abdomen: Abdomen is soft, nontender, normal bowel sounds, no tenderness  Extremities: No edema of the lower  extremities.  Left thigh AV graft, no tenderness, good bruit  Neuro: No change in neurological status.  Grade 3 power both upper and lower extremities  Mentation: She is alert and oriented    Past medical illness, social history, medications, previous notes reviewed.       Laboratory results:      Recent Results (from the past 24 hour(s))   Renal Function Panel    Collection Time: 11/04/17  6:05 AM   Result Value Ref Range    Glucose 110 (H) 60 - 100 mg/dL    BUN 27 (H) 7 - 21 mg/dL    Creatinine 2.88 (H) 0.50 - 1.00 mg/dL    Sodium 142 137 - 145 mmol/L    Potassium 3.2 (L) 3.5 - 5.1 mmol/L    Chloride 102 95 - 110 mmol/L    CO2 28.0 22.0 - 31.0 mmol/L    Calcium 8.8 8.4 - 10.2 mg/dL    Albumin 3.30 (L) 3.40 - 4.80 g/dL    Phosphorus 2.5 2.4 - 4.4 mg/dL    Anion Gap 12.0 5.0 - 15.0 mmol/L    BUN/Creatinine Ratio 9.4 7.0 - 25.0    eGFR Non African Amer 16 (L) >60 mL/min/1.73   CBC Auto Differential    Collection Time: 11/04/17  6:05 AM   Result Value Ref Range    WBC 12.37 (H) 3.20 - 9.80 10*3/mm3    RBC 3.28 (L) 3.77 - 5.16 10*6/mm3    Hemoglobin 9.5 (L) 12.0 - 15.5 g/dL    Hematocrit 30.0 (L) 35.0 - 45.0 %    MCV 91.5 80.0 - 98.0 fL    MCH 29.0 26.5 - 34.0 pg    MCHC 31.7 31.4 - 36.0 g/dL    RDW 16.9 (H) 11.5 - 14.5 %    RDW-SD 56.1 (H) 36.4 - 46.3 fl    MPV 11.0 8.0 - 12.0 fL    Platelets 218 150 - 450 10*3/mm3    Neutrophil % 82.4 (H) 37.0 - 80.0 %    Lymphocyte % 7.8 (L) 10.0 - 50.0 %    Monocyte % 8.6 0.0 - 12.0 %    Eosinophil % 0.3 0.0 - 7.0 %    Basophil % 0.2 0.0 - 2.0 %    Immature Grans % 0.7 (H) 0.0 - 0.5 %    Neutrophils, Absolute 10.18 (H) 2.00 - 8.60 10*3/mm3    Lymphocytes, Absolute 0.97 0.60 - 4.20 10*3/mm3    Monocytes, Absolute 1.06 (H) 0.00 - 0.90 10*3/mm3    Eosinophils, Absolute 0.04 0.00 - 0.70 10*3/mm3    Basophils, Absolute 0.03 0.00 - 0.20 10*3/mm3    Immature Grans, Absolute 0.09 (H) 0.00 - 0.02 10*3/mm3   Adult Transthoracic Echo Limited W/ Cont if Necessary Per Protocol    Collection  Time: 11/04/17 10:20 AM   Result Value Ref Range    BSA 1.4 m^2     CV ECHO URSULA - BZI_BMI 19.5 kilograms/m^2     CV ECHO URSULA - BSA(HAYCOCK) 1.4 m^2     CV ECHO URSULA - BZI_METRIC_WEIGHT 45.4 kg     CV ECHO URSULA - BZI_METRIC_HEIGHT 152.4 cm    Target HR (85%) 125 bpm    Max. Pred. HR (100%) 147 bpm    Echo EF Estimated 60 %   ]    Imaging Results (last 24 hours)     ** No results found for the last 24 hours. **            Assessment:     End-stage renal disease  Left lower lobe pneumonia  Staphylococcal bacteremia  Altered mental status, improved  Essential hypertension  Febrile illness  Type 2 diabetes mellitus    Plan:     ESRD:   Patient is on hemodialysis on Monday Wednesday and Friday. Had HD yesterday.  K is low.  Fluid status stable.      Staphylococcal bacteremia, left lower lobe pneumonia.  Echocardiogram was negative.  Follow-up repeat cultures.  Patient is on IV antibiotics, Vancomycin.  Repeat TTE was done today.  Pt refused ANTHONY.   The thigh graft is without any signs of infection.  There is a prominent 'bulge' on the graft, which has been chronic.  If persistent bacteremia, will request vascular to evaluate the site.      Pneumonia: On IV antibiotic.  Follow cultures.    Hypokalemia:   K 3.2.  20 mEq of KCL given today.    Follow lytes.      Altered mental status, delirium: Improved.   imaging studies were negative.    Anemia of chronic kidney disease:   Hemoglobin and hematocrit levels are stable.  On 8000 units of Epogen with dialysis.    Essential hypertension: Patient is on hydralazine.  Blood pressure readings are high today, but was close to 100 systolic yesterday.  Will follow.      Discussed with patient.      Chi Holden MD

## 2017-11-04 NOTE — THERAPY TREATMENT NOTE
Acute Care - Occupational Therapy Treatment Note  [facility]     Patient Name: Efrem Roa  : 1945  MRN: [id number]  Today's Date: 2017  Onset of Illness/Injury or Date of Surgery Date: 10/30/17  Date of Referral to OT: 17  Referring Physician: LOS Rose      Admit Date: 10/30/2017    Visit Dx:     ICD-10-CM ICD-9-CM   1. HCAP (healthcare-associated pneumonia) J18.9 486   2. Sepsis, due to unspecified organism A41.9 038.9     995.91   3. NSTEMI (non-ST elevated myocardial infarction) I21.4 410.70   4. ESRD (end stage renal disease) N18.6 585.6   5. Essential hypertension I10 401.9   6. Hypertensive heart disease without heart failure I11.9 402.90   7. Oropharyngeal dysphagia R13.12 787.22   8. Impaired mobility and activities of daily living Z74.09 799.89   9. Impaired functional mobility, balance, gait, and endurance Z74.09 V49.89   10. Impaired mobility and ADLs Z74.09 799.89     Patient Active Problem List   Diagnosis   • Constipation   • Arteriovenous fistula   • End stage renal failure on dialysis   • Controlled type 2 diabetes mellitus with chronic kidney disease on chronic dialysis, without long-term current use of insulin   • Coronary artery disease involving native coronary artery of native heart without angina pectoris   • Panlobular emphysema   • ESRD (end stage renal disease) on dialysis   • HCAP (healthcare-associated pneumonia)             Adult Rehabilitation Note       17 1315 17 0939 17 1321    Rehab Assessment/Intervention    Discipline occupational therapy assistant  -LW physical therapy assistant  -JASON physical therapy assistant  -KORI    Document Type therapy note (daily note)  -LW therapy note (daily note)  -JASON therapy note (daily note)  -KORI    Subjective Information no complaints;agree to therapy  -LW agree to therapy  -JASON agree to therapy  -KORI    Patient Effort, Rehab Treatment adequate  -LW adequate  -JASON adequate  -KORI    Patient Effort,  Rehab Treatment Comment Pt states that she is weak and declines OOB  -LW  pt states she is weak from dialysis and defers OOB despite encouragement.   -KORI    Symptoms Noted During/After Treatment   fatigue;other (see comments)   pt w/ rattled cough  -KORI    Precautions/Limitations fall precautions  -LW  fall precautions;oxygen therapy device and L/min  -KORI    Precautions/Limitations, Vision WFL with corrective lenses  -LW      Precautions/Limitations, Hearing WFL  -LW      Specific Treatment Considerations  soon after PTA entered MT arrived to take pt to ECHO  -JASON     Recorded by [LW] JACE Justice/BERNADETTE [JASON] Kalli Marina PTA [KORI] Nemesio Malik PTA    Vital Signs    Pre Systolic BP Rehab 119  -LW  128  -KORI    Pre Treatment Diastolic BP 69  -LW  70  -KORI    Post Systolic BP Rehab   --   pt declines ending BP taken due to the pain it causes.   -KORI    Pretreatment Heart Rate (beats/min) 81  -LW  82  -KORI    Intratreatment Heart Rate (beats/min) 79  -LW      Posttreatment Heart Rate (beats/min) 83  -LW  110  -KORI    Pre SpO2 (%) 97  -LW  98  -KORI    O2 Delivery Pre Treatment supplemental O2  -LW  supplemental O2  -KORI    Intra SpO2 (%) 96  -LW      O2 Delivery Intra Treatment supplemental O2  -LW      Post SpO2 (%) 95  -LW  90  -KORI    O2 Delivery Post Treatment supplemental O2  -LW  supplemental O2  -KORI    Pre Patient Position Supine  -LW Supine  -JASON     Intra Patient Position Sitting  -LW      Post Patient Position Supine  -LW Supine  -JASON     Recorded by [LW] JACE Justice/BERNADETTE [JASON] Kalli Marina PTA [KORI] Nemesio Malik PTA    Pain Assessment    Pain Assessment No/denies pain  -LW No/denies pain  -JASON --   pt did not give pain rating. no c/o pain  -KORI    Recorded by [LW] JACE Justice/BERNADETTE [JASON] Kalli Marina PTA [KORI] Nemesio Malik PTA    Vision Assessment/Intervention    Visual Impairment WFL with corrective lenses  -LW  WFL with corrective lenses  -KORI    Recorded by [LW] JACE Justice/BERNADETTE   [KORI] Nemesio Malik PTA    Cognitive Assessment/Intervention    Current Cognitive/Communication Assessment functional  -LW functional  -JASON functional  -KORI    Orientation Status oriented x 4  -LW oriented x 4  -JASON oriented x 4  -KORI    Follows Commands/Answers Questions 100% of the time  -% of the time  -JASON 100% of the time  -KORI    Personal Safety WNL/WFL  -LW WNL/WFL  -JASON WNL/WFL  -KORI    Personal Safety Interventions   gait belt;muscle strengthening facilitated;nonskid shoes/slippers when out of bed;supervised activity  -KOIR    Recorded by [LW] JACE Justice/L [JASON] Kalli Marina PTA [KORI] Nemesio Malik PTA    Bed Mobility, Assessment/Treatment    Bed Mobility, Assistive Device   bed rails;head of bed elevated  -    Bed Mobility, Roll Left, [place]   moderate assist (50% patient effort)  -KORI    Bed Mobility, Roll Right, [place] minimum assist (75% patient effort)  -  moderate assist (50% patient effort)  -    Bed Mobility, Scoot/Bridge, [place] minimum assist (75% patient effort)  - moderate assist (50% patient effort);2 person assist required  -JASON dependent (less than 25% patient effort);2 person assist required  -    Bed Mob, Supine to Sit, [place] minimum assist (75% patient effort)  -  moderate assist (50% patient effort);maximum assist (25% patient effort)  -    Bed Mob, Sit to Supine, [place] minimum assist (75% patient effort)  -  maximum assist (25% patient effort)  -    Bed Mobility, Comment   pt sat EOB 20 mins  -KORI    Recorded by [LW] JACE Justice/BERNADETTE [JASON] Kalli Marina PTA [KORI] Nemesio Malik PTA    Transfer Assessment/Treatment    Transfers, Bed-Chair [place]   not tested  -KORI    Transfer, Comment  pt declined std pivot t/f and request to be slid over   -JASON     Recorded by  [JASON] Kalli Marina PTA [KORI] Nemesio Malik PTA    Gait Assessment/Treatment    Gait, [place] Level   not tested  -KORI    Recorded by   [KORI] Nemesio FERNANDEZ  King, PTA    Upper Body Bathing Assessment/Training    UB Bathing Assess/Train Assistive Device other (see comments)   Pan bath, wash cloth  -LW      UB Bathing Assess/Train, Position sitting;edge of bed  -LW      UB Bathing Assess/Train, [place] Level stand by assist  -LW      Recorded by [LW] MIHIR JusticeA/L      Lower Body Bathing Assessment/Training    LB Bathing Assess/Train Assistive Device other (see comments)   Pan bath, wash cloth  -LW      LB Bathing Assess/Train, Position edge of bed;sitting  -LW      LB Bathing Assess/Train, [place] Level stand by assist  -LW      Recorded by [LW] MIHIR JusticeA/L      Upper Body Dressing Assessment/Training    UB Dressing Assess/Train, Clothing Type doffing:;donning:;hospital gown  -LW      UB Dressing Assess/Train, Position sitting;edge of bed  -LW      UB Dressing Assess/Train, [place] contact guard assist  -LW      Recorded by [LW] JACE Justice/L      Lower Body Dressing Assessment/Training    LB Dressing Assess/Train, Clothing Type doffing:;donning:;slipper socks  -LW      LB Dressing Assess/Train, Position sitting;edge of bed  -LW      LB Dressing Assess/Train, [place] supervision required  -LW      Recorded by [LW] NIMCO Justice      Toileting Assessment/Training    Toileting Assess/Train, Assistive Device other (see comments)   bed [place]  -LW      Toileting Assess/Train, Position supine  -LW      Toileting Assess/Train, Indepen Level contact guard assist  -LW      Recorded by [LW] NIMCO Justice      Grooming Assessment/Training    Grooming Assess/Train, Indepen Level supervision required  -LW      Grooming Assess/Train, Comment Washed face, hands, combed hair applied deoderant and lotion  -LW      Recorded by [LW] JACE Justice/L      Therapy Exercises    Bilateral Lower Extremities  AROM:;supine;15 reps;ankle pumps/circles;hip ER;hip IR  -JASON AROM:;15 reps;sitting;ankle pumps/circles;LAQ;hip  flexion;hip abduction/adduction;glut sets  -KORI    Recorded by  [JASON] Kalli Marina PTA [KORI] Nemesio Malik PTA    Positioning and Restraints    Pre-Treatment Position in bed  -LW in bed  -JASON in bed  -KORI    Post Treatment Position bed  -LW bed  -JASON bed  -KORI    In Bed supine;call light within reach;encouraged to call for assist;exit alarm on;patient within staff view  -LW --   on stretcgher with MT  -JASON supine;call light within reach;encouraged to call for assist;exit alarm on   all needs isaias.  -KORI    Recorded by [LW] JACE Justice/BERNADETTE [JASON] Kalli Marina PTA [KORI] Nemesio Malik, NIYAH      11/03/17 1302 11/02/17 1025       Rehab Assessment/Intervention    Discipline occupational therapist  -SG speech language pathologist  -EK     Document Type therapy note (daily note)  -SG      Subjective Information agree to therapy  -SG      Patient Effort, Rehab Treatment good  -SG      Patient Effort, Rehab Treatment Comment --   Very fatigued from diaylsis  -SG      Precautions/Limitations fall precautions;oxygen therapy device and L/min  -SG      Recorded by [SG] Clara Charles OT [EK] Alka Padron CCC-SLP     Vital Signs    Pre Systolic BP Rehab 122  -SG      Pre Treatment Diastolic BP 72  -SG      Post SpO2 (%) 91  -SG      O2 Delivery Post Treatment supplemental O2  -SG      Recorded by [SG] Clara Charles OT      Pain Assessment    Pain Assessment No/denies pain  -SG      Recorded by [SG] Clara Charles OT      Vision Assessment/Intervention    Visual Impairment WFL with corrective lenses  -SG      Recorded by [SG] Clara Charles OT      Therapy Exercises    Bilateral Upper Extremity PROM:;AROM:;20 reps;sitting;shoulder extension/flexion;pronation/supination;elbow flexion/extension;shoulder circles  -SG      Recorded by [SG] Clara Charles OT        User Key  (r) = Recorded By, (t) = Taken By, (c) = Cosigned By    Initials Name Effective Dates    SUNDAY Padron CCC-SLP 04/06/17 -       Kalli Marina, PTA 10/17/16 -     KORI Nemesio Malik, PTA 10/17/16 -     LW Desirae Shore, MCCORMICK/L 10/17/16 -     SG Claar Melvin, OT 08/03/17 -                 OT Goals       11/04/17 1430 11/03/17 1526 11/02/17 1556    Transfer Training 2 OT LTG    Transfer Training 2 OT LTG, Date Established   11/02/17  -RB    Transfer Training 2 OT LTG, Time to Achieve   by discharge  -RB    Transfer Training 2 OT LTG, Activity Type   all transfers  -RB    Transfer Training 2 OT LTG, [place] Level   supervision required;contact guard assist  -RB    Transfer Training 2 OT LTG, Assist Device   walker, rolling  -RB    Transfer Training 2 OT LTG, Date Goal Reviewed 11/04/17  -LW 11/03/17  -SG     Transfer Training 2 OT LTG, Outcome goal ongoing  -LW goal ongoing  -SG     Strength OT LTG    Strength Goal OT LTG, Date Established   11/02/17  -RB    Strength Goal OT LTG, Measure to Achieve   1-2 sets of 10 reps all planes (except L shoulder flexion) with 1 lb wrist wt or dumbell for B UE strengthening to increase independence with functional mobility and ADLs.  -RB    Strength Goal OT LTG, Date Goal Reviewed 11/04/17  -LW 11/03/17  -SG     Strength Goal OT LTG, Outcome goal ongoing  -LW goal ongoing  -SG     Static Standing Balance OT LTG    Static Standing Balance OT LTG, Date Established  11/03/17  -SG 11/02/17  -RB    Static Standing Balance OT LTG, Time to Achieve   by discharge  -RB    Static Standing Balance OT LTG, [place] Level   supervision required;contact guard assist   5 minutes with functional activity  -RB    Static Standing Balance OT LTG, Assist Device   assistive device   R/W.  -RB    Static Standing Balance OT LTG, Date Goal Reviewed 11/04/17  -LW      Static Standing Balance OT LTG, Outcome goal ongoing  -LW goal ongoing  -SG     ADL OT LTG    ADL OT LTG, Date Established  11/03/17  -SG 11/02/17  -RB    ADL OT LTG, Time to Achieve   by discharge  -RB    ADL OT LTG, Activity Type   ADL skills   Sponge bath  and dress or walk-in shower.  -RB    ADL OT LTG, [place] Level   min assist;contact guard;assistive device   R/W.  -RB    ADL OT LTG, Date Goal Reviewed 11/04/17  -LW 11/03/17  -SG     ADL OT LTG, Outcome goal met  -LW goal partially met  -SG       User Key  (r) = Recorded By, (t) = Taken By, (c) = Cosigned By    Initials Name Provider Type    RB Quan Tapia, OT Occupational Therapist    NIMCO Crouch Occupational Therapy Assistant    CORINNA Charles OT Occupational Therapist          Occupational Therapy Education     Title: PT OT SLP Therapies (Active)     Topic: Occupational Therapy (Done)     Point: ADL training (Done)    Description: Instruct learner(s) on proper safety adaptation and remediation techniques during self care or transfers.   Instruct in proper use of assistive devices.    Learning Progress Summary    Learner Readiness Method Response Comment Documented by Status   Patient Acceptance E VU  LW 11/04/17 1429 Done               Point: Home exercise program (Done)    Description: Instruct learner(s) on appropriate technique for monitoring, assisting and/or progressing therapeutic exercises/activities.    Learning Progress Summary    Learner Readiness Method Response Comment Documented by Status   Patient Acceptance E VU  LW 11/04/17 1429 Done               Point: Precautions (Done)    Description: Instruct learner(s) on prescribed precautions during self-care and functional transfers.    Learning Progress Summary    Learner Readiness Method Response Comment Documented by Status   Patient Acceptance E VU  LW 11/04/17 1429 Done    Acceptance E OCTAVIONR Edu on no out of bed without assist. RB 11/02/17 1553 Done               Point: Body mechanics (Done)    Description: Instruct learner(s) on proper positioning and spine alignment during self-care, functional mobility activities and/or exercises.    Learning Progress Summary    Learner Readiness Method Response Comment Documented by Status    Patient Acceptance E VU  LW 11/04/17 1429 Done                      User Key     Initials Effective Dates Name Provider Type Discipline     06/15/16 -  Quan Tapia OT Occupational Therapist OT    LW 10/17/16 -  NIMCO Justice Occupational Therapy Assistant OT                  OT Recommendation and Plan  Anticipated Discharge Disposition: skilled nursing facility  Planned Therapy Interventions: activity intolerance, ADL retraining, balance training, bed mobility training, energy conservation, transfer training, strengthening  Therapy Frequency:  (3-14x/wk.)  Plan of Care Review  Plan Of Care Reviewed With: patient  Progress: improving  Outcome Summary/Follow up Plan: Pt met one new goal this day. Will continue POC        Outcome Measures       11/04/17 1315 11/04/17 0939 11/03/17 1321    How much help from another person do you currently need...    Turning from your back to your side while in flat bed without using bedrails?  2  -JASON 2  -KORI    Moving from lying on back to sitting on the side of a flat bed without bedrails?  2  -JASON 2  -KORI    Moving to and from a bed to a chair (including a wheelchair)?  2  -JASON 2  -KORI    Standing up from a chair using your arms (e.g., wheelchair, bedside chair)?  2  -JASON 2  -KORI    Climbing 3-5 steps with a railing?  1  -JASON 1  -KORI    To walk in hospital room?  2  -JASON 2  -KORI    AM-PAC 6 Clicks Score  11  -JASON 11  -KORI    How much help from another is currently needed...    Putting on and taking off regular lower body clothing? 2  -LW      Bathing (including washing, rinsing, and drying) 2  -LW      Toileting (which includes using toilet bed pan or urinal) 2  -LW      Putting on and taking off regular upper body clothing 3  -LW      Taking care of personal grooming (such as brushing teeth) 3  -LW      Eating meals 4  -LW      Score 16  -LW      Functional Assessment    Outcome Measure Options   AM-PAC 6 Clicks Basic Mobility (PT)  -KORI      11/02/17 1445 11/02/17 1444       How  much help from another person do you currently need...    Turning from your back to your side while in flat bed without using bedrails? 4  -JCA      Moving from lying on back to sitting on the side of a flat bed without bedrails? 3  -JCA      Moving to and from a bed to a chair (including a wheelchair)? 3  -JCA      Standing up from a chair using your arms (e.g., wheelchair, bedside chair)? 3  -JCA      Climbing 3-5 steps with a railing? 2  -JCA      To walk in hospital room? 3  -JCA      AM-PAC 6 Clicks Score 18  -JCA      How much help from another is currently needed...    Putting on and taking off regular lower body clothing?  2  -RB     Bathing (including washing, rinsing, and drying)  2  -RB     Toileting (which includes using toilet bed pan or urinal)  2  -RB     Putting on and taking off regular upper body clothing  3  -RB     Taking care of personal grooming (such as brushing teeth)  3  -RB     Eating meals  4  -RB     Score  16  -RB     Functional Assessment    Outcome Measure Options AM-PAC 6 Clicks Basic Mobility (PT)  -JCA AM-PAC 6 Clicks Daily Activity (OT)  -RB       User Key  (r) = Recorded By, (t) = Taken By, (c) = Cosigned By    Initials Name Provider Type    SHIV Tapia, OT Occupational Therapist    CONNIE Andrade, PT Physical Therapist    JASON Marina, PTA Physical Therapy Assistant    KORI Malik, NIYAH Physical Therapy Assistant    JACE Crouch/L Occupational Therapy Assistant           Time Calculation:         Time Calculation- OT       11/04/17 1433          Time Calculation- OT    OT Start Time 1315  -LW      OT Stop Time 1415  -LW      OT Time Calculation (min) 60 min  -LW      Total Timed Code Minutes- OT 60 minute(s)  -LW      OT Received On 11/04/17  -        User Key  (r) = Recorded By, (t) = Taken By, (c) = Cosigned By    Initials Name Provider Type    JACE Crouch/L Occupational Therapy Assistant           Therapy Charges for Today     Code  Description Service Date Service Provider Modifiers Qty    74077280610 HC OT SELF CARE/MGMT/TRAIN EA 15 MIN 11/4/2017 JACE Justice/L GO 4          OT G-codes  OT Professional Judgement Used?: Yes  OT Functional Scales Options: AM-PAC 6 Clicks Daily Activity (OT)  Score: 16  Functional Limitation: Self care  Self Care Current Status (): At least 40 percent but less than 60 percent impaired, limited or restricted  Self Care Goal Status (): At least 20 percent but less than 40 percent impaired, limited or restricted    NIMCO Justice  11/4/2017

## 2017-11-04 NOTE — THERAPY TREATMENT NOTE
Acute Care - Physical Therapy Treatment Note  [facility]     Patient Name: Efrem Roa  : 1945  MRN: [id number]  Today's Date: 2017  Onset of Illness/Injury or Date of Surgery Date: 10/30/17  Date of Referral to PT: 17  Referring Physician: LOS Rose    Admit Date: 10/30/2017    Visit Dx:    ICD-10-CM ICD-9-CM   1. HCAP (healthcare-associated pneumonia) J18.9 486   2. Sepsis, due to unspecified organism A41.9 038.9     995.91   3. NSTEMI (non-ST elevated myocardial infarction) I21.4 410.70   4. ESRD (end stage renal disease) N18.6 585.6   5. Essential hypertension I10 401.9   6. Hypertensive heart disease without heart failure I11.9 402.90   7. Oropharyngeal dysphagia R13.12 787.22   8. Impaired mobility and activities of daily living Z74.09 799.89   9. Impaired functional mobility, balance, gait, and endurance Z74.09 V49.89   10. Impaired mobility and ADLs Z74.09 799.89     Patient Active Problem List   Diagnosis   • Constipation   • Arteriovenous fistula   • End stage renal failure on dialysis   • Controlled type 2 diabetes mellitus with chronic kidney disease on chronic dialysis, without long-term current use of insulin   • Coronary artery disease involving native coronary artery of native heart without angina pectoris   • Panlobular emphysema   • ESRD (end stage renal disease) on dialysis   • HCAP (healthcare-associated pneumonia)               Adult Rehabilitation Note       17 0939 17 1321 17 1302    Rehab Assessment/Intervention    Discipline physical therapy assistant  -JASON physical therapy assistant  -KORI occupational therapist  -SG    Document Type therapy note (daily note)  -JASON therapy note (daily note)  -KORI therapy note (daily note)  -SG    Subjective Information agree to therapy  -JASON agree to therapy  -KORI agree to therapy  -SG    Patient Effort, Rehab Treatment adequate  -JASON adequate  -KORI good  -SG    Patient Effort, Rehab Treatment Comment  pt  states she is weak from dialysis and defers OOB despite encouragement.   -KORI --   Very fatigued from diaylsis  -SG    Symptoms Noted During/After Treatment  fatigue;other (see comments)   pt w/ rattled cough  -KORI     Precautions/Limitations  fall precautions;oxygen therapy device and L/min  -KORI fall precautions;oxygen therapy device and L/min  -SG    Specific Treatment Considerations soon after PTA entered MT arrived to take pt to ECHO  -JASON      Recorded by [JASON] Kalli Marina PTA [KORI] Nemesio Malik PTA [SG] Clara Charles OT    Vital Signs    Pre Systolic BP Rehab  128  -KORI 122  -SG    Pre Treatment Diastolic BP  70  -KORI 72  -SG    Post Systolic BP Rehab  --   pt declines ending BP taken due to the pain it causes.   -KORI     Pretreatment Heart Rate (beats/min)  82  -KORI     Posttreatment Heart Rate (beats/min)  110  -KORI     Pre SpO2 (%)  98  -KORI     O2 Delivery Pre Treatment  supplemental O2  -KORI     Post SpO2 (%)  90  -KORI 91  -SG    O2 Delivery Post Treatment  supplemental O2  -KORI supplemental O2  -SG    Pre Patient Position Supine  -JASON      Post Patient Position Supine  -JASON      Recorded by [JASON] Kalli Marina PTA [KORI] Nemesio Malik PTA [SG] Clara Charles OT    Pain Assessment    Pain Assessment No/denies pain  -JASON --   pt did not give pain rating. no c/o pain  -KORI No/denies pain  -SG    Recorded by [JASON] Kalli Marina PTA [KORI] Nemesio Malik PTA [SG] Clara Charles OT    Vision Assessment/Intervention    Visual Impairment  WFL with corrective lenses  -KORI WFL with corrective lenses  -SG    Recorded by  [KORI] Nemesio Malik PTA [SG] Clara Charles OT    Cognitive Assessment/Intervention    Current Cognitive/Communication Assessment functional  -JASON functional  -KORI     Orientation Status oriented x 4  -JASON oriented x 4  -KORI     Follows Commands/Answers Questions 100% of the time  -JASON 100% of the time  -KORI     Personal Safety WNL/WFL  -JASON WNL/WFL  -KORI     Personal Safety Interventions  gait belt;muscle  strengthening facilitated;nonskid shoes/slippers when out of bed;supervised activity  -     Recorded by [JASON] Kalli Marina PTA [KORI] Nemesio Malik PTA     Bed Mobility, Assessment/Treatment    Bed Mobility, Assistive Device  bed rails;head of bed elevated  -     Bed Mobility, Roll Left, [place]  moderate assist (50% patient effort)  -     Bed Mobility, Roll Right, [place]  moderate assist (50% patient effort)  -     Bed Mobility, Scoot/Bridge, [place] moderate assist (50% patient effort);2 person assist required  - dependent (less than 25% patient effort);2 person assist required  -     Bed Mob, Supine to Sit, [place]  moderate assist (50% patient effort);maximum assist (25% patient effort)  -     Bed Mob, Sit to Supine, [place]  maximum assist (25% patient effort)  -     Bed Mobility, Comment  pt sat EOB 20 mins  -     Recorded by [JASON] Kalli Marina PTA [KORI] Nemesio Malik PTA     Transfer Assessment/Treatment    Transfers, Bed-Chair [place]  not tested  -     Transfer, Comment pt declined std pivot t/f and request to be slid over   -      Recorded by [JASON] Kalli Marina PTA [KORI] Nemesio Malik PTA     Gait Assessment/Treatment    Gait, [place] Level  not tested  -     Recorded by  [KORI] Nemesio Malik PTA     Therapy Exercises    Bilateral Lower Extremities AROM:;supine;15 reps;ankle pumps/circles;hip ER;hip IR  - AROM:;15 reps;sitting;ankle pumps/circles;LAQ;hip flexion;hip abduction/adduction;glut sets  -     Bilateral Upper Extremity   PROM:;AROM:;20 reps;sitting;shoulder extension/flexion;pronation/supination;elbow flexion/extension;shoulder circles  -SG    Recorded by [JASON] Kalli Marina PTA [KORI] Nemesio Malik PTA [SG] Clara Charles OT    Positioning and Restraints    Pre-Treatment Position in bed  - in bed  -     Post Treatment Position bed  -JASON bed  -KORI     In Bed --   on stretcgher with MT  - supine;call light within  reach;encouraged to call for assist;exit alarm on   all needs isaias.  -KORI     Recorded by [JASON] Kalli Marina, PTA [KORI] Nemesio Malik, PTA       11/02/17 1025          Rehab Assessment/Intervention    Discipline speech language pathologist  -EK      Recorded by [EK] Alka Padron, CCC-SLP        User Key  (r) = Recorded By, (t) = Taken By, (c) = Cosigned By    Initials Name Effective Dates    EK Alka Padron, JODY-SLP 04/06/17 -     JASON Kalli Marina, PTA 10/17/16 -     KORI Nemesio Malik, PTA 10/17/16 -     SG Clara Charles, OT 08/03/17 -                 IP PT Goals       11/04/17 0939 11/03/17 1321 11/02/17 1713    Transfer Training PT LTG    Transfer Training PT LTG, Date Established   11/02/17  -    Transfer Training PT LTG, Time to Achieve   by discharge  -    Transfer Training PT LTG, Activity Type   bed to chair /chair to bed;sit to stand/stand to sit  -    Transfer Training PT LTG, [place] Level   conditional independence  -    Transfer Training PT  LTG, Date Goal Reviewed 11/04/17  -JASON 11/03/17  -JCA     Transfer Training PT LTG, Outcome goal ongoing  -JASON goal ongoing  -JCA goal ongoing  -    Gait Training PT LTG    Gait Training Goal PT LTG, Date Established   11/02/17  -    Gait Training Goal PT LTG, Time to Achieve   by discharge  -    Gait Training Goal PT LTG, [place] Level   conditional independence  -    Gait Training Goal PT LTG, Distance to Achieve   150ft;O2 sats will not drop below 92%  -    Gait Training Goal PT LTG, Date Goal Reviewed 11/04/17  -JASON 11/03/17  -JCA     Gait Training Goal PT LTG, Outcome goal ongoing  -JASON goal ongoing  -JCA goal ongoing  -KORI    Strength Goal PT LTG    Strength Goal PT LTG, Date Established   11/02/17  -    Strength Goal PT LTG, Time to Achieve   by discharge  -    Strength Goal PT LTG, Measure to Achieve   Pt will tolerate 15 reps of AROM exercises in sitting  -    Strength Goal PT LTG, Date Goal Reviewed   11/03/17  -A     Strength Goal PT LTG, Outcome  goal met  -[facility] goal ongoing  -    Patient Education PT LTG    Patient Education PT LTG, Date Established   11/02/17  -    Patient Education PT LTG, Time to Achieve   by discharge  -    Patient Education PT LTG, Education Type   gait;transfers;home safety  -    Patient Education PT LTG, Date Goal Reviewed 11/04/17  -JASON 11/03/17  -A     Patient Education PT LTG Outcome goal ongoing  - goal ongoing  -A goal ongoing  -      User Key  (r) = Recorded By, (t) = Taken By, (c) = Cosigned By    Initials Name Provider Type    KORI Trupti Andrade, PT Physical Therapist    JASON Kalli Marina, PTA Physical Therapy Assistant    CONNIE Malik, PTA Physical Therapy Assistant          Physical Therapy Education     Title: PT OT SLP Therapies (Active)     Topic: Physical Therapy (Active)     Point: Mobility training (Active)    Learning Progress Summary    Learner Readiness Method Response Comment Documented by Status   Patient Acceptance E NR pt edu on benefits of OOB  11/03/17 1409 Active                      User Key     Initials Effective Dates Name Provider Type Discipline     10/17/16 -  Nemesio Malik, [facility] Physical Therapy Assistant PT                    PT Recommendation and Plan  Anticipated Discharge Disposition: skilled nursing facility  Planned Therapy Interventions: balance training, bed mobility training, gait training, patient/family education, strengthening, transfer training  PT Frequency: other (see comments) (5-14 times per week)  Plan of Care Review  Plan Of Care Reviewed With: patient  Progress: progress toward functional goals is gradual  Outcome Summary/Follow up Plan: pt had little progression this date, and no new goals met at this time.  Pt is expected to return to the NH upon D/.C          Outcome Measures       11/04/17 0939 11/03/17 1321 11/02/17 1445    How much help from another person do you currently need...    Turning from your  back to your side while in flat bed without using bedrails? 2  -JASON 2  -KORI 4  -JCA    Moving from lying on back to sitting on the side of a flat bed without bedrails? 2  -JASON 2  -KORI 3  -JCA    Moving to and from a bed to a chair (including a wheelchair)? 2  -JASON 2  -KORI 3  -JCA    Standing up from a chair using your arms (e.g., wheelchair, bedside chair)? 2  -JASON 2  -KORI 3  -JCA    Climbing 3-5 steps with a railing? 1  -JASON 1  -KORI 2  -JCA    To walk in hospital room? 2  -JASON 2  -KORI 3  -JCA    AM-PAC 6 Clicks Score 11  -JASON 11  -KORI 18  -JCA    Functional Assessment    Outcome Measure Options  AM-PAC 6 Clicks Basic Mobility (PT)  - AM-PAC 6 Clicks Basic Mobility (PT)  -JCA      11/02/17 1444          How much help from another is currently needed...    Putting on and taking off regular lower body clothing? 2  -RB      Bathing (including washing, rinsing, and drying) 2  -RB      Toileting (which includes using toilet bed pan or urinal) 2  -RB      Putting on and taking off regular upper body clothing 3  -RB      Taking care of personal grooming (such as brushing teeth) 3  -RB      Eating meals 4  -RB      Score 16  -RB      Functional Assessment    Outcome Measure Options AM-PAC 6 Clicks Daily Activity (OT)  -RB        User Key  (r) = Recorded By, (t) = Taken By, (c) = Cosigned By    Initials Name Provider Type    SHIV Tapia, OT Occupational Therapist    CONNIE Andrade, PT Physical Therapist    JASON Marina PTA Physical Therapy Assistant    KORI Malik PTA Physical Therapy Assistant           Time Calculation:         PT Charges       11/04/17 1019          Time Calculation    Start Time 0939  -      Stop Time 1006  -JASON      Time Calculation (min) 27 min  -JASON      Time Calculation- PT    Total Timed Code Minutes- PT 27 minute(s)  -JASON        User Key  (r) = Recorded By, (t) = Taken By, (c) = Cosigned By    Initials Name Provider Type    JASON Marina, NIYAH Physical Therapy Assistant          Therapy  Charges for Today     Code Description Service Date Service Provider Modifiers Qty    [id number] HC PT THER PROC EA 15 MIN 11/4/2017 Kalli Marina, PTA GP 2          PT G-Codes  PT Professional Judgement Used?: Yes  Outcome Measure Options: AM-PAC 6 Clicks Basic Mobility (PT)  Score: 18  Functional Limitation: Mobility: Walking and moving around  Mobility: Walking and Moving Around Current Status (): At least 40 percent but less than 60 percent impaired, limited or restricted  Mobility: Walking and Moving Around Goal Status (): At least 1 percent but less than 20 percent impaired, limited or restricted    Kalli Marina, NIYAH  11/4/2017

## 2017-11-04 NOTE — PROGRESS NOTES
[facility] Medicine Services  INPATIENT PROGRESS NOTE    Length of Stay: 5  Date of Admission: 10/30/2017  Primary Care Physician: Rogers Perez MD    Subjective   Please note that all previous progress notes, physical exam findings, medication changes, lab findings, and radiographical findings have been updated and noted as appropriate.    11/4/2017: Dr. Adan has determined there is low suspicion for any type of endocarditis or vegetation.  Dr. Adan recommends a repeat transthoracic echocardiogram rather than a transesophageal echocardiogram.  Patient has no complaints today.  Patient does have severe pneumonia.  Will recheck blood cultures on Monday.    11/3/2017: Patient has no acute complaints today.  Patient continues to demonstrate MRSA bacteremia despite appropriate antibiotic treatment.  Have spoken with Dr. Adan of cardiology who is agreed to evaluate the patient for possible ANTHONY.  No indwelling lines or catheters.  All other possible sources fistula.  Continue antibiotic treatment.  Attempt sputum culture if possible.  No fevers or chills, dialysis is today.  Patient tolerating by mouth intake.  Essentially complains of soreness.  Patient tachycardia much improved, heart rate is 101.  Respirations are normal with no fever.  Patient hypoxia improving, 94 on 1.5 L.    Chief Complaint/HPI:  This 72-year-old  female was admitted to hospitalist services secondary to a worsening level of altered mental status.  Patient also had fever.  CT of the head was within normal limits.  Chest x-ray demonstrated left lower lobe pneumonia.  Patient has been treated with empiric antibiotics.  Patient was found to have MRSA bacteremia, no vegetation demonstrated on echocardiogram.  Echocardiogram is transthoracic.  Though patient is in no apparent distress, she continues to be tachycardic and hypoxic.  Patient currently on vancomycin and Levaquin.  Patient will be  changed to vancomycin and Zosyn per renal dosing.  EKG reordered.  Initial EKG demonstrated sinus tachycardia.  EKG rule out atrial fibrillation with RVR.  No chest pain, did have an elevated troponin, however troponin has been fairly consistent and is actually trending down.  Troponin likely secondary to ESRD and sepsis.  Have spoken with patient's nephrologist, Dr. Mireles, he is agreeable to CTA today.  We'll also order physical therapy and occupational therapy.    Review of Systems   Constitutional: Negative for chills and fever.   Respiratory: Negative for shortness of breath.    Cardiovascular: Negative for chest pain.   Gastrointestinal: Negative for abdominal pain, nausea and vomiting.   Genitourinary: Negative for dysuria.   Neurological: Negative for dizziness, tremors, seizures, syncope, facial asymmetry, speech difficulty, light-headedness, numbness and headaches.      Review of symptoms is partially compromised by dementia      Objective    Temp:  [97.2 °F (36.2 °C)-98.2 °F (36.8 °C)] 97.9 °F (36.6 °C)  Heart Rate:  [] 92  Resp:  [16-20] 17  BP: ()/() 151/118    Physical Exam   Constitutional: She appears well-developed. No distress.   HENT:   Head: Normocephalic and atraumatic.   Eyes: Conjunctivae are normal. Pupils are equal, round, and reactive to light.   Cardiovascular: Normal rate and regular rhythm.    Pulmonary/Chest: Effort normal. No respiratory distress. She has no wheezes.   Abdominal: Soft. Bowel sounds are normal. She exhibits no distension. There is no tenderness.   Neurological: She is alert.   Skin: Skin is warm and dry. She is not diaphoretic.     Results Review:  I have reviewed the labs, radiology results, and diagnostic studies.    Laboratory Data:     Results from last 7 days  Lab Units 11/04/17  0605 11/03/17  0557 11/02/17  0530  10/30/17  1349   SODIUM mmol/L 142 140 141  < > 137  137   SODIUM, ARTERIAL mmol/L  --   --   --   --  135.2*   POTASSIUM mmol/L 3.2*  3.7 3.6  < > 5.2*  5.1   CHLORIDE mmol/L 102 99 98  < > 99  96   CO2 mmol/L 28.0 26.0 25.0  < > 22.0  22.0   BUN mg/dL 27* 45* 29*  < > 65*  65*   CREATININE mg/dL 2.88* 3.82* 2.85*  < > 4.98*  4.88*   GLUCOSE mg/dL 110* 110* 96  < > 148*  152*   GLUCOSE, ARTERIAL mmol/L  --   --   --   --  153   CALCIUM mg/dL 8.8 8.4 9.0  < > 8.3*  8.4   BILIRUBIN mg/dL  --   --   --   --  0.7   ALK PHOS U/L  --   --   --   --  141*   ALT (SGPT) U/L  --   --   --   --  42   AST (SGOT) U/L  --   --   --   --  86*   ANION GAP mmol/L 12.0 15.0 18.0*  < > 16.0*  19.0*   < > = values in this interval not displayed.  Estimated Creatinine Clearance: 12.6 mL/min (by C-G formula based on Cr of 2.88).    Results from last 7 days  Lab Units 11/04/17  0605 11/03/17  0557 11/02/17  0530   PHOSPHORUS mg/dL 2.5 3.1 2.8           Results from last 7 days  Lab Units 11/04/17  0605 11/03/17  0557 11/02/17  0530 11/01/17  0610 10/31/17  0208   WBC 10*3/mm3 12.37* 13.33* 21.16* 22.93* 14.73*   HEMOGLOBIN g/dL 9.5* 10.0* 12.0 10.1* 10.6*   HEMATOCRIT % 30.0* 30.9* 37.3 31.7* 32.8*   PLATELETS 10*3/mm3 218 202 213 191 166       Results from last 7 days  Lab Units 10/30/17  1349   INR  1.86*       Culture Data:   Blood Culture   Date Value Ref Range Status   11/02/2017 Abnormal Stain (A)  Preliminary   11/02/2017 Abnormal Stain (A)  Preliminary   11/02/2017 Staphylococcus, coagulase positive (A)  Preliminary     Urine Culture   Date Value Ref Range Status   11/02/2017 Mixed Umu Isolated  Corrected     No results found for: RESPCX  No results found for: WOUNDCX  No results found for: STOOLCX  No components found for: BODYFLD    Radiology Data:   Imaging Results (last 24 hours)     ** No results found for the last 24 hours. **          I have reviewed the patient current medications.     Assessment/Plan     Hospital Problem List     HCAP (healthcare-associated pneumonia)      MRSA bacteremia      Plan:  Repeat transthoracic echocardiogram per  cardiology recommendation, recheck blood cultures Monday, continue antibiotics for pneumonia, continue physical therapy and occupational therapy, continue treat as hospital course dictates and replete potassium.            This document has been electronically signed by LOS Paredes on November 4, 2017 10:41 AM

## 2017-11-04 NOTE — PLAN OF CARE
Problem: Patient Care Overview (Adult)  Goal: Plan of Care Review  Outcome: Ongoing (interventions implemented as appropriate)    11/04/17 1430   Coping/Psychosocial Response Interventions   Plan Of Care Reviewed With patient   Patient Care Overview   Progress improving   Outcome Evaluation   Outcome Summary/Follow up Plan Pt met one new goal this day. Will continue POC         Problem: Inpatient Occupational Therapy  Goal: Transfer Training Goal 2 LTG- OT  Outcome: Ongoing (interventions implemented as appropriate)    11/02/17 1556 11/04/17 1430   Transfer Training 2 OT LTG   Transfer Training 2 OT LTG, Date Established 11/02/17 --    Transfer Training 2 OT LTG, Time to Achieve by discharge --    Transfer Training 2 OT LTG, Activity Type all transfers --    Transfer Training 2 OT LTG, [place] Level supervision required;contact guard assist --    Transfer Training 2 OT LTG, Assist Device walker, rolling --    Transfer Training 2 OT LTG, Date Goal Reviewed --  11/04/17   Transfer Training 2 OT LTG, Outcome --  goal ongoing       Goal: Strength Goal LTG- OT  Outcome: Ongoing (interventions implemented as appropriate)    11/02/17 1556 11/04/17 1430   Strength OT LTG   Strength Goal OT LTG, Date Established 11/02/17 --    Strength Goal OT LTG, Measure to Achieve 1-2 sets of 10 reps all planes (except L shoulder flexion) with 1 lb wrist wt or dumbell for B UE strengthening to increase independence with functional mobility and ADLs. --    Strength Goal OT LTG, Date Goal Reviewed --  11/04/17   Strength Goal OT LTG, Outcome --  goal ongoing       Goal: Static Standing Balance Goal LTG- OT  Outcome: Ongoing (interventions implemented as appropriate)    11/02/17 1556 11/03/17 1526 11/04/17 1430   Static Standing Balance OT LTG   Static Standing Balance OT LTG, Date Established --  11/03/17 --    Static Standing Balance OT LTG, Time to Achieve by discharge --  --    Static Standing Balance OT LTG, [place] Level  supervision required;contact guard assist  (5 minutes with functional activity) --  --    Static Standing Balance OT LTG, Assist Device assistive device  (R/W.) --  --    Static Standing Balance OT LTG, Date Goal Reviewed --  --  11/04/17   Static Standing Balance OT LTG, Outcome --  --  goal ongoing       Goal: ADL Goal LTG- OT  Outcome: Outcome(s) achieved Date Met:  11/04/17 11/02/17 1556 11/03/17 1526 11/04/17 1430   ADL OT LTG   ADL OT LTG, Date Established --  11/03/17 --    ADL OT LTG, Time to Achieve by discharge --  --    ADL OT LTG, Activity Type ADL skills  (Sponge bath and dress or walk-in shower.) --  --    ADL OT LTG, [place] Level min assist;contact guard;assistive device  (R/W.) --  --    ADL OT LTG, Date Goal Reviewed --  --  11/04/17   ADL OT       11/02/17 1556 11/03/17 1526 11/04/17 1430   ADL OT LTG   ADL OT LTG, Date Established --  11/03/17 --    ADL OT LTG, Time to Achieve by discharge --  --    ADL OT LTG, Activity Type ADL skills  (Sponge bath and dress or walk-in shower.) --  --    ADL OT LTG, [place] Level min assist;contact guard;assistive device  (R/W.) --  --    ADL OT LTG, Date Goal Reviewed --  --  11/04/17   ADL OT LTG, Outcome --  --  goal met    LTG, Outcome --  --  goal met

## 2017-11-04 NOTE — PLAN OF CARE
Problem: Patient Care Overview (Adult)  Goal: Plan of Care Review  Outcome: Ongoing (interventions implemented as appropriate)    11/04/17 0939   Coping/Psychosocial Response Interventions   Plan Of Care Reviewed With patient   Patient Care Overview   Progress progress toward functional goals is gradual   Outcome Evaluation   Outcome Summary/Follow up Plan pt had little progression this date, and no new goals met at this time. Pt is expected to return to the NH upon D/.C         Problem: Inpatient Physical Therapy  Goal: Transfer Training Goal 1 LTG- PT  Outcome: Ongoing (interventions implemented as appropriate)    11/02/17 1713 11/04/17 0939   Transfer Training PT LTG   Transfer Training PT LTG, Date Established 11/02/17 --    Transfer Training PT LTG, Time to Achieve by discharge --    Transfer Training PT LTG, Activity Type bed to chair /chair to bed;sit to stand/stand to sit --    Transfer Training PT LTG, [place] Level conditional independence --    Transfer Training PT LTG, Date Goal Reviewed --  11/04/17   Transfer Training PT LTG, Outcome --  goal ongoing       Goal: Gait Training Goal LTG- PT  Outcome: Ongoing (interventions implemented as appropriate)    11/02/17 1713 11/04/17 0939   Gait Training PT LTG   Gait Training Goal PT LTG, Date Established 11/02/17 --    Gait Training Goal PT LTG, Time to Achieve by discharge --    Gait Training Goal PT LTG, [place] Level conditional independence --    Gait Training Goal PT LTG, Distance to Achieve 150ft;O2 sats will not drop below 92% --    Gait Training Goal PT LTG, Date Goal Reviewed --  11/04/17   Gait Training Goal PT LTG, Outcome --  goal ongoing       Goal: Patient Education Goal LTG- PT  Outcome: Ongoing (interventions implemented as appropriate)    11/02/17 1713 11/04/17 0939   Patient Education PT LTG   Patient Education PT LTG, Date Established 11/02/17 --    Patient Education PT LTG, Time to Achieve by discharge --    Patient Education PT  LTG, Education Type gait;transfers;home safety --    Patient Education PT LTG, Date Goal Reviewed --  11/04/17   Patient Education PT LTG Outcome --  goal ongoing

## 2017-11-05 LAB
ALBUMIN SERPL-MCNC: 3.3 G/DL (ref 3.4–4.8)
ANION GAP SERPL CALCULATED.3IONS-SCNC: 13 MMOL/L (ref 5–15)
BACTERIA SPEC AEROBE CULT: ABNORMAL
BACTERIA UR QL AUTO: ABNORMAL /HPF
BASOPHILS # BLD AUTO: 0.03 10*3/MM3 (ref 0–0.2)
BASOPHILS NFR BLD AUTO: 0.1 % (ref 0–2)
BILIRUB UR QL STRIP: ABNORMAL
BUN BLD-MCNC: 37 MG/DL (ref 7–21)
BUN/CREAT SERPL: 9.7 (ref 7–25)
CALCIUM SPEC-SCNC: 8.5 MG/DL (ref 8.4–10.2)
CHLORIDE SERPL-SCNC: 102 MMOL/L (ref 95–110)
CLARITY UR: CLEAR
CO2 SERPL-SCNC: 25 MMOL/L (ref 22–31)
COLOR UR: YELLOW
CREAT BLD-MCNC: 3.82 MG/DL (ref 0.5–1)
DEPRECATED RDW RBC AUTO: 56.9 FL (ref 36.4–46.3)
EOSINOPHIL # BLD AUTO: 0.11 10*3/MM3 (ref 0–0.7)
EOSINOPHIL NFR BLD AUTO: 0.5 % (ref 0–7)
ERYTHROCYTE [DISTWIDTH] IN BLOOD BY AUTOMATED COUNT: 16.9 % (ref 11.5–14.5)
GFR SERPL CREATININE-BSD FRML MDRD: 12 ML/MIN/1.73 (ref 39–90)
GLUCOSE BLD-MCNC: 145 MG/DL (ref 60–100)
GLUCOSE UR STRIP-MCNC: NEGATIVE MG/DL
GRAM STN SPEC: ABNORMAL
GRAM STN SPEC: ABNORMAL
HCT VFR BLD AUTO: 31.2 % (ref 35–45)
HGB BLD-MCNC: 9.8 G/DL (ref 12–15.5)
HGB UR QL STRIP.AUTO: ABNORMAL
HYALINE CASTS UR QL AUTO: ABNORMAL /LPF
IMM GRANULOCYTES # BLD: 0.18 10*3/MM3 (ref 0–0.02)
IMM GRANULOCYTES NFR BLD: 0.9 % (ref 0–0.5)
ISOLATED FROM: ABNORMAL
KETONES UR QL STRIP: ABNORMAL
LEUKOCYTE ESTERASE UR QL STRIP.AUTO: NEGATIVE
LYMPHOCYTES # BLD AUTO: 0.78 10*3/MM3 (ref 0.6–4.2)
LYMPHOCYTES NFR BLD AUTO: 3.8 % (ref 10–50)
MCH RBC QN AUTO: 28.9 PG (ref 26.5–34)
MCHC RBC AUTO-ENTMCNC: 31.4 G/DL (ref 31.4–36)
MCV RBC AUTO: 92 FL (ref 80–98)
MONOCYTES # BLD AUTO: 0.98 10*3/MM3 (ref 0–0.9)
MONOCYTES NFR BLD AUTO: 4.8 % (ref 0–12)
NEUTROPHILS # BLD AUTO: 18.23 10*3/MM3 (ref 2–8.6)
NEUTROPHILS NFR BLD AUTO: 89.9 % (ref 37–80)
NITRITE UR QL STRIP: NEGATIVE
PH UR STRIP.AUTO: <=5 [PH] (ref 5–9)
PHOSPHATE SERPL-MCNC: 2.9 MG/DL (ref 2.4–4.4)
PLATELET # BLD AUTO: 222 10*3/MM3 (ref 150–450)
PMV BLD AUTO: 10.7 FL (ref 8–12)
POTASSIUM BLD-SCNC: 3.8 MMOL/L (ref 3.5–5.1)
PROT UR QL STRIP: ABNORMAL
RBC # BLD AUTO: 3.39 10*6/MM3 (ref 3.77–5.16)
RBC # UR: ABNORMAL /HPF
REF LAB TEST METHOD: ABNORMAL
SODIUM BLD-SCNC: 140 MMOL/L (ref 137–145)
SP GR UR STRIP: 1.02 (ref 1–1.03)
SQUAMOUS #/AREA URNS HPF: ABNORMAL /HPF
UROBILINOGEN UR QL STRIP: ABNORMAL
WBC NRBC COR # BLD: 20.31 10*3/MM3 (ref 3.2–9.8)
WBC UR QL AUTO: ABNORMAL /HPF

## 2017-11-05 PROCEDURE — 25010000002 CEFTRIAXONE PER 250 MG: Performed by: INTERNAL MEDICINE

## 2017-11-05 PROCEDURE — 94760 N-INVAS EAR/PLS OXIMETRY 1: CPT

## 2017-11-05 PROCEDURE — 85025 COMPLETE CBC W/AUTO DIFF WBC: CPT | Performed by: INTERNAL MEDICINE

## 2017-11-05 PROCEDURE — 94799 UNLISTED PULMONARY SVC/PX: CPT

## 2017-11-05 PROCEDURE — 81001 URINALYSIS AUTO W/SCOPE: CPT | Performed by: PHYSICIAN ASSISTANT

## 2017-11-05 PROCEDURE — 80069 RENAL FUNCTION PANEL: CPT | Performed by: INTERNAL MEDICINE

## 2017-11-05 RX ORDER — ACETAMINOPHEN 325 MG/1
650 TABLET ORAL EVERY 6 HOURS PRN
Status: DISCONTINUED | OUTPATIENT
Start: 2017-11-05 | End: 2017-11-16 | Stop reason: HOSPADM

## 2017-11-05 RX ORDER — ECHINACEA PURPUREA EXTRACT 125 MG
2 TABLET ORAL EVERY 4 HOURS PRN
Status: DISCONTINUED | OUTPATIENT
Start: 2017-11-05 | End: 2017-11-16 | Stop reason: HOSPADM

## 2017-11-05 RX ADMIN — SALINE NASAL SPRAY 2 SPRAY: 1.5 SOLUTION NASAL at 17:36

## 2017-11-05 RX ADMIN — SERTRALINE HYDROCHLORIDE 50 MG: 50 TABLET ORAL at 09:21

## 2017-11-05 RX ADMIN — APIXABAN 2.5 MG: 2.5 TABLET, FILM COATED ORAL at 09:21

## 2017-11-05 RX ADMIN — HYDRALAZINE HYDROCHLORIDE 100 MG: 50 TABLET ORAL at 09:21

## 2017-11-05 RX ADMIN — HYDROCODONE BITARTRATE AND ACETAMINOPHEN 1 TABLET: 7.5; 325 TABLET ORAL at 15:53

## 2017-11-05 RX ADMIN — MIRTAZAPINE 15 MG: 15 TABLET, FILM COATED ORAL at 20:22

## 2017-11-05 RX ADMIN — HYDRALAZINE HYDROCHLORIDE 100 MG: 50 TABLET ORAL at 20:22

## 2017-11-05 RX ADMIN — QUETIAPINE FUMARATE 50 MG: 25 TABLET, FILM COATED ORAL at 20:22

## 2017-11-05 RX ADMIN — HYDROCODONE BITARTRATE AND ACETAMINOPHEN 1 TABLET: 7.5; 325 TABLET ORAL at 05:56

## 2017-11-05 RX ADMIN — ATORVASTATIN CALCIUM 40 MG: 40 TABLET, FILM COATED ORAL at 20:22

## 2017-11-05 RX ADMIN — HYDROCODONE BITARTRATE AND ACETAMINOPHEN 1 TABLET: 7.5; 325 TABLET ORAL at 20:22

## 2017-11-05 RX ADMIN — IPRATROPIUM BROMIDE AND ALBUTEROL SULFATE 3 ML: 2.5; .5 SOLUTION RESPIRATORY (INHALATION) at 06:50

## 2017-11-05 RX ADMIN — HYDRALAZINE HYDROCHLORIDE 100 MG: 50 TABLET ORAL at 15:38

## 2017-11-05 RX ADMIN — ASPIRIN 81 MG: 81 TABLET, COATED ORAL at 09:21

## 2017-11-05 RX ADMIN — FAMOTIDINE 40 MG: 40 TABLET ORAL at 09:21

## 2017-11-05 RX ADMIN — ACETAMINOPHEN 650 MG: 325 TABLET ORAL at 09:21

## 2017-11-05 RX ADMIN — POLYETHYLENE GLYCOL 3350 17 G: 17 POWDER, FOR SOLUTION ORAL at 09:21

## 2017-11-05 RX ADMIN — CEFTRIAXONE SODIUM 1 G: 1 INJECTION, POWDER, FOR SOLUTION INTRAMUSCULAR; INTRAVENOUS at 15:38

## 2017-11-05 RX ADMIN — APIXABAN 2.5 MG: 2.5 TABLET, FILM COATED ORAL at 17:36

## 2017-11-05 RX ADMIN — LEVOTHYROXINE SODIUM 88 MCG: 88 TABLET ORAL at 05:56

## 2017-11-05 NOTE — PLAN OF CARE
Problem: Fall Risk (Adult)  Goal: Absence of Falls  Outcome: Ongoing (interventions implemented as appropriate)    11/05/17 0607   Fall Risk (Adult)   Absence of Falls achieves outcome

## 2017-11-05 NOTE — PROGRESS NOTES
"Pharmacokinetics by Pharmacy - Vancomycin    Efrem Roa is a 72 y.o. female  [Ht: 60\" (152.4 cm); Wt: 104 lb 14.4 oz (47.6 kg)]    Estimated Creatinine Clearance: 9.9 mL/min (by C-G formula based on Cr of 3.82).   Lab Results   Component Value Date    CREATININE 3.82 (H) 11/05/2017    CREATININE 2.88 (H) 11/04/2017    CREATININE 3.82 (H) 11/03/2017      Lab Results   Component Value Date    WBC 20.31 (H) 11/05/2017    WBC 12.37 (H) 11/04/2017    WBC 13.33 (H) 11/03/2017      Temp Readings from Last 1 Encounters:   11/05/17 97.9 °F (36.6 °C) (Temporal Artery )      Lab Results   Component Value Date    VANCORANDOM 16.55 11/03/2017         Culture Results:  Microbiology Results (last 10 days)       Procedure Component Value - Date/Time      S. Pneumo Ag Urine or CSF - Urine, Urine, Clean Catch [[id number]]  (Normal) Collected:  11/02/17 1437    Lab Status:  Final result Specimen:  Urine from Urine, Clean Catch Updated:  11/02/17 1526     Strep Pneumo Ag Negative    Urine Culture - Urine, Urine, Clean Catch [[id number]] Collected:  11/02/17 1437    Lab Status:  Edited Result - FINAL Specimen:  Urine from Urine, Clean Catch Updated:  11/03/17 1352     Urine Culture --      Mixed Umu Isolated    Narrative:         Specimen contains mixed organisms of questionable pathogenicity which indicates contamination with commensal umu.  Further identification is unlikely to provide clinically useful information.  Suggest recollection.    Blood Culture - Blood, [[id number]]  (Abnormal) Collected:  11/02/17 1422    Lab Status:  Final result Specimen:  Blood from Hand, Left Updated:  11/05/17 0747     Blood Culture --      Staphylococcus, coagulase positive (A)      for sensitivity see previous report          Gram Stain Result Anaerobic Bottle Gram positive cocci in clusters      2 of 4.        Blood Culture - Blood, [[id number]]  (Abnormal)  (Susceptibility) Collected:  11/02/17 1410    Lab Status:  Final result Specimen:  " Blood from Arm, Left Updated:  11/05/17 0848     Blood Culture Abnormal Stain (A)      Staphylococcus aureus, MRSA (C)        Methicillin resistant Staphylococcus aureus, Patient may be an isolation risk.  Multi Drug Resistant Organism  contact precautions requested          Isolated from Aerobic Bottle     Gram Stain Result Aerobic Bottle Gram positive cocci in clusters      1 POSITIVE OF 4 DRAWN       Susceptibility        Staphylococcus aureus, MRSA     [place]     Clindamycin <=0.5 ug/ml Susceptible     Erythromycin >4 ug/ml Resistant     Gentamicin <=4 ug/ml Susceptible     Oxacillin >2 ug/ml Resistant     Penicillin G >8 ug/ml Resistant     Rifampin <=1 ug/ml Susceptible     Trimethoprim + Sulfamethoxazole <=0.5/9.5 ug/ml Susceptible     Vancomycin 2 ug/ml Susceptible                      Blood Culture ID, PCR - Blood, [[id number]]  (Abnormal) Collected:  11/02/17 1410    Lab Status:  Final result Specimen:  Blood from Arm, Left Updated:  11/03/17 1352     BCID, PCR Staphylococcus aureus. mecA (methicillin resistance gene) detected. Identification by BCID PCR. (C)    Narrative:         Sensitivity to Follow    Mycoplasma Pneumoniae PCR - Swab, Throat [[id number]] Collected:  11/02/17 1353    Lab Status:  Final result Specimen:  Swab from Throat Updated:  11/03/17 0839     MYCOPLASMAE PNEUMONIAE BY PCR Negative    Narrative:       This test is performed by utilizing real time polymerace chain recation (PCR).   This test is performed by utilizing real time polymerace chain recation (PCR).     Blood Culture - Blood, [[id number]]  (Normal) Collected:  10/30/17 1505    Lab Status:  Final result Specimen:  Blood from Arm, Right Updated:  11/04/17 1516     Blood Culture No growth at 5 days    Blood Culture - Blood, [[id number]]  (Abnormal)  (Susceptibility) Collected:  10/30/17 1349    Lab Status:  Final result Specimen:  Blood from Arm, Right Updated:  11/02/17 0603     Blood Culture --      Staphylococcus aureus, MRSA (C)         Methicillin resistant Staphylococcus aureus, Patient may be an isolation risk.        Isolated from Anaerobic Bottle     Gram Stain Result Anaerobic Bottle Gram positive cocci in clusters      1 positive of 4 drawn       Susceptibility        Staphylococcus aureus, MRSA     [place]     Clindamycin <=0.5 ug/ml Susceptible     Erythromycin >4 ug/ml Resistant     Gentamicin <=4 ug/ml Susceptible     Oxacillin >2 ug/ml Resistant     Penicillin G >8 ug/ml Resistant     Rifampin <=1 ug/ml Susceptible     Trimethoprim + Sulfamethoxazole <=0.5/9.5 ug/ml Susceptible     Vancomycin 1 ug/ml Susceptible                      Blood Culture ID, PCR - Blood, [[id number]]  (Abnormal) Collected:  10/30/17 1349    Lab Status:  Final result Specimen:  Blood from Arm, Right Updated:  10/31/17 1522     BCID, PCR Staphylococcus aureus. mecA (methicillin resistance gene) detected. Identification by BCID PCR. (C)    Narrative:         Sensitivity to Follow Lab Comment: CONTACT PRECAUTIONS INITIATED    Multi Drug Resistant Organism                  Indication for use: MRSA bacteremia    Current Vancomycin Dose:  pulse dosing on HD days, day 5 of therapy.      Assessment/Plan:  Chart reviewed. WBC had significant increase today. Patient afebrile but receiving acetaminophen. Experiencing spikes in BP and HR. Random tomorrow morning prior to HD. Pharmacy will continue to monitor renal function and adjust dose accordingly.    Jonatan Harding RPH   11/05/17 12:14 PM

## 2017-11-05 NOTE — PLAN OF CARE
Problem: Renal Replacement, Continuous (Adult)  Goal: Signs and Symptoms of Listed Potential Problems Will be Absent or Manageable (Renal Replacement, Continuous)  Outcome: Ongoing (interventions implemented as appropriate)    11/05/17 0607   Renal Replacement, Continuous   Problems Assessed (CRRT) all   Problems Present (CRRT) none

## 2017-11-05 NOTE — PLAN OF CARE
Problem: Patient Care Overview (Adult)  Goal: Plan of Care Review  Outcome: Ongoing (interventions implemented as appropriate)    11/05/17 0607   Coping/Psychosocial Response Interventions   Plan Of Care Reviewed With patient   Patient Care Overview   Progress no change         Problem: Pressure Ulcer (Adult)  Goal: Signs and Symptoms of Listed Potential Problems Will be Absent or Manageable (Pressure Ulcer)  Outcome: Ongoing (interventions implemented as appropriate)    Problem: Infection, Risk/Actual (Adult)  Goal: Identify Related Risk Factors and Signs and Symptoms  Outcome: Ongoing (interventions implemented as appropriate)  Goal: Infection Prevention/Resolution  Outcome: Ongoing (interventions implemented as appropriate)

## 2017-11-05 NOTE — PLAN OF CARE
Problem: Sepsis (Adult)  Goal: Signs and Symptoms of Listed Potential Problems Will be Absent or Manageable (Sepsis)  Outcome: Ongoing (interventions implemented as appropriate)    11/05/17 0607   Sepsis   Problems Assessed (Sepsis) all   Problems Present (Sepsis) none

## 2017-11-05 NOTE — PROGRESS NOTES
[facility] Medicine Services  INPATIENT PROGRESS NOTE    Length of Stay: 6  Date of Admission: 10/30/2017  Primary Care Physician: Rogers Perez MD    Subjective   Please note that all previous progress notes, physical exam findings, medication changes, lab findings, and radiographical findings have been updated and noted as appropriate.    11/5/2017: Patient has no acute complaints.  Scheduled for redraw blood cultures in the a.m.  No further vegetation noted on echocardiogram.    11/4/2017: Dr. Adan has determined there is low suspicion for any type of endocarditis or vegetation.  Dr. Adan recommends a repeat transthoracic echocardiogram rather than a transesophageal echocardiogram.  Patient has no complaints today.  Patient does have severe pneumonia.  Will recheck blood cultures on Monday.    11/3/2017: Patient has no acute complaints today.  Patient continues to demonstrate MRSA bacteremia despite appropriate antibiotic treatment.  Have spoken with Dr. Adan of cardiology who is agreed to evaluate the patient for possible ANTHONY.  No indwelling lines or catheters.  All other possible sources fistula.  Continue antibiotic treatment.  Attempt sputum culture if possible.  No fevers or chills, dialysis is today.  Patient tolerating by mouth intake.  Essentially complains of soreness.  Patient tachycardia much improved, heart rate is 101.  Respirations are normal with no fever.  Patient hypoxia improving, 94 on 1.5 L.    Chief Complaint/HPI:  This 72-year-old  female was admitted to hospitalist services secondary to a worsening level of altered mental status.  Patient also had fever.  CT of the head was within normal limits.  Chest x-ray demonstrated left lower lobe pneumonia.  Patient has been treated with empiric antibiotics.  Patient was found to have MRSA bacteremia, no vegetation demonstrated on echocardiogram.  Echocardiogram is transthoracic.  Though  patient is in no apparent distress, she continues to be tachycardic and hypoxic.  Patient currently on vancomycin and Levaquin.  Patient will be changed to vancomycin and Zosyn per renal dosing.  EKG reordered.  Initial EKG demonstrated sinus tachycardia.  EKG rule out atrial fibrillation with RVR.  No chest pain, did have an elevated troponin, however troponin has been fairly consistent and is actually trending down.  Troponin likely secondary to ESRD and sepsis.  Have spoken with patient's nephrologist, Dr. Mireles, he is agreeable to CTA today.  We'll also order physical therapy and occupational therapy.    Review of Systems   Constitutional: Negative for chills and fever.   Respiratory: Negative for shortness of breath.    Cardiovascular: Negative for chest pain.   Gastrointestinal: Negative for abdominal pain, nausea and vomiting.   Genitourinary: Negative for dysuria.   Neurological: Negative for dizziness, tremors, seizures, syncope, facial asymmetry, speech difficulty, light-headedness, numbness and headaches.      Review of symptoms is partially compromised by dementia      Objective    Temp:  [97.8 °F (36.6 °C)-99.1 °F (37.3 °C)] 97.9 °F (36.6 °C)  Heart Rate:  [] 92  Resp:  [16-20] 20  BP: ()/(51-61) 109/51    Physical Exam   Constitutional: She appears well-developed. No distress.   HENT:   Head: Normocephalic and atraumatic.   Eyes: Conjunctivae are normal. Pupils are equal, round, and reactive to light.   Cardiovascular: Normal rate and regular rhythm.    Pulmonary/Chest: Effort normal. No respiratory distress. She has no wheezes.   Abdominal: Soft. Bowel sounds are normal. She exhibits no distension. There is no tenderness.   Neurological: She is alert.   Skin: Skin is warm and dry. She is not diaphoretic.     Results Review:  I have reviewed the labs, radiology results, and diagnostic studies.    Laboratory Data:     Results from last 7 days  Lab Units 11/05/17  0631 11/04/17  0605  11/03/17  0557  10/30/17  1349   SODIUM mmol/L 140 142 140  < > 137  137   SODIUM, ARTERIAL mmol/L  --   --   --   --  135.2*   POTASSIUM mmol/L 3.8 3.2* 3.7  < > 5.2*  5.1   CHLORIDE mmol/L 102 102 99  < > 99  96   CO2 mmol/L 25.0 28.0 26.0  < > 22.0  22.0   BUN mg/dL 37* 27* 45*  < > 65*  65*   CREATININE mg/dL 3.82* 2.88* 3.82*  < > 4.98*  4.88*   GLUCOSE mg/dL 145* 110* 110*  < > 148*  152*   GLUCOSE, ARTERIAL mmol/L  --   --   --   --  153   CALCIUM mg/dL 8.5 8.8 8.4  < > 8.3*  8.4   BILIRUBIN mg/dL  --   --   --   --  0.7   ALK PHOS U/L  --   --   --   --  141*   ALT (SGPT) U/L  --   --   --   --  42   AST (SGOT) U/L  --   --   --   --  86*   ANION GAP mmol/L 13.0 12.0 15.0  < > 16.0*  19.0*   < > = values in this interval not displayed.  Estimated Creatinine Clearance: 9.9 mL/min (by C-G formula based on Cr of 3.82).    Results from last 7 days  Lab Units 11/05/17  0631 11/04/17  0605 11/03/17  0557   PHOSPHORUS mg/dL 2.9 2.5 3.1           Results from last 7 days  Lab Units 11/05/17  0631 11/04/17  0605 11/03/17  0557 11/02/17  0530 11/01/17  0610   WBC 10*3/mm3 20.31* 12.37* 13.33* 21.16* 22.93*   HEMOGLOBIN g/dL 9.8* 9.5* 10.0* 12.0 10.1*   HEMATOCRIT % 31.2* 30.0* 30.9* 37.3 31.7*   PLATELETS 10*3/mm3 222 218 202 213 191       Results from last 7 days  Lab Units 10/30/17  1349   INR  1.86*       Culture Data:   Blood Culture   Date Value Ref Range Status   11/02/2017 Staphylococcus, coagulase positive (A)  Final     Comment:     for sensitivity see previous report     11/02/2017 Abnormal Stain (A)  Final   11/02/2017 Staphylococcus aureus, MRSA (C)  Final     Comment:       Methicillin resistant Staphylococcus aureus, Patient may be an isolation risk.  Multi Drug Resistant Organism  contact precautions requested       Urine Culture   Date Value Ref Range Status   11/02/2017 Mixed Umu Isolated  Corrected     No results found for: RESPCX  No results found for: WOUNDCX  No results found for:  STOOLCX  No components found for: BODYFLD    Radiology Data:   Imaging Results (last 24 hours)     ** No results found for the last 24 hours. **          I have reviewed the patient current medications.     Assessment/Plan     Hospital Problem List     HCAP (healthcare-associated pneumonia)      MRSA bacteremia      Plan:  Urine culture contaminated, repeat urine collection within out catheter if necessary, repeat blood cultures in the a.m., follow white blood cell count.            This document has been electronically signed by LOS Paredes on November 5, 2017 10:49 AM

## 2017-11-06 LAB
ALBUMIN SERPL-MCNC: 3.5 G/DL (ref 3.4–4.8)
ANION GAP SERPL CALCULATED.3IONS-SCNC: 18 MMOL/L (ref 5–15)
BASOPHILS # BLD AUTO: 0.06 10*3/MM3 (ref 0–0.2)
BASOPHILS NFR BLD AUTO: 0.3 % (ref 0–2)
BUN BLD-MCNC: 46 MG/DL (ref 7–21)
BUN/CREAT SERPL: 10.3 (ref 7–25)
CALCIUM SPEC-SCNC: 8.7 MG/DL (ref 8.4–10.2)
CHLORIDE SERPL-SCNC: 99 MMOL/L (ref 95–110)
CO2 SERPL-SCNC: 24 MMOL/L (ref 22–31)
CREAT BLD-MCNC: 4.47 MG/DL (ref 0.5–1)
DEPRECATED RDW RBC AUTO: 57 FL (ref 36.4–46.3)
EOSINOPHIL # BLD AUTO: 0.35 10*3/MM3 (ref 0–0.7)
EOSINOPHIL NFR BLD AUTO: 1.6 % (ref 0–7)
ERYTHROCYTE [DISTWIDTH] IN BLOOD BY AUTOMATED COUNT: 16.9 % (ref 11.5–14.5)
GFR SERPL CREATININE-BSD FRML MDRD: 10 ML/MIN/1.73 (ref 39–90)
GLUCOSE BLD-MCNC: 168 MG/DL (ref 60–100)
HCT VFR BLD AUTO: 32.4 % (ref 35–45)
HGB BLD-MCNC: 10.3 G/DL (ref 12–15.5)
IMM GRANULOCYTES # BLD: 0.17 10*3/MM3 (ref 0–0.02)
IMM GRANULOCYTES NFR BLD: 0.8 % (ref 0–0.5)
LYMPHOCYTES # BLD AUTO: 0.84 10*3/MM3 (ref 0.6–4.2)
LYMPHOCYTES NFR BLD AUTO: 3.8 % (ref 10–50)
MCH RBC QN AUTO: 29.3 PG (ref 26.5–34)
MCHC RBC AUTO-ENTMCNC: 31.8 G/DL (ref 31.4–36)
MCV RBC AUTO: 92.3 FL (ref 80–98)
MONOCYTES # BLD AUTO: 1.01 10*3/MM3 (ref 0–0.9)
MONOCYTES NFR BLD AUTO: 4.5 % (ref 0–12)
NEUTROPHILS # BLD AUTO: 19.87 10*3/MM3 (ref 2–8.6)
NEUTROPHILS NFR BLD AUTO: 89 % (ref 37–80)
PHOSPHATE SERPL-MCNC: 3.3 MG/DL (ref 2.4–4.4)
PLATELET # BLD AUTO: 297 10*3/MM3 (ref 150–450)
PMV BLD AUTO: 11.1 FL (ref 8–12)
POTASSIUM BLD-SCNC: 4.3 MMOL/L (ref 3.5–5.1)
RBC # BLD AUTO: 3.51 10*6/MM3 (ref 3.77–5.16)
SODIUM BLD-SCNC: 141 MMOL/L (ref 137–145)
VANCOMYCIN SERPL-MCNC: 20.6 MCG/ML
WBC NRBC COR # BLD: 22.3 10*3/MM3 (ref 3.2–9.8)

## 2017-11-06 PROCEDURE — 80069 RENAL FUNCTION PANEL: CPT | Performed by: INTERNAL MEDICINE

## 2017-11-06 PROCEDURE — 87186 SC STD MICRODIL/AGAR DIL: CPT | Performed by: PHYSICIAN ASSISTANT

## 2017-11-06 PROCEDURE — 94799 UNLISTED PULMONARY SVC/PX: CPT

## 2017-11-06 PROCEDURE — 85025 COMPLETE CBC W/AUTO DIFF WBC: CPT | Performed by: INTERNAL MEDICINE

## 2017-11-06 PROCEDURE — 99221 1ST HOSP IP/OBS SF/LOW 40: CPT | Performed by: NURSE PRACTITIONER

## 2017-11-06 PROCEDURE — 87150 DNA/RNA AMPLIFIED PROBE: CPT | Performed by: PHYSICIAN ASSISTANT

## 2017-11-06 PROCEDURE — 94760 N-INVAS EAR/PLS OXIMETRY 1: CPT

## 2017-11-06 PROCEDURE — 87040 BLOOD CULTURE FOR BACTERIA: CPT | Performed by: PHYSICIAN ASSISTANT

## 2017-11-06 PROCEDURE — 87077 CULTURE AEROBIC IDENTIFY: CPT | Performed by: PHYSICIAN ASSISTANT

## 2017-11-06 PROCEDURE — 87147 CULTURE TYPE IMMUNOLOGIC: CPT | Performed by: PHYSICIAN ASSISTANT

## 2017-11-06 PROCEDURE — 25010000002 DAPTOMYCIN PER 1 MG: Performed by: PHYSICIAN ASSISTANT

## 2017-11-06 PROCEDURE — 80202 ASSAY OF VANCOMYCIN: CPT | Performed by: INTERNAL MEDICINE

## 2017-11-06 PROCEDURE — 25010000002 CEFTRIAXONE PER 250 MG: Performed by: INTERNAL MEDICINE

## 2017-11-06 RX ORDER — HYDRALAZINE HYDROCHLORIDE 50 MG/1
50 TABLET, FILM COATED ORAL 3 TIMES DAILY
Status: DISCONTINUED | OUTPATIENT
Start: 2017-11-06 | End: 2017-11-09

## 2017-11-06 RX ADMIN — APIXABAN 2.5 MG: 2.5 TABLET, FILM COATED ORAL at 17:19

## 2017-11-06 RX ADMIN — IPRATROPIUM BROMIDE AND ALBUTEROL SULFATE 3 ML: 2.5; .5 SOLUTION RESPIRATORY (INHALATION) at 18:28

## 2017-11-06 RX ADMIN — IPRATROPIUM BROMIDE AND ALBUTEROL SULFATE 3 ML: 2.5; .5 SOLUTION RESPIRATORY (INHALATION) at 22:15

## 2017-11-06 RX ADMIN — POLYETHYLENE GLYCOL 3350 17 G: 17 POWDER, FOR SOLUTION ORAL at 17:19

## 2017-11-06 RX ADMIN — ATORVASTATIN CALCIUM 40 MG: 40 TABLET, FILM COATED ORAL at 21:48

## 2017-11-06 RX ADMIN — CEFTRIAXONE SODIUM 1 G: 1 INJECTION, POWDER, FOR SOLUTION INTRAMUSCULAR; INTRAVENOUS at 15:15

## 2017-11-06 RX ADMIN — HYDROCODONE BITARTRATE AND ACETAMINOPHEN 1 TABLET: 7.5; 325 TABLET ORAL at 11:03

## 2017-11-06 RX ADMIN — DAPTOMYCIN 270 MG: 500 INJECTION, POWDER, LYOPHILIZED, FOR SOLUTION INTRAVENOUS at 15:15

## 2017-11-06 RX ADMIN — QUETIAPINE FUMARATE 50 MG: 25 TABLET, FILM COATED ORAL at 21:48

## 2017-11-06 RX ADMIN — APIXABAN 2.5 MG: 2.5 TABLET, FILM COATED ORAL at 08:04

## 2017-11-06 RX ADMIN — FAMOTIDINE 40 MG: 40 TABLET ORAL at 08:04

## 2017-11-06 RX ADMIN — SERTRALINE HYDROCHLORIDE 50 MG: 50 TABLET ORAL at 08:04

## 2017-11-06 RX ADMIN — ASPIRIN 81 MG: 81 TABLET, COATED ORAL at 08:04

## 2017-11-06 RX ADMIN — POLYETHYLENE GLYCOL 3350 17 G: 17 POWDER, FOR SOLUTION ORAL at 08:04

## 2017-11-06 RX ADMIN — SALINE NASAL SPRAY 2 SPRAY: 1.5 SOLUTION NASAL at 08:05

## 2017-11-06 RX ADMIN — LEVOTHYROXINE SODIUM 88 MCG: 88 TABLET ORAL at 06:42

## 2017-11-06 RX ADMIN — MIRTAZAPINE 15 MG: 15 TABLET, FILM COATED ORAL at 21:48

## 2017-11-06 NOTE — SIGNIFICANT NOTE
11/06/17 1458   Rehab Treatment   Discipline physical therapy assistant   Treatment Not Performed patient/family declined treatment  (Pt deferred tx due to fatigue from dialysis, pt stated she needed to rest, but therapy could check back tomorrow)   Recommendation   PT - Next Appointment 11/07/17

## 2017-11-06 NOTE — PLAN OF CARE
Problem: Patient Care Overview (Adult)  Goal: Plan of Care Review  Outcome: Ongoing (interventions implemented as appropriate)    11/06/17 0249   Coping/Psychosocial Response Interventions   Plan Of Care Reviewed With patient   Patient Care Overview   Progress no change         Problem: Pressure Ulcer (Adult)  Goal: Signs and Symptoms of Listed Potential Problems Will be Absent or Manageable (Pressure Ulcer)  Outcome: Ongoing (interventions implemented as appropriate)    Problem: Infection, Risk/Actual (Adult)  Goal: Identify Related Risk Factors and Signs and Symptoms  Outcome: Ongoing (interventions implemented as appropriate)  Goal: Infection Prevention/Resolution  Outcome: Ongoing (interventions implemented as appropriate)

## 2017-11-06 NOTE — SIGNIFICANT NOTE
11/06/17 1315   Rehab Treatment   Treatment Not Performed patient unavailable for treatment  (Pt off floor for dialysis)

## 2017-11-06 NOTE — PROGRESS NOTES
Nephrology Progress Note:    No complaints.  No chest pain or shortness of breath.  Patient planned for hemodialysis today.  Blood cultures continue to be positive for Staphylococcus.    Patient Active Problem List   Diagnosis   • Constipation   • Arteriovenous fistula   • End stage renal failure on dialysis   • Controlled type 2 diabetes mellitus with chronic kidney disease on chronic dialysis, without long-term current use of insulin   • Coronary artery disease involving native coronary artery of native heart without angina pectoris   • Panlobular emphysema   • ESRD (end stage renal disease) on dialysis   • HCAP (healthcare-associated pneumonia)       Medications:    apixaban 2.5 mg Oral BID   aspirin 81 mg Oral Daily   atorvastatin 40 mg Oral Nightly   ceftriaxone 1 g Intravenous Q24H   famotidine 40 mg Oral Daily   hydrALAZINE 100 mg Oral TID   ipratropium-albuterol 3 mL Nebulization Q4H - RT   levothyroxine 88 mcg Oral Daily   mirtazapine 15 mg Oral Nightly   polyethylene glycol 17 g Oral BID   QUEtiapine 50 mg Oral Nightly   sertraline 50 mg Oral Daily       Pharmacy to dose vancomycin      Vitals:    11/05/17 1900 11/05/17 2300 11/06/17 0429 11/06/17 0759   BP: 119/59  113/59 120/56   BP Location: Right leg  Right leg Right leg   Patient Position: Lying  Lying Lying   Pulse: 82 87 84 86   Resp: 20  18 18   Temp: 97.9 °F (36.6 °C)  98.6 °F (37 °C) 97.4 °F (36.3 °C)   TempSrc: Oral  Oral Oral   SpO2: 93%  93% 92%   Weight:   97 lb 12.8 oz (44.4 kg)    Height:         I/O last 3 completed shifts:  In: 920 [P.O.:920]  Out: -        On examination:  General:Comfortable, alert and oriented, Lying down comfortably  HEENT: Pallor, no icterus, eye movements are normal.  Chest: Decreased breath sounds at the lung bases.  Coarse breath sounds at the left lung base. Occ wheeze  CVS: Heart sounds are irregular, no pericardial rub or gallop  Abdomen: Abdomen is soft, nontender, normal bowel sounds, no  tenderness  Extremities: No edema of the lower extremities.  Left thigh AV graft, no tenderness, good bruit  Neuro: No change in neurological status.  Grade 3 power both upper and lower extremities  Mentation: She is alert and oriented    Past medical illness, social history, medications, previous notes reviewed.       Laboratory results:      Recent Results (from the past 24 hour(s))   Urinalysis With / Microscopic If Indicated - Urine, Catheter    Collection Time: 11/05/17  1:20 PM   Result Value Ref Range    Color, UA Yellow Yellow, Straw, Dark Yellow, Jaye    Appearance, UA Clear Clear    pH, UA <=5.0 5.0 - 9.0    Specific Gravity, UA 1.020 1.003 - 1.030    Glucose, UA Negative Negative    Ketones, UA Trace (A) Negative    Bilirubin, UA Moderate (2+) (A) Negative    Blood, UA Small (1+) (A) Negative    Protein, UA >=300 mg/dL (3+) (A) Negative    Leuk Esterase, UA Negative Negative    Nitrite, UA Negative Negative    Urobilinogen, UA 0.2 E.U./dL 0.2 - 1.0 E.U./dL   Urinalysis, Microscopic Only - Urine, Clean Catch    Collection Time: 11/05/17  1:20 PM   Result Value Ref Range    RBC, UA 0-2 (A) None Seen /HPF    WBC, UA 0-2 None Seen, 0-2, 3-5 /HPF    Bacteria, UA Trace (A) None Seen /HPF    Squamous Epithelial Cells, UA 3-5 (A) None Seen, 0-2 /HPF    Hyaline Casts, UA 0-2 None Seen /LPF    Methodology Automated Microscopy    Renal Function Panel    Collection Time: 11/06/17  7:46 AM   Result Value Ref Range    Glucose 168 (H) 60 - 100 mg/dL    BUN 46 (H) 7 - 21 mg/dL    Creatinine 4.47 (H) 0.50 - 1.00 mg/dL    Sodium 141 137 - 145 mmol/L    Potassium 4.3 3.5 - 5.1 mmol/L    Chloride 99 95 - 110 mmol/L    CO2 24.0 22.0 - 31.0 mmol/L    Calcium 8.7 8.4 - 10.2 mg/dL    Albumin 3.50 3.40 - 4.80 g/dL    Phosphorus 3.3 2.4 - 4.4 mg/dL    Anion Gap 18.0 (H) 5.0 - 15.0 mmol/L    BUN/Creatinine Ratio 10.3 7.0 - 25.0    eGFR Non  Amer 10 (L) 39 - 90 mL/min/1.73   Vancomycin, Random    Collection Time: 11/06/17   7:46 AM   Result Value Ref Range    Vancomycin Random 20.60 mcg/mL   CBC Auto Differential    Collection Time: 11/06/17  7:46 AM   Result Value Ref Range    WBC 22.30 (H) 3.20 - 9.80 10*3/mm3    RBC 3.51 (L) 3.77 - 5.16 10*6/mm3    Hemoglobin 10.3 (L) 12.0 - 15.5 g/dL    Hematocrit 32.4 (L) 35.0 - 45.0 %    MCV 92.3 80.0 - 98.0 fL    MCH 29.3 26.5 - 34.0 pg    MCHC 31.8 31.4 - 36.0 g/dL    RDW 16.9 (H) 11.5 - 14.5 %    RDW-SD 57.0 (H) 36.4 - 46.3 fl    MPV 11.1 8.0 - 12.0 fL    Platelets 297 150 - 450 10*3/mm3    Neutrophil % 89.0 (H) 37.0 - 80.0 %    Lymphocyte % 3.8 (L) 10.0 - 50.0 %    Monocyte % 4.5 0.0 - 12.0 %    Eosinophil % 1.6 0.0 - 7.0 %    Basophil % 0.3 0.0 - 2.0 %    Immature Grans % 0.8 (H) 0.0 - 0.5 %    Neutrophils, Absolute 19.87 (H) 2.00 - 8.60 10*3/mm3    Lymphocytes, Absolute 0.84 0.60 - 4.20 10*3/mm3    Monocytes, Absolute 1.01 (H) 0.00 - 0.90 10*3/mm3    Eosinophils, Absolute 0.35 0.00 - 0.70 10*3/mm3    Basophils, Absolute 0.06 0.00 - 0.20 10*3/mm3    Immature Grans, Absolute 0.17 (H) 0.00 - 0.02 10*3/mm3   ]    Imaging Results (last 24 hours)     ** No results found for the last 24 hours. **            Assessment:     End-stage renal disease  Left lower lobe pneumonia  Staphylococcal bacteremia  Altered mental status, improved  Essential hypertension  Febrile illness  Type 2 diabetes mellitus    Plan:     ESRD:   Patient is on hemodialysis on Monday Wednesday and Friday.   Patient is planned for hemodialysis today.  Lab investigations are pending.    Staphylococcal bacteremia, left lower lobe pneumonia.  Echocardiogram was negative.  Repeat cultures continued to be positive.  I will request vascular surgery to evaluate the left thigh AV graft.  There is no tenderness at the site.  Continued on IV antibiotics.    Pneumonia: On IV antibiotic.  Follow cultures.    Hypokalemia:   Serum potassium was replaced on Saturday.  Serum potassium levels are improved today.      Altered mental status,  delirium: Improved.   imaging studies were negative.    Anemia of chronic kidney disease:   Hemoglobin and hematocrit levels are stable.  On 8000 units of Epogen with dialysis.    Essential hypertension:  Patient is on hydralazine.  B blood pressure is 120/56.  Continue to monitor.    Discussed with patient.    Chi Holden MD

## 2017-11-06 NOTE — CONSULTS
CVTS CONSULTATION  CVTS/Dr Fernandez has been requested to see Ms Roa in consultation by Dr Mireles.    Patient Care Team:  Rogers Perez MD as PCP - General  ELI Alanis as PCP - Claims Attributed    Chief complaint: Bacteremia MRSA     Subjective     History of Present Illness:  72 y.o. female with well known to CVTS as follows her Left LE AV fistula. Underwent fistulagram 10/5/17.  Has hematoma ajdacent to her fistula.  ESRD on hemodialysis, HTN, COPD, sleep apnea, Stroke, GERD, LEFT arm fracture 20yrs ago.  moderate chronic pain lower back, LEFT leg.  occasional bleeding after decannulation.  recent clotted graft, unable to declot.  possible central venous occlusion.  LEFT femoral tunnel catheter. Recent new AV fistula placement. . End stage renal disease, No clotted access or problems during dialysis,   L Femoral AV graft functioning well.  Returning in scheduled FU.      7/29/2013 Upper extremity vein mapping:  RIGHT cephalic 1.3-4.5mm, basilic 2.5-3.5mm.  LEFT cephalic 2.0-3.3mm, basilic 2.1-3.2mm.  8/19/13: Placement RIGHT chest Tunnel Cath  10/15/13 RIGHT Brachial-Axillary Vein AV graft  11/26/13 RIGHT AV duplex: Anast dist 517, dist 446, mid 93, prx 146, Ax Anast 183. Lg hematoma prx arm >7cm  3/24/14: RIGHT AV duplex: Patent fistula. maxPSV 870cm/s (distal). Size 3.2-6.6mm. Flows 887-2394ml/m. Prx hematoma 3.96cm  7/30/14  RIGHT AV duplex: Patent fistula. maxPSV 869cm/s (distal). Size 9.3mm. Flows 2472ml/m.  elevated velocity at elbow level  1/6/15: LEFT Brachial-axillary AV artegraft  3/11/15: LEFT AV Duplex: Patent fistula. maxPSV 605cm/s. Size 1.7-6.9mm. Flows 165-1219ml/m.   8/3/2015 LEFT AV Duplex: Patent fistula. maxPSV 255cm/s. Size 2.1-7.5mm. Flows 193-1007ml/m.   2/2016 LEFT fistulagram:  fistula end of life.  tunnel cath placed.  10/4/16: LEFT Leg AV graft SFA-SFV  12/08/16  Left AV Fistula  Duplex:  Patent fistula. maxPSV 446cm/s (arterial anast). Size 2.3-5.6mm. Flows 236-1319  ml/m.  01/03/16   Left AV Fistula  Duplex:  Patent fistula. maxPSV 586cm/s (arterial anast). Size 3.4-5.9mm. Flows 767-1567 ml/m.  02/23/17  Tunneled catheter removed.  03/23/17   Left AV Fistula  Duplex:  Patent fistula. maxPSV 398cm/s (mid ). Size 0.31-0.59 cm. Flows 549-1842 ml/m.  Sm pseudoaneurysm 1.5cm  Hematoma distal surrounding graft 5.64cm   06/06/17  Left AV Fistula  Duplex:  Patent fistula. maxPSV 384cm/s (mid ). Size 0.31-0.59 cm. Flows 549-1842 ml/m.  Sm pseudoaneurysm 1.1cm  Hematoma distal surrounding graft 4.1cm, second hematoma 2.2 (distal)   10/05/17 Left AV  Fistulagram:  Proximal anastomosis 60 % stenosis.  PTA competed with no significant residual stenosis.  Inferior ol hematoma present.        The following portions of the patient's history were reviewed and updated as appropriate: allergies, current medications, past family history, past medical history, past social history, past surgical history and problem list.  Recent images independently reviewed.  Available laboratory values reviewed.    Review of Systems   Constitutional: Positive for fever (@ NH  not [facility] ).   HENT: Negative for facial swelling and sore throat.    Eyes: Negative for visual disturbance.   Respiratory: Positive for cough and shortness of breath. Negative for chest tightness and stridor.    Cardiovascular: Positive for leg swelling. Negative for chest pain and palpitations.        Fistula bleeding:  N   Fistula pain:  N  Extremity pain:  N Extremity discoloration:  N   Extremity numbness/tingling:  N  Has knot to side of fistula    Gastrointestinal: Negative for abdominal pain, nausea and vomiting.   Musculoskeletal: Positive for arthralgias, back pain and neck pain. Negative for neck stiffness.   Neurological: Positive for light-headedness. Negative for syncope and numbness.   Hematological: Bruises/bleeds easily.   Psychiatric/Behavioral: The patient is not nervous/anxious.         Past Medical History:   Diagnosis Date    • Abnormal x-ray     Standard chest x ray, f/u pneumonia xray   • Acquired hypothyroidism    • Amblyopia    • Anemia of renal disease    • Anxiety    • Arteriovenous fistula     Placed 10/15/2014   • Benign essential hypertension    • Bipolar affective disorder     Current episode depression   • Bipolar disorder    • Cerebrovascular accident    • Chest wall pain    • Chronic angle-closure glaucoma    • Chronic kidney disease     Stage 4-approaching hemodialysis   • Chronic pain syndrome    • Chronic renal failure    • Combined drug dependence excluding opioids    • Constipation    • COPD (chronic obstructive pulmonary disease)    • Coronary arteriosclerosis    • Cough    • Degeneration of cervical intervertebral disc    • Dementia     Multi-infarct, uncomplicated   • Depression    • Diabetes mellitus     No retinopathy   • Diabetes mellitus     Without mention of complication, type 2 or unspecified type, not stated as uncontrolled   • Diabetic renal disease    • Disc disorder of lumbar region    • DJD (degenerative joint disease)     Involving multiple joints   • Edema    • End stage renal failure on dialysis     LUE AV FISTULA 1/2015   • Epigastric pain    • Essential hypertension    • Exotropia    • Gastritis    • Generalized abdominal pain    • GERD (gastroesophageal reflux disease)     With Esophagitis   • Gout    • H/O screening mammography    • Hiatal hernia    • Hyperlipidemia    • Hypermetropia    • Insomnia    • Iron deficiency anemia    • Low back pain    • Lumbago    • Non-cardiac chest pain    • Nuclear cataract    • Osteoporosis    • Panic disorder without agoraphobia    • Peripheral nervous system disorder    • Radiculitis    • RLQ abdominal pain    • RUQ pain    • Sprain of ankle    • Sprain of knee     Resolving   • Superficial injury of shoulder and upper arm    • Surgical follow-up care    • Type 2 diabetes mellitus    • UTI (urinary tract infection)    • Visit for suture removal    • Vitamin D  deficiency    • Vulvovaginitis      Past Surgical History:   Procedure Laterality Date   • APPENDECTOMY     • BACK SURGERY      x2   • BYPASS GRAFT  2015    Left brachial artery to axillary vein arteriovenous graft. Venous ultrasound unilateral extremity   •  SECTION      x2   • CHOLECYSTECTOMY     • COLONOSCOPY W/ POLYPECTOMY  2015    Colitis found in the cecum. Biopsy taken. A diverticulum was found in the sigmoid colon.   • ENDOSCOPY AND COLONOSCOPY     • ESOPHAGOSCOPY / EGD  2015    Normal esophagus. Gastritis found in the stomach. Biopsy taken. Duodenitis found in the duodenum. Biopsy take.   • ESOPHAGOSCOPY / EGD  1944    With tube-Esophagus appeared normal. Nonerosive gastritis. Duodenum appeared normal   • HYSTERECTOMY     • INJECTION OF MEDICATION  2011    Aranesp   • INJECTION OF MEDICATION  2011    Kenalog   • INTERVENTIONAL RADIOLOGY PROCEDURE Left 10/5/2017    Procedure: IR dialysis fistulagram;  Surgeon: Blane Fernandez MD;  Location: [facility];  Service:    • LEG SURGERY  2000    Cancelled. Due to uncontrolled hypertension   • OTHER SURGICAL HISTORY  2016    Tissue plasminogen activator catheter thrombolysis   • REMOVAL PERITONEAL DIALYSIS CATHETER  2014    Removal of tunneled hemodialysis catheter with cuff   • TRANSESOPHAGEAL ECHOCARDIOGRAM (ANTHONY)  2016    Mild left atrial enlargement with mild to moderate CLVH with normal aortic root size.LV systolic function well preserved with Ef of 55-60%.Mitral valve thickened.Av thickened.Aortic sclerosis.Mild mitral regurg.mild to moderate tricuspid regurg.   • TRANSESOPHAGEAL ECHOCARDIOGRAM (ANTHONY)  2012    Normal left ventricular systolic function. Moderate tricuspid regurgitation with evidenceof pulmonary hypertension     Family History   Problem Relation Age of Onset   • Coronary artery disease Other      Social History   Substance Use Topics   • Smoking  status: Former Smoker   • Smokeless tobacco: Never Used   • Alcohol use No     Prescriptions Prior to Admission   Medication Sig Dispense Refill Last Dose   • Apixaban (ELIQUIS PO) Take 2.5 mg by mouth 2 (Two) Times a Day.   10/2/2017 at 0700   • aspirin 81 MG EC tablet Take 81 mg by mouth daily.   10/4/2017 at 0700   • atorvastatin (LIPITOR) 40 MG tablet Take 40 mg by mouth every night.   10/4/2017 at 1930   • B Complex-C-Folic Acid (NGA CAPS PO) Take 1 capsule by mouth daily.   10/4/2017 at 0700   • Docusate Sodium (COLACE PO) Take 1 capsule by mouth 3 (three) times a day as needed.   More than a month at Unknown time   • famotidine (PEPCID) 40 MG tablet Take 40 mg by mouth Daily.   10/4/2017 at 1930   • fentaNYL (DURAGESIC) 25 MCG/HR patch Place 1 patch on the skin every third day.   10/3/2017   • gabapentin (NEURONTIN) 100 MG capsule Take 100 mg by mouth 3 (three) times a day.   10/4/2017 at 1930   • hydrALAZINE (APRESOLINE) 100 MG tablet Take 100 mg by mouth 3 (three) times a day.   10/5/2017 at 0630   • HYDROcodone-acetaminophen (NORCO) 7.5-325 MG per tablet Take 1 tablet by mouth Every 4 (Four) Hours As Needed for moderate pain (4-6) (for LLE pain post). 50 tablet 0 10/4/2017 at 2300   • levothyroxine (SYNTHROID, LEVOTHROID) 88 MCG tablet Take 88 mcg by mouth daily.   10/4/2017 at 0600   • lidocaine-prilocaine (EMLA) 2.5-2.5 % cream Apply  topically Every 2 (Two) Hours As Needed for Mild Pain (1-3) (as needed prior to dialysis).   10/4/2017   • Mesalamine (APRISO PO) Take 1 capsule by mouth daily.   10/4/2017 at 0700   • mirtazapine (REMERON) 15 MG tablet Take 15 mg by mouth Every Night.   10/4/2017 at 1930   • nitroglycerin (NITROSTAT) 0.4 MG SL tablet Place 0.4 mg under the tongue every 5 (five) minutes as needed for chest pain. Take no more than 3 doses in 15 minutes.   More than a month at Unknown time   • polyethylene glycol (MIRALAX) packet Take 17 g by mouth 2 (two) times a day.   10/4/2017 at 1930  "  • QUETIAPINE FUMARATE PO Take 50 mg by mouth Every Night.   10/4/2017 at 1930   • sertraline (ZOLOFT) 50 MG tablet Take 50 mg by mouth Daily.   10/4/2017 at 0700   • vitamin D (ERGOCALCIFEROL) 82603 UNITS capsule capsule Take 50,000 Units by mouth 1 (one) time per week.   10/2/2017 at 0900       apixaban 2.5 mg Oral BID   aspirin 81 mg Oral Daily   atorvastatin 40 mg Oral Nightly   ceftriaxone 1 g Intravenous Q24H   DAPTOmycin (CUBICIN)  IV 6 mg/kg Intravenous Q48H   famotidine 40 mg Oral Daily   hydrALAZINE 100 mg Oral TID   ipratropium-albuterol 3 mL Nebulization Q4H - RT   levothyroxine 88 mcg Oral Daily   mirtazapine 15 mg Oral Nightly   polyethylene glycol 17 g Oral BID   QUEtiapine 50 mg Oral Nightly   sertraline 50 mg Oral Daily     Allergies:  Ambien [zolpidem tartrate]; Benadryl [diphenhydramine]; and Penicillins    Objective      Vital Signs  Temp:  [97.4 °F (36.3 °C)-98.6 °F (37 °C)] 97.4 °F (36.3 °C)  Heart Rate:  [82-87] 86  Resp:  [18-20] 18  BP: (113-133)/(56-60) 120/56    Flowsheet Rows         First Filed Value    Admission Height  60\" (152.4 cm) Documented at 10/30/2017 1325    Admission Weight  111 lb 1.8 oz (50.4 kg) Documented at 10/30/2017 1700        60\" (152.4 cm)    Physical Exam   Constitutional: She is oriented to person, place, and time.   Thin    HENT:   Head: Normocephalic.   Mouth/Throat: Oropharynx is clear and moist.   Eyes: Conjunctivae are normal. Pupils are equal, round, and reactive to light.   Neck: No JVD present.   Cardiovascular: Normal rate and intact distal pulses.    Fistula Present LLExtremity: (Y)thrill/(Y)bruit/(Y)pulse. Aneurysmal (N). Water Hammer Pulse (Y). Thinning (N).  Turner's Test <3sec (NA). Flows <800ml/min (NA). Flows < previous (NA). Skin warm/dry/pink/intact (Y). Pedal Pulse (Y).  Seen while on CHD.    Medial to fistula 6.2 circular hematoma  No tenderness or erythema.  Firm and decreasing in size.    Pulmonary/Chest: Effort normal and breath sounds " normal. No stridor.   Musculoskeletal: She exhibits no edema or tenderness.   Neurological: She is alert and oriented to person, place, and time.   Skin: Skin is warm and dry. No erythema.   Nursing note and vitals reviewed.      Results Review:   Lab Results (last 24 hours)     Procedure Component Value Units Date/Time    Urinalysis With / Microscopic If Indicated - Urine, Catheter [[id number]]  (Abnormal) Collected:  11/05/17 1320    Specimen:  Urine from Urine, Catheter Updated:  11/05/17 1353     Color, UA Yellow     Appearance, UA Clear     pH, UA <=5.0     Specific Gravity, UA 1.020     Glucose, UA Negative     Ketones, UA Trace (A)     Bilirubin, UA Moderate (2+) (A)     Blood, UA Small (1+) (A)     Protein, UA >=300 mg/dL (3+) (A)     Leuk Esterase, UA Negative     Nitrite, UA Negative     Urobilinogen, UA 0.2 E.U./dL    Urinalysis, Microscopic Only - Urine, Clean Catch [[id number]]  (Abnormal) Collected:  11/05/17 1320    Specimen:  Urine from Urine, Catheter Updated:  11/05/17 1402     RBC, UA 0-2 (A) /HPF      WBC, UA 0-2 /HPF      Bacteria, UA Trace (A) /HPF      Squamous Epithelial Cells, UA 3-5 (A) /HPF      Hyaline Casts, UA 0-2 /LPF      Methodology Automated Microscopy    CBC & Differential [[id number]] Collected:  11/06/17 0746    Specimen:  Blood Updated:  11/06/17 0815    Narrative:       The following orders were created for panel order CBC & Differential.  Procedure                               Abnormality         Status                     ---------                               -----------         ------                     CBC Auto Differential[[id number]]        Abnormal            Final result                 Please view results for these tests on the individual orders.    CBC Auto Differential [[id number]]  (Abnormal) Collected:  11/06/17 0746    Specimen:  Blood Updated:  11/06/17 0815     WBC 22.30 (H) 10*3/mm3      RBC 3.51 (L) 10*6/mm3      Hemoglobin 10.3 (L) g/dL      Hematocrit 32.4  (L) %      MCV 92.3 fL      MCH 29.3 pg      MCHC 31.8 g/dL      RDW 16.9 (H) %      RDW-SD 57.0 (H) fl      MPV 11.1 fL      Platelets 297 10*3/mm3      Neutrophil % 89.0 (H) %      Lymphocyte % 3.8 (L) %      Monocyte % 4.5 %      Eosinophil % 1.6 %      Basophil % 0.3 %      Immature Grans % 0.8 (H) %      Neutrophils, Absolute 19.87 (H) 10*3/mm3      Lymphocytes, Absolute 0.84 10*3/mm3      Monocytes, Absolute 1.01 (H) 10*3/mm3      Eosinophils, Absolute 0.35 10*3/mm3      Basophils, Absolute 0.06 10*3/mm3      Immature Grans, Absolute 0.17 (H) 10*3/mm3     Renal Function Panel [[id number]]  (Abnormal) Collected:  11/06/17 0746    Specimen:  Blood Updated:  11/06/17 0824     Glucose 168 (H) mg/dL      BUN 46 (H) mg/dL      Creatinine 4.47 (H) mg/dL      Sodium 141 mmol/L      Potassium 4.3 mmol/L      Chloride 99 mmol/L      CO2 24.0 mmol/L      Calcium 8.7 mg/dL      Albumin 3.50 g/dL      Phosphorus 3.3 mg/dL      Anion Gap 18.0 (H) mmol/L      BUN/Creatinine Ratio 10.3     eGFR Non African Amer 10 (L) mL/min/1.73     Narrative:       The MDRD GFR formula is only valid for adults with stable renal function between ages 18 and 70.    Vancomycin, Random [[id number]] Collected:  11/06/17 0746    Specimen:  Blood Updated:  11/06/17 0832     Vancomycin Random 20.60 mcg/mL       BLD CS + MRSA  Imaging Results (last 72 hours)     ** No results found for the last 72 hours. **      ECHO:  No valvular vegetation.        Assessment/Plan     Active Problems:    HCAP (healthcare-associated pneumonia)      Assessment & Plan  LLE AV fistula:  Functioning well with dialysis post TPA to anastomosis  .  No infected area.  Old stable hematoma.         Copy to primary care provider.        I discussed the patients findings and my recommendations with Dr Fernandez.      Ayana Dunn, APRN  11/06/17  11:35 AM

## 2017-11-06 NOTE — SIGNIFICANT NOTE
11/06/17 0856   Rehab Treatment   Discipline physical therapy assistant   Treatment Not Performed patient unavailable for treatment  (gone to HD)

## 2017-11-06 NOTE — PROGRESS NOTES
[facility] Medicine Services  INPATIENT PROGRESS NOTE    Length of Stay: 7  Date of Admission: 10/30/2017  Primary Care Physician: Rogers Perez MD    Subjective   Please note that all previous progress notes, physical exam findings, medication changes, lab findings, and radiographical findings have been updated and noted as appropriate.    11/6/2017: Patient has no chest pain, fever, chills.  Have discussed with pharmacy and there is difficulty reaching appropriate levels of vancomycin.  At this time recommendation is to start patient on daptomycin.  Nephrology has asked vascular surgery to evaluate fistula.  Urinalysis demonstrates no signs of infection.  Will repeat blood cultures today.    11/5/2017: Patient has no acute complaints.  Scheduled for redraw blood cultures in the a.m.  No further vegetation noted on echocardiogram.    11/4/2017: Dr. Adan has determined there is low suspicion for any type of endocarditis or vegetation.  Dr. Adan recommends a repeat transthoracic echocardiogram rather than a transesophageal echocardiogram.  Patient has no complaints today.  Patient does have severe pneumonia.  Will recheck blood cultures on Monday.    11/3/2017: Patient has no acute complaints today.  Patient continues to demonstrate MRSA bacteremia despite appropriate antibiotic treatment.  Have spoken with Dr. Adan of cardiology who is agreed to evaluate the patient for possible ANTHONY.  No indwelling lines or catheters.  All other possible sources fistula.  Continue antibiotic treatment.  Attempt sputum culture if possible.  No fevers or chills, dialysis is today.  Patient tolerating by mouth intake.  Essentially complains of soreness.  Patient tachycardia much improved, heart rate is 101.  Respirations are normal with no fever.  Patient hypoxia improving, 94 on 1.5 L.    Chief Complaint/HPI:  This 72-year-old  female was admitted to hospitalist services  secondary to a worsening level of altered mental status.  Patient also had fever.  CT of the head was within normal limits.  Chest x-ray demonstrated left lower lobe pneumonia.  Patient has been treated with empiric antibiotics.  Patient was found to have MRSA bacteremia, no vegetation demonstrated on echocardiogram.  Echocardiogram is transthoracic.  Though patient is in no apparent distress, she continues to be tachycardic and hypoxic.  Patient currently on vancomycin and Levaquin.  Patient will be changed to vancomycin and Zosyn per renal dosing.  EKG reordered.  Initial EKG demonstrated sinus tachycardia.  EKG rule out atrial fibrillation with RVR.  No chest pain, did have an elevated troponin, however troponin has been fairly consistent and is actually trending down.  Troponin likely secondary to ESRD and sepsis.  Have spoken with patient's nephrologist, Dr. Mireles, he is agreeable to CTA today.  We'll also order physical therapy and occupational therapy.    Review of Systems   Constitutional: Negative for chills and fever.   Respiratory: Negative for shortness of breath.    Cardiovascular: Negative for chest pain.   Gastrointestinal: Negative for abdominal pain, nausea and vomiting.   Genitourinary: Negative for dysuria.   Neurological: Negative for dizziness, tremors, seizures, syncope, facial asymmetry, speech difficulty, light-headedness, numbness and headaches.      Review of symptoms is partially compromised by dementia      Objective    Temp:  [97.4 °F (36.3 °C)-98.6 °F (37 °C)] 97.4 °F (36.3 °C)  Heart Rate:  [82-87] 86  Resp:  [18-20] 18  BP: (113-133)/(56-60) 120/56    Physical Exam   Constitutional: She appears well-developed. No distress.   HENT:   Head: Normocephalic and atraumatic.   Eyes: Conjunctivae are normal. Pupils are equal, round, and reactive to light.   Cardiovascular: Normal rate and regular rhythm.    Pulmonary/Chest: Effort normal. No respiratory distress. She has no wheezes.    Abdominal: Soft. Bowel sounds are normal. She exhibits no distension. There is no tenderness.   Neurological: She is alert.   Skin: Skin is warm and dry. She is not diaphoretic.     Results Review:  I have reviewed the labs, radiology results, and diagnostic studies.    Laboratory Data:     Results from last 7 days  Lab Units 11/06/17  0746 11/05/17  0631 11/04/17  0605  10/30/17  1349   SODIUM mmol/L 141 140 142  < > 137  137   SODIUM, ARTERIAL mmol/L  --   --   --   --  135.2*   POTASSIUM mmol/L 4.3 3.8 3.2*  < > 5.2*  5.1   CHLORIDE mmol/L 99 102 102  < > 99  96   CO2 mmol/L 24.0 25.0 28.0  < > 22.0  22.0   BUN mg/dL 46* 37* 27*  < > 65*  65*   CREATININE mg/dL 4.47* 3.82* 2.88*  < > 4.98*  4.88*   GLUCOSE mg/dL 168* 145* 110*  < > 148*  152*   GLUCOSE, ARTERIAL mmol/L  --   --   --   --  153   CALCIUM mg/dL 8.7 8.5 8.8  < > 8.3*  8.4   BILIRUBIN mg/dL  --   --   --   --  0.7   ALK PHOS U/L  --   --   --   --  141*   ALT (SGPT) U/L  --   --   --   --  42   AST (SGOT) U/L  --   --   --   --  86*   ANION GAP mmol/L 18.0* 13.0 12.0  < > 16.0*  19.0*   < > = values in this interval not displayed.  Estimated Creatinine Clearance: 7.9 mL/min (by C-G formula based on Cr of 4.47).    Results from last 7 days  Lab Units 11/06/17  0746 11/05/17 0631 11/04/17  0605   PHOSPHORUS mg/dL 3.3 2.9 2.5           Results from last 7 days  Lab Units 11/06/17  0746 11/05/17  0631 11/04/17  0605 11/03/17  0557 11/02/17  0530   WBC 10*3/mm3 22.30* 20.31* 12.37* 13.33* 21.16*   HEMOGLOBIN g/dL 10.3* 9.8* 9.5* 10.0* 12.0   HEMATOCRIT % 32.4* 31.2* 30.0* 30.9* 37.3   PLATELETS 10*3/mm3 297 222 218 202 213       Results from last 7 days  Lab Units 10/30/17  1349   INR  1.86*       Culture Data:   No results found for: BLOODCX  No results found for: URINECX  No results found for: RESPCX  No results found for: WOUNDCX  No results found for: STOOLCX  No components found for: BODYFLD    Radiology Data:   Imaging Results (last 24  hours)     ** No results found for the last 24 hours. **          I have reviewed the patient current medications.     Assessment/Plan     Hospital Problem List     HCAP (healthcare-associated pneumonia)      MRSA bacteremia      Plan: Daptomycin, DC vancomycin, continue with ceftriaxone given patient's history of pneumonia, urinalysis appears to be clear at this time, continue per nephrology recommendations, repeat blood cultures today.            This document has been electronically signed by LOS Paredes on November 6, 2017 11:37 AM

## 2017-11-07 LAB
BASOPHILS # BLD AUTO: 0.04 10*3/MM3 (ref 0–0.2)
BASOPHILS NFR BLD AUTO: 0.2 % (ref 0–2)
DEPRECATED RDW RBC AUTO: 56.3 FL (ref 36.4–46.3)
EOSINOPHIL # BLD AUTO: 0.07 10*3/MM3 (ref 0–0.7)
EOSINOPHIL NFR BLD AUTO: 0.4 % (ref 0–7)
ERYTHROCYTE [DISTWIDTH] IN BLOOD BY AUTOMATED COUNT: 16.7 % (ref 11.5–14.5)
HCT VFR BLD AUTO: 34.4 % (ref 35–45)
HGB BLD-MCNC: 11 G/DL (ref 12–15.5)
HOLD SPECIMEN: NORMAL
IMM GRANULOCYTES # BLD: 0.14 10*3/MM3 (ref 0–0.02)
IMM GRANULOCYTES NFR BLD: 0.8 % (ref 0–0.5)
LYMPHOCYTES # BLD AUTO: 0.94 10*3/MM3 (ref 0.6–4.2)
LYMPHOCYTES NFR BLD AUTO: 5.4 % (ref 10–50)
MCH RBC QN AUTO: 29.5 PG (ref 26.5–34)
MCHC RBC AUTO-ENTMCNC: 32 G/DL (ref 31.4–36)
MCV RBC AUTO: 92.2 FL (ref 80–98)
MONOCYTES # BLD AUTO: 0.93 10*3/MM3 (ref 0–0.9)
MONOCYTES NFR BLD AUTO: 5.3 % (ref 0–12)
NEUTROPHILS # BLD AUTO: 15.44 10*3/MM3 (ref 2–8.6)
NEUTROPHILS NFR BLD AUTO: 87.9 % (ref 37–80)
PLATELET # BLD AUTO: 314 10*3/MM3 (ref 150–450)
PMV BLD AUTO: 11 FL (ref 8–12)
RBC # BLD AUTO: 3.73 10*6/MM3 (ref 3.77–5.16)
WBC NRBC COR # BLD: 17.56 10*3/MM3 (ref 3.2–9.8)

## 2017-11-07 PROCEDURE — 97530 THERAPEUTIC ACTIVITIES: CPT

## 2017-11-07 PROCEDURE — 97110 THERAPEUTIC EXERCISES: CPT

## 2017-11-07 PROCEDURE — 94799 UNLISTED PULMONARY SVC/PX: CPT

## 2017-11-07 PROCEDURE — 94760 N-INVAS EAR/PLS OXIMETRY 1: CPT

## 2017-11-07 PROCEDURE — 25010000002 CEFTRIAXONE PER 250 MG: Performed by: INTERNAL MEDICINE

## 2017-11-07 PROCEDURE — 85025 COMPLETE CBC W/AUTO DIFF WBC: CPT | Performed by: INTERNAL MEDICINE

## 2017-11-07 RX ORDER — HYDROXYZINE PAMOATE 25 MG/1
25 CAPSULE ORAL ONCE
Status: COMPLETED | OUTPATIENT
Start: 2017-11-07 | End: 2017-11-08

## 2017-11-07 RX ADMIN — HYDROCODONE BITARTRATE AND ACETAMINOPHEN 1 TABLET: 7.5; 325 TABLET ORAL at 10:40

## 2017-11-07 RX ADMIN — QUETIAPINE FUMARATE 50 MG: 25 TABLET, FILM COATED ORAL at 20:46

## 2017-11-07 RX ADMIN — ATORVASTATIN CALCIUM 40 MG: 40 TABLET, FILM COATED ORAL at 20:46

## 2017-11-07 RX ADMIN — ASPIRIN 81 MG: 81 TABLET, COATED ORAL at 08:26

## 2017-11-07 RX ADMIN — POLYETHYLENE GLYCOL 3350 17 G: 17 POWDER, FOR SOLUTION ORAL at 08:26

## 2017-11-07 RX ADMIN — LEVOTHYROXINE SODIUM 88 MCG: 88 TABLET ORAL at 05:57

## 2017-11-07 RX ADMIN — MIRTAZAPINE 15 MG: 15 TABLET, FILM COATED ORAL at 20:46

## 2017-11-07 RX ADMIN — IPRATROPIUM BROMIDE AND ALBUTEROL SULFATE 3 ML: 2.5; .5 SOLUTION RESPIRATORY (INHALATION) at 06:41

## 2017-11-07 RX ADMIN — APIXABAN 2.5 MG: 2.5 TABLET, FILM COATED ORAL at 08:26

## 2017-11-07 RX ADMIN — APIXABAN 2.5 MG: 2.5 TABLET, FILM COATED ORAL at 17:37

## 2017-11-07 RX ADMIN — POLYETHYLENE GLYCOL 3350 17 G: 17 POWDER, FOR SOLUTION ORAL at 17:37

## 2017-11-07 RX ADMIN — FAMOTIDINE 40 MG: 40 TABLET ORAL at 08:26

## 2017-11-07 RX ADMIN — IPRATROPIUM BROMIDE AND ALBUTEROL SULFATE 3 ML: 2.5; .5 SOLUTION RESPIRATORY (INHALATION) at 02:47

## 2017-11-07 RX ADMIN — HYDRALAZINE HYDROCHLORIDE 50 MG: 50 TABLET ORAL at 08:26

## 2017-11-07 RX ADMIN — CEFTRIAXONE SODIUM 1 G: 1 INJECTION, POWDER, FOR SOLUTION INTRAMUSCULAR; INTRAVENOUS at 14:36

## 2017-11-07 RX ADMIN — SERTRALINE HYDROCHLORIDE 50 MG: 50 TABLET ORAL at 08:26

## 2017-11-07 RX ADMIN — HYDROCODONE BITARTRATE AND ACETAMINOPHEN 1 TABLET: 7.5; 325 TABLET ORAL at 06:03

## 2017-11-07 RX ADMIN — HYDRALAZINE HYDROCHLORIDE 50 MG: 50 TABLET ORAL at 20:46

## 2017-11-07 RX ADMIN — HYDRALAZINE HYDROCHLORIDE 50 MG: 50 TABLET ORAL at 17:37

## 2017-11-07 NOTE — PROGRESS NOTES
Nephrology Progress Note:    This 72-year-old  female was admitted to hospitalist services secondary to a worsening level of altered mental status.  Patient also had fever.  CT of the head was within normal limits.  Chest x-ray demonstrated left lower lobe pneumonia.  Patient has been treated with empiric antibiotics.  Patient was found to have MRSA bacteremia, no vegetation demonstrated on echocardiogram.       Patient Active Problem List   Diagnosis   • Constipation   • Arteriovenous fistula   • End stage renal failure on dialysis   • Controlled type 2 diabetes mellitus with chronic kidney disease on chronic dialysis, without long-term current use of insulin   • Coronary artery disease involving native coronary artery of native heart without angina pectoris   • Panlobular emphysema   • ESRD (end stage renal disease) on dialysis   • HCAP (healthcare-associated pneumonia)       Medications:    apixaban 2.5 mg Oral BID   aspirin 81 mg Oral Daily   atorvastatin 40 mg Oral Nightly   ceftriaxone 1 g Intravenous Q24H   DAPTOmycin (CUBICIN)  IV 6 mg/kg Intravenous Q48H   famotidine 40 mg Oral Daily   hydrALAZINE 50 mg Oral TID   ipratropium-albuterol 3 mL Nebulization Q4H - RT   levothyroxine 88 mcg Oral Daily   mirtazapine 15 mg Oral Nightly   polyethylene glycol 17 g Oral BID   QUEtiapine 50 mg Oral Nightly   sertraline 50 mg Oral Daily        Vitals:    11/07/17 0455 11/07/17 0641 11/07/17 0746 11/07/17 0749   BP: 108/56   118/52   BP Location: Right leg   Right leg   Patient Position: Lying   Lying   Pulse: 82 91 97 95   Resp: 18 16  16   Temp: 96.4 °F (35.8 °C)   97.8 °F (36.6 °C)   TempSrc: Axillary   Oral   SpO2: 96% 96%  94%   Weight:       Height:         I/O last 3 completed shifts:  In: 220 [P.O.:220]  Out: 2500 [Other:2500]  I/O this shift:  In: -   Out: 50 [Urine:50]    On examination:  General:, Lying down comfortably  HEENT: Pallor, no icterus, eye movements are normal.  Chest: Occ wheeze  CVS:  Heart sounds are irregular, no pericardial rub or gallop  Abdomen: Abdomen is soft, nontender, normal bowel sounds, no tenderness  Extremities:Left thigh AV graft, no tenderness, good bruit  Neuro: No change in neurological status.  Grade 3 power both upper and lower extremities  Mentation: She is alert and oriented    Past medical illness, social history, medications, previous notes reviewed.       Laboratory results:      Recent Results (from the past 24 hour(s))   CBC Auto Differential    Collection Time: 11/07/17  7:34 AM   Result Value Ref Range    WBC 17.56 (H) 3.20 - 9.80 10*3/mm3    RBC 3.73 (L) 3.77 - 5.16 10*6/mm3    Hemoglobin 11.0 (L) 12.0 - 15.5 g/dL    Hematocrit 34.4 (L) 35.0 - 45.0 %    MCV 92.2 80.0 - 98.0 fL    MCH 29.5 26.5 - 34.0 pg    MCHC 32.0 31.4 - 36.0 g/dL    RDW 16.7 (H) 11.5 - 14.5 %    RDW-SD 56.3 (H) 36.4 - 46.3 fl    MPV 11.0 8.0 - 12.0 fL    Platelets 314 150 - 450 10*3/mm3    Neutrophil % 87.9 (H) 37.0 - 80.0 %    Lymphocyte % 5.4 (L) 10.0 - 50.0 %    Monocyte % 5.3 0.0 - 12.0 %    Eosinophil % 0.4 0.0 - 7.0 %    Basophil % 0.2 0.0 - 2.0 %    Immature Grans % 0.8 (H) 0.0 - 0.5 %    Neutrophils, Absolute 15.44 (H) 2.00 - 8.60 10*3/mm3    Lymphocytes, Absolute 0.94 0.60 - 4.20 10*3/mm3    Monocytes, Absolute 0.93 (H) 0.00 - 0.90 10*3/mm3    Eosinophils, Absolute 0.07 0.00 - 0.70 10*3/mm3    Basophils, Absolute 0.04 0.00 - 0.20 10*3/mm3    Immature Grans, Absolute 0.14 (H) 0.00 - 0.02 10*3/mm3   Gold Top - SST    Collection Time: 11/07/17  7:34 AM   Result Value Ref Range    Extra Tube Hold for add-ons.    ]    Imaging Results (last 24 hours)     ** No results found for the last 24 hours. **            Assessment:     End-stage renal disease  Left lower lobe pneumonia  Staphylococcal bacteremia  Altered mental status, improved  Essential hypertension  HCAP  Type 2 diabetes mellitus    Plan:     ESRD:   Patient is on hemodialysis on Monday Wednesday and Friday.  She had dialysis  yesterday so we will plan her treatment tomorrow.    Staphylococcal bacteremia, left lower lobe pneumonia.  Echocardiogram was negative.   Continued on IV antibiotics.    Pneumonia: On IV antibiotic.  Follow cultures.    Altered mental status, delirium: Improved.   imaging studies were negative.    Anemia of chronic kidney disease:   Hemoglobin and hematocrit levels are stable.  On 8000 units of Epogen with dialysis.    Essential hypertension:  Patient is on hydralazine.    Discussed with patient.    Jair Bustillo MD

## 2017-11-07 NOTE — SIGNIFICANT NOTE
11/07/17 1120   Rehab Treatment   Discipline occupational therapy assistant   Treatment Not Performed other (see comments)  (Attempted OT session pt very confused and uncooperative with OT,)

## 2017-11-07 NOTE — PLAN OF CARE
Problem: Patient Care Overview (Adult)  Goal: Plan of Care Review  Outcome: Ongoing (interventions implemented as appropriate)    11/07/17 8645   Coping/Psychosocial Response Interventions   Plan Of Care Reviewed With patient   Patient Care Overview   Progress no change   Outcome Evaluation   Outcome Summary/Follow up Plan New assessment. Pt visited due to LOS. She reports a decreased appetite and hx of mild wt loss. RD will add ice cream twice daily, she refuses other supplements.

## 2017-11-07 NOTE — PLAN OF CARE
Problem: Patient Care Overview (Adult)  Goal: Plan of Care Review  Outcome: Ongoing (interventions implemented as appropriate)    11/07/17 0307   Coping/Psychosocial Response Interventions   Plan Of Care Reviewed With patient   Patient Care Overview   Progress no change   Outcome Evaluation   Outcome Summary/Follow up Plan Slept most of the night, VSS.       Goal: Adult Individualization and Mutuality  Outcome: Ongoing (interventions implemented as appropriate)  Goal: Discharge Needs Assessment  Outcome: Ongoing (interventions implemented as appropriate)    Problem: Pressure Ulcer (Adult)  Goal: Signs and Symptoms of Listed Potential Problems Will be Absent or Manageable (Pressure Ulcer)  Outcome: Ongoing (interventions implemented as appropriate)    Problem: Infection, Risk/Actual (Adult)  Goal: Identify Related Risk Factors and Signs and Symptoms  Outcome: Ongoing (interventions implemented as appropriate)  Goal: Infection Prevention/Resolution  Outcome: Ongoing (interventions implemented as appropriate)

## 2017-11-07 NOTE — CONSULTS
Adult Nutrition  Assessment    Patient Name:  Efrem Roa  YOB: 1945  MRN: [id number]  Admit Date:  10/30/2017    Assessment Date:  11/7/2017    Comments:  Pt presents with a decreased appetite with overall intake averaging ~40% for the last 5-6 meals.  She refuses Nepro but likes ice cream.  She reports difficulty chewing due to poor intention but is doing OK with her current diet consistency.  She reports a 5-6# wt loss over the last several months.  Rd will continue to monitor.            Reason for Assessment       11/07/17 1445    Reason for Assessment    Reason For Assessment/Visit length of stay    Identified At Risk By Screening Criteria diagnosis    Pulmonary/Critical Care Pneumonia    Renal ESRD;Hemodialysis                Nutrition/Diet History       11/07/17 1446    Nutrition/Diet History    Typical Food/Fluid Intake Pt reports that her appetite isn't very good.  She would like to have a fruit plate.  She does not follow a diet at the home in which she lives. She has lost wt but unsure how much.  Does not really like the Nepro              Labs/Tests/Procedures/Meds       11/07/17 1447    Labs/Tests/Procedures/Meds    Labs/Tests Review BUN;Creat;AlkP;Glucose    Medication Review Reviewed, pertinent            Physical Findings       11/07/17 1447    Physical Findings/Assessment    Additional Documentation Physical Appearance (Group)    Physical Appearance    Oral/Mouth Cavity dental caries            Estimated/Assessed Needs       11/07/17 1448    Calculation Measurements    Weight Used For Calculations 45.4 kg (100 lb)    Height Used for Calculations 1.524 m (5')    Estimated/Assessed Energy Needs    Energy Need Method [place]    Age 72    RMR ([place] Equation) 880.1    Activity Factors ([place] [employer])  Out of bed, ambulatory  1.3    Total estimated needs ([place] Je[place]) 1200    Estimated/Assessed Protein Needs    Weight Used for Protein Calculation 45.4 kg  (100 lb)    Protein (gm/kg) 1.2    1.2 Gm Protein (gm) 54.43    Estimated Protein Range 54    Estimated/Assessed Fluid Needs    Fluid Need Method RDA method    RDA Method (mL)  1200            Nutrition Prescription Ordered       11/07/17 1449    Nutrition Prescription PO    Current PO Diet Soft Texture    Texture Ground    Fluid Consistency Thin    Common Modifiers Renal            Evaluation of Received Nutrient/Fluid Intake       11/07/17 1449    PO Evaluation    Number of Days PO Intake Evaluated 3 days    Number of Meals 5    % PO Intake 40% average            Electronically signed by:  Alyx Ojeda RD  11/07/17 2:54 PM

## 2017-11-07 NOTE — PLAN OF CARE
Problem: Patient Care Overview (Adult)  Goal: Plan of Care Review  Outcome: Ongoing (interventions implemented as appropriate)    11/07/17 0925   Coping/Psychosocial Response Interventions   Plan Of Care Reviewed With patient   Outcome Evaluation   Outcome Summary/Follow up Plan No new goals met this tx. Pt tolerated EOB ther ex well with no adverse effects. Pt deferred standing/OOB this tx. Pt will continue to benefit from skilled therapy services        Goal: Discharge Needs Assessment  Outcome: Ongoing (interventions implemented as appropriate)    10/31/17 1055 11/02/17 1444 11/02/17 1445   Discharge Needs Assessment   Concerns To Be Addressed adjustment to diagnosis/illness concerns --  --    Community [facility] Name(S) [facility], Pt travels via PACS.. --  --    Discharge Facility/Level Of Care Needs --  --  --    Current Discharge Risk chronically ill --  --    Discharge Disposition still a patient --  --    Discharge Planning Comments LSW spoke with [facility] pt has bed hold and may return to facility once cleared medically. --  --    Current Health   Anticipated Changes Related to Illness none --  --    Living Environment   Transportation Available --  ambulance --    Self-Care   Equipment Currently Used at Home --  --  rollator;wheelchair;hospital bed;shower chair;grab bar     11/02/17 1556   Discharge Needs Assessment   Concerns To Be Addressed --    Community Agency Name(S) --    Discharge Facility/Level Of Care Needs nursing facility, skilled   Current Discharge Risk --    Discharge Disposition --    Discharge Planning Comments --    Current Health   Anticipated Changes Related to Illness --    Living Environment   Transportation Available --    Self-Care   Equipment Currently Used at Home --          Problem: Inpatient Physical Therapy  Goal: Transfer Training Goal 1 LTG- PT  Outcome: Ongoing (interventions implemented as appropriate)    11/02/17 1713 11/07/17 0925   Transfer Training PT LTG   Transfer  Training PT LTG, Date Established 11/02/17 --    Transfer Training PT LTG, Time to Achieve by discharge --    Transfer Training PT LTG, Activity Type bed to chair /chair to bed;sit to stand/stand to sit --    Transfer Training PT LTG, [place] Level conditional independence --    Transfer Training PT LTG, Date Goal Reviewed --  11/07/17   Transfer Training PT LTG, Outcome --  goal ongoing       Goal: Gait Training Goal LTG- PT  Outcome: Ongoing (interventions implemented as appropriate)    11/02/17 1713 11/07/17 0925   Gait Training PT LTG   Gait Training Goal PT LTG, Date Established 11/02/17 --    Gait Training Goal PT LTG, Time to Achieve by discharge --    Gait Training Goal PT LTG, [place] Level conditional independence --    Gait Training Goal PT LTG, Distance to Achieve 150ft;O2 sats will not drop below 92% --    Gait Training Goal PT LTG, Date Goal Reviewed --  11/07/17   Gait Training Goal PT LTG, Outcome --  goal ongoing       Goal: Patient Education Goal LTG- PT  Outcome: Ongoing (interventions implemented as appropriate)    11/02/17 1713 11/07/17 0925   Patient Education PT LTG   Patient Education PT LTG, Date Established 11/02/17 --    Patient Education PT LTG, Time to Achieve by discharge --    Patient Education PT LTG, Education Type gait;transfers;home safety --    Patient Education PT LTG, Date Goal Reviewed --  11/07/17   Patient Education PT LTG Outcome --  goal ongoing

## 2017-11-07 NOTE — THERAPY TREATMENT NOTE
Acute Care - Physical Therapy Treatment Note  [facility]     Patient Name: Efrem Roa  : 1945  MRN: [id number]  Today's Date: 2017  Onset of Illness/Injury or Date of Surgery Date: 10/30/17  Date of Referral to PT: 17  Referring Physician: LOS Rose    Admit Date: 10/30/2017    Visit Dx:    ICD-10-CM ICD-9-CM   1. HCAP (healthcare-associated pneumonia) J18.9 486   2. Sepsis, due to unspecified organism A41.9 038.9     995.91   3. NSTEMI (non-ST elevated myocardial infarction) I21.4 410.70   4. ESRD (end stage renal disease) N18.6 585.6   5. Essential hypertension I10 401.9   6. Hypertensive heart disease without heart failure I11.9 402.90   7. Oropharyngeal dysphagia R13.12 787.22   8. Impaired mobility and activities of daily living Z74.09 799.89   9. Impaired functional mobility, balance, gait, and endurance Z74.09 V49.89   10. Impaired mobility and ADLs Z74.09 799.89     Patient Active Problem List   Diagnosis   • Constipation   • Arteriovenous fistula   • End stage renal failure on dialysis   • Controlled type 2 diabetes mellitus with chronic kidney disease on chronic dialysis, without long-term current use of insulin   • Coronary artery disease involving native coronary artery of native heart without angina pectoris   • Panlobular emphysema   • ESRD (end stage renal disease) on dialysis   • HCAP (healthcare-associated pneumonia)               Adult Rehabilitation Note       17 0839 17 1315 17 0939    Rehab Assessment/Intervention    Discipline physical therapy assistant  -CH occupational therapy assistant  -LW physical therapy assistant  -JASON    Document Type therapy note (daily note)  -CH therapy note (daily note)  -LW therapy note (daily note)  -JASON    Subjective Information agree to therapy;complains of;weakness  -CH no complaints;agree to therapy  -LW agree to therapy  -JASON    Patient Effort, Rehab Treatment adequate  -CH adequate  -LW adequate  -JASON     Patient Effort, Rehab Treatment Comment  Pt states that she is weak and declines OOB  -LW     Precautions/Limitations fall precautions;oxygen therapy device and L/min   BP in R LE  - fall precautions  -LW     Precautions/Limitations, Vision  WFL with corrective lenses  -LW     Precautions/Limitations, Hearing  WFL  -LW     Specific Treatment Considerations   soon after PTA entered MT arrived to take pt to ECHO  -JASON    Recorded by [] Anay Nava PTA [LW] Desirae Shore MCCORMICK/L [JASON] Kalli Marina PTA    Vital Signs    Pre Systolic BP Rehab 105  -  -LW     Pre Treatment Diastolic BP 49  -CH 69  -LW     Post Systolic BP Rehab 84  -CH      Post Treatment Diastolic BP 66  -CH      Pretreatment Heart Rate (beats/min) 89  -CH 81  -LW     Intratreatment Heart Rate (beats/min)  79  -LW     Posttreatment Heart Rate (beats/min) 92  -CH 83  -LW     Pre SpO2 (%) 95  -CH 97  -LW     O2 Delivery Pre Treatment room air  - supplemental O2  -LW     Intra SpO2 (%)  96  -LW     O2 Delivery Intra Treatment  supplemental O2  -LW     Post SpO2 (%) 94  -CH 95  -LW     O2 Delivery Post Treatment room air  - supplemental O2  -LW     Pre Patient Position Supine  -CH Supine  -LW Supine  -JASON    Intra Patient Position Sitting  -CH Sitting  -LW     Post Patient Position Supine  -CH Supine  -LW Supine  -JASON    Recorded by [] Anay Nava PTA [LW] MIHIR JusticeA/L [JASON] Kalli Marina PTA    Pain Assessment    Pain Assessment 0-10  - No/denies pain  -LW No/denies pain  -JASON    Pain Score 9  -      Post Pain Score 9  -CH      Pain Type Chronic pain  -      Pain Location Generalized  -      Pain Intervention(s) Repositioned;Medication (See MAR)  -      Recorded by [] Anay Nava PTA [LW] MIHIR JusticeA/L [JASON] Kalli Marina PTA    Vision Assessment/Intervention    Visual Impairment WFL with corrective lenses  - WFL with corrective lenses  -LW     Recorded by [] Anay Nava PTA [LW]  JACE Justice/L     Cognitive Assessment/Intervention    Current Cognitive/Communication Assessment functional  -CH functional  -LW functional  -JASON    Orientation Status oriented x 4  -CH oriented x 4  -LW oriented x 4  -JASON    Follows Commands/Answers Questions 100% of the time  -% of the time  -% of the time  -JASON    Personal Safety WNL/WFL  -CH WNL/WFL  -LW WNL/WFL  -JASON    Personal Safety Interventions fall prevention program maintained;gait belt;muscle strengthening facilitated;nonskid shoes/slippers when out of bed;supervised activity  -CH      Recorded by [CH] Anay Nava PTA [LW] JACE Justice/L [JASON] Kalli Marina PTA    Bed Mobility, Assessment/Treatment    Bed Mobility, Assistive Device bed rails;head of bed elevated  -      Bed Mobility, Roll Left, [place] not tested  -      Bed Mobility, Roll Right, [place] supervision required  - minimum assist (75% patient effort)  -LW     Bed Mobility, Scoot/Bridge, [place] dependent (less than 25% patient effort);2 person assist required   with drawsheet to HOB  - minimum assist (75% patient effort)  -LW moderate assist (50% patient effort);2 person assist required  -JASON    Bed Mob, Supine to Sit, [place] supervision required  - minimum assist (75% patient effort)  -LW     Bed Mob, Sit to Supine, [place] supervision required  - minimum assist (75% patient effort)  -LW     Bed Mobility, Comment Once pt sat EOB, pt began to see bugs on gown and bed and was trying to kill them, PTA and nursing ensured pt that there were no bugs on the gown or bed. Pt eventually was able to began ther ex with no more issues   -CH      Recorded by [CH] Anay Nava PTA [LW] JACE Justice/L [JASON] Kalli Marina PTA    Transfer Assessment/Treatment    Transfers, Sit-Stand [place] other (see comments)   pt deferred  -CH      Transfer, Comment   pt declined std pivot t/f and request to be slid over   -JASON     Recorded by [CH] Anay Nava PTA  [JASON] Kalli Marina PTA    Gait Assessment/Treatment    Gait, [place] Level not tested  -      Recorded by [CH] Anay Nava PTA      Upper Body Bathing Assessment/Training    UB Bathing Assess/Train Assistive Device  other (see comments)   Pan bath, wash cloth  -LW     UB Bathing Assess/Train, Position  sitting;edge of bed  -LW     UB Bathing Assess/Train, [place] Level  stand by assist  -LW     Recorded by  [LW] Desirae Shore MCCORMICK/L     Lower Body Bathing Assessment/Training    LB Bathing Assess/Train Assistive Device  other (see comments)   Pan bath, wash cloth  -LW     LB Bathing Assess/Train, Position  edge of bed;sitting  -LW     LB Bathing Assess/Train, [place] Level  stand by assist  -LW     Recorded by  [LW] Desirae Shore MCCORMICK/L     Upper Body Dressing Assessment/Training    UB Dressing Assess/Train, Clothing Type  doffing:;donning:;hospital gown  -LW     UB Dressing Assess/Train, Position  sitting;edge of bed  -LW     UB Dressing Assess/Train, [place]  contact guard assist  -LW     Recorded by  [LW] MIHIR JusticeA/L     Lower Body Dressing Assessment/Training    LB Dressing Assess/Train, Clothing Type  doffing:;donning:;slipper socks  -LW     LB Dressing Assess/Train, Position  sitting;edge of bed  -LW     LB Dressing Assess/Train, [place]  supervision required  -LW     Recorded by  [LW] Desirae Shore MCCORMICK/L     Toileting Assessment/Training    Toileting Assess/Train, Assistive Device  other (see comments)   bed villeda  -LW     Toileting Assess/Train, Position  supine  -LW     Toileting Assess/Train, Indepen Level  contact guard assist  -LW     Recorded by  [LW] MIHIR JusticeA/L     Grooming Assessment/Training    Grooming Assess/Train, Indepen Level  supervision required  -LW     Grooming Assess/Train, Comment  Washed face, hands, combed hair applied deoderant and lotion  -LW     Recorded by  [LW] Desirae Shore,  MCCORMICK/L     Therapy Exercises    Bilateral Lower Extremities AROM:;20 reps;sitting;ankle pumps/circles;glut sets;hip abduction/adduction;hip flexion;LAQ  -CH  AROM:;supine;15 reps;ankle pumps/circles;hip ER;hip IR  -JASON    Trunk Exercises 15 reps;supine;bridging  -CH      Recorded by [] Anay Nava PTA  [JASON] Kalli Marina PTA    Positioning and Restraints    Pre-Treatment Position in bed  -CH in bed  -LW in bed  -JASON    Post Treatment Position bed  -CH bed  -LW bed  -JASON    In Bed supine;call light within reach;encouraged to call for assist;exit alarm on;side rails up x3  -CH supine;call light within reach;encouraged to call for assist;exit alarm on;patient within staff view  -LW --   on stretcgher with MT  -JASON    Recorded by [CH] Anay Nava PTA [LW] Desirae Shore MCCORMICK/L [JASON] Kalli Marina PTA      User Key  (r) = Recorded By, (t) = Taken By, (c) = Cosigned By    Initials Name Effective Dates     Kalli Marina PTA 10/17/16 -     CH Anay Nava PTA 10/17/16 -     LW Desirae Shore, MCCORMICK/L 10/17/16 -                 IP PT Goals       11/07/17 0925 11/04/17 0939 11/03/17 1321    Transfer Training PT LTG    Transfer Training PT  LTG, Date Goal Reviewed 11/07/17  -CH 11/04/17  -JASON 11/03/17  -KORI    Transfer Training PT LTG, Outcome goal ongoing  - goal ongoing  - goal ongoing  -    Gait Training PT LTG    Gait Training Goal PT LTG, Date Goal Reviewed 11/07/17  -CH 11/04/17  -JASON 11/03/17  -KORI    Gait Training Goal PT LTG, Outcome goal ongoing  - goal ongoing  -JASON goal ongoing  -KORI    Strength Goal PT LTG    Strength Goal PT LTG, Date Goal Reviewed   11/03/17  -KORI    Strength Goal PT LTG, Outcome   goal met  -    Patient Education PT LTG    Patient Education PT LTG, Date Goal Reviewed 11/07/17  -CH 11/04/17  -JASON 11/03/17  -KORI    Patient Education PT LTG Outcome goal ongoing  -CH goal ongoing  -JASON goal ongoing  -KORI      11/02/17 1711          Transfer Training PT LTG    Transfer  Training PT LTG, Date Established 11/02/17  -[facility]      Transfer Training PT LTG, Time to Achieve by discharge  -A      Transfer Training PT LTG, Activity Type bed to chair /chair to bed;sit to stand/stand to sit  -A      Transfer Training PT LTG, [place] Level conditional independence  -JCA      Transfer Training PT LTG, Outcome goal ongoing  -[facility]      Gait Training PT LTG    Gait Training Goal PT LTG, Date Established 11/02/17  -[facility]      Gait Training Goal PT LTG, Time to Achieve by discharge  -JCA      Gait Training Goal PT LTG, [place] Level conditional independence  -JCA      Gait Training Goal PT LTG, Distance to Achieve 150ft;O2 sats will not drop below 92%  -[facility]      Gait Training Goal PT LTG, Outcome goal ongoing  -[facility]      Strength Goal PT LTG    Strength Goal PT LTG, Date Established 11/02/17  -[facility]      Strength Goal PT LTG, Time to Achieve by discharge  -A      Strength Goal PT LTG, Measure to Achieve Pt will tolerate 15 reps of AROM exercises in sitting  -[facility]      Strength Goal PT LTG, Outcome goal ongoing  -[facility]      Patient Education PT LTG    Patient Education PT LTG, Date Established 11/02/17  -[facility]      Patient Education PT LTG, Time to Achieve by discharge  -[facility]      Patient Education PT LTG, Education Type gait;transfers;home safety  -[facility]      Patient Education PT LTG Outcome goal ongoing  -[facility]        User Key  (r) = Recorded By, (t) = Taken By, (c) = Cosigned By    Initials Name Provider Type    [facility] Trupti Andrade, PT Physical Therapist    JASON Marina, PTA Physical Therapy Assistant    KORI Malik, PTA Physical Therapy Assistant    JACKLYN Nava, PTA Physical Therapy Assistant          Physical Therapy Education     Title: PT OT SLP Therapies (Active)     Topic: Physical Therapy (Active)     Point: Mobility training (Active)    Learning Progress Summary    Learner Readiness Method Response Comment Documented by Status   Patient Acceptance E NR pt edu on benefits of  OOB  11/03/17 1409 Active                      User Key     Initials Effective Dates Name Provider Type Discipline     10/17/16 -  Nemesio Malik PTA Physical Therapy Assistant PT                    PT Recommendation and Plan  Anticipated Discharge Disposition: skilled nursing facility  Planned Therapy Interventions: balance training, bed mobility training, gait training, patient/family education, strengthening, transfer training  PT Frequency: other (see comments) (5-14 times per week)  Plan of Care Review  Plan Of Care Reviewed With: patient  Outcome Summary/Follow up Plan: No new goals met this tx. Pt tolerated EOB ther ex well with no adverse effects. Pt deferred standing/OOB this tx. Pt will continue to benefit from skilled therapy services           Outcome Measures       11/07/17 0839 11/04/17 1315 11/04/17 0939    How much help from another person do you currently need...    Turning from your back to your side while in flat bed without using bedrails? 3  -CH  2  -JASON    Moving from lying on back to sitting on the side of a flat bed without bedrails? 3  -CH  2  -JASON    Moving to and from a bed to a chair (including a wheelchair)? 2  -CH  2  -JASON    Standing up from a chair using your arms (e.g., wheelchair, bedside chair)? 2  -CH  2  -JASON    Climbing 3-5 steps with a railing? 1  -CH  1  -JASON    To walk in hospital room? 2  -CH  2  -JASON    AM-PAC 6 Clicks Score 13  -CH  11  -JASON    How much help from another is currently needed...    Putting on and taking off regular lower body clothing?  2  -LW     Bathing (including washing, rinsing, and drying)  2  -LW     Toileting (which includes using toilet bed pan or urinal)  2  -LW     Putting on and taking off regular upper body clothing  3  -LW     Taking care of personal grooming (such as brushing teeth)  3  -LW     Eating meals  4  -LW     Score  16  -LW     Functional Assessment    Outcome Measure Options AM-PAC 6 Clicks Basic Mobility (PT)  -CH        User Key   (r) = Recorded By, (t) = Taken By, (c) = Cosigned By    Initials Name Provider Type    JASON Marina PTA Physical Therapy Assistant    JACKLYN Nava PTA Physical Therapy Assistant    JACE Crouch/L Occupational Therapy Assistant           Time Calculation:         PT Charges       11/07/17 0927          Time Calculation    Start Time 0839  -CH      Stop Time 0912  -      Time Calculation (min) 33 min  -      PT Received On 11/07/17  -      Time Calculation- PT    Total Timed Code Minutes- PT 33 minute(s)  -        User Key  (r) = Recorded By, (t) = Taken By, (c) = Cosigned By    Initials Name Provider Type    JACKLYN Nava, NIYAH Physical Therapy Assistant          Therapy Charges for Today     Code Description Service Date Service Provider Modifiers Qty    58090790801 HC PT THER SUPP EA 15 MIN 11/6/2017 Anay Nava PTA GP 1    54495556388 HC PT THERAPEUTIC ACT EA 15 MIN 11/7/2017 Anay Nava PTA GP 1     Duration:  16m ( 8:39 AM -  8:55 AM)      73331370533 HC PT THER PROC EA 15 MIN 11/7/2017 Anay Nava PTA GP 1     Duration:  17m ( 8:55 AM -  9:12 AM)            PT G-Codes  PT Professional Judgement Used?: Yes  Outcome Measure Options: AM-PAC 6 Clicks Basic Mobility (PT)  Score: 18  Functional Limitation: Mobility: Walking and moving around  Mobility: Walking and Moving Around Current Status (): At least 40 percent but less than 60 percent impaired, limited or restricted  Mobility: Walking and Moving Around Goal Status (): At least 1 percent but less than 20 percent impaired, limited or restricted    Anay Nava PTA  11/7/2017

## 2017-11-07 NOTE — PROGRESS NOTES
[facility] Medicine Services  INPATIENT PROGRESS NOTE    Length of Stay: 8  Date of Admission: 10/30/2017  Primary Care Physician: Rogers Perez MD    Subjective   Please note that all previous progress notes, physical exam findings, medication changes, lab findings, and radiographical findings have been updated and noted as appropriate.    11/7/2017: Patient feeling much better today.  She is in good spirits.  Blood cultures are negative at less than 24 hours after changing over to daptomycin.  No fever, chills, shortness of air.  Patient has been off oxygen and satting well on room air greater than 24 hours.  Vascular has evaluated patient's fistula site and has determined it is functioning well with no infection.    11/6/2017: Patient has no chest pain, fever, chills.  Have discussed with pharmacy and there is difficulty reaching appropriate levels of vancomycin.  At this time recommendation is to start patient on daptomycin.  Nephrology has asked vascular surgery to evaluate fistula.  Urinalysis demonstrates no signs of infection.  Will repeat blood cultures today.    11/5/2017: Patient has no acute complaints.  Scheduled for redraw blood cultures in the a.m.  No further vegetation noted on echocardiogram.    11/4/2017: Dr. Adan has determined there is low suspicion for any type of endocarditis or vegetation.  Dr. Adan recommends a repeat transthoracic echocardiogram rather than a transesophageal echocardiogram.  Patient has no complaints today.  Patient does have severe pneumonia.  Will recheck blood cultures on Monday.    11/3/2017: Patient has no acute complaints today.  Patient continues to demonstrate MRSA bacteremia despite appropriate antibiotic treatment.  Have spoken with Dr. Adan of cardiology who is agreed to evaluate the patient for possible ANTHONY.  No indwelling lines or catheters.  All other possible sources fistula.  Continue antibiotic  treatment.  Attempt sputum culture if possible.  No fevers or chills, dialysis is today.  Patient tolerating by mouth intake.  Essentially complains of soreness.  Patient tachycardia much improved, heart rate is 101.  Respirations are normal with no fever.  Patient hypoxia improving, 94 on 1.5 L.    Chief Complaint/HPI:  This 72-year-old  female was admitted to hospitalist services secondary to a worsening level of altered mental status.  Patient also had fever.  CT of the head was within normal limits.  Chest x-ray demonstrated left lower lobe pneumonia.  Patient has been treated with empiric antibiotics.  Patient was found to have MRSA bacteremia, no vegetation demonstrated on echocardiogram.  Echocardiogram is transthoracic.  Though patient is in no apparent distress, she continues to be tachycardic and hypoxic.  Patient currently on vancomycin and Levaquin.  Patient will be changed to vancomycin and Zosyn per renal dosing.  EKG reordered.  Initial EKG demonstrated sinus tachycardia.  EKG rule out atrial fibrillation with RVR.  No chest pain, did have an elevated troponin, however troponin has been fairly consistent and is actually trending down.  Troponin likely secondary to ESRD and sepsis.  Have spoken with patient's nephrologist, Dr. Mireles, he is agreeable to CTA today.  We'll also order physical therapy and occupational therapy.    Review of Systems   Constitutional: Negative for chills and fever.   Respiratory: Negative for shortness of breath.    Cardiovascular: Negative for chest pain.   Gastrointestinal: Negative for abdominal pain, nausea and vomiting.   Genitourinary: Negative for dysuria.   Neurological: Negative for dizziness, tremors, seizures, syncope, facial asymmetry, speech difficulty, light-headedness, numbness and headaches.      Review of symptoms is partially compromised by dementia      Objective    Temp:  [96.3 °F (35.7 °C)-98 °F (36.7 °C)] 97.8 °F (36.6 °C)  Heart Rate:  [71-97]  95  Resp:  [16-22] 16  BP: (105-140)/(50-64) 118/52    Physical Exam   Constitutional: She appears well-developed. No distress.   HENT:   Head: Normocephalic and atraumatic.   Eyes: Conjunctivae are normal. Pupils are equal, round, and reactive to light.   Cardiovascular: Normal rate and regular rhythm.    Pulmonary/Chest: Effort normal. No respiratory distress. She has no wheezes.   Abdominal: Soft. Bowel sounds are normal. She exhibits no distension. There is no tenderness.   Neurological: She is alert.   Skin: Skin is warm and dry. She is not diaphoretic.     Results Review:  I have reviewed the labs, radiology results, and diagnostic studies.    Laboratory Data:     Results from last 7 days  Lab Units 11/06/17  0746 11/05/17  0631 11/04/17  0605   SODIUM mmol/L 141 140 142   POTASSIUM mmol/L 4.3 3.8 3.2*   CHLORIDE mmol/L 99 102 102   CO2 mmol/L 24.0 25.0 28.0   BUN mg/dL 46* 37* 27*   CREATININE mg/dL 4.47* 3.82* 2.88*   GLUCOSE mg/dL 168* 145* 110*   CALCIUM mg/dL 8.7 8.5 8.8   ANION GAP mmol/L 18.0* 13.0 12.0     Estimated Creatinine Clearance: 7.9 mL/min (by C-G formula based on Cr of 4.47).    Results from last 7 days  Lab Units 11/06/17  0746 11/05/17  0631 11/04/17  0605   PHOSPHORUS mg/dL 3.3 2.9 2.5           Results from last 7 days  Lab Units 11/07/17  0734 11/06/17  0746 11/05/17  0631 11/04/17  0605 11/03/17  0557   WBC 10*3/mm3 17.56* 22.30* 20.31* 12.37* 13.33*   HEMOGLOBIN g/dL 11.0* 10.3* 9.8* 9.5* 10.0*   HEMATOCRIT % 34.4* 32.4* 31.2* 30.0* 30.9*   PLATELETS 10*3/mm3 314 297 222 218 202           Culture Data:   Blood Culture   Date Value Ref Range Status   11/06/2017 No growth at less than 24 hours  Preliminary   11/06/2017 No growth at less than 24 hours  Preliminary     No results found for: URINECX  No results found for: RESPCX  No results found for: WOUNDCX  No results found for: STOOLCX  No components found for: BODYFLD    Radiology Data:   Imaging Results (last 24 hours)     ** No  results found for the last 24 hours. **          I have reviewed the patient current medications.     Assessment/Plan     Hospital Problem List     HCAP (healthcare-associated pneumonia)      MRSA bacteremia      Plan: Continue daptomycin.  The blood cultures continue to be negative, begin discharge planning.          This document has been electronically signed by LOS Paredes on November 7, 2017 11:09 AM

## 2017-11-08 LAB
ALBUMIN SERPL-MCNC: 3.3 G/DL (ref 3.4–4.8)
ANION GAP SERPL CALCULATED.3IONS-SCNC: 12 MMOL/L (ref 5–15)
BASOPHILS # BLD AUTO: 0.05 10*3/MM3 (ref 0–0.2)
BASOPHILS NFR BLD AUTO: 0.3 % (ref 0–2)
BUN BLD-MCNC: 42 MG/DL (ref 7–21)
BUN/CREAT SERPL: 9.6 (ref 7–25)
CALCIUM SPEC-SCNC: 8.2 MG/DL (ref 8.4–10.2)
CHLORIDE SERPL-SCNC: 98 MMOL/L (ref 95–110)
CO2 SERPL-SCNC: 28 MMOL/L (ref 22–31)
CREAT BLD-MCNC: 4.39 MG/DL (ref 0.5–1)
DEPRECATED RDW RBC AUTO: 56.2 FL (ref 36.4–46.3)
EOSINOPHIL # BLD AUTO: 0.36 10*3/MM3 (ref 0–0.7)
EOSINOPHIL NFR BLD AUTO: 2.4 % (ref 0–7)
ERYTHROCYTE [DISTWIDTH] IN BLOOD BY AUTOMATED COUNT: 16.9 % (ref 11.5–14.5)
GFR SERPL CREATININE-BSD FRML MDRD: 10 ML/MIN/1.73 (ref 60–90)
GLUCOSE BLD-MCNC: 121 MG/DL (ref 60–100)
HCT VFR BLD AUTO: 31.2 % (ref 35–45)
HGB BLD-MCNC: 9.8 G/DL (ref 12–15.5)
HOLD SPECIMEN: NORMAL
IMM GRANULOCYTES # BLD: 0.09 10*3/MM3 (ref 0–0.02)
IMM GRANULOCYTES NFR BLD: 0.6 % (ref 0–0.5)
LYMPHOCYTES # BLD AUTO: 1.24 10*3/MM3 (ref 0.6–4.2)
LYMPHOCYTES NFR BLD AUTO: 8.4 % (ref 10–50)
MCH RBC QN AUTO: 28.6 PG (ref 26.5–34)
MCHC RBC AUTO-ENTMCNC: 31.4 G/DL (ref 31.4–36)
MCV RBC AUTO: 91 FL (ref 80–98)
MONOCYTES # BLD AUTO: 1.01 10*3/MM3 (ref 0–0.9)
MONOCYTES NFR BLD AUTO: 6.8 % (ref 0–12)
NEUTROPHILS # BLD AUTO: 12.02 10*3/MM3 (ref 2–8.6)
NEUTROPHILS NFR BLD AUTO: 81.5 % (ref 37–80)
PHOSPHATE SERPL-MCNC: 3.7 MG/DL (ref 2.4–4.4)
PLATELET # BLD AUTO: 362 10*3/MM3 (ref 150–450)
PMV BLD AUTO: 10.9 FL (ref 8–12)
POTASSIUM BLD-SCNC: 4.7 MMOL/L (ref 3.5–5.1)
RBC # BLD AUTO: 3.43 10*6/MM3 (ref 3.77–5.16)
SODIUM BLD-SCNC: 138 MMOL/L (ref 137–145)
WBC NRBC COR # BLD: 14.77 10*3/MM3 (ref 3.2–9.8)

## 2017-11-08 PROCEDURE — 85025 COMPLETE CBC W/AUTO DIFF WBC: CPT | Performed by: INTERNAL MEDICINE

## 2017-11-08 PROCEDURE — 25010000002 DAPTOMYCIN PER 1 MG: Performed by: PHYSICIAN ASSISTANT

## 2017-11-08 PROCEDURE — 97530 THERAPEUTIC ACTIVITIES: CPT

## 2017-11-08 PROCEDURE — 63510000001 EPOETIN ALFA PER 1000 UNITS: Performed by: INTERNAL MEDICINE

## 2017-11-08 PROCEDURE — 25010000002 CEFTRIAXONE PER 250 MG: Performed by: INTERNAL MEDICINE

## 2017-11-08 PROCEDURE — 97110 THERAPEUTIC EXERCISES: CPT

## 2017-11-08 PROCEDURE — 80069 RENAL FUNCTION PANEL: CPT | Performed by: PHYSICIAN ASSISTANT

## 2017-11-08 RX ORDER — HYDROXYZINE PAMOATE 25 MG/1
25 CAPSULE ORAL ONCE
Status: COMPLETED | OUTPATIENT
Start: 2017-11-08 | End: 2017-11-08

## 2017-11-08 RX ADMIN — MIRTAZAPINE 15 MG: 15 TABLET, FILM COATED ORAL at 20:39

## 2017-11-08 RX ADMIN — APIXABAN 2.5 MG: 2.5 TABLET, FILM COATED ORAL at 18:01

## 2017-11-08 RX ADMIN — HYDROXYZINE PAMOATE 25 MG: 25 CAPSULE ORAL at 04:39

## 2017-11-08 RX ADMIN — HYDROCODONE BITARTRATE AND ACETAMINOPHEN 1 TABLET: 7.5; 325 TABLET ORAL at 04:39

## 2017-11-08 RX ADMIN — CEFTRIAXONE SODIUM 1 G: 1 INJECTION, POWDER, FOR SOLUTION INTRAMUSCULAR; INTRAVENOUS at 14:22

## 2017-11-08 RX ADMIN — ATORVASTATIN CALCIUM 40 MG: 40 TABLET, FILM COATED ORAL at 20:39

## 2017-11-08 RX ADMIN — Medication 10 ML: at 16:50

## 2017-11-08 RX ADMIN — APIXABAN 2.5 MG: 2.5 TABLET, FILM COATED ORAL at 07:45

## 2017-11-08 RX ADMIN — ERYTHROPOIETIN 6000 UNITS: 10000 INJECTION, SOLUTION INTRAVENOUS; SUBCUTANEOUS at 10:52

## 2017-11-08 RX ADMIN — ACETAMINOPHEN 650 MG: 325 TABLET ORAL at 06:08

## 2017-11-08 RX ADMIN — QUETIAPINE FUMARATE 50 MG: 25 TABLET, FILM COATED ORAL at 20:39

## 2017-11-08 RX ADMIN — POLYETHYLENE GLYCOL 3350 17 G: 17 POWDER, FOR SOLUTION ORAL at 07:46

## 2017-11-08 RX ADMIN — HYDRALAZINE HYDROCHLORIDE 50 MG: 50 TABLET ORAL at 15:28

## 2017-11-08 RX ADMIN — HYDROCODONE BITARTRATE AND ACETAMINOPHEN 1 TABLET: 7.5; 325 TABLET ORAL at 07:45

## 2017-11-08 RX ADMIN — DAPTOMYCIN 270 MG: 500 INJECTION, POWDER, LYOPHILIZED, FOR SOLUTION INTRAVENOUS at 16:50

## 2017-11-08 RX ADMIN — ASPIRIN 81 MG: 81 TABLET, COATED ORAL at 07:46

## 2017-11-08 RX ADMIN — LEVOTHYROXINE SODIUM 88 MCG: 88 TABLET ORAL at 06:47

## 2017-11-08 RX ADMIN — SERTRALINE HYDROCHLORIDE 50 MG: 50 TABLET ORAL at 07:45

## 2017-11-08 RX ADMIN — HYDRALAZINE HYDROCHLORIDE 50 MG: 50 TABLET ORAL at 20:39

## 2017-11-08 RX ADMIN — HYDROXYZINE PAMOATE 25 MG: 25 CAPSULE ORAL at 16:50

## 2017-11-08 RX ADMIN — FAMOTIDINE 40 MG: 40 TABLET ORAL at 07:45

## 2017-11-08 RX ADMIN — HYDROCODONE BITARTRATE AND ACETAMINOPHEN 1 TABLET: 7.5; 325 TABLET ORAL at 15:28

## 2017-11-08 NOTE — PLAN OF CARE
Problem: Sepsis (Adult)  Goal: Signs and Symptoms of Listed Potential Problems Will be Absent or Manageable (Sepsis)  Outcome: Ongoing (interventions implemented as appropriate)    Problem: Fall Risk (Adult)  Goal: Absence of Falls  Outcome: Ongoing (interventions implemented as appropriate)    Problem: Renal Replacement, Continuous (Adult)  Goal: Signs and Symptoms of Listed Potential Problems Will be Absent or Manageable (Renal Replacement, Continuous)  Outcome: Ongoing (interventions implemented as appropriate)    Problem: Patient Care Overview (Adult)  Goal: Plan of Care Review  Outcome: Ongoing (interventions implemented as appropriate)    11/08/17 1552   Coping/Psychosocial Response Interventions   Plan Of Care Reviewed With patient   Patient Care Overview   Progress no change   Outcome Evaluation   Outcome Summary/Follow up Plan Pt went to dialysis this morning; Pt complained to nose itching in afternoon; nasal saline solution applied; vital signs stable       Goal: Adult Individualization and Mutuality  Outcome: Ongoing (interventions implemented as appropriate)  Goal: Discharge Needs Assessment  Outcome: Ongoing (interventions implemented as appropriate)    Problem: Pressure Ulcer (Adult)  Goal: Signs and Symptoms of Listed Potential Problems Will be Absent or Manageable (Pressure Ulcer)  Outcome: Ongoing (interventions implemented as appropriate)    Problem: Infection, Risk/Actual (Adult)  Goal: Identify Related Risk Factors and Signs and Symptoms  Outcome: Ongoing (interventions implemented as appropriate)  Goal: Infection Prevention/Resolution  Outcome: Ongoing (interventions implemented as appropriate)

## 2017-11-08 NOTE — SIGNIFICANT NOTE
11/08/17 1418   Rehab Treatment   Treatment Not Performed (Checked on Pt multiple times today and pt felt like he wants to rest..)

## 2017-11-08 NOTE — PLAN OF CARE
Problem: Patient Care Overview (Adult)  Goal: Plan of Care Review  Outcome: Ongoing (interventions implemented as appropriate)    11/08/17 1303   Coping/Psychosocial Response Interventions   Plan Of Care Reviewed With patient   Patient Care Overview   Progress progress toward functional goals is gradual   Outcome Evaluation   Outcome Summary/Follow up Plan pt has limited participation with little progression. No New goals met at this time.          Problem: Inpatient Physical Therapy  Goal: Transfer Training Goal 1 LTG- PT  Outcome: Ongoing (interventions implemented as appropriate)    11/02/17 1713 11/08/17 1303   Transfer Training PT LTG   Transfer Training PT LTG, Date Established 11/02/17 --    Transfer Training PT LTG, Time to Achieve by discharge --    Transfer Training PT LTG, Activity Type bed to chair /chair to bed;sit to stand/stand to sit --    Transfer Training PT LTG, [place] Level conditional independence --    Transfer Training PT LTG, Date Goal Reviewed --  11/08/17   Transfer Training PT LTG, Outcome --  goal ongoing       Goal: Gait Training Goal LTG- PT  Outcome: Ongoing (interventions implemented as appropriate)    11/02/17 1713 11/08/17 1303   Gait Training PT LTG   Gait Training Goal PT LTG, Date Established 11/02/17 --    Gait Training Goal PT LTG, Time to Achieve by discharge --    Gait Training Goal PT LTG, [place] Level conditional independence --    Gait Training Goal PT LTG, Distance to Achieve 150ft;O2 sats will not drop below 92% --    Gait Training Goal PT LTG, Date Goal Reviewed --  11/08/17   Gait Training Goal PT LTG, Outcome --  goal ongoing       Goal: Patient Education Goal LTG- PT  Outcome: Ongoing (interventions implemented as appropriate)    11/02/17 1713 11/08/17 1303   Patient Education PT LTG   Patient Education PT LTG, Date Established 11/02/17 --    Patient Education PT LTG, Time to Achieve by discharge --    Patient Education PT LTG, Education Type  gait;transfers;home safety --    Patient Education PT LTG, Date Goal Reviewed --  11/08/17   Patient Education PT LTG Outcome --  goal ongoing

## 2017-11-08 NOTE — PROGRESS NOTES
[facility] Medicine Services  INPATIENT PROGRESS NOTE    Length of Stay: 9  Date of Admission: 10/30/2017  Primary Care Physician: Rogers Perez MD    Subjective   Please note that all previous progress notes, physical exam findings, medication changes, lab findings, and radiographical findings have been updated and noted as appropriate.    11/8/2017: Patient continues to be more pleasant and feels better.  At 24 hours blood cultures are negative.  No nausea, vomiting, chest pain.  Should be noted that if patient blood cultures remain negative at 48 hours, can likely be discharged.  Patient will be passed on to Rosalba BENITEZ who will evaluate the patient as appropriate and determine discharge possibilities at that time.    11/7/2017: Patient feeling much better today.  She is in good spirits.  Blood cultures are negative at less than 24 hours after changing over to daptomycin.  No fever, chills, shortness of air.  Patient has been off oxygen and satting well on room air greater than 24 hours.  Vascular has evaluated patient's fistula site and has determined it is functioning well with no infection.    11/6/2017: Patient has no chest pain, fever, chills.  Have discussed with pharmacy and there is difficulty reaching appropriate levels of vancomycin.  At this time recommendation is to start patient on daptomycin.  Nephrology has asked vascular surgery to evaluate fistula.  Urinalysis demonstrates no signs of infection.  Will repeat blood cultures today.    11/5/2017: Patient has no acute complaints.  Scheduled for redraw blood cultures in the a.m.  No further vegetation noted on echocardiogram.    11/4/2017: Dr. Adan has determined there is low suspicion for any type of endocarditis or vegetation.  Dr. Adan recommends a repeat transthoracic echocardiogram rather than a transesophageal echocardiogram.  Patient has no complaints today.  Patient does have severe  pneumonia.  Will recheck blood cultures on Monday.    11/3/2017: Patient has no acute complaints today.  Patient continues to demonstrate MRSA bacteremia despite appropriate antibiotic treatment.  Have spoken with Dr. Adan of cardiology who is agreed to evaluate the patient for possible ANTHONY.  No indwelling lines or catheters.  All other possible sources fistula.  Continue antibiotic treatment.  Attempt sputum culture if possible.  No fevers or chills, dialysis is today.  Patient tolerating by mouth intake.  Essentially complains of soreness.  Patient tachycardia much improved, heart rate is 101.  Respirations are normal with no fever.  Patient hypoxia improving, 94 on 1.5 L.    Chief Complaint/HPI:  This 72-year-old  female was admitted to hospitalist services secondary to a worsening level of altered mental status.  Patient also had fever.  CT of the head was within normal limits.  Chest x-ray demonstrated left lower lobe pneumonia.  Patient has been treated with empiric antibiotics.  Patient was found to have MRSA bacteremia, no vegetation demonstrated on echocardiogram.  Echocardiogram is transthoracic.  Though patient is in no apparent distress, she continues to be tachycardic and hypoxic.  Patient currently on vancomycin and Levaquin.  Patient will be changed to vancomycin and Zosyn per renal dosing.  EKG reordered.  Initial EKG demonstrated sinus tachycardia.  EKG rule out atrial fibrillation with RVR.  No chest pain, did have an elevated troponin, however troponin has been fairly consistent and is actually trending down.  Troponin likely secondary to ESRD and sepsis.  Have spoken with patient's nephrologist, Dr. Mireles, he is agreeable to CTA today.  We'll also order physical therapy and occupational therapy.    Review of Systems   Constitutional: Negative for chills and fever.   Respiratory: Negative for shortness of breath.    Cardiovascular: Negative for chest pain.   Gastrointestinal: Negative  for abdominal pain, nausea and vomiting.   Genitourinary: Negative for dysuria.   Neurological: Negative for dizziness, tremors, seizures, syncope, facial asymmetry, speech difficulty, light-headedness, numbness and headaches.      Review of symptoms is partially compromised by dementia      Objective    Temp:  [97.7 °F (36.5 °C)-98.1 °F (36.7 °C)] 98.1 °F (36.7 °C)  Heart Rate:  [77-89] 80  Resp:  [18] 18  BP: (111-127)/(50-56) 125/52    Physical Exam   Constitutional: She appears well-developed. No distress.   HENT:   Head: Normocephalic and atraumatic.   Eyes: Conjunctivae are normal. Pupils are equal, round, and reactive to light.   Cardiovascular: Normal rate and regular rhythm.    Pulmonary/Chest: Effort normal. No respiratory distress. She has no wheezes.   Decreased breath sounds at bases   Abdominal: Soft. Bowel sounds are normal. She exhibits no distension. There is no tenderness.   Neurological: She is alert.   Skin: Skin is warm and dry. She is not diaphoretic.     Results Review:  I have reviewed the labs, radiology results, and diagnostic studies.    Laboratory Data:     Results from last 7 days  Lab Units 11/08/17  0607 11/06/17 0746 11/05/17  0631   SODIUM mmol/L 138 141 140   POTASSIUM mmol/L 4.7 4.3 3.8   CHLORIDE mmol/L 98 99 102   CO2 mmol/L 28.0 24.0 25.0   BUN mg/dL 42* 46* 37*   CREATININE mg/dL 4.39* 4.47* 3.82*   GLUCOSE mg/dL 121* 168* 145*   CALCIUM mg/dL 8.2* 8.7 8.5   ANION GAP mmol/L 12.0 18.0* 13.0     Estimated Creatinine Clearance: 8 mL/min (by C-G formula based on Cr of 4.39).    Results from last 7 days  Lab Units 11/08/17  0607 11/06/17  0746 11/05/17  0631   PHOSPHORUS mg/dL 3.7 3.3 2.9           Results from last 7 days  Lab Units 11/08/17  0607 11/07/17  0734 11/06/17  0746 11/05/17  0631 11/04/17  0605   WBC 10*3/mm3 14.77* 17.56* 22.30* 20.31* 12.37*   HEMOGLOBIN g/dL 9.8* 11.0* 10.3* 9.8* 9.5*   HEMATOCRIT % 31.2* 34.4* 32.4* 31.2* 30.0*   PLATELETS 10*3/mm3 362 314 297 222  218           Culture Data:   Blood Culture   Date Value Ref Range Status   11/06/2017 No growth at 24 hours  Preliminary   11/06/2017 No growth at 24 hours  Preliminary     No results found for: URINECX  No results found for: RESPCX  No results found for: WOUNDCX  No results found for: STOOLCX  No components found for: BODYFLD    Radiology Data:   Imaging Results (last 24 hours)     ** No results found for the last 24 hours. **          I have reviewed the patient current medications.     Assessment/Plan     Hospital Problem List     HCAP (healthcare-associated pneumonia)      MRSA bacteremia      Plan: Continue daptomycin.  The blood cultures continue to be negative, begin discharge planning.          This document has been electronically signed by LOS Paredes on November 8, 2017 11:22 AM

## 2017-11-08 NOTE — PLAN OF CARE
Problem: Sepsis (Adult)  Goal: Signs and Symptoms of Listed Potential Problems Will be Absent or Manageable (Sepsis)  Outcome: Ongoing (interventions implemented as appropriate)    Problem: Fall Risk (Adult)  Goal: Absence of Falls  Outcome: Ongoing (interventions implemented as appropriate)    Problem: Renal Replacement, Continuous (Adult)  Goal: Signs and Symptoms of Listed Potential Problems Will be Absent or Manageable (Renal Replacement, Continuous)  Outcome: Ongoing (interventions implemented as appropriate)    Problem: Patient Care Overview (Adult)  Goal: Plan of Care Review  Outcome: Ongoing (interventions implemented as appropriate)  Goal: Adult Individualization and Mutuality  Outcome: Ongoing (interventions implemented as appropriate)  Goal: Discharge Needs Assessment  Outcome: Ongoing (interventions implemented as appropriate)    Problem: Pressure Ulcer (Adult)  Goal: Signs and Symptoms of Listed Potential Problems Will be Absent or Manageable (Pressure Ulcer)  Outcome: Ongoing (interventions implemented as appropriate)

## 2017-11-08 NOTE — PROGRESS NOTES
Nephrology Progress Note:    Seen and examined on HD.  Feels better.  Cough better.   Using thigh AVG.   Blood cultures so far neg.  On Dapto.     Patient Active Problem List   Diagnosis   • Constipation   • Arteriovenous fistula   • End stage renal failure on dialysis   • Controlled type 2 diabetes mellitus with chronic kidney disease on chronic dialysis, without long-term current use of insulin   • Coronary artery disease involving native coronary artery of native heart without angina pectoris   • Panlobular emphysema   • ESRD (end stage renal disease) on dialysis   • HCAP (healthcare-associated pneumonia)       Medications:    apixaban 2.5 mg Oral BID   aspirin 81 mg Oral Daily   atorvastatin 40 mg Oral Nightly   ceftriaxone 1 g Intravenous Q24H   DAPTOmycin (CUBICIN)  IV 6 mg/kg Intravenous Q48H   epoetin dagmar 6,000 Units Subcutaneous Once per day on Mon Wed Fri   famotidine 40 mg Oral Daily   hydrALAZINE 50 mg Oral TID   ipratropium-albuterol 3 mL Nebulization Q4H - RT   levothyroxine 88 mcg Oral Daily   mirtazapine 15 mg Oral Nightly   polyethylene glycol 17 g Oral BID   QUEtiapine 50 mg Oral Nightly   sertraline 50 mg Oral Daily        Vitals:    11/07/17 1534 11/07/17 1936 11/07/17 2300 11/08/17 0340   BP:  127/53  111/50   BP Location:  Right leg  Right leg   Patient Position:  Lying     Pulse: 77 85 86 89   Resp:  18  18   Temp:  97.9 °F (36.6 °C)  97.7 °F (36.5 °C)   TempSrc:  Temporal Artery   Temporal Artery    SpO2:  98%  95%   Weight:       Height:         I/O last 3 completed shifts:  In: 480 [P.O.:480]  Out: 50 [Urine:50]  I/O this shift:  In: 120 [P.O.:120]  Out: -     On examination:  General:Comfortable, alert and oriented, Lying down comfortably  HEENT: Pallor, no icterus, eye movements are normal.  Chest: Decreased breath sounds at the lung bases.  Coarse breath sounds at the left lung base. Occ wheeze  CVS: Heart sounds are irregular, no pericardial rub or gallop  Abdomen: Abdomen is soft,  nontender, normal bowel sounds, no tenderness  Extremities: No edema of the lower extremities.  Left thigh AV graft, no tenderness, good bruit  Neuro: No change in neurological status.  Grade 3 power both upper and lower extremities  Mentation: She is alert and oriented    Past medical illness, social history, medications, previous notes reviewed.       Laboratory results:      Recent Results (from the past 24 hour(s))   Renal Function Panel    Collection Time: 11/08/17  6:07 AM   Result Value Ref Range    Glucose 121 (H) 60 - 100 mg/dL    BUN 42 (H) 7 - 21 mg/dL    Creatinine 4.39 (H) 0.50 - 1.00 mg/dL    Sodium 138 137 - 145 mmol/L    Potassium 4.7 3.5 - 5.1 mmol/L    Chloride 98 95 - 110 mmol/L    CO2 28.0 22.0 - 31.0 mmol/L    Calcium 8.2 (L) 8.4 - 10.2 mg/dL    Albumin 3.30 (L) 3.40 - 4.80 g/dL    Phosphorus 3.7 2.4 - 4.4 mg/dL    Anion Gap 12.0 5.0 - 15.0 mmol/L    BUN/Creatinine Ratio 9.6 7.0 - 25.0    eGFR Non African Amer 10 (L) >60 mL/min/1.73   CBC Auto Differential    Collection Time: 11/08/17  6:07 AM   Result Value Ref Range    WBC 14.77 (H) 3.20 - 9.80 10*3/mm3    RBC 3.43 (L) 3.77 - 5.16 10*6/mm3    Hemoglobin 9.8 (L) 12.0 - 15.5 g/dL    Hematocrit 31.2 (L) 35.0 - 45.0 %    MCV 91.0 80.0 - 98.0 fL    MCH 28.6 26.5 - 34.0 pg    MCHC 31.4 31.4 - 36.0 g/dL    RDW 16.9 (H) 11.5 - 14.5 %    RDW-SD 56.2 (H) 36.4 - 46.3 fl    MPV 10.9 8.0 - 12.0 fL    Platelets 362 150 - 450 10*3/mm3    Neutrophil % 81.5 (H) 37.0 - 80.0 %    Lymphocyte % 8.4 (L) 10.0 - 50.0 %    Monocyte % 6.8 0.0 - 12.0 %    Eosinophil % 2.4 0.0 - 7.0 %    Basophil % 0.3 0.0 - 2.0 %    Immature Grans % 0.6 (H) 0.0 - 0.5 %    Neutrophils, Absolute 12.02 (H) 2.00 - 8.60 10*3/mm3    Lymphocytes, Absolute 1.24 0.60 - 4.20 10*3/mm3    Monocytes, Absolute 1.01 (H) 0.00 - 0.90 10*3/mm3    Eosinophils, Absolute 0.36 0.00 - 0.70 10*3/mm3    Basophils, Absolute 0.05 0.00 - 0.20 10*3/mm3    Immature Grans, Absolute 0.09 (H) 0.00 - 0.02 10*3/mm3    [facility]    Collection Time: 11/08/17  6:07 AM   Result Value Ref Range    Extra Tube Hold for add-ons.    ]    Imaging Results (last 24 hours)     ** No results found for the last 24 hours. **            Assessment:     End-stage renal disease  Left lower lobe pneumonia  Staphylococcal bacteremia  Altered mental status, improved  Essential hypertension  Febrile illness  Type 2 diabetes mellitus    Plan:     ESRD:   Patient is on hemodialysis on Monday Wednesday and Friday.   Seen and examined on HD.  ORders reviewed.      Staphylococcal bacteremia, left lower lobe pneumonia.  Echocardiogram was negative.  Repeat cultures were positive.  Now on Dapto and curretn cultures neg so far.   Vascular evaluated AVG    Pneumonia: On IV antibiotic.  Follow cultures.  Cough better.     Altered mental status, delirium: Improved.   imaging studies were negative.    Anemia of chronic kidney disease:   Hemoglobin and hematocrit levels are stable.  On 8000 units of Epogen with dialysis.    Essential hypertension:  Patient is on hydralazine.  Blood pressure is 110/76.  Continue to monitor.    Discussed with patient.    Chi Holden MD

## 2017-11-08 NOTE — THERAPY TREATMENT NOTE
Acute Care - Physical Therapy Treatment Note  [facility]     Patient Name: Efrem Roa  : 1945  MRN: [id number]  Today's Date: 2017  Onset of Illness/Injury or Date of Surgery Date: 10/30/17  Date of Referral to PT: 17  Referring Physician: LOS Rose    Admit Date: 10/30/2017    Visit Dx:    ICD-10-CM ICD-9-CM   1. HCAP (healthcare-associated pneumonia) J18.9 486   2. Sepsis, due to unspecified organism A41.9 038.9     995.91   3. NSTEMI (non-ST elevated myocardial infarction) I21.4 410.70   4. ESRD (end stage renal disease) N18.6 585.6   5. Essential hypertension I10 401.9   6. Hypertensive heart disease without heart failure I11.9 402.90   7. Oropharyngeal dysphagia R13.12 787.22   8. Impaired mobility and activities of daily living Z74.09 799.89   9. Impaired functional mobility, balance, gait, and endurance Z74.09 V49.89   10. Impaired mobility and ADLs Z74.09 799.89     Patient Active Problem List   Diagnosis   • Constipation   • Arteriovenous fistula   • End stage renal failure on dialysis   • Controlled type 2 diabetes mellitus with chronic kidney disease on chronic dialysis, without long-term current use of insulin   • Coronary artery disease involving native coronary artery of native heart without angina pectoris   • Panlobular emphysema   • ESRD (end stage renal disease) on dialysis   • HCAP (healthcare-associated pneumonia)               Adult Rehabilitation Note       17 1303 17 0839       Rehab Assessment/Intervention    Discipline physical therapy assistant  -JASON physical therapy assistant  -     Document Type therapy note (daily note)  -JASON therapy note (daily note)  -     Subjective Information agree to therapy;complains of;weakness;fatigue;pain;nausea/vomiting;swelling;dyspnea  -JASON agree to therapy;complains of;weakness  -     Patient Effort, Rehab Treatment  adequate  -CH     Precautions/Limitations  fall precautions;oxygen therapy device and  L/min   BP in R LE  -CH     Specific Treatment Considerations Pt would only agree to sitting EOB if PTA washed her hair  -JASON      Recorded by [JASON] Kalli Marina PTA [CH] Anay Nava PTA     Vital Signs    Pre Systolic BP Rehab  105  -CH     Pre Treatment Diastolic BP  49  -CH     Post Systolic BP Rehab  84  -CH     Post Treatment Diastolic BP  66  -CH     Pretreatment Heart Rate (beats/min)  89  -CH     Posttreatment Heart Rate (beats/min)  92  -CH     Pre SpO2 (%)  95  -CH     O2 Delivery Pre Treatment  room air  -CH     Post SpO2 (%)  94  -CH     O2 Delivery Post Treatment  room air  -CH     Pre Patient Position Supine  -JASON Supine  -CH     Intra Patient Position Sitting  -JASON Sitting  -CH     Post Patient Position Supine  -JASON Supine  -CH     Recorded by [JASON] Kalli Marina PTA [CH] Anay Nava PTA     Pain Assessment    Pain Assessment 0-10  -JASON 0-10  -CH     Pain Score 8  -JASON 9  -CH     Post Pain Score 8  -JASON 9  -CH     Pain Type Chronic pain  -JASON Chronic pain  -CH     Pain Location  Generalized  -     Pain Intervention(s)  Repositioned;Medication (See MAR)  -CH     Recorded by [JASON] aKlli Marina PTA [CH] Anay Nava PTA     Vision Assessment/Intervention    Visual Impairment  WFL with corrective lenses  -CH     Recorded by  [CH] Anay Nava PTA     Cognitive Assessment/Intervention    Current Cognitive/Communication Assessment functional  -JASON functional  -     Orientation Status oriented x 4  -JASON oriented x 4  -CH     Follows Commands/Answers Questions 75% of the time  -JASON 100% of the time  -     Personal Safety WNL/WFL  -JASON WNL/WFL  -CH     Personal Safety Interventions fall prevention program maintained  -JASON fall prevention program maintained;gait belt;muscle strengthening facilitated;nonskid shoes/slippers when out of bed;supervised activity  -CH     Recorded by [JASON] Kalli Marina PTA [CH] Anay Nava PTA     Bed Mobility, Assessment/Treatment    Bed Mobility,  Assistive Device bed rails;head of bed elevated  - bed rails;head of bed elevated  -     Bed Mobility, Roll Left, [place] not tested  - not tested  -     Bed Mobility, Roll Right, [place] not tested  - supervision required  -     Bed Mobility, Scoot/Bridge, [place] moderate assist (50% patient effort);dependent (less than 25% patient effort)  - dependent (less than 25% patient effort);2 person assist required   with drawsheet to HOB  -     Bed Mob, Supine to Sit, [place] supervision required  - supervision required  -     Bed Mob, Sit to Supine, [place] supervision required  - supervision required  -     Bed Mobility, Comment sat EOB for ~ 30+ min for hair washing  - Once pt sat EOB, pt began to see bugs on gown and bed and was trying to kill them, PTA and nursing ensured pt that there were no bugs on the gown or bed. Pt eventually was able to began ther ex with no more issues   -CH     Recorded by [JASON] Kalli Marina PTA [CH] Anay Nava PTA     Transfer Assessment/Treatment    Transfers, Sit-Stand [place]  other (see comments)   pt deferred  -     Recorded by  [CH] Anay Nava PTA     Gait Assessment/Treatment    Gait, [place] Level  not tested  -     Recorded by  [CH] Anay Nava PTA     Therapy Exercises    Bilateral Lower Extremities AROM:;sitting;ankle pumps/circles;hip flexion;LAQ;glut sets  - AROM:;20 reps;sitting;ankle pumps/circles;glut sets;hip abduction/adduction;hip flexion;LAQ  -     Trunk Exercises  15 reps;supine;bridging  -     Recorded by [JASON] Kalli Marina PTA [CH] Anay Nava PTA     Positioning and Restraints    Pre-Treatment Position in bed  - in bed  -     Post Treatment Position bed  - bed  -     In Bed supine;encouraged to call for assist;exit alarm on;call light within reach  - supine;call light within reach;encouraged to call for assist;exit alarm on;side rails up x3  -      Recorded by [JASON] Kalli Marina, PTA [CH] Anay Nava, PTA       User Key  (r) = Recorded By, (t) = Taken By, (c) = Cosigned By    Initials Name Effective Dates    JASON Kalli Marina, PTA 10/17/16 -     CH Anay Nava, PTA 10/17/16 -                 IP PT Goals       11/08/17 1303 11/07/17 0925 11/04/17 0939    Transfer Training PT LTG    Transfer Training PT  LTG, Date Goal Reviewed 11/08/17  -JASON 11/07/17  -CH 11/04/17  -    Transfer Training PT LTG, Outcome goal ongoing  - goal ongoing  - goal ongoing  -    Gait Training PT LTG    Gait Training Goal PT LTG, Date Goal Reviewed 11/08/17  -JASON 11/07/17  -CH 11/04/17  -    Gait Training Goal PT LTG, Outcome goal ongoing  - goal ongoing  - goal ongoing  -    Patient Education PT LTG    Patient Education PT LTG, Date Goal Reviewed 11/08/17  -JASON 11/07/17  -CH 11/04/17  -    Patient Education PT LTG Outcome goal ongoing  - goal ongoing  - goal ongoing  -      11/03/17 1321 11/02/17 1713       Transfer Training PT LTG    Transfer Training PT LTG, Date Established  11/02/17  -     Transfer Training PT LTG, Time to Achieve  by discharge  -KORI     Transfer Training PT LTG, Activity Type  bed to chair /chair to bed;sit to stand/stand to sit  -KORI     Transfer Training PT LTG, [place] Level  conditional independence  -KORI     Transfer Training PT  LTG, Date Goal Reviewed 11/03/17  -JCA      Transfer Training PT LTG, Outcome goal ongoing  -A goal ongoing  -KORI     Gait Training PT LTG    Gait Training Goal PT LTG, Date Established  11/02/17  -KORI     Gait Training Goal PT LTG, Time to Achieve  by discharge  -KORI     Gait Training Goal PT LTG, [place] Level  conditional independence  -KORI     Gait Training Goal PT LTG, Distance to Achieve  150ft;O2 sats will not drop below 92%  -KORI     Gait Training Goal PT LTG, Date Goal Reviewed 11/03/17  -JCA      Gait Training Goal PT LTG, Outcome goal ongoing  -JCA goal ongoing  -KORI     Strength Goal PT  LTG    Strength Goal PT LTG, Date Established  11/02/17  -     Strength Goal PT LTG, Time to Achieve  by discharge  -     Strength Goal PT LTG, Measure to Achieve  Pt will tolerate 15 reps of AROM exercises in sitting  -     Strength Goal PT LTG, Date Goal Reviewed 11/03/17  -A      Strength Goal PT LTG, Outcome goal met  -A goal ongoing  -     Patient Education PT LTG    Patient Education PT LTG, Date Established  11/02/17  -     Patient Education PT LTG, Time to Achieve  by discharge  -     Patient Education PT LTG, Education Type  gait;transfers;home safety  -     Patient Education PT LTG, Date Goal Reviewed 11/03/17  -A      Patient Education PT LTG Outcome goal ongoing  -A goal ongoing  -       User Key  (r) = Recorded By, (t) = Taken By, (c) = Cosigned By    Initials Name Provider Type    KORI Trupti Andrade, PT Physical Therapist    JASON Marina, PTA Physical Therapy Assistant    CONNIE Malik, PTA Physical Therapy Assistant    JACKLYN Nava, PTA Physical Therapy Assistant          Physical Therapy Education     Title: PT OT SLP Therapies (Active)     Topic: Physical Therapy (Active)     Point: Mobility training (Active)    Learning Progress Summary    Learner Readiness Method Response Comment Documented by Status   Patient Acceptance E NR pt edu on benefits of OOB  11/03/17 1409 Active                      User Key     Initials Effective Dates Name Provider Type Discipline     10/17/16 -  Nemesio Malik, NIYAH Physical Therapy Assistant PT                    PT Recommendation and Plan  Anticipated Discharge Disposition: skilled nursing facility  Planned Therapy Interventions: balance training, bed mobility training, gait training, patient/family education, strengthening, transfer training  PT Frequency: other (see comments) (5-14 times per week)  Plan of Care Review  Plan Of Care Reviewed With: patient  Progress: progress toward functional goals is gradual  Outcome  Summary/Follow up Plan: pt has limited participation with little progression.  No New goals met at this time.           Outcome Measures       11/08/17 1303 11/07/17 0839       How much help from another person do you currently need...    Turning from your back to your side while in flat bed without using bedrails? 3  -JASON 3  -CH     Moving from lying on back to sitting on the side of a flat bed without bedrails? 3  -JASON 3  -CH     Moving to and from a bed to a chair (including a wheelchair)? 2  -JASON 2  -CH     Standing up from a chair using your arms (e.g., wheelchair, bedside chair)? 2  -JASON 2  -CH     Climbing 3-5 steps with a railing? 1  -JASON 1  -CH     To walk in hospital room? 2  -JASON 2  -CH     AM-PAC 6 Clicks Score 13  -JASON 13  -CH     Functional Assessment    Outcome Measure Options  AM-PAC 6 Clicks Basic Mobility (PT)  -       User Key  (r) = Recorded By, (t) = Taken By, (c) = Cosigned By    Initials Name Provider Type    JASON Marina PTA Physical Therapy Assistant     Anay Nava PTA Physical Therapy Assistant           Time Calculation:         PT Charges       11/08/17 1409          Time Calculation    Start Time 1303  -      Stop Time 1342  -      Time Calculation (min) 39 min  -      Time Calculation- PT    Total Timed Code Minutes- PT 39 minute(s)  -        User Key  (r) = Recorded By, (t) = Taken By, (c) = Cosigned By    Initials Name Provider Type    JASON Marina PTA Physical Therapy Assistant          Therapy Charges for Today     Code Description Service Date Service Provider Modifiers Qty    99327067986  PT THERAPEUTIC ACT EA 15 MIN 11/8/2017 Kalli Marina PTA GP 2    20343181728 HC PT THER PROC EA 15 MIN 11/8/2017 Kalli Marina PTA GP 1          PT G-Codes  PT Professional Judgement Used?: Yes  Outcome Measure Options: AM-PAC 6 Clicks Basic Mobility (PT)  Score: 18  Functional Limitation: Mobility: Walking and moving around  Mobility: Walking and Moving Around  Current Status (): At least 40 percent but less than 60 percent impaired, limited or restricted  Mobility: Walking and Moving Around Goal Status (): At least 1 percent but less than 20 percent impaired, limited or restricted    Kalli Marina, PTA  11/8/2017

## 2017-11-09 LAB
ALBUMIN SERPL-MCNC: 3.3 G/DL (ref 3.4–4.8)
ANION GAP SERPL CALCULATED.3IONS-SCNC: 11 MMOL/L (ref 5–15)
BACTERIA BLD CULT: ABNORMAL
BASOPHILS # BLD AUTO: 0.07 10*3/MM3 (ref 0–0.2)
BASOPHILS NFR BLD AUTO: 0.6 % (ref 0–2)
BUN BLD-MCNC: 24 MG/DL (ref 7–21)
BUN/CREAT SERPL: 8 (ref 7–25)
CALCIUM SPEC-SCNC: 8.2 MG/DL (ref 8.4–10.2)
CHLORIDE SERPL-SCNC: 99 MMOL/L (ref 95–110)
CO2 SERPL-SCNC: 28 MMOL/L (ref 22–31)
CREAT BLD-MCNC: 3 MG/DL (ref 0.5–1)
DEPRECATED RDW RBC AUTO: 57.1 FL (ref 36.4–46.3)
EOSINOPHIL # BLD AUTO: 0.29 10*3/MM3 (ref 0–0.7)
EOSINOPHIL NFR BLD AUTO: 2.4 % (ref 0–7)
ERYTHROCYTE [DISTWIDTH] IN BLOOD BY AUTOMATED COUNT: 17 % (ref 11.5–14.5)
GFR SERPL CREATININE-BSD FRML MDRD: 15 ML/MIN/1.73 (ref 39–90)
GLUCOSE BLD-MCNC: 115 MG/DL (ref 60–100)
HCT VFR BLD AUTO: 32.5 % (ref 35–45)
HGB BLD-MCNC: 10.5 G/DL (ref 12–15.5)
IMM GRANULOCYTES # BLD: 0.05 10*3/MM3 (ref 0–0.02)
IMM GRANULOCYTES NFR BLD: 0.4 % (ref 0–0.5)
LYMPHOCYTES # BLD AUTO: 1.08 10*3/MM3 (ref 0.6–4.2)
LYMPHOCYTES NFR BLD AUTO: 9.1 % (ref 10–50)
MCH RBC QN AUTO: 29.9 PG (ref 26.5–34)
MCHC RBC AUTO-ENTMCNC: 32.3 G/DL (ref 31.4–36)
MCV RBC AUTO: 92.6 FL (ref 80–98)
MONOCYTES # BLD AUTO: 1.06 10*3/MM3 (ref 0–0.9)
MONOCYTES NFR BLD AUTO: 8.9 % (ref 0–12)
NEUTROPHILS # BLD AUTO: 9.36 10*3/MM3 (ref 2–8.6)
NEUTROPHILS NFR BLD AUTO: 78.6 % (ref 37–80)
NRBC BLD MANUAL-RTO: 0 /100 WBC (ref 0–0)
PHOSPHATE SERPL-MCNC: 4.1 MG/DL (ref 2.4–4.4)
PLATELET # BLD AUTO: 269 10*3/MM3 (ref 150–450)
PMV BLD AUTO: 11.1 FL (ref 8–12)
POTASSIUM BLD-SCNC: 4.9 MMOL/L (ref 3.5–5.1)
RBC # BLD AUTO: 3.51 10*6/MM3 (ref 3.77–5.16)
SODIUM BLD-SCNC: 138 MMOL/L (ref 137–145)
WBC NRBC COR # BLD: 11.91 10*3/MM3 (ref 3.2–9.8)

## 2017-11-09 PROCEDURE — 80069 RENAL FUNCTION PANEL: CPT | Performed by: PHYSICIAN ASSISTANT

## 2017-11-09 PROCEDURE — 97535 SELF CARE MNGMENT TRAINING: CPT

## 2017-11-09 PROCEDURE — 85025 COMPLETE CBC W/AUTO DIFF WBC: CPT | Performed by: INTERNAL MEDICINE

## 2017-11-09 PROCEDURE — 25010000002 CEFTRIAXONE PER 250 MG: Performed by: INTERNAL MEDICINE

## 2017-11-09 PROCEDURE — 97530 THERAPEUTIC ACTIVITIES: CPT

## 2017-11-09 PROCEDURE — 97110 THERAPEUTIC EXERCISES: CPT

## 2017-11-09 RX ORDER — HYDRALAZINE HYDROCHLORIDE 25 MG/1
25 TABLET, FILM COATED ORAL 3 TIMES DAILY
Status: DISCONTINUED | OUTPATIENT
Start: 2017-11-09 | End: 2017-11-15

## 2017-11-09 RX ADMIN — HYDRALAZINE HYDROCHLORIDE 25 MG: 25 TABLET ORAL at 17:58

## 2017-11-09 RX ADMIN — FAMOTIDINE 40 MG: 40 TABLET ORAL at 08:35

## 2017-11-09 RX ADMIN — ASPIRIN 81 MG: 81 TABLET, COATED ORAL at 08:35

## 2017-11-09 RX ADMIN — APIXABAN 2.5 MG: 2.5 TABLET, FILM COATED ORAL at 17:56

## 2017-11-09 RX ADMIN — POLYETHYLENE GLYCOL 3350 17 G: 17 POWDER, FOR SOLUTION ORAL at 08:34

## 2017-11-09 RX ADMIN — SERTRALINE HYDROCHLORIDE 50 MG: 50 TABLET ORAL at 08:36

## 2017-11-09 RX ADMIN — ATORVASTATIN CALCIUM 40 MG: 40 TABLET, FILM COATED ORAL at 20:41

## 2017-11-09 RX ADMIN — MIRTAZAPINE 15 MG: 15 TABLET, FILM COATED ORAL at 20:50

## 2017-11-09 RX ADMIN — QUETIAPINE FUMARATE 50 MG: 25 TABLET, FILM COATED ORAL at 20:41

## 2017-11-09 RX ADMIN — APIXABAN 2.5 MG: 2.5 TABLET, FILM COATED ORAL at 08:34

## 2017-11-09 RX ADMIN — HYDROCODONE BITARTRATE AND ACETAMINOPHEN 1 TABLET: 7.5; 325 TABLET ORAL at 08:34

## 2017-11-09 RX ADMIN — HYDRALAZINE HYDROCHLORIDE 50 MG: 50 TABLET ORAL at 08:35

## 2017-11-09 RX ADMIN — CEFTRIAXONE SODIUM 1 G: 1 INJECTION, POWDER, FOR SOLUTION INTRAMUSCULAR; INTRAVENOUS at 15:17

## 2017-11-09 RX ADMIN — LEVOTHYROXINE SODIUM 88 MCG: 88 TABLET ORAL at 06:23

## 2017-11-09 NOTE — THERAPY TREATMENT NOTE
Acute Care - Physical Therapy Treatment Note  [facility]     Patient Name: Efrem Roa  : 1945  MRN: [id number]  Today's Date: 2017  Onset of Illness/Injury or Date of Surgery Date: 10/30/17  Date of Referral to PT: 17  Referring Physician: LOS Rose    Admit Date: 10/30/2017    Visit Dx:    ICD-10-CM ICD-9-CM   1. HCAP (healthcare-associated pneumonia) J18.9 486   2. Sepsis, due to unspecified organism A41.9 038.9     995.91   3. NSTEMI (non-ST elevated myocardial infarction) I21.4 410.70   4. ESRD (end stage renal disease) N18.6 585.6   5. Essential hypertension I10 401.9   6. Hypertensive heart disease without heart failure I11.9 402.90   7. Oropharyngeal dysphagia R13.12 787.22   8. Impaired mobility and activities of daily living Z74.09 799.89   9. Impaired functional mobility, balance, gait, and endurance Z74.09 V49.89   10. Impaired mobility and ADLs Z74.09 799.89     Patient Active Problem List   Diagnosis   • Constipation   • Arteriovenous fistula   • End stage renal failure on dialysis   • Controlled type 2 diabetes mellitus with chronic kidney disease on chronic dialysis, without long-term current use of insulin   • Coronary artery disease involving native coronary artery of native heart without angina pectoris   • Panlobular emphysema   • ESRD (end stage renal disease) on dialysis   • HCAP (healthcare-associated pneumonia)               Adult Rehabilitation Note       17 0931 17 0803 17 1303    Rehab Assessment/Intervention    Discipline physical therapy assistant  -TA occupational therapy assistant  -BB physical therapy assistant  -JASON    Document Type therapy note (daily note)  -TA therapy note (daily note)  -BB therapy note (daily note)  -JASON    Subjective Information agree to therapy  -TA agree to therapy  -BB agree to therapy;complains of;weakness;fatigue;pain;nausea/vomiting;swelling;dyspnea  -JASON    Patient Effort, Rehab Treatment adequate  -TA  adequate  -BB     Precautions/Limitations fall precautions  -TA fall precautions  -BB     Specific Treatment Considerations   Pt would only agree to sitting EOB if PTA washed her hair  -JASON    Recorded by [TA] Ny Martinez PTA [BB] JACE Phillips/BERNADETTE [JASON] Kalli Marina PTA    Vital Signs    Pre Systolic BP Rehab  112  -BB     Pre Treatment Diastolic BP  53  -BB     Pretreatment Heart Rate (beats/min) 94  -TA 78  -BB     Posttreatment Heart Rate (beats/min) 98  -TA 76  -BB     Pre SpO2 (%) 97  -TA 96  -BB     O2 Delivery Pre Treatment room air  -TA      Intra SpO2 (%)  97  -BB     Post SpO2 (%) 98  -TA 96  -BB     Pre Patient Position Supine  -TA --   long sitting  -BB Supine  -JASON    Intra Patient Position   Sitting  -JASON    Post Patient Position Supine  -TA --   long sitting  -BB Supine  -JASON    Recorded by [TA] Ny Martinez PTA [BB] MIHIR PhillipsA/L [JASON] Kalli Marina PTA    Pain Assessment    Pain Assessment 0-10  -TA 0-10  -BB 0-10  -JASON    Pain Score 9  -TA 4  -BB 8  -JASON    Post Pain Score 9  -TA 4  -BB 8  -JASON    Pain Type Chronic pain  -TA Chronic pain  -BB Chronic pain  -JASON    Pain Location Generalized  -TA Generalized  -BB     Pain Intervention(s)  Repositioned  -BB     Recorded by [TA] Ny Martinez PTA [BB] JACE Phillips/BERNADETTE [JASON] Kalli Marina PTA    Cognitive Assessment/Intervention    Current Cognitive/Communication Assessment functional  -TA functional  -BB functional  -JASON    Orientation Status oriented x 4  -TA oriented x 4  -BB oriented x 4  -JASON    Follows Commands/Answers Questions 75% of the time  -TA 75% of the time  -BB 75% of the time  -JASON    Personal Safety WNL/WFL  -TA  WNL/WFL  -JASON    Personal Safety Interventions fall prevention program maintained;gait belt;nonskid shoes/slippers when out of bed  -TA fall prevention program maintained  -BB fall prevention program maintained  -JASON    Recorded by [TA] Ny Martinez PTA [BB] MIHIR PhillipsA/BERNADETTE [JASON]  Kalli Marina PTA    Bed Mobility, Assessment/Treatment    Bed Mobility, Assistive Device bed rails;head of bed elevated  -TA bed rails;head of bed elevated  -BB bed rails;head of bed elevated  -JASON    Bed Mobility, Roll Left, [place]   not tested  -JASON    Bed Mobility, Roll Right, [place]   not tested  -JASON    Bed Mobility, Scoot/Bridge, [place] minimum assist (75% patient effort)  -TA  moderate assist (50% patient effort);dependent (less than 25% patient effort)  -JASON    Bed Mob, Supine to Sit, [place] supervision required  -TA supervision required  -BB supervision required  -JASON    Bed Mob, Sit to Supine, [place] supervision required  -TA supervision required  -BB supervision required  -JASON    Bed Mob, Sidelying to Sit, [place]  minimum assist (75% patient effort)  -BB     Bed Mobility, Comment  Pt sat EOB for ~ 30 min for ADL  -BB sat EOB for ~ 30+ min for hair washing  -JASON    Recorded by [TA] Ny Martinez PTA [BB] JACE Phillips/BERNADETTE [JASON] Kalli Marina PTA    Transfer Assessment/Treatment    Transfers, Sit-Stand [place] minimum assist (75% patient effort)  -TA      Transfers, Stand-Sit [place] contact guard assist  -TA      Transfers, Sit-Stand-Sit, Assist Device rolling walker  -TA      Transfer, Comment pt stood ~1 minute with CGA & RW, pt unable to take step  -TA      Recorded by [TA] Ny Martinez PTA      Upper Body Bathing Assessment/Training    UB Bathing Assess/Train, Position  sitting;edge of bed  -BB     UB Bathing Assess/Train, [place] Level  stand by assist;set up required  -BB     Recorded by  [BB] JACE Phillips/L     Lower Body Bathing Assessment/Training    LB Bathing Assess/Train, Position  edge of bed  -BB     LB Bathing Assess/Train, [place] Level  set up required;stand by assist  -BB     Recorded by  [BB] JACE Phillips/L     Upper Body Dressing Assessment/Training    UB Dressing Assess/Train, Clothing Type   doffing:;donning:;hospital gown   robe  -BB     UB Dressing Assess/Train, Position  edge of bed;sitting  -BB     UB Dressing Assess/Train, [place]  contact guard assist  -BB     Recorded by  [BB] JACE Phillips/L     Lower Body Dressing Assessment/Training    LB Dressing Assess/Train, Clothing Type  donning:;doffing:;slipper socks  -BB     LB Dressing Assess/Train, Position  edge of bed;sitting  -BB     LB Dressing Assess/Train, [place]  supervision required;set up required  -BB     Recorded by  [BB] NIMCO Phillips     Grooming Assessment/Training    Grooming Assess/Train, [place] Level  set up required;supervision required  -BB     Grooming Assess/Train, Comment  washed hads, face, applied deoderant, lotion, combed hair  -BB     Recorded by  [BB] JACE Phillips/L     Therapy Exercises    Bilateral Lower Extremities AROM:;15 reps;sitting;ankle pumps/circles;glut sets;hip abduction/adduction;hip flexion;LAQ  -TA  AROM:;sitting;ankle pumps/circles;hip flexion;LAQ;glut sets  -JASON    Recorded by [TA] Ny Martinez PTA  [JASON] Kalli Marina, NIYAH    Positioning and Restraints    Pre-Treatment Position in bed  -TA in bed  -BB in bed  -JASON    Post Treatment Position bed  -TA bed  -BB bed  -JASON    In Bed supine;call light within reach  -TA supine;call light within reach;encouraged to call for assist;exit alarm on  -BB supine;encouraged to call for assist;exit alarm on;call light within reach  -JASON    Recorded by [TA] Ny Martinez PTA [BB] JACE Phillips/BERNADETTE [JASON] Kalli Marina PTA      11/07/17 0839          Rehab Assessment/Intervention    Discipline physical therapy assistant  -      Document Type therapy note (daily note)  -      Subjective Information agree to therapy;complains of;weakness  -CH      Patient Effort, Rehab Treatment adequate  -CH      Precautions/Limitations fall precautions;oxygen therapy device and L/min   BP in R LE  -CH      Recorded by [CH] Anay  LAUREN Nava PTA      Vital Signs    Pre Systolic BP Rehab 105  -CH      Pre Treatment Diastolic BP 49  -CH      Post Systolic BP Rehab 84  -CH      Post Treatment Diastolic BP 66  -CH      Pretreatment Heart Rate (beats/min) 89  -CH      Posttreatment Heart Rate (beats/min) 92  -CH      Pre SpO2 (%) 95  -CH      O2 Delivery Pre Treatment room air  -CH      Post SpO2 (%) 94  -CH      O2 Delivery Post Treatment room air  -CH      Pre Patient Position Supine  -CH      Intra Patient Position Sitting  -CH      Post Patient Position Supine  -CH      Recorded by [CH] Anay Nava PTA      Pain Assessment    Pain Assessment 0-10  -      Pain Score 9  -CH      Post Pain Score 9  -CH      Pain Type Chronic pain  -      Pain Location Generalized  -      Pain Intervention(s) Repositioned;Medication (See MAR)  -      Recorded by [CH] Anay Nava PTA      Vision Assessment/Intervention    Visual Impairment WFL with corrective lenses  -      Recorded by [CH] Anay Nava PTA      Cognitive Assessment/Intervention    Current Cognitive/Communication Assessment functional  -      Orientation Status oriented x 4  -      Follows Commands/Answers Questions 100% of the time  -      Personal Safety WNL/WFL  -      Personal Safety Interventions fall prevention program maintained;gait belt;muscle strengthening facilitated;nonskid shoes/slippers when out of bed;supervised activity  -      Recorded by [CH] Anay Nava PTA      Bed Mobility, Assessment/Treatment    Bed Mobility, Assistive Device bed rails;head of bed elevated  -      Bed Mobility, Roll Left, [place] not tested  -      Bed Mobility, Roll Right, [place] supervision required  -      Bed Mobility, Scoot/Bridge, [place] dependent (less than 25% patient effort);2 person assist required   with drawsheet to HOB  -      Bed Mob, Supine to Sit, [place] supervision required  -      Bed Mob, Sit to Supine, [place]  supervision required  -      Bed Mobility, Comment Once pt sat EOB, pt began to see bugs on gown and bed and was trying to kill them, PTA and nursing ensured pt that there were no bugs on the gown or bed. Pt eventually was able to began ther ex with no more issues   -CH      Recorded by [CH] Anay Nava PTA      Transfer Assessment/Treatment    Transfers, Sit-Stand [place] other (see comments)   pt deferred  -CH      Recorded by [CH] Anay Nava PTA      Gait Assessment/Treatment    Gait, [place] Level not tested  -CH      Recorded by [CH] Anay Nava PTA      Therapy Exercises    Bilateral Lower Extremities AROM:;20 reps;sitting;ankle pumps/circles;glut sets;hip abduction/adduction;hip flexion;LAQ  -CH      Trunk Exercises 15 reps;supine;bridging  -CH      Recorded by [] Anay Nava PTA      Positioning and Restraints    Pre-Treatment Position in bed  -CH      Post Treatment Position bed  -CH      In Bed supine;call light within reach;encouraged to call for assist;exit alarm on;side rails up x3  -CH      Recorded by [CH] Anay Nava PTA        User Key  (r) = Recorded By, (t) = Taken By, (c) = Cosigned By    Initials Name Effective Dates    TA Ny Martinez, \[facility] [place] [facility]\"" 10/17/16 -     JASON Kalli Marina, \[facility] [place] [facility]\"" 10/17/16 -      Anay Nava, \[facility] [place] [facility]\"" 10/17/16 -      Juliette Plummer, MCCORMICK/L 10/17/16 -                 IP PT Goals       11/08/17 1303 11/07/17 0925 11/04/17 0939    Transfer Training PT LTG    Transfer Training PT  LTG, Date Goal Reviewed 11/08/17  -JASON 11/07/17  - 11/04/17  -    Transfer Training PT LTG, Outcome goal ongoing  - goal ongoing  - goal ongoing  -    Gait Training PT LTG    Gait Training Goal PT LTG, Date Goal Reviewed 11/08/17  -JASON 11/07/17  - 11/04/17  -    Gait Training Goal PT LTG, Outcome goal ongoing  - goal ongoing  - goal ongoing  -    Patient Education PT LTG    Patient Education PT LTG, Date Goal Reviewed 11/08/17  -  11/07/17  - 11/04/17  -    Patient Education PT LTG Outcome goal ongoing  -JASON goal ongoing  - goal ongoing  -JASON      11/03/17 1321 11/02/17 1713       Transfer Training PT LTG    Transfer Training PT LTG, Date Established  11/02/17  -     Transfer Training PT LTG, Time to Achieve  by discharge  -     Transfer Training PT LTG, Activity Type  bed to chair /chair to bed;sit to stand/stand to sit  -     Transfer Training PT LTG, [place] Level  conditional independence  -     Transfer Training PT  LTG, Date Goal Reviewed 11/03/17  -A      Transfer Training PT LTG, Outcome goal ongoing  -A goal ongoing  -     Gait Training PT LTG    Gait Training Goal PT LTG, Date Established  11/02/17  -     Gait Training Goal PT LTG, Time to Achieve  by discharge  -     Gait Training Goal PT LTG, [place] Level  conditional independence  -     Gait Training Goal PT LTG, Distance to Achieve  150ft;O2 sats will not drop below 92%  -     Gait Training Goal PT LTG, Date Goal Reviewed 11/03/17  -A      Gait Training Goal PT LTG, Outcome goal ongoing  -A goal ongoing  -     Strength Goal PT LTG    Strength Goal PT LTG, Date Established  11/02/17  -     Strength Goal PT LTG, Time to Achieve  by discharge  -     Strength Goal PT LTG, Measure to Achieve  Pt will tolerate 15 reps of AROM exercises in sitting  -     Strength Goal PT LTG, Date Goal Reviewed 11/03/17  -A      Strength Goal PT LTG, Outcome goal met  -A goal ongoing  -     Patient Education PT LTG    Patient Education PT LTG, Date Established  11/02/17  -     Patient Education PT LTG, Time to Achieve  by discharge  -     Patient Education PT LTG, Education Type  gait;transfers;home safety  -     Patient Education PT LTG, Date Goal Reviewed 11/03/17  -A      Patient Education PT LTG Outcome goal ongoing  -[facility] goal [facility]       User Key  (r) = Recorded By, (t) = Taken By, (c) = Cosigned By    Initials Name Provider  Type    KORI Trupti Andrade, PT Physical Therapist    JASON Marina, PTA Physical Therapy Assistant    CONNIE Malik, PTA Physical Therapy Assistant    JACKLYN Nava, NIYAH Physical Therapy Assistant          Physical Therapy Education     Title: PT OT SLP Therapies (Active)     Topic: Physical Therapy (Active)     Point: Mobility training (Active)    Learning Progress Summary    Learner Readiness Method Response Comment Documented by Status   Patient Acceptance E NR pt edu on benefits of OOB  11/03/17 1409 Active                      User Key     Initials Effective Dates Name Provider Type Discipline     10/17/16 -  Nemesio Malik PTA Physical Therapy Assistant PT                    PT Recommendation and Plan  Anticipated Discharge Disposition: skilled nursing facility  Planned Therapy Interventions: balance training, bed mobility training, gait training, patient/family education, strengthening, transfer training  PT Frequency: other (see comments) (5-14 times per week)  Plan of Care Review  Outcome Summary/Follow up Plan: pt sup<>sit with SBA, sit<>stand with min assist & RW for ~1 minute, pt would benefit from 24/7 care & continued PT services          Outcome Measures       11/09/17 1100 11/08/17 1303 11/07/17 0839    How much help from another person do you currently need...    Turning from your back to your side while in flat bed without using bedrails? 3  -TA 3  -JASON 3  -CH    Moving from lying on back to sitting on the side of a flat bed without bedrails? 3  -TA 3  -JASON 3  -CH    Moving to and from a bed to a chair (including a wheelchair)? 2  -TA 2  -JASON 2  -CH    Standing up from a chair using your arms (e.g., wheelchair, bedside chair)? 3  -TA 2  -JASON 2  -CH    Climbing 3-5 steps with a railing? 1  -TA 1  -JASON 1  -CH    To walk in hospital room? 2  -TA 2  -JASON 2  -CH    AM-PAC 6 Clicks Score 14  -TA 13  -JASON 13  -CH    Functional Assessment    Outcome Measure Options AM-PAC 6 Clicks Basic  Mobility (PT)  -TA  AM-PAC 6 Clicks Basic Mobility (PT)  -      User Key  (r) = Recorded By, (t) = Taken By, (c) = Cosigned By    Initials Name Provider Type    REAGAN Martinez PTA Physical Therapy Assistant    JASON Marina, [facility] Physical Therapy Assistant    JACKLYN Nava, PTA Physical Therapy Assistant           Time Calculation:         PT Charges       11/09/17 1152          Time Calculation    Start Time 0931  -TA      Stop Time 1006  -TA      Time Calculation (min) 35 min  -TA      Time Calculation- PT    Total Timed Code Minutes- PT 35 minute(s)  -TA        User Key  (r) = Recorded By, (t) = Taken By, (c) = Cosigned By    Initials Name Provider Type    REAGAN Martinez PTA Physical Therapy Assistant          Therapy Charges for Today     Code Description Service Date Service Provider Modifiers Qty    72731292276 HC PT THER PROC EA 15 MIN 11/9/2017 Ny Martinez PTA GP 1    09864013302 HC PT THERAPEUTIC ACT EA 15 MIN 11/9/2017 Ny Martinez PTA GP 1          PT G-Codes  PT Professional Judgement Used?: Yes  Outcome Measure Options: AM-PAC 6 Clicks Basic Mobility (PT)  Score: 18  Functional Limitation: Mobility: Walking and moving around  Mobility: Walking and Moving Around Current Status (): At least 40 percent but less than 60 percent impaired, limited or restricted  Mobility: Walking and Moving Around Goal Status (): At least 1 percent but less than 20 percent impaired, limited or restricted    Ny Martinez PTA  11/9/2017

## 2017-11-09 NOTE — PROGRESS NOTES
Nephrology Progress Note:    Had HD yesterday. Fatigue.   States some abd discomfort.   Took medication for constipation.  No SOA.  Afebrile.   On IV Dapto.      Patient Active Problem List   Diagnosis   • Constipation   • Arteriovenous fistula   • End stage renal failure on dialysis   • Controlled type 2 diabetes mellitus with chronic kidney disease on chronic dialysis, without long-term current use of insulin   • Coronary artery disease involving native coronary artery of native heart without angina pectoris   • Panlobular emphysema   • ESRD (end stage renal disease) on dialysis   • HCAP (healthcare-associated pneumonia)       Medications:    apixaban 2.5 mg Oral BID   aspirin 81 mg Oral Daily   atorvastatin 40 mg Oral Nightly   ceftriaxone 1 g Intravenous Q24H   DAPTOmycin (CUBICIN)  IV 6 mg/kg Intravenous Q48H   epoetin dagmar 6,000 Units Subcutaneous Once per day on Mon Wed Fri   famotidine 40 mg Oral Daily   hydrALAZINE 50 mg Oral TID   levothyroxine 88 mcg Oral Daily   mirtazapine 15 mg Oral Nightly   polyethylene glycol 17 g Oral BID   QUEtiapine 50 mg Oral Nightly   sertraline 50 mg Oral Daily        Vitals:    11/08/17 2300 11/09/17 0623 11/09/17 0746 11/09/17 0802   BP:  116/55 127/59    BP Location:  Right arm Right leg    Patient Position:  Lying Lying    Pulse: 78 91 94 92   Resp:  18 16    Temp:  97.7 °F (36.5 °C) 97.3 °F (36.3 °C)    TempSrc:  Oral Temporal Artery     SpO2:  93% 95%    Weight:  96 lb 6.4 oz (43.7 kg)     Height:         I/O last 3 completed shifts:  In: 650 [P.O.:650]  Out: 1500 [Other:1500]  I/O this shift:  In: 360 [P.O.:360]  Out: 100 [Urine:100]    On examination:  General:Comfortable, alert and oriented, Lying down comfortably  HEENT: Pallor, no icterus, eye movements are normal.  Chest: Decreased breath sounds at the lung bases.  Coarse breath sounds at the left lung base. Occ wheeze  CVS: Heart sounds are irregular, no pericardial rub or gallop  Abdomen: Abdomen is soft, normal  bowel sounds, Complaints of some discomfort in epigastric area  Extremities: No edema of the lower extremities.  Left thigh AV graft, no tenderness, good bruit  Neuro: No change in neurological status.  Grade 3 power both upper and lower extremities  Mentation: She is alert and oriented    Past medical illness, social history, medications, previous notes reviewed.       Laboratory results:      Recent Results (from the past 24 hour(s))   Renal Function Panel    Collection Time: 11/09/17  5:47 AM   Result Value Ref Range    Glucose 115 (H) 60 - 100 mg/dL    BUN 24 (H) 7 - 21 mg/dL    Creatinine 3.00 (H) 0.50 - 1.00 mg/dL    Sodium 138 137 - 145 mmol/L    Potassium 4.9 3.5 - 5.1 mmol/L    Chloride 99 95 - 110 mmol/L    CO2 28.0 22.0 - 31.0 mmol/L    Calcium 8.2 (L) 8.4 - 10.2 mg/dL    Albumin 3.30 (L) 3.40 - 4.80 g/dL    Phosphorus 4.1 2.4 - 4.4 mg/dL    Anion Gap 11.0 5.0 - 15.0 mmol/L    BUN/Creatinine Ratio 8.0 7.0 - 25.0    eGFR Non  Amer 15 (L) 39 - 90 mL/min/1.73   CBC Auto Differential    Collection Time: 11/09/17  5:48 AM   Result Value Ref Range    WBC 11.91 (H) 3.20 - 9.80 10*3/mm3    RBC 3.51 (L) 3.77 - 5.16 10*6/mm3    Hemoglobin 10.5 (L) 12.0 - 15.5 g/dL    Hematocrit 32.5 (L) 35.0 - 45.0 %    MCV 92.6 80.0 - 98.0 fL    MCH 29.9 26.5 - 34.0 pg    MCHC 32.3 31.4 - 36.0 g/dL    RDW 17.0 (H) 11.5 - 14.5 %    RDW-SD 57.1 (H) 36.4 - 46.3 fl    MPV 11.1 8.0 - 12.0 fL    Platelets 269 150 - 450 10*3/mm3    Neutrophil % 78.6 37.0 - 80.0 %    Lymphocyte % 9.1 (L) 10.0 - 50.0 %    Monocyte % 8.9 0.0 - 12.0 %    Eosinophil % 2.4 0.0 - 7.0 %    Basophil % 0.6 0.0 - 2.0 %    Immature Grans % 0.4 0.0 - 0.5 %    Neutrophils, Absolute 9.36 (H) 2.00 - 8.60 10*3/mm3    Lymphocytes, Absolute 1.08 0.60 - 4.20 10*3/mm3    Monocytes, Absolute 1.06 (H) 0.00 - 0.90 10*3/mm3    Eosinophils, Absolute 0.29 0.00 - 0.70 10*3/mm3    Basophils, Absolute 0.07 0.00 - 0.20 10*3/mm3    Immature Grans, Absolute 0.05 (H) 0.00 - 0.02  10*3/mm3    nRBC 0.0 0.0 - 0.0 /100 WBC   ]    Imaging Results (last 24 hours)     ** No results found for the last 24 hours. **            Assessment:     End-stage renal disease  Left lower lobe pneumonia  Staphylococcal bacteremia  Altered mental status, improved  Essential hypertension  Febrile illness  Type 2 diabetes mellitus    Plan:     ESRD:   Patient is on hemodialysis on Monday Wednesday and Friday.   Patient had HD yesterday.   Fluid status and K stable.   Planned HD tomorrow.        Staphylococcal bacteremia, left lower lobe pneumonia.  Echocardiogram was negative.  Repeat cultures were positive.  Now on Dapto and current cultures neg so far.   Vascular evaluated AVG.  Check CPK on Dapto.      Abd discomfort.   To follow.   If persistent, please consider imaging studies.       Pneumonia: On IV antibiotic.  Follow cultures.  Cough better.     Altered mental status, delirium: Improved.   imaging studies were negative.    Anemia of chronic kidney disease:   Hemoglobin and hematocrit levels are stable.  On 8000 units of Epogen with dialysis.    Essential hypertension:  Patient is on lower dose of hydralazine.  Blood pressure is 116/66.  Continue to monitor.    Discussed with patient.    Chi Holden MD

## 2017-11-09 NOTE — PROGRESS NOTES
[facility] Medicine Services  INPATIENT PROGRESS NOTE    Length of Stay: 10  Date of Admission: 10/30/2017  Primary Care Physician: Rogers Perez MD    Subjective   Chief Complaint: No complaints    HPI:        11/9/2017:  Blood culture returned this morning, again with MRSA.  She is receiving Daptomycin every 48 hours and has only received two doses.  WBC is continuing to decrease with no fever noted.  Will continue Daptomycin and recheck blood cultures in a few days.      Previous notes by NADJA Land PA:   11/8/2017: Patient continues to be more pleasant and feels better.  At 24 hours blood cultures are negative.  No nausea, vomiting, chest pain.  Should be noted that if patient blood cultures remain negative at 48 hours, can likely be discharged.  Patient will be passed on to Rosalba BENITEZ who will evaluate the patient as appropriate and determine discharge possibilities at that time.     11/7/2017: Patient feeling much better today.  She is in good spirits.  Blood cultures are negative at less than 24 hours after changing over to daptomycin.  No fever, chills, shortness of air.  Patient has been off oxygen and satting well on room air greater than 24 hours.  Vascular has evaluated patient's fistula site and has determined it is functioning well with no infection.     11/6/2017: Patient has no chest pain, fever, chills.  Have discussed with pharmacy and there is difficulty reaching appropriate levels of vancomycin.  At this time recommendation is to start patient on daptomycin.  Nephrology has asked vascular surgery to evaluate fistula.  Urinalysis demonstrates no signs of infection.  Will repeat blood cultures today.     11/5/2017: Patient has no acute complaints.  Scheduled for redraw blood cultures in the a.m.  No further vegetation noted on echocardiogram.     11/4/2017: Dr. Adan has determined there is low suspicion for any type of endocarditis or  vegetation.  Dr. Adan recommends a repeat transthoracic echocardiogram rather than a transesophageal echocardiogram.  Patient has no complaints today.  Patient does have severe pneumonia.  Will recheck blood cultures on Monday.     11/3/2017: Patient has no acute complaints today.  Patient continues to demonstrate MRSA bacteremia despite appropriate antibiotic treatment.  Have spoken with Dr. Adan of cardiology who is agreed to evaluate the patient for possible ANTHONY.  No indwelling lines or catheters.  All other possible sources fistula.  Continue antibiotic treatment.  Attempt sputum culture if possible.  No fevers or chills, dialysis is today.  Patient tolerating by mouth intake.  Essentially complains of soreness.  Patient tachycardia much improved, heart rate is 101.  Respirations are normal with no fever.  Patient hypoxia improving, 94 on 1.5 L.     Chief Complaint/HPI:  This 72-year-old  female was admitted to hospitalist services secondary to a worsening level of altered mental status.  Patient also had fever.  CT of the head was within normal limits.  Chest x-ray demonstrated left lower lobe pneumonia.  Patient has been treated with empiric antibiotics.  Patient was found to have MRSA bacteremia, no vegetation demonstrated on echocardiogram.  Echocardiogram is transthoracic.  Though patient is in no apparent distress, she continues to be tachycardic and hypoxic.  Patient currently on vancomycin and Levaquin.  Patient will be changed to vancomycin and Zosyn per renal dosing.  EKG reordered.  Initial EKG demonstrated sinus tachycardia.  EKG rule out atrial fibrillation with RVR.  No chest pain, did have an elevated troponin, however troponin has been fairly consistent and is actually trending down.  Troponin likely secondary to ESRD and sepsis.  Have spoken with patient's nephrologist, Dr. Mireles, he is agreeable to CTA today.  We'll also order physical therapy and occupational therapy.    Review  of Systems   Constitutional: Positive for fatigue.   All other systems reviewed and are negative.       All pertinent negatives and positives are as above. All other systems have been reviewed and are negative unless otherwise stated.     Objective    Temp:  [97.2 °F (36.2 °C)-98 °F (36.7 °C)] 97.3 °F (36.3 °C)  Heart Rate:  [75-94] 92  Resp:  [16-18] 16  BP: (114-128)/(48-59) 127/59    Physical Exam   Constitutional: She is oriented to person, place, and time. She appears well-developed and well-nourished.   HENT:   Head: Normocephalic and atraumatic.   Eyes: EOM are normal. Pupils are equal, round, and reactive to light.   Neck: Normal range of motion. Neck supple.   Cardiovascular: Normal rate and regular rhythm.    Pulmonary/Chest: Effort normal and breath sounds normal.   Abdominal: Soft. Bowel sounds are normal.   Musculoskeletal: Normal range of motion.   Neurological: She is alert and oriented to person, place, and time.   Skin: Skin is warm and dry.   Psychiatric: She has a normal mood and affect.     Results Review:  I have reviewed the labs, radiology results, and diagnostic studies.    Laboratory Data:     Results from last 7 days  Lab Units 11/09/17  0547 11/08/17  0607 11/06/17  0746   SODIUM mmol/L 138 138 141   POTASSIUM mmol/L 4.9 4.7 4.3   CHLORIDE mmol/L 99 98 99   CO2 mmol/L 28.0 28.0 24.0   BUN mg/dL 24* 42* 46*   CREATININE mg/dL 3.00* 4.39* 4.47*   GLUCOSE mg/dL 115* 121* 168*   CALCIUM mg/dL 8.2* 8.2* 8.7   ANION GAP mmol/L 11.0 12.0 18.0*     Estimated Creatinine Clearance: 11.5 mL/min (by C-G formula based on Cr of 3).    Results from last 7 days  Lab Units 11/09/17  0547 11/08/17  0607 11/06/17  0746   PHOSPHORUS mg/dL 4.1 3.7 3.3           Results from last 7 days  Lab Units 11/09/17  0548 11/08/17  0607 11/07/17  0734 11/06/17  0746 11/05/17  0631   WBC 10*3/mm3 11.91* 14.77* 17.56* 22.30* 20.31*   HEMOGLOBIN g/dL 10.5* 9.8* 11.0* 10.3* 9.8*   HEMATOCRIT % 32.5* 31.2* 34.4* 32.4* 31.2*    PLATELETS 10*3/mm3 269 362 314 297 222           Culture Data:   No results found for: BLOODCX  No results found for: URINECX  No results found for: RESPCX  No results found for: WOUNDCX  No results found for: STOOLCX  No components found for: BODYFLD    Radiology Data:   Imaging Results (last 24 hours)     ** No results found for the last 24 hours. **          I have reviewed the patient current medications.     Assessment/Plan       Plan:    1.  MRSA bacteremia:  Continue Daptomycin.  Will recheck blood cultures after several more doses.    2.  HCAP:  Rocephin day# 8.  The patient is on room air.   3.  Deconditioning:  Continue PT/OT          Discharge Planning: I expect patient to be discharged to home in 3-4 days.      This document has been electronically signed by ELI Thorne on November 9, 2017 1:40 PM

## 2017-11-09 NOTE — PLAN OF CARE
Problem: Patient Care Overview (Adult)  Goal: Plan of Care Review  Outcome: Ongoing (interventions implemented as appropriate)    11/09/17 1025   Coping/Psychosocial Response Interventions   Plan Of Care Reviewed With patient   Patient Care Overview   Progress no change   Outcome Evaluation   Outcome Summary/Follow up Plan Pt performed ADl requiring set up and supervision.. No new goals met this date.         Problem: Inpatient Occupational Therapy  Goal: Transfer Training Goal 2 LTG- OT  Outcome: Ongoing (interventions implemented as appropriate)    11/02/17 1556 11/04/17 1430 11/09/17 1025   Transfer Training 2 OT LTG   Transfer Training 2 OT LTG, Date Established 11/02/17 --  --    Transfer Training 2 OT LTG, Time to Achieve by discharge --  --    Transfer Training 2 OT LTG, Activity Type all transfers --  --    Transfer Training 2 OT LTG, [place] Level supervision required;contact guard assist --  --    Transfer Training 2 OT LTG, Assist Device walker, rolling --  --    Transfer Training 2 OT LTG, Date Goal Reviewed --  --  11/09/17   Transfer Training 2 OT LTG, Outcome --  goal ongoing --        Goal: Strength Goal LTG- OT  Outcome: Ongoing (interventions implemented as appropriate)    11/02/17 1556 11/04/17 1430 11/09/17 1025   Strength OT LTG   Strength Goal OT LTG, Date Established 11/02/17 --  --    Strength Goal OT LTG, Measure to Achieve 1-2 sets of 10 reps all planes (except L shoulder flexion) with 1 lb wrist wt or dumbell for B UE strengthening to increase independence with functional mobility and ADLs. --  --    Strength Goal OT LTG, Date Goal Reviewed --  --  11/09/17   Strength Goal OT LTG, Outcome --  goal ongoing --        Goal: Static Standing Balance Goal LTG- OT  Outcome: Ongoing (interventions implemented as appropriate)    11/02/17 1556 11/03/17 1526 11/04/17 1430   Static Standing Balance OT LTG   Static Standing Balance OT LTG, Date Established --  11/03/17 --    Static Standing  Balance OT LTG, Time to Achieve by discharge --  --    Static Standing Balance OT LTG, [place] Level supervision required;contact guard assist  (5 minutes with functional activity) --  --    Static Standing Balance OT LTG, Assist Device assistive device  (R/W.) --  --    Static Standing Balance OT LTG, Date Goal Reviewed --  --  --    Static Standing Balance OT LTG, Outcome --  --  goal ongoing     11/09/17 1025   Static Standing Balance OT LTG   Static Standing Balance OT LTG, Date Established --    Static Standing Balance OT LTG, Time to Achieve --    Static Standing Balance OT LTG, [place] Level --    Static Standing Balance OT LTG, Assist Device --    Static Standing Balance OT LTG, Date Goal Reviewed 11/09/17   Static Standing Balance OT LTG, Outcome --

## 2017-11-09 NOTE — THERAPY TREATMENT NOTE
Acute Care - Occupational Therapy Treatment Note  [facility]     Patient Name: Efrem Roa  : 1945  MRN: [id number]  Today's Date: 2017  Onset of Illness/Injury or Date of Surgery Date: 10/30/17  Date of Referral to OT: 17  Referring Physician: LOS Rose      Admit Date: 10/30/2017    Visit Dx:     ICD-10-CM ICD-9-CM   1. HCAP (healthcare-associated pneumonia) J18.9 486   2. Sepsis, due to unspecified organism A41.9 038.9     995.91   3. NSTEMI (non-ST elevated myocardial infarction) I21.4 410.70   4. ESRD (end stage renal disease) N18.6 585.6   5. Essential hypertension I10 401.9   6. Hypertensive heart disease without heart failure I11.9 402.90   7. Oropharyngeal dysphagia R13.12 787.22   8. Impaired mobility and activities of daily living Z74.09 799.89   9. Impaired functional mobility, balance, gait, and endurance Z74.09 V49.89   10. Impaired mobility and ADLs Z74.09 799.89     Patient Active Problem List   Diagnosis   • Constipation   • Arteriovenous fistula   • End stage renal failure on dialysis   • Controlled type 2 diabetes mellitus with chronic kidney disease on chronic dialysis, without long-term current use of insulin   • Coronary artery disease involving native coronary artery of native heart without angina pectoris   • Panlobular emphysema   • ESRD (end stage renal disease) on dialysis   • HCAP (healthcare-associated pneumonia)             Adult Rehabilitation Note       17 0803 17 1303 17 0839    Rehab Assessment/Intervention    Discipline occupational therapy assistant  -BB physical therapy assistant  -JASON physical therapy assistant  -CH    Document Type therapy note (daily note)  -BB therapy note (daily note)  -JASON therapy note (daily note)  -CH    Subjective Information agree to therapy  -DAYO agree to therapy;complains of;weakness;fatigue;pain;nausea/vomiting;swelling;dyspnea  -JASON agree to therapy;complains of;weakness  -CH    Patient Effort,  Rehab Treatment adequate  -BB  adequate  -CH    Precautions/Limitations fall precautions  -BB  fall precautions;oxygen therapy device and L/min   BP in R LE  -CH    Specific Treatment Considerations  Pt would only agree to sitting EOB if PTA washed her hair  -JASON     Recorded by [BB] JACE Phillips/BERNADETTE [JASON] Kalli Marina PTA [CH] Anay Nava PTA    Vital Signs    Pre Systolic BP Rehab 112  -BB  105  -CH    Pre Treatment Diastolic BP 53  -BB  49  -CH    Post Systolic BP Rehab   84  -CH    Post Treatment Diastolic BP   66  -CH    Pretreatment Heart Rate (beats/min) 78  -BB  89  -CH    Posttreatment Heart Rate (beats/min) 76  -BB  92  -CH    Pre SpO2 (%) 96  -BB  95  -CH    O2 Delivery Pre Treatment   room air  -CH    Intra SpO2 (%) 97  -BB      Post SpO2 (%) 96  -BB  94  -CH    O2 Delivery Post Treatment   room air  -CH    Pre Patient Position --   long sitting  -BB Supine  -JASON Supine  -CH    Intra Patient Position  Sitting  -JASON Sitting  -CH    Post Patient Position --   long sitting  -BB Supine  -JASON Supine  -CH    Recorded by [BB] JACE Phillips/BERNADETTE [JASON] Kalli Marina, PTA [CH] Anay Nava PTA    Pain Assessment    Pain Assessment 0-10  -BB 0-10  -JASON 0-10  -CH    Pain Score 4  -BB 8  -JASON 9  -CH    Post Pain Score 4  -BB 8  -JASON 9  -CH    Pain Type Chronic pain  -BB Chronic pain  -JASON Chronic pain  -CH    Pain Location Generalized  -BB  Generalized  -CH    Pain Intervention(s) Repositioned  -BB  Repositioned;Medication (See MAR)  -CH    Recorded by [BB] JACE Phillips/BERNADETTE [JASON] Kalli Marina, PTA [CH] Anay Nava PTA    Vision Assessment/Intervention    Visual Impairment   WFL with corrective lenses  -CH    Recorded by   [CH] Anay Nava PTA    Cognitive Assessment/Intervention    Current Cognitive/Communication Assessment functional  -BB functional  -JASON functional  -CH    Orientation Status oriented x 4  -BB oriented x 4  -JASON oriented x 4  -CH    Follows  Commands/Answers Questions 75% of the time  -BB 75% of the time  -JASON 100% of the time  -CH    Personal Safety  WNL/WFL  -JASON WNL/WFL  -CH    Personal Safety Interventions fall prevention program maintained  -BB fall prevention program maintained  -JASON fall prevention program maintained;gait belt;muscle strengthening facilitated;nonskid shoes/slippers when out of bed;supervised activity  -CH    Recorded by [BB] MIHIR PhillipsA/L [JASON] Kalli Marina, PTA [CH] Anay Nava PTA    Bed Mobility, Assessment/Treatment    Bed Mobility, Assistive Device bed rails;head of bed elevated  -BB bed rails;head of bed elevated  -JASON bed rails;head of bed elevated  -CH    Bed Mobility, Roll Left, [place]  not tested  -JASNO not tested  -CH    Bed Mobility, Roll Right, [place]  not tested  -JASON supervision required  -    Bed Mobility, Scoot/Bridge, [place]  moderate assist (50% patient effort);dependent (less than 25% patient effort)  -JASON dependent (less than 25% patient effort);2 person assist required   with drawsheet to HOB  -CH    Bed Mob, Supine to Sit, [place] supervision required  -BB supervision required  -JASON supervision required  -CH    Bed Mob, Sit to Supine, [place] supervision required  -BB supervision required  -JASON supervision required  -    Bed Mob, Sidelying to Sit, [place] minimum assist (75% patient effort)  -BB      Bed Mobility, Comment Pt sat EOB for ~ 30 min for ADL  -BB sat EOB for ~ 30+ min for hair washing  -JASON Once pt sat EOB, pt began to see bugs on gown and bed and was trying to kill them, PTA and nursing ensured pt that there were no bugs on the gown or bed. Pt eventually was able to began ther ex with no more issues   -CH    Recorded by [BB] JACE Phillips/BERNADETTE [JASON] Kalli Marina PTA [CH] Anay Nava PTA    Transfer Assessment/Treatment    Transfers, Sit-Stand [place]   other (see comments)   pt deferred  -CH    Recorded by   [CH] Anay AGUILAR  NIYAH Nava    Gait Assessment/Treatment    Gait, [place] Level   not tested  -CH    Recorded by   [CH] Anay Nava PTA    Upper Body Bathing Assessment/Training    UB Bathing Assess/Train, Position sitting;edge of bed  -BB      UB Bathing Assess/Train, [place] Level stand by assist;set up required  -BB      Recorded by [BB] Juliette Plummer, MCCORMICK/L      Lower Body Bathing Assessment/Training    LB Bathing Assess/Train, Position edge of bed  -BB      LB Bathing Assess/Train, [place] Level set up required;stand by assist  -BB      Recorded by [BB] Juliette Plummer, MCCORMICK/L      Upper Body Dressing Assessment/Training    UB Dressing Assess/Train, Clothing Type doffing:;donning:;hospital gown   robe  -BB      UB Dressing Assess/Train, Position edge of bed;sitting  -BB      UB Dressing Assess/Train, [place] contact guard assist  -BB      Recorded by [BB] Juliette Plummer, MCCORMICK/L      Lower Body Dressing Assessment/Training    LB Dressing Assess/Train, Clothing Type donning:;doffing:;slipper socks  -BB      LB Dressing Assess/Train, Position edge of bed;sitting  -BB      LB Dressing Assess/Train, [place] supervision required;set up required  -BB      Recorded by [BB] MIHIR PhillipsA/L      Grooming Assessment/Training    Grooming Assess/Train, Indepen Level set up required;supervision required  -BB      Grooming Assess/Train, Comment washed hads, face, applied deoderant, lotion, combed hair  -BB      Recorded by [BB] Juliette Plummer, MCCORMICK/L      Therapy Exercises    Bilateral Lower Extremities  AROM:;sitting;ankle pumps/circles;hip flexion;LAQ;glut sets  -JASON AROM:;20 reps;sitting;ankle pumps/circles;glut sets;hip abduction/adduction;hip flexion;LAQ  -CH    Trunk Exercises   15 reps;supine;bridging  -CH    Recorded by  [JASON] Kalli Marina PTA [CH] Anay Nava PTA    Positioning and Restraints    Pre-Treatment Position in bed  -BB in bed  -JASON in bed  -CH    Post  Treatment Position bed  -BB bed  -JASON bed  -CH    In Bed supine;call light within reach;encouraged to call for assist;exit alarm on  -BB supine;encouraged to call for assist;exit alarm on;call light within reach  -JASON supine;call light within reach;encouraged to call for assist;exit alarm on;side rails up x3  -CH    Recorded by [BB] Juliette Plummer, MCCORMICK/L [JASON] Kalli Marina, PTA [CH] Anay Nava, NIYAH      User Key  (r) = Recorded By, (t) = Taken By, (c) = Cosigned By    Initials Name Effective Dates     Kalli Marina, PTA 10/17/16 -     CH Anay Nava, PTA 10/17/16 -     BB Juliette Plummer, MCCORMICK/L 10/17/16 -                 OT Goals       11/09/17 1025 11/04/17 1430 11/03/17 1526    Transfer Training 2 OT LTG    Transfer Training 2 OT LTG, Date Goal Reviewed 11/09/17  -BB 11/04/17  -LW 11/03/17  -SG    Transfer Training 2 OT LTG, Outcome  goal ongoing  -LW goal ongoing  -SG    Strength OT LTG    Strength Goal OT LTG, Date Goal Reviewed 11/09/17  -BB 11/04/17  -LW 11/03/17  -SG    Strength Goal OT LTG, Outcome  goal ongoing  -LW goal ongoing  -SG    Static Standing Balance OT LTG    Static Standing Balance OT LTG, Date Established   11/03/17  -SG    Static Standing Balance OT LTG, Date Goal Reviewed 11/09/17  -BB 11/04/17  -LW     Static Standing Balance OT LTG, Outcome  goal ongoing  -LW goal ongoing  -SG    ADL OT LTG    ADL OT LTG, Date Established   11/03/17  -SG    ADL OT LTG, Date Goal Reviewed  11/04/17  -LW 11/03/17  -SG    ADL OT LTG, Outcome  goal met  -LW goal partially met  -SG      11/02/17 1556          Transfer Training 2 OT LTG    Transfer Training 2 OT LTG, Date Established 11/02/17  -RB      Transfer Training 2 OT LTG, Time to Achieve by discharge  -RB      Transfer Training 2 OT LTG, Activity Type all transfers  -RB      Transfer Training 2 OT LTG, [place] Level supervision required;contact guard assist  -RB      Transfer Training 2 OT LTG, Assist Device walker, rolling  -RB       Strength OT LTG    Strength Goal OT LTG, Date Established 11/02/17  -      Strength Goal OT LTG, Measure to Achieve 1-2 sets of 10 reps all planes (except L shoulder flexion) with 1 lb wrist wt or dumbell for B UE strengthening to increase independence with functional mobility and ADLs.  -RB      Static Standing Balance OT LTG    Static Standing Balance OT LTG, Date Established 11/02/17  -RB      Static Standing Balance OT LTG, Time to Achieve by discharge  -RB      Static Standing Balance OT LTG, [place] Level supervision required;contact guard assist   5 minutes with functional activity  -RB      Static Standing Balance OT LTG, Assist Device assistive device   R/W.  -RB      ADL OT LTG    ADL OT LTG, Date Established 11/02/17  -      ADL OT LTG, Time to Achieve by discharge  -RB      ADL OT LTG, Activity Type ADL skills   Sponge bath and dress or walk-in shower.  -RB      ADL OT LTG, [place] Level min assist;contact guard;assistive device   R/W.  -RB        User Key  (r) = Recorded By, (t) = Taken By, (c) = Cosigned By    Initials Name Provider Type    RB Quan Tapia, OT Occupational Therapist    JACE Walton/L Occupational Therapy Assistant    JACE Crouch/L Occupational Therapy Assistant    CORINNA Charles OT Occupational Therapist          Occupational Therapy Education     Title: PT OT SLP Therapies (Active)     Topic: Occupational Therapy (Active)     Point: ADL training (Active)    Description: Instruct learner(s) on proper safety adaptation and remediation techniques during self care or transfers.   Instruct in proper use of assistive devices.    Learning Progress Summary    Learner Readiness Method Response Comment Documented by Status   Patient Acceptance E NR  BB 11/09/17 1025 Active    Acceptance E VU  PABLO 11/04/17 1429 Done               Point: Home exercise program (Done)    Description: Instruct learner(s) on appropriate technique for monitoring,  assisting and/or progressing therapeutic exercises/activities.    Learning Progress Summary    Learner Readiness Method Response Comment Documented by Status   Patient Acceptance E VU  LW 11/04/17 1429 Done               Point: Precautions (Active)    Description: Instruct learner(s) on prescribed precautions during self-care and functional transfers.    Learning Progress Summary    Learner Readiness Method Response Comment Documented by Status   Patient Acceptance E NR  BB 11/09/17 1025 Active    Acceptance E VU  LW 11/04/17 1429 Done    Acceptance E VU,NR Edu on no out of bed without assist. RB 11/02/17 1553 Done               Point: Body mechanics (Active)    Description: Instruct learner(s) on proper positioning and spine alignment during self-care, functional mobility activities and/or exercises.    Learning Progress Summary    Learner Readiness Method Response Comment Documented by Status   Patient Acceptance E NR  BB 11/09/17 1025 Active    Acceptance E VU  LW 11/04/17 1429 Done                      User Key     Initials Effective Dates Name Provider Type Discipline    RB 06/15/16 -  Quan Tapia OT Occupational Therapist OT    BB 10/17/16 -  JACE Phillips/L Occupational Therapy Assistant OT    LW 10/17/16 -  MIHIR JusticeA/L Occupational Therapy Assistant OT                  OT Recommendation and Plan  Anticipated Discharge Disposition: skilled nursing facility  Planned Therapy Interventions: activity intolerance, ADL retraining, balance training, bed mobility training, energy conservation, transfer training, strengthening  Therapy Frequency:  (3-14x/wk.)  Plan of Care Review  Plan Of Care Reviewed With: patient  Progress: no change  Outcome Summary/Follow up Plan: Pt performed ADl requiring set up and supervision.. No new goals met this date.        Outcome Measures       11/08/17 1303 11/07/17 0839       How much help from another person do you currently need...    Turning from your back  to your side while in flat bed without using bedrails? 3  -JASON 3  -CH     Moving from lying on back to sitting on the side of a flat bed without bedrails? 3  -JASON 3  -CH     Moving to and from a bed to a chair (including a wheelchair)? 2  -JASON 2  -CH     Standing up from a chair using your arms (e.g., wheelchair, bedside chair)? 2  -JASON 2  -CH     Climbing 3-5 steps with a railing? 1  -JASON 1  -CH     To walk in hospital room? 2  -JASON 2  -CH     AM-PAC 6 Clicks Score 13  -JASON 13  -     Functional Assessment    Outcome Measure Options  AM-PAC 6 Clicks Basic Mobility (PT)  -       User Key  (r) = Recorded By, (t) = Taken By, (c) = Cosigned By    Initials Name Provider Type    JASON Marina, PTA Physical Therapy Assistant     Anay Nava [facility] Physical Therapy Assistant           Time Calculation:         Time Calculation- OT       11/09/17 1028          Time Calculation- OT    OT Start Time 0803  -BB      OT Stop Time 0857  -BB      OT Time Calculation (min) 54 min  -BB      Total Timed Code Minutes- OT 54 minute(s)  -BB      OT Received On 11/09/17  -BB        User Key  (r) = Recorded By, (t) = Taken By, (c) = Cosigned By    Initials Name Provider Type    JACE Walton/L Occupational Therapy Assistant           Therapy Charges for Today     Code Description Service Date Service Provider Modifiers Qty    [id number] HC OT SELF CARE/MGMT/TRAIN EA 15 MIN 11/9/2017 JACE Phillips/L GO 4          OT G-codes  OT Professional Judgement Used?: Yes  OT Functional Scales Options: AM-PAC 6 Clicks Daily Activity (OT)  Score: 16  Functional Limitation: Self care  Self Care Current Status (): At least 40 percent but less than 60 percent impaired, limited or restricted  Self Care Goal Status (): At least 20 percent but less than 40 percent impaired, limited or restricted    NIMCO Phillips  11/9/2017

## 2017-11-09 NOTE — PLAN OF CARE
Problem: Patient Care Overview (Adult)  Goal: Plan of Care Review  Outcome: Ongoing (interventions implemented as appropriate)    11/09/17 0931 11/09/17 1025   Coping/Psychosocial Response Interventions   Plan Of Care Reviewed With --  patient   Patient Care Overview   Progress --  no change   Outcome Evaluation   Outcome Summary/Follow up Plan pt sup<>sit with SBA, sit<>stand with min assist & RW for ~1 minute, pt would benefit from 24/7 care & continued PT services --          Problem: Inpatient Physical Therapy  Goal: Transfer Training Goal 1 LTG- PT  Outcome: Ongoing (interventions implemented as appropriate)  Goal: Gait Training Goal LTG- PT  Outcome: Ongoing (interventions implemented as appropriate)    11/02/17 1713 11/08/17 1303   Gait Training PT LTG   Gait Training Goal PT LTG, Date Established 11/02/17 --    Gait Training Goal PT LTG, Time to Achieve by discharge --    Gait Training Goal PT LTG, [place] Level conditional independence --    Gait Training Goal PT LTG, Distance to Achieve 150ft;O2 sats will not drop below 92% --    Gait Training Goal PT LTG, Date Goal Reviewed --  11/08/17   Gait Training Goal PT LTG, Outcome --  goal ongoing       Goal: Patient Education Goal LTG- PT  Outcome: Ongoing (interventions implemented as appropriate)    11/02/17 1713 11/08/17 1303   Patient Education PT LTG   Patient Education PT LTG, Date Established 11/02/17 --    Patient Education PT LTG, Time to Achieve by discharge --    Patient Education PT LTG, Education Type gait;transfers;home safety --    Patient Education PT LTG, Date Goal Reviewed --  11/08/17   Patient Education PT LTG Outcome --  goal ongoing

## 2017-11-09 NOTE — PLAN OF CARE
Problem: Infection, Risk/Actual (Adult)  Goal: Identify Related Risk Factors and Signs and Symptoms  Outcome: Outcome(s) achieved Date Met:  11/09/17 11/08/17 1552 11/09/17 0326   Infection, Risk/Actual   Infection, Risk/Actual: Related Risk Factors --  age extremes   Signs and Symptoms (Infection, Risk/Actual) pain;lab value changes;cultures positive --

## 2017-11-10 LAB
ALBUMIN SERPL-MCNC: 3.4 G/DL (ref 3.4–4.8)
ANION GAP SERPL CALCULATED.3IONS-SCNC: 10 MMOL/L (ref 5–15)
BASOPHILS # BLD AUTO: 0.07 10*3/MM3 (ref 0–0.2)
BASOPHILS NFR BLD AUTO: 1 % (ref 0–2)
BUN BLD-MCNC: 39 MG/DL (ref 7–21)
BUN/CREAT SERPL: 8.9 (ref 7–25)
CALCIUM SPEC-SCNC: 8.2 MG/DL (ref 8.4–10.2)
CHLORIDE SERPL-SCNC: 96 MMOL/L (ref 95–110)
CK SERPL-CCNC: 21 U/L (ref 30–135)
CO2 SERPL-SCNC: 31 MMOL/L (ref 22–31)
CREAT BLD-MCNC: 4.36 MG/DL (ref 0.5–1)
DEPRECATED RDW RBC AUTO: 58.2 FL (ref 36.4–46.3)
EOSINOPHIL # BLD AUTO: 0.21 10*3/MM3 (ref 0–0.7)
EOSINOPHIL NFR BLD AUTO: 3.1 % (ref 0–7)
ERYTHROCYTE [DISTWIDTH] IN BLOOD BY AUTOMATED COUNT: 17 % (ref 11.5–14.5)
GFR SERPL CREATININE-BSD FRML MDRD: 10 ML/MIN/1.73 (ref 60–90)
GLUCOSE BLD-MCNC: 123 MG/DL (ref 60–100)
HCT VFR BLD AUTO: 32.8 % (ref 35–45)
HGB BLD-MCNC: 10.4 G/DL (ref 12–15.5)
IMM GRANULOCYTES # BLD: 0.04 10*3/MM3 (ref 0–0.02)
IMM GRANULOCYTES NFR BLD: 0.6 % (ref 0–0.5)
LYMPHOCYTES # BLD AUTO: 1.28 10*3/MM3 (ref 0.6–4.2)
LYMPHOCYTES NFR BLD AUTO: 18.9 % (ref 10–50)
MCH RBC QN AUTO: 29.5 PG (ref 26.5–34)
MCHC RBC AUTO-ENTMCNC: 31.7 G/DL (ref 31.4–36)
MCV RBC AUTO: 93.2 FL (ref 80–98)
MONOCYTES # BLD AUTO: 0.69 10*3/MM3 (ref 0–0.9)
MONOCYTES NFR BLD AUTO: 10.2 % (ref 0–12)
NEUTROPHILS # BLD AUTO: 4.5 10*3/MM3 (ref 2–8.6)
NEUTROPHILS NFR BLD AUTO: 66.2 % (ref 37–80)
PHOSPHATE SERPL-MCNC: 5.5 MG/DL (ref 2.4–4.4)
PLATELET # BLD AUTO: 410 10*3/MM3 (ref 150–450)
PMV BLD AUTO: 10.4 FL (ref 8–12)
POTASSIUM BLD-SCNC: 4.6 MMOL/L (ref 3.5–5.1)
RBC # BLD AUTO: 3.52 10*6/MM3 (ref 3.77–5.16)
SODIUM BLD-SCNC: 137 MMOL/L (ref 137–145)
WBC NRBC COR # BLD: 6.79 10*3/MM3 (ref 3.2–9.8)

## 2017-11-10 PROCEDURE — 80069 RENAL FUNCTION PANEL: CPT | Performed by: PHYSICIAN ASSISTANT

## 2017-11-10 PROCEDURE — 25010000002 CEFTRIAXONE PER 250 MG: Performed by: INTERNAL MEDICINE

## 2017-11-10 PROCEDURE — 97530 THERAPEUTIC ACTIVITIES: CPT

## 2017-11-10 PROCEDURE — 82550 ASSAY OF CK (CPK): CPT | Performed by: INTERNAL MEDICINE

## 2017-11-10 PROCEDURE — 63510000001 EPOETIN ALFA PER 1000 UNITS: Performed by: INTERNAL MEDICINE

## 2017-11-10 PROCEDURE — 97110 THERAPEUTIC EXERCISES: CPT

## 2017-11-10 PROCEDURE — 85025 COMPLETE CBC W/AUTO DIFF WBC: CPT | Performed by: INTERNAL MEDICINE

## 2017-11-10 PROCEDURE — 25010000002 DAPTOMYCIN PER 1 MG: Performed by: PHYSICIAN ASSISTANT

## 2017-11-10 RX ORDER — LIDOCAINE HYDROCHLORIDE 10 MG/ML
5 INJECTION, SOLUTION EPIDURAL; INFILTRATION; INTRACAUDAL; PERINEURAL ONCE
Status: COMPLETED | OUTPATIENT
Start: 2017-11-10 | End: 2017-11-10

## 2017-11-10 RX ADMIN — ERYTHROPOIETIN 6000 UNITS: 10000 INJECTION, SOLUTION INTRAVENOUS; SUBCUTANEOUS at 10:11

## 2017-11-10 RX ADMIN — LEVOTHYROXINE SODIUM 88 MCG: 88 TABLET ORAL at 05:43

## 2017-11-10 RX ADMIN — HYDROCODONE BITARTRATE AND ACETAMINOPHEN 1 TABLET: 7.5; 325 TABLET ORAL at 10:16

## 2017-11-10 RX ADMIN — HYDROCODONE BITARTRATE AND ACETAMINOPHEN 1 TABLET: 7.5; 325 TABLET ORAL at 14:50

## 2017-11-10 RX ADMIN — DAPTOMYCIN 270 MG: 500 INJECTION, POWDER, LYOPHILIZED, FOR SOLUTION INTRAVENOUS at 16:45

## 2017-11-10 RX ADMIN — ACETAMINOPHEN 650 MG: 325 TABLET ORAL at 16:45

## 2017-11-10 RX ADMIN — HYDRALAZINE HYDROCHLORIDE 25 MG: 25 TABLET ORAL at 16:45

## 2017-11-10 RX ADMIN — LIDOCAINE HYDROCHLORIDE 5 ML: 10 INJECTION, SOLUTION EPIDURAL; INFILTRATION; INTRACAUDAL; PERINEURAL at 08:15

## 2017-11-10 RX ADMIN — QUETIAPINE FUMARATE 50 MG: 25 TABLET, FILM COATED ORAL at 21:34

## 2017-11-10 RX ADMIN — ATORVASTATIN CALCIUM 40 MG: 40 TABLET, FILM COATED ORAL at 21:34

## 2017-11-10 RX ADMIN — FAMOTIDINE 40 MG: 40 TABLET ORAL at 13:50

## 2017-11-10 RX ADMIN — MIRTAZAPINE 15 MG: 15 TABLET, FILM COATED ORAL at 21:34

## 2017-11-10 RX ADMIN — CEFTRIAXONE SODIUM 1 G: 1 INJECTION, POWDER, FOR SOLUTION INTRAMUSCULAR; INTRAVENOUS at 13:50

## 2017-11-10 RX ADMIN — APIXABAN 2.5 MG: 2.5 TABLET, FILM COATED ORAL at 18:06

## 2017-11-10 RX ADMIN — SERTRALINE HYDROCHLORIDE 50 MG: 50 TABLET ORAL at 13:50

## 2017-11-10 RX ADMIN — ASPIRIN 81 MG: 81 TABLET, COATED ORAL at 13:50

## 2017-11-10 NOTE — SIGNIFICANT NOTE
11/10/17 0935   Rehab Treatment   Discipline physical therapy assistant   Treatment Not Performed patient unavailable for treatment  (pt off the floor for HD)

## 2017-11-10 NOTE — CONSULTS
Adult Nutrition  Assessment    Patient Name:  Efrem Roa  YOB: 1945  MRN: [id number]  Admit Date:  10/30/2017    Assessment Date:  11/10/2017    Comments:  Pt continues to eat ~40% on average of most meals.  She reports nausea and vomiting during dialysis today,  Improved at this time.  Dxed MRSA bacteremia--abx continues.  Will continue ice cream lunch and supper but she adamantly refuses supplements.  RD will monitor.           Reason for Assessment       11/10/17 1532    Reason for Assessment    Reason For Assessment/Visit follow up protocol                Nutrition/Diet History       11/10/17 1532    Nutrition/Diet History    Typical Food/Fluid Intake Pt with lunch in front of her.  She states that she has just started to feel better.  She has been very sick.  She still does not want in [place].  Just finished dialysis today.  she said that she got sick during dialysis.  N/V              Labs/Tests/Procedures/Meds       11/10/17 1536    Labs/Tests/Procedures/Meds    Labs/Tests Review Reviewed;Phos;BUN;Creat;Glucose;Alb    Medication Review Reviewed, pertinent;Antibiotic            Physical Findings       11/10/17 1537    Physical Appearance    Oral/Mouth Cavity dental caries              Nutrition Prescription Ordered       11/10/17 1537    Nutrition Prescription PO    Current PO Diet Soft Texture    Texture Ground    Fluid Consistency Thin    Common Modifiers Renal            Evaluation of Received Nutrient/Fluid Intake       11/10/17 1537    PO Evaluation    Number of Days PO Intake Evaluated 3 days    Number of Meals 6    % PO Intake 42% average            Electronically signed by:  Alyx Ojeda RD  11/10/17 3:42 PM

## 2017-11-10 NOTE — PROGRESS NOTES
[facility] Medicine Services  INPATIENT PROGRESS NOTE    Length of Stay: 11  Date of Admission: 10/30/2017  Primary Care Physician: Rogers Perez MD    Subjective   Chief Complaint: No complaints    HPI:      11/10/2017:  Continue with dialysis today (M-W-F).  The patient will receive Daptomycin today and on the 12th.  Will recheck blood cultures next week.      11/9/2017:  Blood culture returned this morning, again with MRSA.  She is receiving Daptomycin every 48 hours and has only received two doses.  WBC is continuing to decrease with no fever noted.  Will continue Daptomycin and recheck blood cultures in a few days.      Previous notes by NADJA Ladn PA:   11/8/2017: Patient continues to be more pleasant and feels better.  At 24 hours blood cultures are negative.  No nausea, vomiting, chest pain.  Should be noted that if patient blood cultures remain negative at 48 hours, can likely be discharged.  Patient will be passed on to Rosalba BENITEZ who will evaluate the patient as appropriate and determine discharge possibilities at that time.     11/7/2017: Patient feeling much better today.  She is in good spirits.  Blood cultures are negative at less than 24 hours after changing over to daptomycin.  No fever, chills, shortness of air.  Patient has been off oxygen and satting well on room air greater than 24 hours.  Vascular has evaluated patient's fistula site and has determined it is functioning well with no infection.     11/6/2017: Patient has no chest pain, fever, chills.  Have discussed with pharmacy and there is difficulty reaching appropriate levels of vancomycin.  At this time recommendation is to start patient on daptomycin.  Nephrology has asked vascular surgery to evaluate fistula.  Urinalysis demonstrates no signs of infection.  Will repeat blood cultures today.     11/5/2017: Patient has no acute complaints.  Scheduled for redraw blood cultures in the a.m.   No further vegetation noted on echocardiogram.     11/4/2017: Dr. Adan has determined there is low suspicion for any type of endocarditis or vegetation.  Dr. Adan recommends a repeat transthoracic echocardiogram rather than a transesophageal echocardiogram.  Patient has no complaints today.  Patient does have severe pneumonia.  Will recheck blood cultures on Monday.     11/3/2017: Patient has no acute complaints today.  Patient continues to demonstrate MRSA bacteremia despite appropriate antibiotic treatment.  Have spoken with Dr. Adan of cardiology who is agreed to evaluate the patient for possible ANTHONY.  No indwelling lines or catheters.  All other possible sources fistula.  Continue antibiotic treatment.  Attempt sputum culture if possible.  No fevers or chills, dialysis is today.  Patient tolerating by mouth intake.  Essentially complains of soreness.  Patient tachycardia much improved, heart rate is 101.  Respirations are normal with no fever.  Patient hypoxia improving, 94 on 1.5 L.     Chief Complaint/HPI:  This 72-year-old  female was admitted to hospitalist services secondary to a worsening level of altered mental status.  Patient also had fever.  CT of the head was within normal limits.  Chest x-ray demonstrated left lower lobe pneumonia.  Patient has been treated with empiric antibiotics.  Patient was found to have MRSA bacteremia, no vegetation demonstrated on echocardiogram.  Echocardiogram is transthoracic.  Though patient is in no apparent distress, she continues to be tachycardic and hypoxic.  Patient currently on vancomycin and Levaquin.  Patient will be changed to vancomycin and Zosyn per renal dosing.  EKG reordered.  Initial EKG demonstrated sinus tachycardia.  EKG rule out atrial fibrillation with RVR.  No chest pain, did have an elevated troponin, however troponin has been fairly consistent and is actually trending down.  Troponin likely secondary to ESRD and sepsis.  Have  spoken with patient's nephrologist, Dr. Mireles, he is agreeable to CTA today.  We'll also order physical therapy and occupational therapy.    Review of Systems   Constitutional: Positive for fatigue.   All other systems reviewed and are negative.       All pertinent negatives and positives are as above. All other systems have been reviewed and are negative unless otherwise stated.     Objective    Temp:  [96.9 °F (36.1 °C)-98 °F (36.7 °C)] 98 °F (36.7 °C)  Heart Rate:  [74-86] 86  Resp:  [16-24] 24  BP: (101-155)/(50-74) 155/74    Physical Exam   Constitutional: She is oriented to person, place, and time. She appears well-developed and well-nourished.   HENT:   Head: Normocephalic and atraumatic.   Eyes: EOM are normal. Pupils are equal, round, and reactive to light.   Neck: Normal range of motion. Neck supple.   Cardiovascular: Normal rate and regular rhythm.    Pulmonary/Chest: Effort normal and breath sounds normal.   Abdominal: Soft. Bowel sounds are normal.   Musculoskeletal: Normal range of motion.   Neurological: She is alert and oriented to person, place, and time.   Skin: Skin is warm and dry.   Psychiatric: She has a normal mood and affect.     Results Review:  I have reviewed the labs, radiology results, and diagnostic studies.    Laboratory Data:     Results from last 7 days  Lab Units 11/10/17  0516 11/09/17  0547 11/08/17  0607   SODIUM mmol/L 137 138 138   POTASSIUM mmol/L 4.6 4.9 4.7   CHLORIDE mmol/L 96 99 98   CO2 mmol/L 31.0 28.0 28.0   BUN mg/dL 39* 24* 42*   CREATININE mg/dL 4.36* 3.00* 4.39*   GLUCOSE mg/dL 123* 115* 121*   CALCIUM mg/dL 8.2* 8.2* 8.2*   ANION GAP mmol/L 10.0 11.0 12.0     Estimated Creatinine Clearance: 7.9 mL/min (by C-G formula based on Cr of 4.36).    Results from last 7 days  Lab Units 11/10/17  0516 11/09/17  0547 11/08/17  0607   PHOSPHORUS mg/dL 5.5* 4.1 3.7           Results from last 7 days  Lab Units 11/10/17  0516 11/09/17  0548 11/08/17  0607 11/07/17  0734  11/06/17  0746   WBC 10*3/mm3 6.79 11.91* 14.77* 17.56* 22.30*   HEMOGLOBIN g/dL 10.4* 10.5* 9.8* 11.0* 10.3*   HEMATOCRIT % 32.8* 32.5* 31.2* 34.4* 32.4*   PLATELETS 10*3/mm3 410 269 362 314 297           Culture Data:   No results found for: BLOODCX  No results found for: URINECX  No results found for: RESPCX  No results found for: WOUNDCX  No results found for: STOOLCX  No components found for: BODYFLD    Radiology Data:   Imaging Results (last 24 hours)     ** No results found for the last 24 hours. **          I have reviewed the patient current medications.     Assessment/Plan       Plan:    1.  MRSA bacteremia:  Continue Daptomycin dose #3 today.  Will recheck blood cultures after several more doses.    2.  HCAP:  Rocephin day# 9.  The patient is on room air.   3.  Deconditioning:  Continue PT/OT          Discharge Planning: I expect patient to be discharged to home in 3-4 days.      This document has been electronically signed by ELI Thorne on November 10, 2017 12:59 PM

## 2017-11-10 NOTE — SIGNIFICANT NOTE
Unavailable for treatment     11/10/17 5860   Rehab Treatment   Discipline occupational therapy assistant   Treatment Not Performed patient unavailable for treatment

## 2017-11-10 NOTE — PLAN OF CARE
Problem: Fall Risk (Adult)  Goal: Absence of Falls  Outcome: Ongoing (interventions implemented as appropriate)    Problem: Patient Care Overview (Adult)  Goal: Plan of Care Review  Outcome: Ongoing (interventions implemented as appropriate)    11/10/17 0157   Coping/Psychosocial Response Interventions   Plan Of Care Reviewed With patient   Patient Care Overview   Progress no change   Outcome Evaluation   Outcome Summary/Follow up Plan pt has been resting well this shift. No complaints of pain or discofort. Vitals stable. Will continue to monitor.        Goal: Adult Individualization and Mutuality  Outcome: Ongoing (interventions implemented as appropriate)  Goal: Discharge Needs Assessment  Outcome: Ongoing (interventions implemented as appropriate)    Problem: Pressure Ulcer (Adult)  Goal: Signs and Symptoms of Listed Potential Problems Will be Absent or Manageable (Pressure Ulcer)  Outcome: Ongoing (interventions implemented as appropriate)    Problem: Infection, Risk/Actual (Adult)  Goal: Infection Prevention/Resolution  Outcome: Ongoing (interventions implemented as appropriate)

## 2017-11-10 NOTE — PROGRESS NOTES
Nephrology Progress Note:    Patient was seen and examined on hemodialysis.  Complains of some discomfort in the epigastric area.  No shortness of breath.  White cell count is improving.  Hemodialysis orders were reviewed.  We are using left thigh AV graft.    Patient Active Problem List   Diagnosis   • Constipation   • Arteriovenous fistula   • End stage renal failure on dialysis   • Controlled type 2 diabetes mellitus with chronic kidney disease on chronic dialysis, without long-term current use of insulin   • Coronary artery disease involving native coronary artery of native heart without angina pectoris   • Panlobular emphysema   • ESRD (end stage renal disease) on dialysis   • HCAP (healthcare-associated pneumonia)       Medications:    apixaban 2.5 mg Oral BID   aspirin 81 mg Oral Daily   atorvastatin 40 mg Oral Nightly   ceftriaxone 1 g Intravenous Q24H   DAPTOmycin (CUBICIN)  IV 6 mg/kg Intravenous Q48H   epoetin dagmar 6,000 Units Subcutaneous Once per day on Mon Wed Fri   famotidine 40 mg Oral Daily   hydrALAZINE 25 mg Oral TID   levothyroxine 88 mcg Oral Daily   mirtazapine 15 mg Oral Nightly   polyethylene glycol 17 g Oral BID   QUEtiapine 50 mg Oral Nightly   sertraline 50 mg Oral Daily        Vitals:    11/10/17 0428 11/10/17 0717 11/10/17 0733 11/10/17 1126   BP: 120/60  122/50 155/74   BP Location: Right arm  Right leg Right leg   Patient Position: Lying  Lying Lying   Pulse: 74 81 81 86   Resp: 18  18 24   Temp: 96.9 °F (36.1 °C)  97 °F (36.1 °C) 98 °F (36.7 °C)   TempSrc: Temporal Artery   Oral Oral   SpO2: 94%  94%    Weight: 96 lb 9.6 oz (43.8 kg)      Height:         I/O last 3 completed shifts:  In: 1320 [P.O.:1320]  Out: 100 [Urine:100]  I/O this shift:  In: 120 [P.O.:120]  Out: 1150 [Other:1150]    On examination:  General:Comfortable, alert and oriented, Lying down comfortably.  Seen and examined in hemodialysis  HEENT: Pallor, no icterus, eye movements are normal.  Chest: Decreased breath  sounds at the lung bases.  Coarse breath sounds at the left lung base. Occ wheeze  CVS: Heart sounds are irregular, no pericardial rub or gallop  Abdomen: Abdomen is soft, normal bowel sounds, complaints of some discomfort in epigastric area  Extremities: No edema of the lower extremities.  Left thigh AV graft, no tenderness, good bruit  Neuro: No change in neurological status.  Grade 3 power both upper and lower extremities  Mentation: She is alert and oriented    Past medical illness, social history, medications, previous notes reviewed.       Laboratory results:      Recent Results (from the past 24 hour(s))   Renal Function Panel    Collection Time: 11/10/17  5:16 AM   Result Value Ref Range    Glucose 123 (H) 60 - 100 mg/dL    BUN 39 (H) 7 - 21 mg/dL    Creatinine 4.36 (H) 0.50 - 1.00 mg/dL    Sodium 137 137 - 145 mmol/L    Potassium 4.6 3.5 - 5.1 mmol/L    Chloride 96 95 - 110 mmol/L    CO2 31.0 22.0 - 31.0 mmol/L    Calcium 8.2 (L) 8.4 - 10.2 mg/dL    Albumin 3.40 3.40 - 4.80 g/dL    Phosphorus 5.5 (H) 2.4 - 4.4 mg/dL    Anion Gap 10.0 5.0 - 15.0 mmol/L    BUN/Creatinine Ratio 8.9 7.0 - 25.0    eGFR Non African Amer 10 (L) >60 mL/min/1.73   CK    Collection Time: 11/10/17  5:16 AM   Result Value Ref Range    Creatine Kinase 21 (L) 30 - 135 U/L   CBC Auto Differential    Collection Time: 11/10/17  5:16 AM   Result Value Ref Range    WBC 6.79 3.20 - 9.80 10*3/mm3    RBC 3.52 (L) 3.77 - 5.16 10*6/mm3    Hemoglobin 10.4 (L) 12.0 - 15.5 g/dL    Hematocrit 32.8 (L) 35.0 - 45.0 %    MCV 93.2 80.0 - 98.0 fL    MCH 29.5 26.5 - 34.0 pg    MCHC 31.7 31.4 - 36.0 g/dL    RDW 17.0 (H) 11.5 - 14.5 %    RDW-SD 58.2 (H) 36.4 - 46.3 fl    MPV 10.4 8.0 - 12.0 fL    Platelets 410 150 - 450 10*3/mm3    Neutrophil % 66.2 37.0 - 80.0 %    Lymphocyte % 18.9 10.0 - 50.0 %    Monocyte % 10.2 0.0 - 12.0 %    Eosinophil % 3.1 0.0 - 7.0 %    Basophil % 1.0 0.0 - 2.0 %    Immature Grans % 0.6 (H) 0.0 - 0.5 %    Neutrophils, Absolute 4.50  2.00 - 8.60 10*3/mm3    Lymphocytes, Absolute 1.28 0.60 - 4.20 10*3/mm3    Monocytes, Absolute 0.69 0.00 - 0.90 10*3/mm3    Eosinophils, Absolute 0.21 0.00 - 0.70 10*3/mm3    Basophils, Absolute 0.07 0.00 - 0.20 10*3/mm3    Immature Grans, Absolute 0.04 (H) 0.00 - 0.02 10*3/mm3   ]    Imaging Results (last 24 hours)     ** No results found for the last 24 hours. **            Assessment:     End-stage renal disease  Left lower lobe pneumonia  Staphylococcal bacteremia  Altered mental status, improved  Essential hypertension  Febrile illness  Type 2 diabetes mellitus    Plan:     ESRD:   Patient is on hemodialysis on Monday Wednesday and Friday.   Patient was seen and examined on hemodialysis.  Hemodialysis orders were reviewed.  Fluid status appears stable.       Staphylococcal bacteremia, left lower lobe pneumonia.  Echocardiogram was negative.  Repeat cultures were positive.  Patient is on daptomycin, CPK stable.  Repeat cultures on daptomycin.  Vascular evaluation was done, no definite evidence of infection over the thigh graft.  Discussed with patient in detail.    Abd discomfort.   To follow.   Proton pump inhibitors.    Pneumonia: On IV antibiotic.  Follow cultures.  Cough better.     Altered mental status, delirium: Improved.   imaging studies were negative.    Anemia of chronic kidney disease:   Hemoglobin and hematocrit levels are stable.  On 8000 units of Epogen with dialysis.    Essential hypertension:  Patient is on lower dose of hydralazine.  Blood pressure is 155/74.    Discussed with patient.  Discussed with primary team.    Chi Holden MD

## 2017-11-10 NOTE — PLAN OF CARE
Problem: Patient Care Overview (Adult)  Goal: Plan of Care Review  Outcome: Ongoing (interventions implemented as appropriate)    11/10/17 1411 11/10/17 1540   Coping/Psychosocial Response Interventions   Plan Of Care Reviewed With --  patient;caregiver   Patient Care Overview   Progress --  no change   Outcome Evaluation   Outcome Summary/Follow up Plan pt sit<>sup with SBA, pt sat @ EOB ~20 minutes, pt defered standing this date, pt would benefit from 24/7 care & continued PT services --          Problem: Inpatient Physical Therapy  Goal: Transfer Training Goal 1 LTG- PT  Outcome: Ongoing (interventions implemented as appropriate)    11/02/17 1713 11/08/17 1303   Transfer Training PT LTG   Transfer Training PT LTG, Date Established 11/02/17 --    Transfer Training PT LTG, Time to Achieve by discharge --    Transfer Training PT LTG, Activity Type bed to chair /chair to bed;sit to stand/stand to sit --    Transfer Training PT LTG, [place] conditional independence --    Transfer Training PT LTG, Date Goal Reviewed --  11/08/17   Transfer Training PT LTG, Outcome --  goal ongoing       Goal: Gait Training Goal LTG- PT  Outcome: Ongoing (interventions implemented as appropriate)  Goal: Patient Education Goal LTG- PT  Outcome: Ongoing (interventions implemented as appropriate)    11/02/17 1713 11/08/17 1303   Patient Education PT LTG   Patient Education PT LTG, Date Established 11/02/17 --    Patient Education PT LTG, Time to Achieve by discharge --    Patient Education PT LTG, Education Type gait;transfers;home safety --    Patient Education PT LTG, Date Goal Reviewed --  11/08/17   Patient Education PT LTG Outcome --  goal ongoing

## 2017-11-10 NOTE — PLAN OF CARE
Problem: Patient Care Overview (Adult)  Goal: Plan of Care Review  Outcome: Ongoing (interventions implemented as appropriate)    11/10/17 8850   Coping/Psychosocial Response Interventions   Plan Of Care Reviewed With patient;caregiver   Patient Care Overview   Progress no change   Outcome Evaluation   Outcome Summary/Follow up Plan Pt continues to eat ~40% on average of most meals. She continues to refuse supplements. Dialysis continues. Will monitor.

## 2017-11-10 NOTE — THERAPY TREATMENT NOTE
Acute Care - Physical Therapy Treatment Note  [facility]     Patient Name: Efrem Roa  : 1945  MRN: [id number]  Today's Date: 11/10/2017  Onset of Illness/Injury or Date of Surgery Date: 10/30/17  Date of Referral to PT: 17  Referring Physician: LOS Rose    Admit Date: 10/30/2017    Visit Dx:    ICD-10-CM ICD-9-CM   1. HCAP (healthcare-associated pneumonia) J18.9 486   2. Sepsis, due to unspecified organism A41.9 038.9     995.91   3. NSTEMI (non-ST elevated myocardial infarction) I21.4 410.70   4. ESRD (end stage renal disease) N18.6 585.6   5. Essential hypertension I10 401.9   6. Hypertensive heart disease without heart failure I11.9 402.90   7. Oropharyngeal dysphagia R13.12 787.22   8. Impaired mobility and activities of daily living Z74.09 799.89   9. Impaired functional mobility, balance, gait, and endurance Z74.09 V49.89   10. Impaired mobility and ADLs Z74.09 799.89     Patient Active Problem List   Diagnosis   • Constipation   • Arteriovenous fistula   • End stage renal failure on dialysis   • Controlled type 2 diabetes mellitus with chronic kidney disease on chronic dialysis, without long-term current use of insulin   • Coronary artery disease involving native coronary artery of native heart without angina pectoris   • Panlobular emphysema   • ESRD (end stage renal disease) on dialysis   • HCAP (healthcare-associated pneumonia)               Adult Rehabilitation Note       11/10/17 1411 17 0931 17 [id number]    Rehab Assessment/Intervention    Discipline physical therapy assistant  -TA physical therapy assistant  -TA occupational therapy assistant  -BB    Document Type therapy note (daily note)  -TA therapy note (daily note)  -TA therapy note (daily note)  -BB    Subjective Information agree to therapy;complains of;pain  -TA agree to therapy  -TA agree to therapy  -BB    Patient Effort, Rehab Treatment adequate  -TA adequate  -TA adequate  -BB     Precautions/Limitations  fall precautions  -TA fall precautions  -BB    Recorded by [TA] Ny Martinez PTA [TA] Ny Martinez, NIYAH [BB] Juliette Plummer, MCCORMICK/L    Vital Signs    Pre Systolic BP Rehab   112  -BB    Pre Treatment Diastolic BP   53  -BB    Pretreatment Heart Rate (beats/min) 94  -TA 94  -TA 78  -BB    Intratreatment Heart Rate (beats/min) 88  -TA      Posttreatment Heart Rate (beats/min) 96  -TA 98  -TA 76  -BB    Pre SpO2 (%) 94  -TA 97  -TA 96  -BB    O2 Delivery Pre Treatment room air  -TA room air  -TA     Intra SpO2 (%) 97  -TA  97  -BB    Post SpO2 (%) 98  -TA 98  -TA 96  -BB    Pre Patient Position Supine  -TA Supine  -TA --   long sitting  -BB    Intra Patient Position Sitting  -TA      Post Patient Position Supine  -TA Supine  -TA --   long sitting  -BB    Recorded by [TA] Ny Martinez, NIYAH [TA] Ny Martinez, NIYAH [BB] Juliette Plummer, MCCORMICK/L    Pain Assessment    Pain Assessment 0-10  -TA 0-10  -TA 0-10  -BB    Pain Score 9  -TA 9  -TA 4  -BB    Post Pain Score 9  -TA 9  -TA 4  -BB    Pain Type Chronic pain  -TA Chronic pain  -TA Chronic pain  -BB    Pain Location Generalized  -TA Generalized  -TA Generalized  -BB    Pain Intervention(s)   Repositioned  -BB    Recorded by [TA] Ny Martinez, NIYAH [TA] Ny Martinez, PTA [BB] Juliette Plummer, MCCORMICK/L    Cognitive Assessment/Intervention    Current Cognitive/Communication Assessment functional  -TA functional  -TA functional  -BB    Orientation Status oriented x 4  -TA oriented x 4  -TA oriented x 4  -BB    Follows Commands/Answers Questions 75% of the time  -TA 75% of the time  -TA 75% of the time  -BB    Personal Safety WNL/WFL  -TA WNL/WFL  -TA     Personal Safety Interventions fall prevention program maintained  -TA fall prevention program maintained;gait belt;nonskid shoes/slippers when out of bed  -TA fall prevention program maintained  -BB    Recorded by [TA] Ny Martinez PTA [TA] Ny Martinez, NIYAH [BB] Juliette TAMEZ  Plummer, MCCORMICK/L    Bed Mobility, Assessment/Treatment    Bed Mobility, Assistive Device bed rails;head of bed elevated  -TA bed rails;head of bed elevated  -TA bed rails;head of bed elevated  -BB    Bed Mobility, Scoot/Bridge, Ranger  minimum assist (75% patient effort)  -TA     Bed Mob, Supine to Sit, Ranger supervision required  -TA supervision required  -TA supervision required  -BB    Bed Mob, Sit to Supine, Ranger supervision required  -TA supervision required  -TA supervision required  -BB    Bed Mob, Sidelying to Sit, Ranger   minimum assist (75% patient effort)  -BB    Bed Mobility, Comment pt sat @ EOB~20 minutes with close supervision & performed exercises  -TA  Pt sat EOB for ~ 30 min for ADL  -BB    Recorded by [TA] Ny Martinez PTA [TA] Ny Martinez PTA [BB] MIHIR PhillipsA/L    Transfer Assessment/Treatment    Transfers, Sit-Stand Ranger not tested  -TA minimum assist (75% patient effort)  -TA     Transfers, Stand-Sit Ranger not tested  -TA contact guard assist  -TA     Transfers, Sit-Stand-Sit, Assist Device  rolling walker  -TA     Transfer, Comment  pt stood ~1 minute with CGA & RW, pt unable to take step  -TA     Recorded by [TA] Ny Martinez PTA [TA] Ny Martinez PTA     Upper Body Bathing Assessment/Training    UB Bathing Assess/Train, Position   sitting;edge of bed  -BB    UB Bathing Assess/Train, Ranger Level   stand by assist;set up required  -BB    Recorded by   [BB] JACE Phillips/L    Lower Body Bathing Assessment/Training    LB Bathing Assess/Train, Position   edge of bed  -BB    LB Bathing Assess/Train, [place] Level   set up required;stand by assist  -BB    Recorded by   [BB] MIHIR PhillipsA/L    Upper Body Dressing Assessment/Training    UB Dressing Assess/Train, Clothing Type   doffing:;donning:;hospital gown   robe  -BB    UB Dressing Assess/Train, Position   edge of bed;sitting  -BB    UB Dressing  Assess/Train, [place]   contact guard assist  -BB    Recorded by   [BB] MIHIR PhillipsA/L    Lower Body Dressing Assessment/Training    LB Dressing Assess/Train, Clothing Type   donning:;doffing:;slipper socks  -BB    LB Dressing Assess/Train, Position   edge of bed;sitting  -BB    LB Dressing Assess/Train, [place]   supervision required;set up required  -BB    Recorded by   [BB] JACE Phillips/L    Grooming Assessment/Training    Grooming Assess/Train, Indepen Level   set up required;supervision required  -BB    Grooming Assess/Train, Comment   washed hads, face, applied deoderant, lotion, combed hair  -BB    Recorded by   [BB] MIHIR PhillipsA/L    Therapy Exercises    Bilateral Lower Extremities AROM:;20 reps;sitting;ankle pumps/circles;glut sets;LAQ;supine;quad sets  -TA AROM:;15 reps;sitting;ankle pumps/circles;glut sets;hip abduction/adduction;hip flexion;LAQ  -TA     Recorded by [TA] Ny Martinez PTA [TA] Ny Martinez PTA     Positioning and Restraints    Pre-Treatment Position in bed  -TA in bed  -TA in bed  -BB    Post Treatment Position bed  -TA bed  -TA bed  -BB    In Bed supine;call light within reach  -TA supine;call light within reach  -TA supine;call light within reach;encouraged to call for assist;exit alarm on  -BB    Recorded by [TA] Ny Martinez PTA [TA] Ny Martinez PTA [BB] MIHIR PhillipsA/BERNADETTE      11/08/17 1303          Rehab Assessment/Intervention    Discipline physical therapy assistant  -JASON      Document Type therapy note (daily note)  -JASON      Subjective Information agree to therapy;complains of;weakness;fatigue;pain;nausea/vomiting;swelling;dyspnea  -JASON      Specific Treatment Considerations Pt would only agree to sitting EOB if PTA washed her hair  -JASON      Recorded by [JASON] Kalli Marina PTA      Vital Signs    Pre Patient Position Supine  -JASON      Intra Patient Position Sitting  -JASON      Post Patient Position Supine  -JASON       Recorded by [JASON] Kalli Marina PTA      Pain Assessment    Pain Assessment 0-10  -JASON      Pain Score 8  -      Post Pain Score 8  -      Pain Type Chronic pain  -JASON      Recorded by [JASON] Kalli Marina PTA      Cognitive Assessment/Intervention    Current Cognitive/Communication Assessment functional  -      Orientation Status oriented x 4  -JASON      Follows Commands/Answers Questions 75% of the time  -JASON      Personal Safety WNL/WFL  -JASON      Personal Safety Interventions fall prevention program maintained  -JASON      Recorded by [JASON] Kalli Marina PTA      Bed Mobility, Assessment/Treatment    Bed Mobility, Assistive Device bed rails;head of bed elevated  -JASON      Bed Mobility, Roll Left, [place] not tested  -JASON      Bed Mobility, Roll Right, [place] not tested  -JASON      Bed Mobility, Scoot/Bridge, [place] moderate assist (50% patient effort);dependent (less than 25% patient effort)  -JASON      Bed Mob, Supine to Sit, [place] supervision required  -JASON      Bed Mob, Sit to Supine, [place] supervision required  -JASON      Bed Mobility, Comment sat EOB for ~ 30+ min for hair washing  -JASON      Recorded by [JASON] Kalli Marina PTA      Therapy Exercises    Bilateral Lower Extremities AROM:;sitting;ankle pumps/circles;hip flexion;LAQ;glut sets  -JASON      Recorded by [JASON] Kalli Marina PTA      Positioning and Restraints    Pre-Treatment Position in bed  -JASON      Post Treatment Position bed  -JASON      In Bed supine;encouraged to call for assist;exit alarm on;call light within reach  -JASON      Recorded by [JASON] Kalli Marina PTA        User Key  (r) = Recorded By, (t) = Taken By, (c) = Cosigned By    Initials Name Effective Dates    TA Ny Martinez, [facility] 10/17/16 -     JASON Marina PTA 10/17/16 -     BB Juliette Plummer, MCCORMICK/BERNADETTE 10/17/16 -                 IP PT Goals       11/08/17 1303 11/07/17 0925 11/04/17 0939    Transfer Training PT LTG    Transfer Training PT  LTG, Date Goal  Reviewed 11/08/17  -JASON 11/07/17  - 11/04/17  -    Transfer Training PT LTG, Outcome goal ongoing  - goal ongoing  - goal ongoing  -    Gait Training PT LTG    Gait Training Goal PT LTG, Date Goal Reviewed 11/08/17  -JASON 11/07/17  -CH 11/04/17  -    Gait Training Goal PT LTG, Outcome goal ongoing  - goal ongoing  - goal ongoing  -    Patient Education PT LTG    Patient Education PT LTG, Date Goal Reviewed 11/08/17  -JASON 11/07/17  - 11/04/17  -    Patient Education PT LTG Outcome goal ongoing  - goal ongoing  - goal ongoing  -      11/03/17 1321 11/02/17 1713       Transfer Training PT LTG    Transfer Training PT LTG, Date Established  11/02/17  -     Transfer Training PT LTG, Time to Achieve  by discharge  -     Transfer Training PT LTG, Activity Type  bed to chair /chair to bed;sit to stand/stand to sit  -     Transfer Training PT LTG, [place] Level  conditional independence  -     Transfer Training PT  LTG, Date Goal Reviewed 11/03/17  -A      Transfer Training PT LTG, Outcome goal ongoing  -A goal ongoing  -     Gait Training PT LTG    Gait Training Goal PT LTG, Date Established  11/02/17  -     Gait Training Goal PT LTG, Time to Achieve  by discharge  -     Gait Training Goal PT LTG, [place] Level  conditional independence  -     Gait Training Goal PT LTG, Distance to Achieve  150ft;O2 sats will not drop below 92%  -     Gait Training Goal PT LTG, Date Goal Reviewed 11/03/17  -A      Gait Training Goal PT LTG, Outcome goal ongoing  -A goal ongoing  -     Strength Goal PT LTG    Strength Goal PT LTG, Date Established  11/02/17  -     Strength Goal PT LTG, Time to Achieve  by discharge  -     Strength Goal PT LTG, Measure to Achieve  Pt will tolerate 15 reps of AROM exercises in sitting  -     Strength Goal PT LTG, Date Goal Reviewed 11/03/17  -A      Strength Goal PT LTG, Outcome goal met  -A goal ongoing  -     Patient Education PT LTG     Patient Education PT LTG, Date Established  11/02/17  -     Patient Education PT LTG, Time to Achieve  by discharge  -     Patient Education PT LTG, Education Type  gait;transfers;home safety  -     Patient Education PT LTG, Date Goal Reviewed 11/03/17  -[facility]      Patient Education PT LTG Outcome goal ongoing  -[facility] goal ongoing  -       User Key  (r) = Recorded By, (t) = Taken By, (c) = Cosigned By    Initials Name Provider Type     Trupti Andrade, PT Physical Therapist    JASON Marina, PTA Physical Therapy Assistant    CONNIE Malik, PTA Physical Therapy Assistant    JACKLYN Nava, PTA Physical Therapy Assistant          Physical Therapy Education     Title: PT OT SLP Therapies (Active)     Topic: Physical Therapy (Active)     Point: Mobility training (Active)    Learning Progress Summary    Learner Readiness Method Response Comment Documented by Status   Patient Acceptance E NR pt edu on benefits of OOB  11/03/17 1409 Active                      User Key     Initials Effective Dates Name Provider Type Discipline     10/17/16 -  Nemesio Malik, NIYAH Physical Therapy Assistant PT                    PT Recommendation and Plan  Anticipated Discharge Disposition: skilled nursing facility  Planned Therapy Interventions: balance training, bed mobility training, gait training, patient/family education, strengthening, transfer training  PT Frequency: other (see comments) (5-14 times per week)  Plan of Care Review  Outcome Summary/Follow up Plan: pt sit<>sup with SBA, pt sat @ EOB ~20 minutes, pt defered standing this date, pt would benefit from 24/7 care & continued PT services          Outcome Measures       11/10/17 1500 11/09/17 1100 11/08/17 1303    How much help from another person do you currently need...    Turning from your back to your side while in flat bed without using bedrails? 3  -TA 3  -TA 3  -JASON    Moving from lying on back to sitting on the side of a flat bed without bedrails?  3  -TA 3  -TA 3  -JASON    Moving to and from a bed to a chair (including a wheelchair)? 2  -TA 2  -TA 2  -JASON    Standing up from a chair using your arms (e.g., wheelchair, bedside chair)? 3  -TA 3  -TA 2  -JASON    Climbing 3-5 steps with a railing? 1  -TA 1  -TA 1  -JASON    To walk in hospital room? 2  -TA 2  -TA 2  -JASON    AM-PAC 6 Clicks Score 14  -TA 14  -TA 13  -JASON    Functional Assessment    Outcome Measure Options AM-PAC 6 Clicks Basic Mobility (PT)  -TA AM-PAC 6 Clicks Basic Mobility (PT)  -TA       User Key  (r) = Recorded By, (t) = Taken By, (c) = Cosigned By    Initials Name Provider Type    REAGAN Martinez PTA Physical Therapy Assistant    JASON Marina PTA Physical Therapy Assistant           Time Calculation:         PT Charges       11/10/17 1558          Time Calculation    Start Time 1411  -TA      Stop Time 1442  -TA      Time Calculation (min) 31 min  -TA      Time Calculation- PT    Total Timed Code Minutes- PT 31 minute(s)  -TA        User Key  (r) = Recorded By, (t) = Taken By, (c) = Cosigned By    Initials Name Provider Type    REAGAN Martinez PTA Physical Therapy Assistant          Therapy Charges for Today     Code Description Service Date Service Provider Modifiers Qty    55935144423 HC PT THER PROC EA 15 MIN 11/9/2017 Ny Martinez, PTA GP 1    19323128901 HC PT THERAPEUTIC ACT EA 15 MIN 11/9/2017 Ny Martinez PTA GP 1    19068419948 HC PT THERAPEUTIC ACT EA 15 MIN 11/10/2017 Ny Martinez PTA GP 1    30125992181 HC PT THER PROC EA 15 MIN 11/10/2017 Ny Martinez, PTA GP 1          PT G-Codes  PT Professional Judgement Used?: Yes  Outcome Measure Options: AM-PAC 6 Clicks Basic Mobility (PT)  Score: 18  Functional Limitation: Mobility: Walking and moving around  Mobility: Walking and Moving Around Current Status (): At least 40 percent but less than 60 percent impaired, limited or restricted  Mobility: Walking and Moving Around Goal Status (): At least 1 percent but less  than 20 percent impaired, limited or restricted    Ny Martinez, PTA  11/10/2017

## 2017-11-11 LAB
ALBUMIN SERPL-MCNC: 3.4 G/DL (ref 3.4–4.8)
ANION GAP SERPL CALCULATED.3IONS-SCNC: 12 MMOL/L (ref 5–15)
BACTERIA SPEC AEROBE CULT: NORMAL
BASOPHILS # BLD AUTO: 0.11 10*3/MM3 (ref 0–0.2)
BASOPHILS NFR BLD AUTO: 1.4 % (ref 0–2)
BUN BLD-MCNC: 28 MG/DL (ref 7–21)
BUN/CREAT SERPL: 8.5 (ref 7–25)
CALCIUM SPEC-SCNC: 8.4 MG/DL (ref 8.4–10.2)
CHLORIDE SERPL-SCNC: 99 MMOL/L (ref 95–110)
CO2 SERPL-SCNC: 28 MMOL/L (ref 22–31)
CREAT BLD-MCNC: 3.28 MG/DL (ref 0.5–1)
DEPRECATED RDW RBC AUTO: 56.2 FL (ref 36.4–46.3)
EOSINOPHIL # BLD AUTO: 0.17 10*3/MM3 (ref 0–0.7)
EOSINOPHIL NFR BLD AUTO: 2.2 % (ref 0–7)
ERYTHROCYTE [DISTWIDTH] IN BLOOD BY AUTOMATED COUNT: 16.7 % (ref 11.5–14.5)
GFR SERPL CREATININE-BSD FRML MDRD: 14 ML/MIN/1.73 (ref 39–90)
GLUCOSE BLD-MCNC: 114 MG/DL (ref 60–100)
GRAM STN SPEC: NORMAL
HCT VFR BLD AUTO: 34.4 % (ref 35–45)
HGB BLD-MCNC: 10.7 G/DL (ref 12–15.5)
IMM GRANULOCYTES # BLD: 0.04 10*3/MM3 (ref 0–0.02)
IMM GRANULOCYTES NFR BLD: 0.5 % (ref 0–0.5)
LYMPHOCYTES # BLD AUTO: 1.68 10*3/MM3 (ref 0.6–4.2)
LYMPHOCYTES NFR BLD AUTO: 21.7 % (ref 10–50)
MCH RBC QN AUTO: 28.5 PG (ref 26.5–34)
MCHC RBC AUTO-ENTMCNC: 31.1 G/DL (ref 31.4–36)
MCV RBC AUTO: 91.5 FL (ref 80–98)
MONOCYTES # BLD AUTO: 0.72 10*3/MM3 (ref 0–0.9)
MONOCYTES NFR BLD AUTO: 9.3 % (ref 0–12)
NEUTROPHILS # BLD AUTO: 5.02 10*3/MM3 (ref 2–8.6)
NEUTROPHILS NFR BLD AUTO: 64.9 % (ref 37–80)
PHOSPHATE SERPL-MCNC: 4.9 MG/DL (ref 2.4–4.4)
PLATELET # BLD AUTO: 367 10*3/MM3 (ref 150–450)
PMV BLD AUTO: 11.4 FL (ref 8–12)
POTASSIUM BLD-SCNC: 4.6 MMOL/L (ref 3.5–5.1)
RBC # BLD AUTO: 3.76 10*6/MM3 (ref 3.77–5.16)
SODIUM BLD-SCNC: 139 MMOL/L (ref 137–145)
WBC NRBC COR # BLD: 7.74 10*3/MM3 (ref 3.2–9.8)

## 2017-11-11 PROCEDURE — 25010000002 CEFTRIAXONE PER 250 MG: Performed by: INTERNAL MEDICINE

## 2017-11-11 PROCEDURE — 85025 COMPLETE CBC W/AUTO DIFF WBC: CPT | Performed by: INTERNAL MEDICINE

## 2017-11-11 PROCEDURE — 80069 RENAL FUNCTION PANEL: CPT | Performed by: PHYSICIAN ASSISTANT

## 2017-11-11 RX ADMIN — ASPIRIN 81 MG: 81 TABLET, COATED ORAL at 08:55

## 2017-11-11 RX ADMIN — QUETIAPINE FUMARATE 50 MG: 25 TABLET, FILM COATED ORAL at 21:00

## 2017-11-11 RX ADMIN — POLYETHYLENE GLYCOL 3350 17 G: 17 POWDER, FOR SOLUTION ORAL at 08:55

## 2017-11-11 RX ADMIN — MIRTAZAPINE 15 MG: 15 TABLET, FILM COATED ORAL at 21:00

## 2017-11-11 RX ADMIN — LEVOTHYROXINE SODIUM 88 MCG: 88 TABLET ORAL at 06:06

## 2017-11-11 RX ADMIN — ATORVASTATIN CALCIUM 40 MG: 40 TABLET, FILM COATED ORAL at 21:01

## 2017-11-11 RX ADMIN — APIXABAN 2.5 MG: 2.5 TABLET, FILM COATED ORAL at 08:55

## 2017-11-11 RX ADMIN — APIXABAN 2.5 MG: 2.5 TABLET, FILM COATED ORAL at 17:12

## 2017-11-11 RX ADMIN — HYDRALAZINE HYDROCHLORIDE 25 MG: 25 TABLET ORAL at 08:55

## 2017-11-11 RX ADMIN — ACETAMINOPHEN 650 MG: 325 TABLET ORAL at 15:35

## 2017-11-11 RX ADMIN — SERTRALINE HYDROCHLORIDE 50 MG: 50 TABLET ORAL at 08:55

## 2017-11-11 RX ADMIN — HYDRALAZINE HYDROCHLORIDE 25 MG: 25 TABLET ORAL at 21:01

## 2017-11-11 RX ADMIN — FAMOTIDINE 40 MG: 40 TABLET ORAL at 08:55

## 2017-11-11 RX ADMIN — HYDROCODONE BITARTRATE AND ACETAMINOPHEN 1 TABLET: 7.5; 325 TABLET ORAL at 21:00

## 2017-11-11 RX ADMIN — HYDRALAZINE HYDROCHLORIDE 25 MG: 25 TABLET ORAL at 17:12

## 2017-11-11 RX ADMIN — HYDROCODONE BITARTRATE AND ACETAMINOPHEN 1 TABLET: 7.5; 325 TABLET ORAL at 08:55

## 2017-11-11 RX ADMIN — CEFTRIAXONE SODIUM 1 G: 1 INJECTION, POWDER, FOR SOLUTION INTRAMUSCULAR; INTRAVENOUS at 17:00

## 2017-11-11 NOTE — SIGNIFICANT NOTE
11/11/17 0917   Rehab Treatment   Discipline physical therapy assistant   Treatment Not Performed patient/family declined treatment  (Pt deferred tx this date, pt requests that therapy check back tomorrow due to increased pain in abdomen and not feeling well)   Recommendation   PT - Next Appointment 11/12/17

## 2017-11-11 NOTE — SIGNIFICANT NOTE
11/11/17 1020   Rehab Treatment   Discipline occupational therapy assistant   Treatment Not Performed patient/family decline treatment, pt not feeling well  (Pt reports abdomen pain and left leg restlessness is beyond tolerable, pt state this is why she can not do any therapy )

## 2017-11-11 NOTE — PROGRESS NOTES
Nephrology Progress Note:    Feels a little better this morning.  Still with some epigastric discomfort.  Cough is improving.  No significant expectoration.  Patient had hemodialysis yesterday.    Patient Active Problem List   Diagnosis   • Constipation   • Arteriovenous fistula   • End stage renal failure on dialysis   • Controlled type 2 diabetes mellitus with chronic kidney disease on chronic dialysis, without long-term current use of insulin   • Coronary artery disease involving native coronary artery of native heart without angina pectoris   • Panlobular emphysema   • ESRD (end stage renal disease) on dialysis   • HCAP (healthcare-associated pneumonia)       Medications:    apixaban 2.5 mg Oral BID   aspirin 81 mg Oral Daily   atorvastatin 40 mg Oral Nightly   ceftriaxone 1 g Intravenous Q24H   DAPTOmycin (CUBICIN)  IV 6 mg/kg Intravenous Q48H   epoetin dagmar 6,000 Units Subcutaneous Once per day on Mon Wed Fri   famotidine 40 mg Oral Daily   hydrALAZINE 25 mg Oral TID   levothyroxine 88 mcg Oral Daily   mirtazapine 15 mg Oral Nightly   polyethylene glycol 17 g Oral BID   QUEtiapine 50 mg Oral Nightly   sertraline 50 mg Oral Daily        Vitals:    11/10/17 1126 11/10/17 1507 11/10/17 2002 11/11/17 0400   BP: 155/74 119/58 98/55 103/48   BP Location: Right leg Right leg Right leg Right leg   Patient Position: Lying Lying Lying Lying   Pulse: 86 80 77 73   Resp: 24 20 20 20   Temp: 98 °F (36.7 °C) 96.7 °F (35.9 °C) 97.3 °F (36.3 °C) 97.2 °F (36.2 °C)   TempSrc: Oral  Oral Oral   SpO2:  97% 95% 97%   Weight:    96 lb 11.2 oz (43.9 kg)   Height:         I/O last 3 completed shifts:  In: 600 [P.O.:600]  Out: 1150 [Other:1150]       On examination:  General:Comfortable, alert and oriented,   Sitting up in bed, eating breakfast  HEENT: Pallor, no icterus, eye movements are normal.  Chest: Decreased breath sounds at the lung bases.  Coarse breath sounds at the left lung base. Occ wheeze  CVS: Heart sounds are  irregular, no pericardial rub or gallop  Abdomen: Abdomen is soft, normal bowel sounds, complaints of some discomfort in epigastric area  Extremities: No edema of the lower extremities.  Left thigh AV graft, no tenderness, good bruit  Neuro: No change in neurological status.  Grade 3 power both upper and lower extremities  Mentation: She is alert and oriented    Past medical illness, social history, medications, previous notes reviewed.       Laboratory results:      Recent Results (from the past 24 hour(s))   Renal Function Panel    Collection Time: 11/11/17  6:44 AM   Result Value Ref Range    Glucose 114 (H) 60 - 100 mg/dL    BUN 28 (H) 7 - 21 mg/dL    Creatinine 3.28 (H) 0.50 - 1.00 mg/dL    Sodium 139 137 - 145 mmol/L    Potassium 4.6 3.5 - 5.1 mmol/L    Chloride 99 95 - 110 mmol/L    CO2 28.0 22.0 - 31.0 mmol/L    Calcium 8.4 8.4 - 10.2 mg/dL    Albumin 3.40 3.40 - 4.80 g/dL    Phosphorus 4.9 (H) 2.4 - 4.4 mg/dL    Anion Gap 12.0 5.0 - 15.0 mmol/L    BUN/Creatinine Ratio 8.5 7.0 - 25.0    eGFR Non  Amer 14 (L) 39 - 90 mL/min/1.73   CBC Auto Differential    Collection Time: 11/11/17  6:44 AM   Result Value Ref Range    WBC 7.74 3.20 - 9.80 10*3/mm3    RBC 3.76 (L) 3.77 - 5.16 10*6/mm3    Hemoglobin 10.7 (L) 12.0 - 15.5 g/dL    Hematocrit 34.4 (L) 35.0 - 45.0 %    MCV 91.5 80.0 - 98.0 fL    MCH 28.5 26.5 - 34.0 pg    MCHC 31.1 (L) 31.4 - 36.0 g/dL    RDW 16.7 (H) 11.5 - 14.5 %    RDW-SD 56.2 (H) 36.4 - 46.3 fl    MPV 11.4 8.0 - 12.0 fL    Platelets 367 150 - 450 10*3/mm3    Neutrophil % 64.9 37.0 - 80.0 %    Lymphocyte % 21.7 10.0 - 50.0 %    Monocyte % 9.3 0.0 - 12.0 %    Eosinophil % 2.2 0.0 - 7.0 %    Basophil % 1.4 0.0 - 2.0 %    Immature Grans % 0.5 0.0 - 0.5 %    Neutrophils, Absolute 5.02 2.00 - 8.60 10*3/mm3    Lymphocytes, Absolute 1.68 0.60 - 4.20 10*3/mm3    Monocytes, Absolute 0.72 0.00 - 0.90 10*3/mm3    Eosinophils, Absolute 0.17 0.00 - 0.70 10*3/mm3    Basophils, Absolute 0.11 0.00 - 0.20  10*3/mm3    Immature Grans, Absolute 0.04 (H) 0.00 - 0.02 10*3/mm3   ]    Imaging Results (last 24 hours)     ** No results found for the last 24 hours. **            Assessment:     End-stage renal disease  Left lower lobe pneumonia  Staphylococcal bacteremia  Altered mental status, improved  Essential hypertension  Febrile illness  Type 2 diabetes mellitus    Plan:     ESRD:   Patient is on hemodialysis on Monday Wednesday and Friday.   Patient had hemodialysis yesterday.  Serum potassium and fluid status are stable.    Staphylococcal bacteremia, left lower lobe pneumonia.  Echocardiogram was negative.  Repeat cultures were positive.  Patient is on daptomycin, CPK stable.  Vascular evaluation was done, no definite evidence of infection over the thigh graft.  Follow-up repeat cultures.  White cell count is improving.    Abd discomfort.   To follow.   Proton pump inhibitors.    Pneumonia: On IV antibiotic.  Follow cultures.  Cough better.     Altered mental status, delirium: Improved.   imaging studies were negative.    Anemia of chronic kidney disease:   Hemoglobin and hematocrit levels are stable.  On 8000 units of Epogen with dialysis.    Essential hypertension:  Patient is on lower dose of hydralazine.  Blood pressure is sometimes on the lower side, to be monitored.      Discussed with patient.      Chi Holden MD

## 2017-11-11 NOTE — PLAN OF CARE
Problem: Sepsis (Adult)  Goal: Signs and Symptoms of Listed Potential Problems Will be Absent or Manageable (Sepsis)  Outcome: Ongoing (interventions implemented as appropriate)    Problem: Fall Risk (Adult)  Goal: Absence of Falls  Outcome: Ongoing (interventions implemented as appropriate)    Problem: Renal Replacement, Continuous (Adult)  Goal: Signs and Symptoms of Listed Potential Problems Will be Absent or Manageable (Renal Replacement, Continuous)  Outcome: Ongoing (interventions implemented as appropriate)    Problem: Patient Care Overview (Adult)  Goal: Plan of Care Review  Outcome: Ongoing (interventions implemented as appropriate)    11/11/17 0501   Coping/Psychosocial Response Interventions   Plan Of Care Reviewed With patient   Patient Care Overview   Progress no change   Outcome Evaluation   Outcome Summary/Follow up Plan Pt slept throughout the night; refusing to turn at times; vital signs stable, monitoring pt        Goal: Adult Individualization and Mutuality  Outcome: Ongoing (interventions implemented as appropriate)  Goal: Discharge Needs Assessment  Outcome: Ongoing (interventions implemented as appropriate)    Problem: Pressure Ulcer (Adult)  Goal: Signs and Symptoms of Listed Potential Problems Will be Absent or Manageable (Pressure Ulcer)  Outcome: Ongoing (interventions implemented as appropriate)    Problem: Infection, Risk/Actual (Adult)  Goal: Infection Prevention/Resolution  Outcome: Ongoing (interventions implemented as appropriate)

## 2017-11-12 LAB
ALBUMIN SERPL-MCNC: 3.3 G/DL (ref 3.4–4.8)
ANION GAP SERPL CALCULATED.3IONS-SCNC: 12 MMOL/L (ref 5–15)
BACTERIA SPEC AEROBE CULT: ABNORMAL
BACTERIA SPEC AEROBE CULT: ABNORMAL
BASOPHILS # BLD AUTO: 0.16 10*3/MM3 (ref 0–0.2)
BASOPHILS NFR BLD AUTO: 2.2 % (ref 0–2)
BUN BLD-MCNC: 42 MG/DL (ref 7–21)
BUN/CREAT SERPL: 9.4 (ref 7–25)
CALCIUM SPEC-SCNC: 8 MG/DL (ref 8.4–10.2)
CHLORIDE SERPL-SCNC: 101 MMOL/L (ref 95–110)
CO2 SERPL-SCNC: 27 MMOL/L (ref 22–31)
CREAT BLD-MCNC: 4.49 MG/DL (ref 0.5–1)
DEPRECATED RDW RBC AUTO: 55.8 FL (ref 36.4–46.3)
EOSINOPHIL # BLD AUTO: 0.31 10*3/MM3 (ref 0–0.7)
EOSINOPHIL NFR BLD AUTO: 4.2 % (ref 0–7)
ERYTHROCYTE [DISTWIDTH] IN BLOOD BY AUTOMATED COUNT: 16.6 % (ref 11.5–14.5)
GFR SERPL CREATININE-BSD FRML MDRD: 10 ML/MIN/1.73 (ref 39–90)
GLUCOSE BLD-MCNC: 107 MG/DL (ref 60–100)
GRAM STN SPEC: ABNORMAL
HCT VFR BLD AUTO: 32.8 % (ref 35–45)
HGB BLD-MCNC: 10.1 G/DL (ref 12–15.5)
IMM GRANULOCYTES # BLD: 0.03 10*3/MM3 (ref 0–0.02)
IMM GRANULOCYTES NFR BLD: 0.4 % (ref 0–0.5)
ISOLATED FROM: ABNORMAL
LYMPHOCYTES # BLD AUTO: 1.98 10*3/MM3 (ref 0.6–4.2)
LYMPHOCYTES NFR BLD AUTO: 26.9 % (ref 10–50)
MCH RBC QN AUTO: 28.5 PG (ref 26.5–34)
MCHC RBC AUTO-ENTMCNC: 30.8 G/DL (ref 31.4–36)
MCV RBC AUTO: 92.7 FL (ref 80–98)
MONOCYTES # BLD AUTO: 0.62 10*3/MM3 (ref 0–0.9)
MONOCYTES NFR BLD AUTO: 8.4 % (ref 0–12)
NEUTROPHILS # BLD AUTO: 4.25 10*3/MM3 (ref 2–8.6)
NEUTROPHILS NFR BLD AUTO: 57.9 % (ref 37–80)
PHOSPHATE SERPL-MCNC: 5.6 MG/DL (ref 2.4–4.4)
PLATELET # BLD AUTO: 403 10*3/MM3 (ref 150–450)
PMV BLD AUTO: 10.6 FL (ref 8–12)
POTASSIUM BLD-SCNC: 4.6 MMOL/L (ref 3.5–5.1)
RBC # BLD AUTO: 3.54 10*6/MM3 (ref 3.77–5.16)
SODIUM BLD-SCNC: 140 MMOL/L (ref 137–145)
WBC NRBC COR # BLD: 7.35 10*3/MM3 (ref 3.2–9.8)

## 2017-11-12 PROCEDURE — 25010000002 CEFTRIAXONE PER 250 MG: Performed by: INTERNAL MEDICINE

## 2017-11-12 PROCEDURE — 85025 COMPLETE CBC W/AUTO DIFF WBC: CPT | Performed by: INTERNAL MEDICINE

## 2017-11-12 PROCEDURE — 80069 RENAL FUNCTION PANEL: CPT | Performed by: PHYSICIAN ASSISTANT

## 2017-11-12 RX ADMIN — LEVOTHYROXINE SODIUM 88 MCG: 88 TABLET ORAL at 06:07

## 2017-11-12 RX ADMIN — APIXABAN 2.5 MG: 2.5 TABLET, FILM COATED ORAL at 17:19

## 2017-11-12 RX ADMIN — QUETIAPINE FUMARATE 50 MG: 25 TABLET, FILM COATED ORAL at 21:02

## 2017-11-12 RX ADMIN — HYDROCODONE BITARTRATE AND ACETAMINOPHEN 1 TABLET: 7.5; 325 TABLET ORAL at 06:07

## 2017-11-12 RX ADMIN — SERTRALINE HYDROCHLORIDE 50 MG: 50 TABLET ORAL at 09:15

## 2017-11-12 RX ADMIN — FAMOTIDINE 40 MG: 40 TABLET ORAL at 09:15

## 2017-11-12 RX ADMIN — ATORVASTATIN CALCIUM 40 MG: 40 TABLET, FILM COATED ORAL at 21:01

## 2017-11-12 RX ADMIN — HYDRALAZINE HYDROCHLORIDE 25 MG: 25 TABLET ORAL at 17:19

## 2017-11-12 RX ADMIN — MIRTAZAPINE 15 MG: 15 TABLET, FILM COATED ORAL at 21:01

## 2017-11-12 RX ADMIN — APIXABAN 2.5 MG: 2.5 TABLET, FILM COATED ORAL at 09:16

## 2017-11-12 RX ADMIN — HYDROCODONE BITARTRATE AND ACETAMINOPHEN 1 TABLET: 7.5; 325 TABLET ORAL at 17:19

## 2017-11-12 RX ADMIN — ASPIRIN 81 MG: 81 TABLET, COATED ORAL at 09:15

## 2017-11-12 RX ADMIN — CEFTRIAXONE SODIUM 1 G: 1 INJECTION, POWDER, FOR SOLUTION INTRAMUSCULAR; INTRAVENOUS at 15:56

## 2017-11-12 RX ADMIN — HYDRALAZINE HYDROCHLORIDE 25 MG: 25 TABLET ORAL at 09:15

## 2017-11-12 NOTE — PROGRESS NOTES
[facility] Medicine Services  INPATIENT PROGRESS NOTE    Length of Stay: 13  Date of Admission: 10/30/2017  Primary Care Physician: Rogers Perez MD    Subjective   Chief Complaint: No complaints    HPI:      11/12/2017:  No complaints today.  She is scheduled for dialysis tomorrow.  Her 4th dose of Daptomycin is scheduled for 1500 today.  Will repeat blood cultures in the AM to reevaluate MRSA bacteremia.      11/11/2017:  The patient has no complaints today.  Continue PT/OT, dialysis and Daptomycin.     11/10/2017:  Continue with dialysis today (M-W-F).  The patient will receive Daptomycin today and on the 12th.  Will recheck blood cultures next week.      11/9/2017:  Blood culture returned this morning, again with MRSA.  She is receiving Daptomycin every 48 hours and has only received two doses.  WBC is continuing to decrease with no fever noted.  Will continue Daptomycin and recheck blood cultures in a few days.      Previous notes by NADJA Land PA:   11/8/2017: Patient continues to be more pleasant and feels better.  At 24 hours blood cultures are negative.  No nausea, vomiting, chest pain.  Should be noted that if patient blood cultures remain negative at 48 hours, can likely be discharged.  Patient will be passed on to Rosalba BENITEZ who will evaluate the patient as appropriate and determine discharge possibilities at that time.     11/7/2017: Patient feeling much better today.  She is in good spirits.  Blood cultures are negative at less than 24 hours after changing over to daptomycin.  No fever, chills, shortness of air.  Patient has been off oxygen and satting well on room air greater than 24 hours.  Vascular has evaluated patient's fistula site and has determined it is functioning well with no infection.     11/6/2017: Patient has no chest pain, fever, chills.  Have discussed with pharmacy and there is difficulty reaching appropriate levels of vancomycin.   At this time recommendation is to start patient on daptomycin.  Nephrology has asked vascular surgery to evaluate fistula.  Urinalysis demonstrates no signs of infection.  Will repeat blood cultures today.     11/5/2017: Patient has no acute complaints.  Scheduled for redraw blood cultures in the a.m.  No further vegetation noted on echocardiogram.     11/4/2017: Dr. Adan has determined there is low suspicion for any type of endocarditis or vegetation.  Dr. Adan recommends a repeat transthoracic echocardiogram rather than a transesophageal echocardiogram.  Patient has no complaints today.  Patient does have severe pneumonia.  Will recheck blood cultures on Monday.     11/3/2017: Patient has no acute complaints today.  Patient continues to demonstrate MRSA bacteremia despite appropriate antibiotic treatment.  Have spoken with Dr. Adan of cardiology who is agreed to evaluate the patient for possible ANTHONY.  No indwelling lines or catheters.  All other possible sources fistula.  Continue antibiotic treatment.  Attempt sputum culture if possible.  No fevers or chills, dialysis is today.  Patient tolerating by mouth intake.  Essentially complains of soreness.  Patient tachycardia much improved, heart rate is 101.  Respirations are normal with no fever.  Patient hypoxia improving, 94 on 1.5 L.     Chief Complaint/HPI:  This 72-year-old  female was admitted to hospitalist services secondary to a worsening level of altered mental status.  Patient also had fever.  CT of the head was within normal limits.  Chest x-ray demonstrated left lower lobe pneumonia.  Patient has been treated with empiric antibiotics.  Patient was found to have MRSA bacteremia, no vegetation demonstrated on echocardiogram.  Echocardiogram is transthoracic.  Though patient is in no apparent distress, she continues to be tachycardic and hypoxic.  Patient currently on vancomycin and Levaquin.  Patient will be changed to vancomycin and  Zosyn per renal dosing.  EKG reordered.  Initial EKG demonstrated sinus tachycardia.  EKG rule out atrial fibrillation with RVR.  No chest pain, did have an elevated troponin, however troponin has been fairly consistent and is actually trending down.  Troponin likely secondary to ESRD and sepsis.  Have spoken with patient's nephrologist, Dr. Mireles, he is agreeable to CTA today.  We'll also order physical therapy and occupational therapy.    Review of Systems   Constitutional: Positive for fatigue.   All other systems reviewed and are negative.       All pertinent negatives and positives are as above. All other systems have been reviewed and are negative unless otherwise stated.     Objective    Temp:  [96.3 °F (35.7 °C)-97.1 °F (36.2 °C)] 96.7 °F (35.9 °C)  Heart Rate:  [70-88] 77  Resp:  [18] 18  BP: (114-157)/(51-67) 122/58    Physical Exam   Constitutional: She is oriented to person, place, and time. She appears well-developed and well-nourished.   HENT:   Head: Normocephalic and atraumatic.   Eyes: EOM are normal. Pupils are equal, round, and reactive to light.   Neck: Normal range of motion. Neck supple.   Cardiovascular: Normal rate and regular rhythm.    Pulmonary/Chest: Effort normal and breath sounds normal.   Abdominal: Soft. Bowel sounds are normal.   Musculoskeletal: Normal range of motion.   Neurological: She is alert and oriented to person, place, and time.   Skin: Skin is warm and dry.   Psychiatric: She has a normal mood and affect.     Results Review:  I have reviewed the labs, radiology results, and diagnostic studies.    Laboratory Data:     Results from last 7 days  Lab Units 11/12/17  0607 11/11/17  0644 11/10/17  0516   SODIUM mmol/L 140 139 137   POTASSIUM mmol/L 4.6 4.6 4.6   CHLORIDE mmol/L 101 99 96   CO2 mmol/L 27.0 28.0 31.0   BUN mg/dL 42* 28* 39*   CREATININE mg/dL 4.49* 3.28* 4.36*   GLUCOSE mg/dL 107* 114* 123*   CALCIUM mg/dL 8.0* 8.4 8.2*   ANION GAP mmol/L 12.0 12.0 10.0      Estimated Creatinine Clearance: 7.5 mL/min (by C-G formula based on Cr of 4.49).    Results from last 7 days  Lab Units 11/12/17  0607 11/11/17  0644 11/10/17  0516   PHOSPHORUS mg/dL 5.6* 4.9* 5.5*           Results from last 7 days  Lab Units 11/12/17  0607 11/11/17  0644 11/10/17  0516 11/09/17  0548 11/08/17  0607   WBC 10*3/mm3 7.35 7.74 6.79 11.91* 14.77*   HEMOGLOBIN g/dL 10.1* 10.7* 10.4* 10.5* 9.8*   HEMATOCRIT % 32.8* 34.4* 32.8* 32.5* 31.2*   PLATELETS 10*3/mm3 403 367 410 269 362           Culture Data:   No results found for: BLOODCX  No results found for: URINECX  No results found for: RESPCX  No results found for: WOUNDCX  No results found for: STOOLCX  No components found for: BODYFLD    Radiology Data:   Imaging Results (last 24 hours)     ** No results found for the last 24 hours. **          I have reviewed the patient current medications.     Assessment/Plan       Plan:    1.  MRSA bacteremia:   Daptomycin #4 today.  Will recheck blood cultures in the am.       2.  HCAP:  Rocephin day# 10.   3.  Deconditioning:  Continue PT/OT  4.  ESRD:  Dialysis M-W-F          Discharge Planning: I expect patient to be discharged to home in 3-4 days.      This document has been electronically signed by ELI Thorne on November 12, 2017 10:40 AM

## 2017-11-12 NOTE — PROGRESS NOTES
Nephrology Progress Note:    Feels better.  Last dialysis on Friday.  Patient is due for dialysis tomorrow.  No shortness of breath.  No nausea or vomiting.  Eating better.  She discussed about plans to see dental.    Patient Active Problem List   Diagnosis   • Constipation   • Arteriovenous fistula   • End stage renal failure on dialysis   • Controlled type 2 diabetes mellitus with chronic kidney disease on chronic dialysis, without long-term current use of insulin   • Coronary artery disease involving native coronary artery of native heart without angina pectoris   • Panlobular emphysema   • ESRD (end stage renal disease) on dialysis   • HCAP (healthcare-associated pneumonia)       Medications:    apixaban 2.5 mg Oral BID   aspirin 81 mg Oral Daily   atorvastatin 40 mg Oral Nightly   ceftriaxone 1 g Intravenous Q24H   DAPTOmycin (CUBICIN)  IV 6 mg/kg Intravenous Q48H   epoetin dagmar 6,000 Units Subcutaneous Once per day on Mon Wed Fri   famotidine 40 mg Oral Daily   hydrALAZINE 25 mg Oral TID   levothyroxine 88 mcg Oral Daily   mirtazapine 15 mg Oral Nightly   polyethylene glycol 17 g Oral BID   QUEtiapine 50 mg Oral Nightly   sertraline 50 mg Oral Daily        Vitals:    11/11/17 1555 11/11/17 2029 11/12/17 0341 11/12/17 0833   BP: 157/67 114/51 124/58 122/58   BP Location: Right leg Right leg Right arm Right leg   Patient Position: Lying Lying Lying Lying   Pulse: 83 81 70 77   Resp: 18 18 18 18   Temp: 97.1 °F (36.2 °C) 96.8 °F (36 °C) 97 °F (36.1 °C) 96.7 °F (35.9 °C)   TempSrc: Oral Axillary Axillary Axillary   SpO2: 96% 96% 95% 94%   Weight:   94 lb 4.8 oz (42.8 kg)    Height:         I/O last 3 completed shifts:  In: 720 [P.O.:720]  Out: 0        On examination:  General:Comfortable, alert and oriented,   Sitting up in bed, eating breakfast  HEENT: Pallor, no icterus, eye movements are normal.  Chest: Decreased breath sounds at the lung bases.  Clear lungs  CVS: Heart sounds are irregular, no pericardial rub  or gallop  Abdomen: Abdomen is soft, normal bowel sounds, complaints of some discomfort in epigastric area  Extremities: No edema of the lower extremities.  Left thigh AV graft, no tenderness, good bruit  Neuro: No change in neurological status.  Grade 3 power both upper and lower extremities  Mentation: She is alert and oriented    Past medical illness, social history, medications, previous notes reviewed.       Laboratory results:      Recent Results (from the past 24 hour(s))   Renal Function Panel    Collection Time: 11/12/17  6:07 AM   Result Value Ref Range    Glucose 107 (H) 60 - 100 mg/dL    BUN 42 (H) 7 - 21 mg/dL    Creatinine 4.49 (H) 0.50 - 1.00 mg/dL    Sodium 140 137 - 145 mmol/L    Potassium 4.6 3.5 - 5.1 mmol/L    Chloride 101 95 - 110 mmol/L    CO2 27.0 22.0 - 31.0 mmol/L    Calcium 8.0 (L) 8.4 - 10.2 mg/dL    Albumin 3.30 (L) 3.40 - 4.80 g/dL    Phosphorus 5.6 (H) 2.4 - 4.4 mg/dL    Anion Gap 12.0 5.0 - 15.0 mmol/L    BUN/Creatinine Ratio 9.4 7.0 - 25.0    eGFR Non  Amer 10 (L) 39 - 90 mL/min/1.73   CBC Auto Differential    Collection Time: 11/12/17  6:07 AM   Result Value Ref Range    WBC 7.35 3.20 - 9.80 10*3/mm3    RBC 3.54 (L) 3.77 - 5.16 10*6/mm3    Hemoglobin 10.1 (L) 12.0 - 15.5 g/dL    Hematocrit 32.8 (L) 35.0 - 45.0 %    MCV 92.7 80.0 - 98.0 fL    MCH 28.5 26.5 - 34.0 pg    MCHC 30.8 (L) 31.4 - 36.0 g/dL    RDW 16.6 (H) 11.5 - 14.5 %    RDW-SD 55.8 (H) 36.4 - 46.3 fl    MPV 10.6 8.0 - 12.0 fL    Platelets 403 150 - 450 10*3/mm3    Neutrophil % 57.9 37.0 - 80.0 %    Lymphocyte % 26.9 10.0 - 50.0 %    Monocyte % 8.4 0.0 - 12.0 %    Eosinophil % 4.2 0.0 - 7.0 %    Basophil % 2.2 (H) 0.0 - 2.0 %    Immature Grans % 0.4 0.0 - 0.5 %    Neutrophils, Absolute 4.25 2.00 - 8.60 10*3/mm3    Lymphocytes, Absolute 1.98 0.60 - 4.20 10*3/mm3    Monocytes, Absolute 0.62 0.00 - 0.90 10*3/mm3    Eosinophils, Absolute 0.31 0.00 - 0.70 10*3/mm3    Basophils, Absolute 0.16 0.00 - 0.20 10*3/mm3     Immature Grans, Absolute 0.03 (H) 0.00 - 0.02 10*3/mm3   ]    Imaging Results (last 24 hours)     ** No results found for the last 24 hours. **            Assessment:     End-stage renal disease  Left lower lobe pneumonia  Staphylococcal bacteremia  Altered mental status, improved  Essential hypertension  Febrile illness  Type 2 diabetes mellitus    Plan:     ESRD:   Patient is on hemodialysis on Monday Wednesday and Friday.   Patient had hemodialysis on Friday.  Next hemodialysis on Monday.    Staphylococcal bacteremia, left lower lobe pneumonia.  Echocardiogram was negative.  Repeat cultures were positive.  Patient is on daptomycin, CPK stable.  Vascular evaluation was done, no definite evidence of infection over the thigh graft.  Follow-up repeat cultures.  White cell count is improving.    Abd discomfort.    Improved, no nausea or vomiting.  On proton pump inhibitors.    Pneumonia: On IV antibiotic.  Follow cultures.  Cough better.     Altered mental status, delirium: Improved.   imaging studies were negative.    Anemia of chronic kidney disease:   Hemoglobin and hematocrit levels are stable.  On 8000 units of Epogen with dialysis.    Essential hypertension:  Blood pressure readings have been on the lower side, the dose of hydralazine was cut back.  Follow blood pressure readings.    Discussed with patient.      Chi Holden MD

## 2017-11-13 LAB
ALBUMIN SERPL-MCNC: 3.4 G/DL (ref 3.4–4.8)
ANION GAP SERPL CALCULATED.3IONS-SCNC: 16 MMOL/L (ref 5–15)
ANISOCYTOSIS BLD QL: NORMAL
BASOPHILS # BLD AUTO: 0.1 10*3/MM3 (ref 0–0.2)
BASOPHILS NFR BLD AUTO: 1.4 % (ref 0–2)
BUN BLD-MCNC: 57 MG/DL (ref 7–21)
BUN/CREAT SERPL: 11.1 (ref 7–25)
CALCIUM SPEC-SCNC: 8.1 MG/DL (ref 8.4–10.2)
CHLORIDE SERPL-SCNC: 100 MMOL/L (ref 95–110)
CO2 SERPL-SCNC: 23 MMOL/L (ref 22–31)
CREAT BLD-MCNC: 5.12 MG/DL (ref 0.5–1)
DEPRECATED RDW RBC AUTO: 54.1 FL (ref 36.4–46.3)
EOSINOPHIL # BLD AUTO: 0.26 10*3/MM3 (ref 0–0.7)
EOSINOPHIL NFR BLD AUTO: 3.7 % (ref 0–7)
ERYTHROCYTE [DISTWIDTH] IN BLOOD BY AUTOMATED COUNT: 16.6 % (ref 11.5–14.5)
GFR SERPL CREATININE-BSD FRML MDRD: 8 ML/MIN/1.73 (ref 60–90)
GLUCOSE BLD-MCNC: 189 MG/DL (ref 60–100)
HCT VFR BLD AUTO: 31.3 % (ref 35–45)
HGB BLD-MCNC: 9.8 G/DL (ref 12–15.5)
HOLD SPECIMEN: NORMAL
HOLD SPECIMEN: NORMAL
IMM GRANULOCYTES # BLD: 0.04 10*3/MM3 (ref 0–0.02)
IMM GRANULOCYTES NFR BLD: 0.6 % (ref 0–0.5)
LYMPHOCYTES # BLD AUTO: 1.65 10*3/MM3 (ref 0.6–4.2)
LYMPHOCYTES NFR BLD AUTO: 23.4 % (ref 10–50)
MCH RBC QN AUTO: 28.2 PG (ref 26.5–34)
MCHC RBC AUTO-ENTMCNC: 31.3 G/DL (ref 31.4–36)
MCV RBC AUTO: 90.2 FL (ref 80–98)
MONOCYTES # BLD AUTO: 0.3 10*3/MM3 (ref 0–0.9)
MONOCYTES NFR BLD AUTO: 4.3 % (ref 0–12)
NEUTROPHILS # BLD AUTO: 4.69 10*3/MM3 (ref 2–8.6)
NEUTROPHILS NFR BLD AUTO: 66.6 % (ref 37–80)
NRBC BLD MANUAL-RTO: 0 /100 WBC (ref 0–0)
OVALOCYTES BLD QL SMEAR: NORMAL
PHOSPHATE SERPL-MCNC: 5.9 MG/DL (ref 2.4–4.4)
PLATELET # BLD AUTO: 400 10*3/MM3 (ref 150–450)
PMV BLD AUTO: 10.3 FL (ref 8–12)
POIKILOCYTOSIS BLD QL SMEAR: NORMAL
POLYCHROMASIA BLD QL SMEAR: NORMAL
POTASSIUM BLD-SCNC: 4.4 MMOL/L (ref 3.5–5.1)
RBC # BLD AUTO: 3.47 10*6/MM3 (ref 3.77–5.16)
SMALL PLATELETS BLD QL SMEAR: ADEQUATE
SODIUM BLD-SCNC: 139 MMOL/L (ref 137–145)
TARGETS BLD QL SMEAR: NORMAL
WBC MORPH BLD: NORMAL
WBC NRBC COR # BLD: 7.04 10*3/MM3 (ref 3.2–9.8)
WHOLE BLOOD HOLD SPECIMEN: NORMAL

## 2017-11-13 PROCEDURE — 63510000001 EPOETIN ALFA PER 1000 UNITS: Performed by: INTERNAL MEDICINE

## 2017-11-13 PROCEDURE — 80069 RENAL FUNCTION PANEL: CPT | Performed by: PHYSICIAN ASSISTANT

## 2017-11-13 PROCEDURE — 85007 BL SMEAR W/DIFF WBC COUNT: CPT | Performed by: INTERNAL MEDICINE

## 2017-11-13 PROCEDURE — 87040 BLOOD CULTURE FOR BACTERIA: CPT | Performed by: NURSE PRACTITIONER

## 2017-11-13 PROCEDURE — 25010000002 CEFTRIAXONE PER 250 MG: Performed by: INTERNAL MEDICINE

## 2017-11-13 PROCEDURE — 25010000002 ONDANSETRON PER 1 MG: Performed by: INTERNAL MEDICINE

## 2017-11-13 PROCEDURE — 85025 COMPLETE CBC W/AUTO DIFF WBC: CPT | Performed by: INTERNAL MEDICINE

## 2017-11-13 PROCEDURE — 25010000002 DAPTOMYCIN PER 1 MG: Performed by: PHYSICIAN ASSISTANT

## 2017-11-13 RX ORDER — ONDANSETRON 2 MG/ML
4 INJECTION INTRAMUSCULAR; INTRAVENOUS EVERY 6 HOURS PRN
Status: DISCONTINUED | OUTPATIENT
Start: 2017-11-13 | End: 2017-11-16 | Stop reason: HOSPADM

## 2017-11-13 RX ADMIN — HYDROCODONE BITARTRATE AND ACETAMINOPHEN 1 TABLET: 7.5; 325 TABLET ORAL at 07:34

## 2017-11-13 RX ADMIN — ATORVASTATIN CALCIUM 40 MG: 40 TABLET, FILM COATED ORAL at 21:01

## 2017-11-13 RX ADMIN — HYDROCODONE BITARTRATE AND ACETAMINOPHEN 1 TABLET: 7.5; 325 TABLET ORAL at 12:12

## 2017-11-13 RX ADMIN — FAMOTIDINE 40 MG: 40 TABLET ORAL at 07:33

## 2017-11-13 RX ADMIN — APIXABAN 2.5 MG: 2.5 TABLET, FILM COATED ORAL at 17:07

## 2017-11-13 RX ADMIN — ONDANSETRON 4 MG: 2 INJECTION INTRAMUSCULAR; INTRAVENOUS at 09:29

## 2017-11-13 RX ADMIN — SERTRALINE HYDROCHLORIDE 50 MG: 50 TABLET ORAL at 07:33

## 2017-11-13 RX ADMIN — MIRTAZAPINE 15 MG: 15 TABLET, FILM COATED ORAL at 21:01

## 2017-11-13 RX ADMIN — HYDRALAZINE HYDROCHLORIDE 25 MG: 25 TABLET ORAL at 21:01

## 2017-11-13 RX ADMIN — CEFTRIAXONE SODIUM 1 G: 1 INJECTION, POWDER, FOR SOLUTION INTRAMUSCULAR; INTRAVENOUS at 15:20

## 2017-11-13 RX ADMIN — DAPTOMYCIN 270 MG: 500 INJECTION, POWDER, LYOPHILIZED, FOR SOLUTION INTRAVENOUS at 17:12

## 2017-11-13 RX ADMIN — ERYTHROPOIETIN 6000 UNITS: 10000 INJECTION, SOLUTION INTRAVENOUS; SUBCUTANEOUS at 09:50

## 2017-11-13 RX ADMIN — POLYETHYLENE GLYCOL 3350 17 G: 17 POWDER, FOR SOLUTION ORAL at 07:33

## 2017-11-13 RX ADMIN — APIXABAN 2.5 MG: 2.5 TABLET, FILM COATED ORAL at 07:34

## 2017-11-13 RX ADMIN — HYDRALAZINE HYDROCHLORIDE 25 MG: 25 TABLET ORAL at 17:07

## 2017-11-13 RX ADMIN — ASPIRIN 81 MG: 81 TABLET, COATED ORAL at 07:33

## 2017-11-13 RX ADMIN — LEVOTHYROXINE SODIUM 88 MCG: 88 TABLET ORAL at 06:40

## 2017-11-13 RX ADMIN — QUETIAPINE FUMARATE 50 MG: 25 TABLET, FILM COATED ORAL at 21:01

## 2017-11-13 NOTE — SIGNIFICANT NOTE
11/13/17 1425   Rehab Treatment   Discipline physical therapy assistant   Treatment Not Performed patient/family declined treatment  (Pt deferred tx this PM, pt stated she just returned from dialysis and is just too tired to therapy today. Pt request that therapy check back tomorrow)   Recommendation   PT - Next Appointment 11/14/17

## 2017-11-13 NOTE — PROGRESS NOTES
Nephrology Progress Note:    Seen and examined on HD.   Orders reviewed.  Some epigastric discomfort.   No SOA.       Patient Active Problem List   Diagnosis   • Constipation   • Arteriovenous fistula   • End stage renal failure on dialysis   • Controlled type 2 diabetes mellitus with chronic kidney disease on chronic dialysis, without long-term current use of insulin   • Coronary artery disease involving native coronary artery of native heart without angina pectoris   • Panlobular emphysema   • ESRD (end stage renal disease) on dialysis   • HCAP (healthcare-associated pneumonia)       Medications:    apixaban 2.5 mg Oral BID   aspirin 81 mg Oral Daily   atorvastatin 40 mg Oral Nightly   ceftriaxone 1 g Intravenous Q24H   DAPTOmycin (CUBICIN)  IV 6 mg/kg Intravenous Once per day on Mon Wed Fri   epoetin dagmar 6,000 Units Subcutaneous Once per day on Mon Wed Fri   famotidine 40 mg Oral Daily   hydrALAZINE 25 mg Oral TID   levothyroxine 88 mcg Oral Daily   mirtazapine 15 mg Oral Nightly   polyethylene glycol 17 g Oral BID   QUEtiapine 50 mg Oral Nightly   sertraline 50 mg Oral Daily        Vitals:    11/12/17 0833 11/12/17 1526 11/12/17 1929 11/13/17 0504   BP: 122/58 124/57 99/50 110/51   BP Location: Right leg Right leg Right leg Right leg   Patient Position: Lying Lying Lying Lying   Pulse: 77 83 74 67   Resp: 18 18 18 18   Temp: 96.7 °F (35.9 °C) 96.5 °F (35.8 °C) 97.4 °F (36.3 °C) 97.8 °F (36.6 °C)   TempSrc: Axillary Axillary Temporal Artery  Temporal Artery    SpO2: 94% 93% 96% 96%   Weight:    94 lb 9.6 oz (42.9 kg)   Height:         I/O last 3 completed shifts:  In: 720 [P.O.:720]  Out: 0        On examination:  General:Comfortable, alert and oriented,   Seen and examined in HD.   HEENT: Pallor, no icterus, eye movements are normal.  Chest: Decreased breath sounds at the lung bases.  Clear lungs  CVS: Heart sounds are irregular, no pericardial rub or gallop  Abdomen: Abdomen is soft, normal bowel sounds,  complaints of some discomfort in epigastric area  Extremities: No edema of the lower extremities.  Left thigh AV graft, no tenderness, good bruit  Neuro: No change in neurological status.  Grade 3 power both upper and lower extremities  Mentation: She is alert and oriented    Past medical illness, social history, medications, previous notes reviewed.       Laboratory results:      No results found for this or any previous visit (from the past 24 hour(s)).]    Imaging Results (last 24 hours)     ** No results found for the last 24 hours. **            Assessment:     End-stage renal disease  Left lower lobe pneumonia  Staphylococcal bacteremia  Altered mental status, improved  Essential hypertension  Febrile illness  Type 2 diabetes mellitus    Plan:     ESRD:   Patient is on hemodialysis on Monday Wednesday and Friday.   Seen and examined on HD.  HD orders reviewed.  FLuid removal on HD as tolerated.      Staphylococcal bacteremia, left lower lobe pneumonia.  Echocardiogram was negative.  Repeat cultures were positive.  Patient is on daptomycin, CPK stable.  Vascular evaluation was done, no definite evidence of infection over the thigh graft.  Follow-up repeat cultures.  White cell count is improving.    Abd discomfort.    On proton pump inhibitors.    Pneumonia: On IV antibiotic.  Follow cultures.  Cough better.     Altered mental status, delirium: Improved.   imaging studies were negative.    Anemia of chronic kidney disease:   Hemoglobin and hematocrit levels are stable.  On 8000 units of Epogen with dialysis.    Essential hypertension:  Blood pressure readings have been on the lower side, the dose of hydralazine was cut back.  Follow blood pressure readings.    Discussed with patient.      hCi Holden MD

## 2017-11-13 NOTE — SIGNIFICANT NOTE
Pt. Refused this pm stating she was too tired from receiving dialysis this am. Pt. Has been having history of multiple refusals. If pt. Continues to refuse skilled therapy she will need to be discharged d/t not making functional progress.

## 2017-11-13 NOTE — PROGRESS NOTES
[facility] Medicine Services  INPATIENT PROGRESS NOTE    Length of Stay: 14  Date of Admission: 10/30/2017  Primary Care Physician: Rogers Perez MD    Subjective   Chief Complaint: No complaints    HPI:      11/13/2017:  The patient received dialysis today.  Blood cultures have been recollected.      11/12/2017:  No complaints today.  She is scheduled for dialysis tomorrow.  Her 4th dose of Daptomycin is scheduled for 1500 today.  Will repeat blood cultures in the AM to reevaluate MRSA bacteremia.      11/11/2017:  The patient has no complaints today.  Continue PT/OT, dialysis and Daptomycin.     11/10/2017:  Continue with dialysis today (M-W-F).  The patient will receive Daptomycin today and on the 12th.  Will recheck blood cultures next week.      11/9/2017:  Blood culture returned this morning, again with MRSA.  She is receiving Daptomycin every 48 hours and has only received two doses.  WBC is continuing to decrease with no fever noted.  Will continue Daptomycin and recheck blood cultures in a few days.      Previous notes by NADJA Land PA:   11/8/2017: Patient continues to be more pleasant and feels better.  At 24 hours blood cultures are negative.  No nausea, vomiting, chest pain.  Should be noted that if patient blood cultures remain negative at 48 hours, can likely be discharged.  Patient will be passed on to Rosalba BENITEZ who will evaluate the patient as appropriate and determine discharge possibilities at that time.     11/7/2017: Patient feeling much better today.  She is in good spirits.  Blood cultures are negative at less than 24 hours after changing over to daptomycin.  No fever, chills, shortness of air.  Patient has been off oxygen and satting well on room air greater than 24 hours.  Vascular has evaluated patient's fistula site and has determined it is functioning well with no infection.     11/6/2017: Patient has no chest pain, fever, chills.  Have  discussed with pharmacy and there is difficulty reaching appropriate levels of vancomycin.  At this time recommendation is to start patient on daptomycin.  Nephrology has asked vascular surgery to evaluate fistula.  Urinalysis demonstrates no signs of infection.  Will repeat blood cultures today.     11/5/2017: Patient has no acute complaints.  Scheduled for redraw blood cultures in the a.m.  No further vegetation noted on echocardiogram.     11/4/2017: Dr. Adan has determined there is low suspicion for any type of endocarditis or vegetation.  Dr. Adan recommends a repeat transthoracic echocardiogram rather than a transesophageal echocardiogram.  Patient has no complaints today.  Patient does have severe pneumonia.  Will recheck blood cultures on Monday.     11/3/2017: Patient has no acute complaints today.  Patient continues to demonstrate MRSA bacteremia despite appropriate antibiotic treatment.  Have spoken with Dr. Adan of cardiology who is agreed to evaluate the patient for possible ANTHONY.  No indwelling lines or catheters.  All other possible sources fistula.  Continue antibiotic treatment.  Attempt sputum culture if possible.  No fevers or chills, dialysis is today.  Patient tolerating by mouth intake.  Essentially complains of soreness.  Patient tachycardia much improved, heart rate is 101.  Respirations are normal with no fever.  Patient hypoxia improving, 94 on 1.5 L.     Chief Complaint/HPI:  This 72-year-old  female was admitted to hospitalist services secondary to a worsening level of altered mental status.  Patient also had fever.  CT of the head was within normal limits.  Chest x-ray demonstrated left lower lobe pneumonia.  Patient has been treated with empiric antibiotics.  Patient was found to have MRSA bacteremia, no vegetation demonstrated on echocardiogram.  Echocardiogram is transthoracic.  Though patient is in no apparent distress, she continues to be tachycardic and hypoxic.   Patient currently on vancomycin and Levaquin.  Patient will be changed to vancomycin and Zosyn per renal dosing.  EKG reordered.  Initial EKG demonstrated sinus tachycardia.  EKG rule out atrial fibrillation with RVR.  No chest pain, did have an elevated troponin, however troponin has been fairly consistent and is actually trending down.  Troponin likely secondary to ESRD and sepsis.  Have spoken with patient's nephrologist, Dr. Mireles, he is agreeable to CTA today.  We'll also order physical therapy and occupational therapy.    Review of Systems   Constitutional: Positive for fatigue.   All other systems reviewed and are negative.       All pertinent negatives and positives are as above. All other systems have been reviewed and are negative unless otherwise stated.     Objective    Temp:  [97.4 °F (36.3 °C)-97.8 °F (36.6 °C)] 97.8 °F (36.6 °C)  Heart Rate:  [67-74] 67  Resp:  [18] 18  BP: ()/(50-51) 110/51    Physical Exam   Constitutional: She is oriented to person, place, and time. She appears well-developed and well-nourished.   HENT:   Head: Normocephalic and atraumatic.   Eyes: EOM are normal. Pupils are equal, round, and reactive to light.   Neck: Normal range of motion. Neck supple.   Cardiovascular: Normal rate and regular rhythm.    Pulmonary/Chest: Effort normal and breath sounds normal.   Abdominal: Soft. Bowel sounds are normal.   Musculoskeletal: Normal range of motion.   Neurological: She is alert and oriented to person, place, and time.   Skin: Skin is warm and dry.   Psychiatric: She has a normal mood and affect.     Results Review:  I have reviewed the labs, radiology results, and diagnostic studies.    Laboratory Data:     Results from last 7 days  Lab Units 11/13/17  0816 11/12/17  0607 11/11/17  0644   SODIUM mmol/L 139 140 139   POTASSIUM mmol/L 4.4 4.6 4.6   CHLORIDE mmol/L 100 101 99   CO2 mmol/L 23.0 27.0 28.0   BUN mg/dL 57* 42* 28*   CREATININE mg/dL 5.12* 4.49* 3.28*   GLUCOSE mg/dL  189* 107* 114*   CALCIUM mg/dL 8.1* 8.0* 8.4   ANION GAP mmol/L 16.0* 12.0 12.0     Estimated Creatinine Clearance: 6.6 mL/min (by C-G formula based on Cr of 5.12).    Results from last 7 days  Lab Units 11/13/17  0816 11/12/17  0607 11/11/17  0644   PHOSPHORUS mg/dL 5.9* 5.6* 4.9*           Results from last 7 days  Lab Units 11/13/17  0812 11/12/17  0607 11/11/17  0644 11/10/17  0516 11/09/17  0548   WBC 10*3/mm3 7.04 7.35 7.74 6.79 11.91*   HEMOGLOBIN g/dL 9.8* 10.1* 10.7* 10.4* 10.5*   HEMATOCRIT % 31.3* 32.8* 34.4* 32.8* 32.5*   PLATELETS 10*3/mm3 400 403 367 410 269           Culture Data:   No results found for: BLOODCX  No results found for: URINECX  No results found for: RESPCX  No results found for: WOUNDCX  No results found for: STOOLCX  No components found for: BODYFLD    Radiology Data:   Imaging Results (last 24 hours)     ** No results found for the last 24 hours. **          I have reviewed the patient current medications.     Assessment/Plan       Plan:    1.  MRSA bacteremia:   Daptomycin #4 yesterday.  Blood culture pending.       2.  HCAP:  Rocephin day# 11.   Will discontinue.   3.  Deconditioning:  Continue PT/OT  4.  ESRD:  Dialysis M-W-F          Discharge Planning: I expect patient to be discharged to home in 3-4 days.      This document has been electronically signed by ELI Thorne on November 13, 2017 3:37 PM

## 2017-11-13 NOTE — PLAN OF CARE
Problem: Sepsis (Adult)  Goal: Signs and Symptoms of Listed Potential Problems Will be Absent or Manageable (Sepsis)  Outcome: Ongoing (interventions implemented as appropriate)    Problem: Fall Risk (Adult)  Goal: Absence of Falls  Outcome: Ongoing (interventions implemented as appropriate)    Problem: Renal Replacement, Continuous (Adult)  Goal: Signs and Symptoms of Listed Potential Problems Will be Absent or Manageable (Renal Replacement, Continuous)  Outcome: Ongoing (interventions implemented as appropriate)    Problem: Patient Care Overview (Adult)  Goal: Plan of Care Review  Outcome: Ongoing (interventions implemented as appropriate)    11/11/17 0501 11/12/17 2054   Coping/Psychosocial Response Interventions   Plan Of Care Reviewed With --  patient   Patient Care Overview   Progress no change --    Outcome Evaluation   Outcome Summary/Follow up Plan Pt slept throughout the night; refusing to turn at times; vital signs stable, monitoring pt  --        Goal: Adult Individualization and Mutuality  Outcome: Ongoing (interventions implemented as appropriate)  Goal: Discharge Needs Assessment  Outcome: Ongoing (interventions implemented as appropriate)    Problem: Pressure Ulcer (Adult)  Goal: Signs and Symptoms of Listed Potential Problems Will be Absent or Manageable (Pressure Ulcer)  Outcome: Ongoing (interventions implemented as appropriate)    Problem: Skin Integrity Impairment, Risk/Actual (Adult)  Goal: Identify Related Risk Factors and Signs and Symptoms  Outcome: Ongoing (interventions implemented as appropriate)  Goal: Skin Integrity/Wound Healing  Outcome: Ongoing (interventions implemented as appropriate)

## 2017-11-14 LAB
ALBUMIN SERPL-MCNC: 3.4 G/DL (ref 3.4–4.8)
ANION GAP SERPL CALCULATED.3IONS-SCNC: 12 MMOL/L (ref 5–15)
BASOPHILS # BLD AUTO: 0.11 10*3/MM3 (ref 0–0.2)
BASOPHILS NFR BLD AUTO: 1.6 % (ref 0–2)
BUN BLD-MCNC: 38 MG/DL (ref 7–21)
BUN/CREAT SERPL: 9.8 (ref 7–25)
CALCIUM SPEC-SCNC: 8.4 MG/DL (ref 8.4–10.2)
CHLORIDE SERPL-SCNC: 96 MMOL/L (ref 95–110)
CO2 SERPL-SCNC: 30 MMOL/L (ref 22–31)
CREAT BLD-MCNC: 3.87 MG/DL (ref 0.5–1)
DEPRECATED RDW RBC AUTO: 55.5 FL (ref 36.4–46.3)
EOSINOPHIL # BLD AUTO: 0.22 10*3/MM3 (ref 0–0.7)
EOSINOPHIL NFR BLD AUTO: 3.1 % (ref 0–7)
ERYTHROCYTE [DISTWIDTH] IN BLOOD BY AUTOMATED COUNT: 16.7 % (ref 11.5–14.5)
GFR SERPL CREATININE-BSD FRML MDRD: 11 ML/MIN/1.73 (ref 60–90)
GLUCOSE BLD-MCNC: 111 MG/DL (ref 60–100)
HCT VFR BLD AUTO: 32.9 % (ref 35–45)
HGB BLD-MCNC: 10.2 G/DL (ref 12–15.5)
IMM GRANULOCYTES # BLD: 0.02 10*3/MM3 (ref 0–0.02)
IMM GRANULOCYTES NFR BLD: 0.3 % (ref 0–0.5)
LYMPHOCYTES # BLD AUTO: 2.26 10*3/MM3 (ref 0.6–4.2)
LYMPHOCYTES NFR BLD AUTO: 32.3 % (ref 10–50)
MCH RBC QN AUTO: 28.6 PG (ref 26.5–34)
MCHC RBC AUTO-ENTMCNC: 31 G/DL (ref 31.4–36)
MCV RBC AUTO: 92.2 FL (ref 80–98)
MONOCYTES # BLD AUTO: 0.46 10*3/MM3 (ref 0–0.9)
MONOCYTES NFR BLD AUTO: 6.6 % (ref 0–12)
NEUTROPHILS # BLD AUTO: 3.93 10*3/MM3 (ref 2–8.6)
NEUTROPHILS NFR BLD AUTO: 56.1 % (ref 37–80)
NRBC BLD MANUAL-RTO: 0 /100 WBC (ref 0–0)
PHOSPHATE SERPL-MCNC: 5.9 MG/DL (ref 2.4–4.4)
PLATELET # BLD AUTO: 410 10*3/MM3 (ref 150–450)
PMV BLD AUTO: 10.1 FL (ref 8–12)
POTASSIUM BLD-SCNC: 4.5 MMOL/L (ref 3.5–5.1)
RBC # BLD AUTO: 3.57 10*6/MM3 (ref 3.77–5.16)
SODIUM BLD-SCNC: 138 MMOL/L (ref 137–145)
WBC NRBC COR # BLD: 7 10*3/MM3 (ref 3.2–9.8)

## 2017-11-14 PROCEDURE — 97530 THERAPEUTIC ACTIVITIES: CPT

## 2017-11-14 PROCEDURE — 97110 THERAPEUTIC EXERCISES: CPT

## 2017-11-14 PROCEDURE — 85025 COMPLETE CBC W/AUTO DIFF WBC: CPT | Performed by: INTERNAL MEDICINE

## 2017-11-14 PROCEDURE — 97535 SELF CARE MNGMENT TRAINING: CPT

## 2017-11-14 PROCEDURE — 80069 RENAL FUNCTION PANEL: CPT | Performed by: PHYSICIAN ASSISTANT

## 2017-11-14 PROCEDURE — 97755 ASSISTIVE TECHNOLOGY ASSESS: CPT

## 2017-11-14 RX ADMIN — SERTRALINE HYDROCHLORIDE 50 MG: 50 TABLET ORAL at 08:12

## 2017-11-14 RX ADMIN — APIXABAN 2.5 MG: 2.5 TABLET, FILM COATED ORAL at 17:20

## 2017-11-14 RX ADMIN — ATORVASTATIN CALCIUM 40 MG: 40 TABLET, FILM COATED ORAL at 20:44

## 2017-11-14 RX ADMIN — LEVOTHYROXINE SODIUM 88 MCG: 88 TABLET ORAL at 05:22

## 2017-11-14 RX ADMIN — FAMOTIDINE 40 MG: 40 TABLET ORAL at 08:12

## 2017-11-14 RX ADMIN — APIXABAN 2.5 MG: 2.5 TABLET, FILM COATED ORAL at 08:12

## 2017-11-14 RX ADMIN — ASPIRIN 81 MG: 81 TABLET, COATED ORAL at 08:12

## 2017-11-14 RX ADMIN — QUETIAPINE FUMARATE 50 MG: 25 TABLET, FILM COATED ORAL at 20:44

## 2017-11-14 RX ADMIN — MIRTAZAPINE 15 MG: 15 TABLET, FILM COATED ORAL at 20:44

## 2017-11-14 RX ADMIN — HYDROCODONE BITARTRATE AND ACETAMINOPHEN 1 TABLET: 7.5; 325 TABLET ORAL at 08:12

## 2017-11-14 NOTE — THERAPY TREATMENT NOTE
Acute Care - Occupational Therapy Treatment Note  [facility]     Patient Name: Efrem Roa  : 1945  MRN: [id number]  Today's Date: 2017  Onset of Illness/Injury or Date of Surgery Date: 10/30/17  Date of Referral to OT: 17  Referring Physician: LOS Rose      Admit Date: 10/30/2017    Visit Dx:     ICD-10-CM ICD-9-CM   1. HCAP (healthcare-associated pneumonia) J18.9 486   2. Sepsis, due to unspecified organism A41.9 038.9     995.91   3. NSTEMI (non-ST elevated myocardial infarction) I21.4 410.70   4. ESRD (end stage renal disease) N18.6 585.6   5. Essential hypertension I10 401.9   6. Hypertensive heart disease without heart failure I11.9 402.90   7. Oropharyngeal dysphagia R13.12 787.22   8. Impaired mobility and activities of daily living Z74.09 799.89   9. Impaired functional mobility, balance, gait, and endurance Z74.09 V49.89   10. Impaired mobility and ADLs Z74.09 799.89     Patient Active Problem List   Diagnosis   • Constipation   • Arteriovenous fistula   • End stage renal failure on dialysis   • Controlled type 2 diabetes mellitus with chronic kidney disease on chronic dialysis, without long-term current use of insulin   • Coronary artery disease involving native coronary artery of native heart without angina pectoris   • Panlobular emphysema   • ESRD (end stage renal disease) on dialysis   • HCAP (healthcare-associated pneumonia)             Adult Rehabilitation Note       17 1015 17 1421 17 1007    Rehab Assessment/Intervention    Discipline occupational therapy assistant  - occupational therapist  -NN occupational therapist  -NN    Document Type therapy note (daily note)  -      Subjective Information agree to therapy  -      Patient Effort, Rehab Treatment fair  -      Treatment Not Performed  patient/family declined treatment  -NN other (see comments)  -NN    Treatment Not Performed, Comment  Pt. deferrered getting OOB this pm. Pt.  stated she was too tired from dialysis this pm. Pt. has had hx of multiple refusals . Please monitor refusals and if pt. continues to refuse OT needs to sign off. It is not therapeutic.   -NN Pt. in dialysis this am  -NN    Recorded by [] MIHIR MerazA/BERNADETTE [NN] Karly Canales OTR/L [NN] Karly Canales OTR/L    Vital Signs    Pre SpO2 (%) 98  -JH      O2 Delivery Pre Treatment room air  -      Recorded by [] MIHIR MerazA/L      Pain Assessment    Pain Assessment No/denies pain  -      Recorded by [] MIHIR MerazA/L      Cognitive Assessment/Intervention    Current Cognitive/Communication Assessment impaired  -      Orientation Status oriented to;person  -      Recorded by [] JACE Meraz/L      Bed Mobility, Assessment/Treatment    Bed Mob, Supine to Sit, [place] conditional independence  -      Bed Mob, Sit to Supine, [place] conditional independence  -      Recorded by [] JACE Meraz/L      Grooming Assessment/Training    Grooming Assess/Train, Position edge of bed  -      Grooming Assess/Train, Indepen Level supervision required;verbal cues required  -      Recorded by [] JACE Meraz/L      Therapy Exercises    Bilateral Upper Extremity AROM:;5 reps;sitting;elbow flexion/extension;hand pumps;pronation/supination  -      BUE Resistance manual resistance- minimal  -      Recorded by [] JACE Meraz/L      Positioning and Restraints    Pre-Treatment Position in bed  -      Post Treatment Position bed  -      In Bed sitting EOB;call light within reach;encouraged to call for assist;exit alarm on  -      Recorded by [] JACE Meraz/L        User Key  (r) = Recorded By, (t) = Taken By, (c) = Cosigned By    Initials Name Effective Dates     NIMCO Meraz 10/17/16 -     NN KOJO Juan/BERNADETTE 11/08/17 -                 OT Goals       11/14/17 1015 11/09/17 1025 11/04/17 1430    Transfer  Training 2 OT LTG    Transfer Training 2 OT LTG, Date Goal Reviewed 11/14/17  -JH 11/09/17  -BB 11/04/17  -LW    Transfer Training 2 OT LTG, Outcome   goal ongoing  -LW    Strength OT LTG    Strength Goal OT LTG, Date Goal Reviewed 11/14/17  -JH 11/09/17  -BB 11/04/17  -LW    Strength Goal OT LTG, Outcome   goal ongoing  -LW    Static Standing Balance OT LTG    Static Standing Balance OT LTG, Date Goal Reviewed 11/14/17  -JH 11/09/17  -BB 11/04/17  -LW    Static Standing Balance OT LTG, Outcome   goal ongoing  -LW    ADL OT LTG    ADL OT LTG, Date Goal Reviewed   11/04/17  -LW    ADL OT LTG, Outcome   goal met  -LW      11/03/17 1526 11/02/17 1556       Transfer Training 2 OT LTG    Transfer Training 2 OT LTG, Date Established  11/02/17  -RB     Transfer Training 2 OT LTG, Time to Achieve  by discharge  -RB     Transfer Training 2 OT LTG, Activity Type  all transfers  -RB     Transfer Training 2 OT LTG, [place] Level  supervision required;contact guard assist  -RB     Transfer Training 2 OT LTG, Assist Device  walker, rolling  -RB     Transfer Training 2 OT LTG, Date Goal Reviewed 11/03/17  -SG      Transfer Training 2 OT LTG, Outcome goal ongoing  -SG      Strength OT LTG    Strength Goal OT LTG, Date Established  11/02/17  -RB     Strength Goal OT LTG, Measure to Achieve  1-2 sets of 10 reps all planes (except L shoulder flexion) with 1 lb wrist wt or dumbell for B UE strengthening to increase independence with functional mobility and ADLs.  -RB     Strength Goal OT LTG, Date Goal Reviewed 11/03/17  -SG      Strength Goal OT LTG, Outcome goal ongoing  -SG      Static Standing Balance OT LTG    Static Standing Balance OT LTG, Date Established 11/03/17  -SG 11/02/17  -RB     Static Standing Balance OT LTG, Time to Achieve  by discharge  -RB     Static Standing Balance OT LTG, [place] Level  supervision required;contact guard assist   5 minutes with functional activity  -RB     Static Standing Balance OT  LTG, Assist Device  assistive device   R/W.  -RB     Static Standing Balance OT LTG, Outcome goal ongoing  -SG      ADL OT LTG    ADL OT LTG, Date Established 11/03/17  -SG 11/02/17  -RB     ADL OT LTG, Time to Achieve  by discharge  -RB     ADL OT LTG, Activity Type  ADL skills   Sponge bath and dress or walk-in shower.  -RB     ADL OT LTG, [place] Level  min assist;contact guard;assistive device   R/W.  -RB     ADL OT LTG, Date Goal Reviewed 11/03/17  -SG      ADL OT LTG, Outcome goal partially met  -        User Key  (r) = Recorded By, (t) = Taken By, (c) = Cosigned By    Initials Name Provider Type    RB Quan Tapia, OT Occupational Therapist    DAYO Plummer, MCCORMICK/L Occupational Therapy Assistant     Sandra Milner, MCCORMICK/L Occupational Therapy Assistant     Desirae Shore, MCCORMICK/L Occupational Therapy Assistant    CORINNA Charles, OT Occupational Therapist          Occupational Therapy Education     Title: PT OT SLP Therapies (Active)     Topic: Occupational Therapy (Active)     Point: ADL training (Active)    Description: Instruct learner(s) on proper safety adaptation and remediation techniques during self care or transfers.   Instruct in proper use of assistive devices.    Learning Progress Summary    Learner Readiness Method Response Comment Documented by Status   Patient Acceptance E NR  BB 11/09/17 1025 Active    Acceptance E VU  LW 11/04/17 1429 Done               Point: Home exercise program (Done)    Description: Instruct learner(s) on appropriate technique for monitoring, assisting and/or progressing therapeutic exercises/activities.    Learning Progress Summary    Learner Readiness Method Response Comment Documented by Status   Patient Acceptance E VU  LW 11/04/17 1429 Done               Point: Precautions (Active)    Description: Instruct learner(s) on prescribed precautions during self-care and functional transfers.    Learning Progress Summary    Learner Readiness Method  Response Comment Documented by Status   Patient Acceptance E NR  BB 11/09/17 1025 Active    Acceptance E VU  LW 11/04/17 1429 Done    Acceptance E VU,NR Edu on no out of bed without assist. RB 11/02/17 1553 Done               Point: Body mechanics (Active)    Description: Instruct learner(s) on proper positioning and spine alignment during self-care, functional mobility activities and/or exercises.    Learning Progress Summary    Learner Readiness Method Response Comment Documented by Status   Patient Acceptance E NR  BB 11/09/17 1025 Active    Acceptance E VU  LW 11/04/17 1429 Done                      User Key     Initials Effective Dates Name Provider Type Discipline     06/15/16 -  Quan Tapia OT Occupational Therapist OT     10/17/16 -  JACE Phillips/L Occupational Therapy Assistant OT     10/17/16 -  JACE Justice/L Occupational Therapy Assistant OT                  OT Recommendation and Plan  Anticipated Discharge Disposition: skilled nursing facility  Planned Therapy Interventions: activity intolerance, ADL retraining, balance training, bed mobility training, energy conservation, transfer training, strengthening  Therapy Frequency:  (3-14x/wk.)          Time Calculation:         Time Calculation- OT       11/14/17 1234          Time Calculation- OT    OT Start Time 1015  -      OT Stop Time 1115  -      OT Time Calculation (min) 60 min  -      OT Received On 11/14/17  -        User Key  (r) = Recorded By, (t) = Taken By, (c) = Cosigned By    Initials Name Provider Type     JACE Meraz/L Occupational Therapy Assistant           Therapy Charges for Today     Code Description Service Date Service Provider Modifiers Qty    81258843113 HC OT ASSTV TECHNICAL ASSMT-15 MIN 11/14/2017 NIMCO Meraz  1    16061054315 HC OT THERAPEUTIC ACT EA 15 MIN 11/14/2017 NIMCO Meraz GO 1    59562025989 HC OT THER PROC EA 15 MIN 11/14/2017 NIMCO Meraz  1    82545193387 HC OT SELF CARE/MGMT/TRAIN EA 15 MIN 11/14/2017 JACE Meraz/L GO 1    91190505600 HC OT THER SUPP EA 15 MIN 11/14/2017 NIMCO Meraz GO 1          OT G-codes  OT Professional Judgement Used?: Yes  OT Functional Scales Options: AM-PAC 6 Clicks Daily Activity (OT)  Score: 16  Functional Limitation: Self care  Self Care Current Status (): At least 40 percent but less than 60 percent impaired, limited or restricted  Self Care Goal Status (): At least 20 percent but less than 40 percent impaired, limited or restricted    NIMCO Meraz  11/14/2017

## 2017-11-14 NOTE — PLAN OF CARE
Problem: Patient Care Overview (Adult)  Goal: Plan of Care Review  Outcome: Ongoing (interventions implemented as appropriate)    11/14/17 1332   Coping/Psychosocial Response Interventions   Plan Of Care Reviewed With patient   Patient Care Overview   Progress progress towards functional goals is fair   Outcome Evaluation   Outcome Summary/Follow up Plan pt had limited participation, but did agree to bed exercises. Pt continues to declined OOB. No new goals met at this time. PT is expected to return to NH at D/C         Problem: Inpatient Physical Therapy  Goal: Transfer Training Goal 1 LTG- PT  Outcome: Ongoing (interventions implemented as appropriate)    11/02/17 1713 11/14/17 1332   Transfer Training PT LTG   Transfer Training PT LTG, Date Established 11/02/17 --    Transfer Training PT LTG, Time to Achieve by discharge --    Transfer Training PT LTG, Activity Type bed to chair /chair to bed;sit to stand/stand to sit --    Transfer Training PT LTG, [place] Level conditional independence --    Transfer Training PT LTG, Date Goal Reviewed --  11/14/17   Transfer Training PT LTG, Outcome --  goal ongoing       Goal: Gait Training Goal LTG- PT  Outcome: Ongoing (interventions implemented as appropriate)    11/02/17 1713 11/14/17 1332   Gait Training PT LTG   Gait Training Goal PT LTG, Date Established 11/02/17 --    Gait Training Goal PT LTG, Time to Achieve by discharge --    Gait Training Goal PT LTG, [place] Level conditional independence --    Gait Training Goal PT LTG, Distance to Achieve 150ft;O2 sats will not drop below 92% --    Gait Training Goal PT LTG, Date Goal Reviewed --  11/14/17   Gait Training Goal PT LTG, Outcome --  goal ongoing       Goal: Patient Education Goal LTG- PT  Outcome: Ongoing (interventions implemented as appropriate)

## 2017-11-14 NOTE — PLAN OF CARE
Problem: Inpatient Occupational Therapy  Goal: Transfer Training Goal 2 LTG- OT  Outcome: Ongoing (interventions implemented as appropriate)    11/02/17 1556 11/04/17 1430 11/14/17 1015   Transfer Training 2 OT LTG   Transfer Training 2 OT LTG, Date Established 11/02/17 --  --    Transfer Training 2 OT LTG, Time to Achieve by discharge --  --    Transfer Training 2 OT LTG, Activity Type all transfers --  --    Transfer Training 2 OT LTG, [place] Level supervision required;contact guard assist --  --    Transfer Training 2 OT LTG, Assist Device walker, rolling --  --    Transfer Training 2 OT LTG, Date Goal Reviewed --  --  11/14/17   Transfer Training 2 OT LTG, Outcome --  goal ongoing --        Goal: Strength Goal LTG- OT  Outcome: Ongoing (interventions implemented as appropriate)    11/02/17 1556 11/04/17 1430 11/14/17 1015   Strength OT LTG   Strength Goal OT LTG, Date Established 11/02/17 --  --    Strength Goal OT LTG, Measure to Achieve 1-2 sets of 10 reps all planes (except L shoulder flexion) with 1 lb wrist wt or dumbell for B UE strengthening to increase independence with functional mobility and ADLs. --  --    Strength Goal OT LTG, Date Goal Reviewed --  --  11/14/17   Strength Goal OT LTG, Outcome --  goal ongoing --        Goal: Static Standing Balance Goal LTG- OT  Outcome: Ongoing (interventions implemented as appropriate)    11/02/17 1556 11/03/17 1526 11/04/17 1430   Static Standing Balance OT LTG   Static Standing Balance OT LTG, Date Established --  11/03/17 --    Static Standing Balance OT LTG, Time to Achieve by discharge --  --    Static Standing Balance OT LTG, [place] Level supervision required;contact guard assist  (5 minutes with functional activity) --  --    Static Standing Balance OT LTG, Assist Device assistive device  (R/W.) --  --    Static Standing Balance OT LTG, Date Goal Reviewed --  --  --    Static Standing Balance OT LTG, Outcome --  --  goal ongoing     11/14/17  1015   Static Standing Balance OT LTG   Static Standing Balance OT LTG, Date Established --    Static Standing Balance OT LTG, Time to Achieve --    Static Standing Balance OT LTG, [place] Level --    Static Standing Balance OT LTG, Assist Device --    Static Standing Balance OT LTG, Date Goal Reviewed 11/14/17   Static Standing Balance OT LTG, Outcome --

## 2017-11-14 NOTE — THERAPY TREATMENT NOTE
Acute Care - Physical Therapy Treatment Note  [facility]     Patient Name: Efrem Roa  : 1945  MRN: [id number]  Today's Date: 2017  Onset of Illness/Injury or Date of Surgery Date: 10/30/17  Date of Referral to PT: 17  Referring Physician: LOS Rose    Admit Date: 10/30/2017    Visit Dx:    ICD-10-CM ICD-9-CM   1. HCAP (healthcare-associated pneumonia) J18.9 486   2. Sepsis, due to unspecified organism A41.9 038.9     995.91   3. NSTEMI (non-ST elevated myocardial infarction) I21.4 410.70   4. ESRD (end stage renal disease) N18.6 585.6   5. Essential hypertension I10 401.9   6. Hypertensive heart disease without heart failure I11.9 402.90   7. Oropharyngeal dysphagia R13.12 787.22   8. Impaired mobility and activities of daily living Z74.09 799.89   9. Impaired functional mobility, balance, gait, and endurance Z74.09 V49.89   10. Impaired mobility and ADLs Z74.09 799.89     Patient Active Problem List   Diagnosis   • Constipation   • Arteriovenous fistula   • End stage renal failure on dialysis   • Controlled type 2 diabetes mellitus with chronic kidney disease on chronic dialysis, without long-term current use of insulin   • Coronary artery disease involving native coronary artery of native heart without angina pectoris   • Panlobular emphysema   • ESRD (end stage renal disease) on dialysis   • HCAP (healthcare-associated pneumonia)               Adult Rehabilitation Note       17 1332 17 1015 17 1421    Rehab Assessment/Intervention    Discipline physical therapy assistant  -JASON occupational therapy assistant  - occupational therapist  -NN    Document Type therapy note (daily note)  -JASON therapy note (daily note)  -     Subjective Information agree to therapy;complains of;pain  -JASON agree to therapy  -LULU     Patient Effort, Rehab Treatment  fair  -     Treatment Not Performed   patient/family declined treatment  -NN    Treatment Not Performed, Comment    Pt. deferrered getting OOB this pm. Pt. stated she was too tired from dialysis this pm. Pt. has had hx of multiple refusals . Please monitor refusals and if pt. continues to refuse OT needs to sign off. It is not therapeutic.   -NN    Symptoms Noted During/After Treatment fatigue  -JASON      Recorded by [] Kalli Marina PTA [] Sandra Milner, MCCORMICK/L [NN] Karly Canales, OTR/L    Vital Signs    Pre Systolic BP Rehab 108  -JASON      Pre Treatment Diastolic BP 55  -JASON      Pretreatment Heart Rate (beats/min) 88  -JASON      Pre SpO2 (%)  98  -JH     O2 Delivery Pre Treatment  room air  -     Recorded by [JASON] Kalli Marina PTA [] Sandra Milner, MCCORMICK/L     Pain Assessment    Pain Assessment 0-10  -JASON No/denies pain  -     Pain Score 10  -JASON      Post Pain Score 10  -JASON      Pain Type Chronic pain  -      Pain Location Abdomen  -JASON      Recorded by [JASON] Kalli Marina PTA [] Sandra Milner, MCCORMICK/L     Cognitive Assessment/Intervention    Current Cognitive/Communication Assessment functional  -JASON impaired  -     Orientation Status oriented x 4  -JASON oriented to;person  -     Follows Commands/Answers Questions 100% of the time  -JASON      Personal Safety WNL/WFL  -JASON      Personal Safety Interventions supervised activity  -JASON      Recorded by [JASON] Kalli Marina PTA [] Sandra Milner, MCCORMICK/L     Bed Mobility, Assessment/Treatment    Bed Mobility, Assistive Device head of bed elevated;bed rails  -JASON      Bed Mobility, Roll Left, Pr[place] verbal cues required  -JASON      Bed Mobility, Roll Right, [place] verbal cues required  -JASON      Bed Mob, Supine to Sit, Pratt  conditional independence  -     Bed Mob, Sit to Supine, Pratt  conditional independence  -     Recorded by [JASON] Kalli Marina PTA [] Sandra Milner, MCCORMICK/L     Transfer Assessment/Treatment    Transfers, Chair-Bed Pratt not tested  -JASON      Transfer, Comment pt declined  -JASON      Recorded by [JASON] Kalli CERVANTES  NIYAH Marina      Gait Assessment/Treatment    Gait, [place] Level not tested  -      Gait, Comment pt declined  -      Recorded by [JASON] Kalli Marina PTA      Grooming Assessment/Training    Grooming Assess/Train, Position  edge of bed  -     Grooming Assess/Train, Indepen Level  supervision required;verbal cues required  -     Recorded by  [JH] MIHIR MerazA/L     Therapy Exercises    Bilateral Lower Extremities AROM:;supine;20 reps;ankle pumps/circles;hip ER;hip IR;hip abduction/adduction;SAQ;heel slides;glut sets  -      Bilateral Upper Extremity  AROM:;5 reps;sitting;elbow flexion/extension;hand pumps;pronation/supination  -     BUE Resistance  manual resistance- minimal  -     Recorded by [JASON] Kalli Marina PTA [] MIHIR MerazA/L     Positioning and Restraints    Pre-Treatment Position in bed  - in bed  -     Post Treatment Position bed  - bed  -     In Bed call light within reach;exit alarm on;encouraged to call for assist  - sitting EOB;call light within reach;encouraged to call for assist;exit alarm on  -     Recorded by [JASON] Kalli Marina PTA [] Sandra Milner MCCORMICK/BERNADETTE       11/13/17 [facility]          Rehab Assessment/Intervention    Discipline occupational therapist  -NN      Treatment Not Performed other (see comments)  -NN      Treatment Not Performed, Comment Pt. in dialysis this am  -NN      Recorded by [NN] Karly Canales OTR/L        User Key  (r) = Recorded By, (t) = Taken By, (c) = Cosigned By    Initials Name Effective Dates     Kalli Marina PTA 10/17/16 -      Sandra Milner MCCORMICK/BERNADETTE 10/17/16 -     NN Karly Canales OTR/L 11/08/17 -                 IP PT Goals       11/14/17 1332 11/08/17 1303 11/07/17 0925    Transfer Training PT LTG    Transfer Training PT  LTG, Date Goal Reviewed 11/14/17  -JASON 11/08/17  -JASON 11/07/17  -CH    Transfer Training PT LTG, Outcome goal ongoing  - goal ongoing  - goal ongoing  -    Gait Training PT  LTG    Gait Training Goal PT LTG, Date Goal Reviewed 11/14/17  -JASON 11/08/17  -JASON 11/07/17  -    Gait Training Goal PT LTG, Outcome goal ongoing  -JASON goal ongoing  - goal ongoing  -    Patient Education PT LTG    Patient Education PT LTG, Date Goal Reviewed 11/14/17  -JASON 11/08/17  -JASON 11/07/17  -    Patient Education PT LTG Outcome goal ongoing  - goal ongoing  - goal ongoing  -      11/04/17 0939 11/03/17 1321 11/02/17 1713    Transfer Training PT LTG    Transfer Training PT LTG, Date Established   11/02/17  -    Transfer Training PT LTG, Time to Achieve   by discharge  -    Transfer Training PT LTG, Activity Type   bed to chair /chair to bed;sit to stand/stand to sit  -    Transfer Training PT LTG, [place] Level   conditional independence  -    Transfer Training PT  LTG, Date Goal Reviewed 11/04/17  -JASON 11/03/17  -A     Transfer Training PT LTG, Outcome goal ongoing  - goal ongoing  -A goal ongoing  -    Gait Training PT LTG    Gait Training Goal PT LTG, Date Established   11/02/17  -    Gait Training Goal PT LTG, Time to Achieve   by discharge  -    Gait Training Goal PT LTG, [place] Level   conditional independence  -    Gait Training Goal PT LTG, Distance to Achieve   150ft;O2 sats will not drop below 92%  -    Gait Training Goal PT LTG, Date Goal Reviewed 11/04/17  -JASON 11/03/17  -A     Gait Training Goal PT LTG, Outcome goal ongoing  -JASON goal ongoing  -JCA goal ongoing  -    Strength Goal PT LTG    Strength Goal PT LTG, Date Established   11/02/17  -    Strength Goal PT LTG, Time to Achieve   by discharge  -    Strength Goal PT LTG, Measure to Achieve   Pt will tolerate 15 reps of AROM exercises in sitting  -    Strength Goal PT LTG, Date Goal Reviewed  11/03/17  -A     Strength Goal PT LTG, Outcome  goal met  -A goal ongoing  -    Patient Education PT LTG    Patient Education PT LTG, Date Established   11/02/17  -    Patient Education PT LTG,  Time to Achieve   by discharge  -    Patient Education PT LTG, Education Type   gait;transfers;home safety  -    Patient Education PT LTG, Date Goal Reviewed 11/04/17  -JASON 11/03/17  -JCA     Patient Education PT LTG Outcome goal ongoing  -JASON goal ongoing  -JCA goal ongoing  -KORI      User Key  (r) = Recorded By, (t) = Taken By, (c) = Cosigned By    Initials Name Provider Type    KORI Trupti Andrade, PT Physical Therapist    JASON Kalli Marina, PTA Physical Therapy Assistant    CONNIE Malik, PTA Physical Therapy Assistant    JACKLYN Nava, PTA Physical Therapy Assistant          Physical Therapy Education     Title: PT OT SLP Therapies (Active)     Topic: Physical Therapy (Active)     Point: Mobility training (Active)    Learning Progress Summary    Learner Readiness Method Response Comment Documented by Status   Patient Acceptance E NR pt edu on benefits of OOB  11/03/17 1409 Active                      User Key     Initials Effective Dates Name Provider Type Discipline     10/17/16 -  Nemesio Malik, NIYAH Physical Therapy Assistant PT                    PT Recommendation and Plan  Anticipated Discharge Disposition: skilled nursing facility  Planned Therapy Interventions: balance training, bed mobility training, gait training, patient/family education, strengthening, transfer training  PT Frequency: other (see comments) (5-14 times per week)  Plan of Care Review  Plan Of Care Reviewed With: patient  Progress: progress towards functional goals is fair  Outcome Summary/Follow up Plan: pt had limited participation, but did agree to bed exercises.  Pt continues to declined OOB. No new goals met at this time.  PT is expected to return to NH at D/C          Outcome Measures       11/14/17 3362          How much help from another person do you currently need...    Turning from your back to your side while in flat bed without using bedrails? 3  -JASON      Moving from lying on back to sitting on the side of a  flat bed without bedrails? 3  -JASON      Moving to and from a bed to a chair (including a wheelchair)? 2  -JASON      Standing up from a chair using your arms (e.g., wheelchair, bedside chair)? 2  -JASON      Climbing 3-5 steps with a railing? 1  -JASON      To walk in hospital room? 2  -JASON      AM-PAC 6 Clicks Score 13  -JASON        User Key  (r) = Recorded By, (t) = Taken By, (c) = Cosigned By    Initials Name Provider Type    JASON Marina PTA Physical Therapy Assistant           Time Calculation:         PT Charges       11/14/17 1416          Time Calculation    Start Time 1332  -JASON      Stop Time 1402  -JASON      Time Calculation (min) 30 min  -JASON      Time Calculation- PT    Total Timed Code Minutes- PT 30 minute(s)  -JASON        User Key  (r) = Recorded By, (t) = Taken By, (c) = Cosigned By    Initials Name Provider Type    JASON Marina PTA Physical Therapy Assistant          Therapy Charges for Today     Code Description Service Date Service Provider Modifiers Qty    22982790931 HC PT THER PROC EA 15 MIN 11/14/2017 Kalli Marina PTA GP 2    12458379879 HC PT THER SUPP EA 15 MIN 11/14/2017 Kalli Marina PTA GP 1          PT G-Codes  PT Professional Judgement Used?: Yes  Outcome Measure Options: AM-PAC 6 Clicks Basic Mobility (PT)  Score: 18  Functional Limitation: Mobility: Walking and moving around  Mobility: Walking and Moving Around Current Status (): At least 40 percent but less than 60 percent impaired, limited or restricted  Mobility: Walking and Moving Around Goal Status (): At least 1 percent but less than 20 percent impaired, limited or restricted    Kalli Marina PTA  11/14/2017

## 2017-11-14 NOTE — PLAN OF CARE
Problem: Patient Care Overview (Adult)  Goal: Plan of Care Review  Outcome: Ongoing (interventions implemented as appropriate)    11/14/17 8763   Coping/Psychosocial Response Interventions   Plan Of Care Reviewed With patient;caregiver   Patient Care Overview   Progress improving   Outcome Evaluation   Outcome Summary/Follow up Plan Po intake improved to ~71% average. Still declines supplements.

## 2017-11-14 NOTE — CONSULTS
"Adult Nutrition  Assessment    Patient Name:  Efrem Roa  YOB: 1945  MRN: [id number]  Admit Date:  10/30/2017    Assessment Date:  11/14/2017    Comments:  Pt with improved intake with overall intake averaging ~75% for the last 7 meals.  She denies any requests.  She does seem to have GI distress during dialysis with emesis noted.  Per staff pt has been confused and \"seeing bugs\"  MD is aware.  RD will continue to monitor          Reason for Assessment       11/14/17 1528    Reason for Assessment    Reason For Assessment/Visit follow up protocol                Nutrition/Diet History       11/14/17 1528    Nutrition/Diet History    Typical Food/Fluid Intake Pt states that she is eating better than she was.  She said that she wanted to speak to her doctor--notified staff.               Labs/Tests/Procedures/Meds       11/14/17 1529    Labs/Tests/Procedures/Meds    Labs/Tests Review Reviewed    Medication Review Reviewed, pertinent                Nutrition Prescription Ordered       11/14/17 1529    Nutrition Prescription PO    Current PO Diet Soft Texture    Texture Ground    Fluid Consistency Thin    Supplement Ice Cream    Supplement Frequency 2 times a day    Common Modifiers Renal            Evaluation of Received Nutrient/Fluid Intake       11/14/17 1529    PO Evaluation    Number of Days PO Intake Evaluated 3 days    Number of Meals 7    % PO Intake 71% average            Electronically signed by:  Alyx Ojeda RD  11/14/17 3:34 PM  "

## 2017-11-14 NOTE — PLAN OF CARE
Problem: Sepsis (Adult)  Goal: Signs and Symptoms of Listed Potential Problems Will be Absent or Manageable (Sepsis)  Outcome: Ongoing (interventions implemented as appropriate)    Problem: Fall Risk (Adult)  Goal: Absence of Falls  Outcome: Ongoing (interventions implemented as appropriate)    Problem: Renal Replacement, Continuous (Adult)  Goal: Signs and Symptoms of Listed Potential Problems Will be Absent or Manageable (Renal Replacement, Continuous)  Outcome: Ongoing (interventions implemented as appropriate)    Problem: Patient Care Overview (Adult)  Goal: Plan of Care Review  Outcome: Ongoing (interventions implemented as appropriate)    11/14/17 0250   Coping/Psychosocial Response Interventions   Plan Of Care Reviewed With patient   Patient Care Overview   Progress no change   Outcome Evaluation   Outcome Summary/Follow up Plan pt continues to refuse turns at times; no complaints voiced at this time; vital signs stable; will continue to monitor       Goal: Adult Individualization and Mutuality  Outcome: Ongoing (interventions implemented as appropriate)  Goal: Discharge Needs Assessment  Outcome: Ongoing (interventions implemented as appropriate)    Problem: Pressure Ulcer (Adult)  Goal: Signs and Symptoms of Listed Potential Problems Will be Absent or Manageable (Pressure Ulcer)  Outcome: Ongoing (interventions implemented as appropriate)    Problem: Infection, Risk/Actual (Adult)  Goal: Infection Prevention/Resolution  Outcome: Ongoing (interventions implemented as appropriate)    Problem: Skin Integrity Impairment, Risk/Actual (Adult)  Goal: Identify Related Risk Factors and Signs and Symptoms  Outcome: Ongoing (interventions implemented as appropriate)  Goal: Skin Integrity/Wound Healing  Outcome: Ongoing (interventions implemented as appropriate)

## 2017-11-14 NOTE — PROGRESS NOTES
[facility]  INPATIENT PROGRESS NOTE    Length of Stay: 15  Date of Admission: 10/30/2017  Primary Care Physician: Rogers Perez MD    Subjective   Chief Complaint: No complaints    HPI:      11/14/2017:  No complaints today.  Following blood cultures.  Negative at 24 hours.     11/13/2017:  The patient received dialysis today.  Blood cultures have been recollected.      11/12/2017:  No complaints today.  She is scheduled for dialysis tomorrow.  Her 4th dose of Daptomycin is scheduled for 1500 today.  Will repeat blood cultures in the AM to reevaluate MRSA bacteremia.      11/11/2017:  The patient has no complaints today.  Continue PT/OT, dialysis and Daptomycin.     11/10/2017:  Continue with dialysis today (M-W-F).  The patient will receive Daptomycin today and on the 12th.  Will recheck blood cultures next week.      11/9/2017:  Blood culture returned this morning, again with MRSA.  She is receiving Daptomycin every 48 hours and has only received two doses.  WBC is continuing to decrease with no fever noted.  Will continue Daptomycin and recheck blood cultures in a few days.      Previous notes by NADJA Land PA:   11/8/2017: Patient continues to be more pleasant and feels better.  At 24 hours blood cultures are negative.  No nausea, vomiting, chest pain.  Should be noted that if patient blood cultures remain negative at 48 hours, can likely be discharged.  Patient will be passed on to Rosalba BENITEZ who will evaluate the patient as appropriate and determine discharge possibilities at that time.     11/7/2017: Patient feeling much better today.  She is in good spirits.  Blood cultures are negative at less than 24 hours after changing over to daptomycin.  No fever, chills, shortness of air.  Patient has been off oxygen and satting well on room air greater than 24 hours.  Vascular has evaluated patient's fistula site and has determined it is  functioning well with no infection.     11/6/2017: Patient has no chest pain, fever, chills.  Have discussed with pharmacy and there is difficulty reaching appropriate levels of vancomycin.  At this time recommendation is to start patient on daptomycin.  Nephrology has asked vascular surgery to evaluate fistula.  Urinalysis demonstrates no signs of infection.  Will repeat blood cultures today.     11/5/2017: Patient has no acute complaints.  Scheduled for redraw blood cultures in the a.m.  No further vegetation noted on echocardiogram.     11/4/2017: Dr. Adan has determined there is low suspicion for any type of endocarditis or vegetation.  Dr. Adan recommends a repeat transthoracic echocardiogram rather than a transesophageal echocardiogram.  Patient has no complaints today.  Patient does have severe pneumonia.  Will recheck blood cultures on Monday.     11/3/2017: Patient has no acute complaints today.  Patient continues to demonstrate MRSA bacteremia despite appropriate antibiotic treatment.  Have spoken with Dr. Adan of cardiology who is agreed to evaluate the patient for possible ANTHONY.  No indwelling lines or catheters.  All other possible sources fistula.  Continue antibiotic treatment.  Attempt sputum culture if possible.  No fevers or chills, dialysis is today.  Patient tolerating by mouth intake.  Essentially complains of soreness.  Patient tachycardia much improved, heart rate is 101.  Respirations are normal with no fever.  Patient hypoxia improving, 94 on 1.5 L.     Chief Complaint/HPI:  This 72-year-old  female was admitted to hospitalist services secondary to a worsening level of altered mental status.  Patient also had fever.  CT of the head was within normal limits.  Chest x-ray demonstrated left lower lobe pneumonia.  Patient has been treated with empiric antibiotics.  Patient was found to have MRSA bacteremia, no vegetation demonstrated on echocardiogram.  Echocardiogram is  transthoracic.  Though patient is in no apparent distress, she continues to be tachycardic and hypoxic.  Patient currently on vancomycin and Levaquin.  Patient will be changed to vancomycin and Zosyn per renal dosing.  EKG reordered.  Initial EKG demonstrated sinus tachycardia.  EKG rule out atrial fibrillation with RVR.  No chest pain, did have an elevated troponin, however troponin has been fairly consistent and is actually trending down.  Troponin likely secondary to ESRD and sepsis.  Have spoken with patient's nephrologist, Dr. Mireles, he is agreeable to CTA today.  We'll also order physical therapy and occupational therapy.    Review of Systems   Constitutional: Positive for fatigue.   All other systems reviewed and are negative.       All pertinent negatives and positives are as above. All other systems have been reviewed and are negative unless otherwise stated.     Objective    Temp:  [96.5 °F (35.8 °C)-98.3 °F (36.8 °C)] 98.3 °F (36.8 °C)  Heart Rate:  [70-81] 70  Resp:  [16-18] 18  BP: (105-132)/(49-58) 130/50    Physical Exam   Constitutional: She is oriented to person, place, and time. She appears well-developed and well-nourished.   HENT:   Head: Normocephalic and atraumatic.   Eyes: EOM are normal. Pupils are equal, round, and reactive to light.   Neck: Normal range of motion. Neck supple.   Cardiovascular: Normal rate and regular rhythm.    Pulmonary/Chest: Effort normal and breath sounds normal.   Abdominal: Soft. Bowel sounds are normal.   Musculoskeletal: Normal range of motion.   Neurological: She is alert and oriented to person, place, and time.   Skin: Skin is warm and dry.   Psychiatric: She has a normal mood and affect.     Results Review:  I have reviewed the labs, radiology results, and diagnostic studies.    Laboratory Data:     Results from last 7 days  Lab Units 11/14/17  0730 11/13/17  0816 11/12/17  0607   SODIUM mmol/L 138 139 140   POTASSIUM mmol/L 4.5 4.4 4.6   CHLORIDE mmol/L 96 100  101   CO2 mmol/L 30.0 23.0 27.0   BUN mg/dL 38* 57* 42*   CREATININE mg/dL 3.87* 5.12* 4.49*   GLUCOSE mg/dL 111* 189* 107*   CALCIUM mg/dL 8.4 8.1* 8.0*   ANION GAP mmol/L 12.0 16.0* 12.0     Estimated Creatinine Clearance: 8.9 mL/min (by C-G formula based on Cr of 3.87).    Results from last 7 days  Lab Units 11/14/17  0730 11/13/17  0816 11/12/17  0607   PHOSPHORUS mg/dL 5.9* 5.9* 5.6*           Results from last 7 days  Lab Units 11/14/17  0730 11/13/17  0812 11/12/17  0607 11/11/17  0644 11/10/17  0516   WBC 10*3/mm3 7.00 7.04 7.35 7.74 6.79   HEMOGLOBIN g/dL 10.2* 9.8* 10.1* 10.7* 10.4*   HEMATOCRIT % 32.9* 31.3* 32.8* 34.4* 32.8*   PLATELETS 10*3/mm3 410 400 403 367 410           Culture Data:   Blood Culture   Date Value Ref Range Status   11/13/2017 No growth at 24 hours  Preliminary   11/13/2017 No growth at 24 hours  Preliminary     No results found for: URINECX  No results found for: RESPCX  No results found for: WOUNDCX  No results found for: STOOLCX  No components found for: BODYFLD    Radiology Data:   Imaging Results (last 24 hours)     ** No results found for the last 24 hours. **          I have reviewed the patient current medications.     Assessment/Plan       Plan:    1.  MRSA bacteremia:   Daptomycin q 48 hours . 5th dose due on the 15th.  Repeat blood culture negative at 24 hours.        2.  HCAP:  Rocephin completed.   3.  Deconditioning:  Continue PT/OT  4.  ESRD:  Dialysis M-W-F          Discharge Planning: I expect patient to be discharged to home in 3-4 days.      This document has been electronically signed by ELI Thorne on November 14, 2017 2:34 PM

## 2017-11-15 LAB
ALBUMIN SERPL-MCNC: 3.4 G/DL (ref 3.4–4.8)
ANION GAP SERPL CALCULATED.3IONS-SCNC: 14 MMOL/L (ref 5–15)
BASOPHILS # BLD AUTO: 0.12 10*3/MM3 (ref 0–0.2)
BASOPHILS NFR BLD AUTO: 1.5 % (ref 0–2)
BUN BLD-MCNC: 51 MG/DL (ref 7–21)
BUN/CREAT SERPL: 10.6 (ref 7–25)
CALCIUM SPEC-SCNC: 7.9 MG/DL (ref 8.4–10.2)
CHLORIDE SERPL-SCNC: 99 MMOL/L (ref 95–110)
CO2 SERPL-SCNC: 27 MMOL/L (ref 22–31)
CREAT BLD-MCNC: 4.8 MG/DL (ref 0.5–1)
DEPRECATED RDW RBC AUTO: 55.9 FL (ref 36.4–46.3)
EOSINOPHIL # BLD AUTO: 0.27 10*3/MM3 (ref 0–0.7)
EOSINOPHIL NFR BLD AUTO: 3.5 % (ref 0–7)
ERYTHROCYTE [DISTWIDTH] IN BLOOD BY AUTOMATED COUNT: 17 % (ref 11.5–14.5)
GFR SERPL CREATININE-BSD FRML MDRD: 9 ML/MIN/1.73 (ref 39–90)
GLUCOSE BLD-MCNC: 166 MG/DL (ref 60–100)
HCT VFR BLD AUTO: 32.4 % (ref 35–45)
HGB BLD-MCNC: 10.1 G/DL (ref 12–15.5)
IMM GRANULOCYTES # BLD: 0.03 10*3/MM3 (ref 0–0.02)
IMM GRANULOCYTES NFR BLD: 0.4 % (ref 0–0.5)
LYMPHOCYTES # BLD AUTO: 2.09 10*3/MM3 (ref 0.6–4.2)
LYMPHOCYTES NFR BLD AUTO: 26.7 % (ref 10–50)
MCH RBC QN AUTO: 28.7 PG (ref 26.5–34)
MCHC RBC AUTO-ENTMCNC: 31.2 G/DL (ref 31.4–36)
MCV RBC AUTO: 92 FL (ref 80–98)
MONOCYTES # BLD AUTO: 0.57 10*3/MM3 (ref 0–0.9)
MONOCYTES NFR BLD AUTO: 7.3 % (ref 0–12)
NEUTROPHILS # BLD AUTO: 4.74 10*3/MM3 (ref 2–8.6)
NEUTROPHILS NFR BLD AUTO: 60.6 % (ref 37–80)
PHOSPHATE SERPL-MCNC: 6.8 MG/DL (ref 2.4–4.4)
PLATELET # BLD AUTO: 352 10*3/MM3 (ref 150–450)
PMV BLD AUTO: 10.5 FL (ref 8–12)
POTASSIUM BLD-SCNC: 4.2 MMOL/L (ref 3.5–5.1)
RBC # BLD AUTO: 3.52 10*6/MM3 (ref 3.77–5.16)
SODIUM BLD-SCNC: 140 MMOL/L (ref 137–145)
WBC NRBC COR # BLD: 7.82 10*3/MM3 (ref 3.2–9.8)

## 2017-11-15 PROCEDURE — 97535 SELF CARE MNGMENT TRAINING: CPT

## 2017-11-15 PROCEDURE — 80069 RENAL FUNCTION PANEL: CPT | Performed by: PHYSICIAN ASSISTANT

## 2017-11-15 PROCEDURE — 97530 THERAPEUTIC ACTIVITIES: CPT

## 2017-11-15 PROCEDURE — 63510000001 EPOETIN ALFA PER 1000 UNITS: Performed by: INTERNAL MEDICINE

## 2017-11-15 PROCEDURE — 25010000002 DAPTOMYCIN PER 1 MG: Performed by: PHYSICIAN ASSISTANT

## 2017-11-15 PROCEDURE — 85025 COMPLETE CBC W/AUTO DIFF WBC: CPT | Performed by: INTERNAL MEDICINE

## 2017-11-15 PROCEDURE — G8979 MOBILITY GOAL STATUS: HCPCS

## 2017-11-15 PROCEDURE — G8978 MOBILITY CURRENT STATUS: HCPCS

## 2017-11-15 RX ADMIN — FAMOTIDINE 40 MG: 40 TABLET ORAL at 08:13

## 2017-11-15 RX ADMIN — APIXABAN 2.5 MG: 2.5 TABLET, FILM COATED ORAL at 18:12

## 2017-11-15 RX ADMIN — LEVOTHYROXINE SODIUM 88 MCG: 88 TABLET ORAL at 06:03

## 2017-11-15 RX ADMIN — ERYTHROPOIETIN 6000 UNITS: 10000 INJECTION, SOLUTION INTRAVENOUS; SUBCUTANEOUS at 08:57

## 2017-11-15 RX ADMIN — SERTRALINE HYDROCHLORIDE 50 MG: 50 TABLET ORAL at 08:13

## 2017-11-15 RX ADMIN — APIXABAN 2.5 MG: 2.5 TABLET, FILM COATED ORAL at 08:12

## 2017-11-15 RX ADMIN — MIRTAZAPINE 15 MG: 15 TABLET, FILM COATED ORAL at 21:10

## 2017-11-15 RX ADMIN — HYDROCODONE BITARTRATE AND ACETAMINOPHEN 1 TABLET: 7.5; 325 TABLET ORAL at 08:11

## 2017-11-15 RX ADMIN — ATORVASTATIN CALCIUM 40 MG: 40 TABLET, FILM COATED ORAL at 21:10

## 2017-11-15 RX ADMIN — DAPTOMYCIN 270 MG: 500 INJECTION, POWDER, LYOPHILIZED, FOR SOLUTION INTRAVENOUS at 16:11

## 2017-11-15 RX ADMIN — HYDROCODONE BITARTRATE AND ACETAMINOPHEN 1 TABLET: 7.5; 325 TABLET ORAL at 21:10

## 2017-11-15 RX ADMIN — ASPIRIN 81 MG: 81 TABLET, COATED ORAL at 08:13

## 2017-11-15 RX ADMIN — POLYETHYLENE GLYCOL 3350 17 G: 17 POWDER, FOR SOLUTION ORAL at 08:14

## 2017-11-15 RX ADMIN — HYDROCODONE BITARTRATE AND ACETAMINOPHEN 1 TABLET: 7.5; 325 TABLET ORAL at 15:44

## 2017-11-15 RX ADMIN — QUETIAPINE FUMARATE 50 MG: 25 TABLET, FILM COATED ORAL at 21:10

## 2017-11-15 NOTE — PLAN OF CARE
Problem: Patient Care Overview (Adult)  Goal: Plan of Care Review  Outcome: Ongoing (interventions implemented as appropriate)    11/15/17 1738   Coping/Psychosocial Response Interventions   Plan Of Care Reviewed With patient   Outcome Evaluation   Outcome Summary/Follow up Plan PT recertification completed. Pt transferred supine to sit to supine with supervision. Pt transferred bed to BSC to bed with max assistance of 1. Pt not able to standing completely erect . Pt has met 1 of 4 goals this certication period and will benefit from continued PT to regain lost function. Anticipate transfer to SNF at discharge.         Problem: Inpatient Physical Therapy  Goal: Transfer Training Goal 1 LTG- PT    11/02/17 1713 11/14/17 1332 11/15/17 1738   Transfer Training PT LTG   Transfer Training PT LTG, Date Established 11/02/17 --  --    Transfer Training PT LTG, Time to Achieve by discharge --  --    Transfer Training PT LTG, Activity Type bed to chair /chair to bed;sit to stand/stand to sit --  --    Transfer Training PT LTG, [place] Level conditional independence --  --    Transfer Training PT LTG, Date Goal Reviewed --  --  11/15/17   Transfer Training PT LTG, Outcome --  goal ongoing --        Goal: Gait Training Goal LTG- PT  Outcome: Ongoing (interventions implemented as appropriate)    11/02/17 1713 11/15/17 1738   Gait Training PT LTG   Gait Training Goal PT LTG, Date Established 11/02/17 --    Gait Training Goal PT LTG, Time to Achieve by discharge --    Gait Training Goal PT LTG, [place] Level conditional independence --    Gait Training Goal PT LTG, Distance to Achieve --  10FT   Gait Training Goal PT LTG, Additional Goal --  50ft   Gait Training Goal PT LTG, Date Goal Reviewed --  11/15/17   Gait Training Goal PT LTG, Outcome --  goal revised       Goal: Patient Education Goal LTG- PT    11/02/17 1713 11/14/17 1332 11/15/17 1738   Patient Education PT LTG   Patient Education PT LTG, Date Established  11/02/17 --  --    Patient Education PT LTG, Time to Achieve by discharge --  --    Patient Education PT LTG, Education Type gait;transfers;home safety --  --    Patient Education PT LTG, Date Goal Reviewed --  --  11/15/17   Patient Education PT LTG Outcome --  goal ongoing --

## 2017-11-15 NOTE — THERAPY TREATMENT NOTE
Acute Care - Occupational Therapy Treatment Note  [facility]     Patient Name: Efrem Roa  : 1945  MRN: [id number]  Today's Date: 11/15/2017  Onset of Illness/Injury or Date of Surgery Date: 10/30/17  Date of Referral to OT: 17  Referring Physician: LOS Rose      Admit Date: 10/30/2017    Visit Dx:     ICD-10-CM ICD-9-CM   1. HCAP (healthcare-associated pneumonia) J18.9 486   2. Sepsis, due to unspecified organism A41.9 038.9     995.91   3. NSTEMI (non-ST elevated myocardial infarction) I21.4 410.70   4. ESRD (end stage renal disease) N18.6 585.6   5. Essential hypertension I10 401.9   6. Hypertensive heart disease without heart failure I11.9 402.90   7. Oropharyngeal dysphagia R13.12 787.22   8. Impaired mobility and activities of daily living Z74.09 799.89   9. Impaired functional mobility, balance, gait, and endurance Z74.09 V49.89   10. Impaired mobility and ADLs Z74.09 799.89     Patient Active Problem List   Diagnosis   • Constipation   • Arteriovenous fistula   • End stage renal failure on dialysis   • Controlled type 2 diabetes mellitus with chronic kidney disease on chronic dialysis, without long-term current use of insulin   • Coronary artery disease involving native coronary artery of native heart without angina pectoris   • Panlobular emphysema   • ESRD (end stage renal disease) on dialysis   • HCAP (healthcare-associated pneumonia)             Adult Rehabilitation Note       11/15/17 0710 17 1332 17 1015    Rehab Assessment/Intervention    Discipline occupational therapy assistant  -BB physical therapy assistant  -JASON occupational therapy assistant  -    Document Type therapy note (daily note)  -DAYO therapy note (daily note)  -JASON therapy note (daily note)  -    Subjective Information agree to therapy  -DAYO agree to therapy;complains of;pain  -JASON agree to therapy  -LULU    Patient Effort, Rehab Treatment fair  -BB  fair  -    Patient Effort, Rehab  Treatment Comment --   declines EOB/OOB stating she is weak and can not do it today  -BB      Symptoms Noted During/After Treatment fatigue  -BB fatigue  -JASON     Precautions/Limitations fall precautions  -BB      Recorded by [BB] JACE Phillips/BERNADETTE [JASON] Kalli Marina PTA [] MIHIR MerazA/L    Vital Signs    Pre Systolic BP Rehab  108  -JASON     Pre Treatment Diastolic BP  55  -JASON     Pretreatment Heart Rate (beats/min) 87  -BB 88  -JASON     Posttreatment Heart Rate (beats/min) 93  -BB      Pre SpO2 (%) 97  -BB  98  -JH    O2 Delivery Pre Treatment room air  -BB  room air  -JH    Post SpO2 (%) 98  -BB      O2 Delivery Post Treatment room air  -BB      Pre Patient Position Supine  -BB      Post Patient Position --   long sitting  -BB      Recorded by [BB] JACE Phillips/BERNADETTE [JASON] Kalli Marina PTA [] MIHIR MerazA/L    Pain Assessment    Pain Assessment 0-10  -BB 0-10  -JASON No/denies pain  -JH    Pain Score 10  -BB 10  -JASON     Post Pain Score 10  -BB 10  -JASON     Pain Type Chronic pain  -BB Chronic pain  -JASON     Pain Location Abdomen  -BB Abdomen  -JASON     Pain Intervention(s) Medication (See MAR)  -BB      Recorded by [BB] JACE Phillips/BERNADETTE [JASON] Kalli Marina PTA [] MIHIR MerazA/L    Cognitive Assessment/Intervention    Current Cognitive/Communication Assessment functional  -BB functional  -JASON impaired  -JH    Orientation Status oriented x 4  -BB oriented x 4  -JASON oriented to;person  -JH    Follows Commands/Answers Questions 100% of the time  -% of the time  -JASON     Personal Safety  WNL/WFL  -JASON     Personal Safety Interventions supervised activity  -BB supervised activity  -JASON     Recorded by [BB] JACE Phillips/BERNADETTE [JASON] Kalli Marina PTA [] MIHIR MerazA/L    Bed Mobility, Assessment/Treatment    Bed Mobility, Assistive Device bed rails;head of bed elevated  -BB head of bed elevated;bed rails  -JASON     Bed Mobility, Roll Left, Loving  verbal cues required  -BB verbal cues required  -JASON     Bed Mobility, Roll Right, Coal verbal cues required  -BB verbal cues required  -JASON     Bed Mob, Supine to Sit, Coal   conditional independence  -JH    Bed Mob, Sit to Supine, Coal   conditional independence  -JH    Recorded by [BB] Juliette Plummer, MCCORMICK/L [JASON] Kalli Marina, PTA [JH] Sandra Milner, MCCORMICK/L    Transfer Assessment/Treatment    Transfers, Chair-Bed Coal  not tested  -JASON     Transfer, Comment  pt declined  -JASON     Recorded by  [JASON] Kalli Marina, PTA     Gait Assessment/Treatment    Gait, Coal Level  not tested  -JASON     Gait, Comment  pt declined  -JASON     Recorded by  [JASON] Kalli Marina, PTA     Upper Body Bathing Assessment/Training    UB Bathing Assess/Train, Position long sitting  -BB      UB Bathing Assess/Train, [place] Level set up required;stand by assist  -BB      Recorded by [BB] Juliette Plummer, MCCORMICK/L      Lower Body Bathing Assessment/Training    LB Bathing Assess/Train, Position long sitting  -BB      LB Bathing Assess/Train, [place] Level set up required;stand by assist  -BB      Recorded by [BB] Juliette Plummer, MCCORMICK/L      Upper Body Dressing Assessment/Training    UB Dressing Assess/Train, Clothing Type doffing:;donning:;hospital gown  -BB      UB Dressing Assess/Train, Position long sitting  -BB      UB Dressing Assess/Train, Coal supervision required;set up required  -BB      Recorded by [BB] Juliette Plummer, MCCORMICK/L      Lower Body Dressing Assessment/Training    LB Dressing Assess/Train, Clothing Type doffing:;donning:;socks  -BB      LB Dressing Assess/Train, Position long sitting  -BB      LB Dressing Assess/Train, Coal supervision required  -BB      Recorded by [BB] Juliette Plummer, MCCORMICK/L      Grooming Assessment/Training    Grooming Assess/Train, Position long sitting  -BB  edge of bed  -    Grooming Assess/Train, Indepen Level set up  required;supervision required  -  supervision required;verbal cues required  -    Grooming Assess/Train, Comment washed face, hands, applied deoderant, lotion, combed hair  -      Recorded by [BB] JACE Phillips/BERNADETTE  [] JACE Meraz/L    Therapy Exercises    Bilateral Lower Extremities  AROM:;supine;20 reps;ankle pumps/circles;hip ER;hip IR;hip abduction/adduction;SAQ;heel slides;glut sets  -     Bilateral Upper Extremity   AROM:;5 reps;sitting;elbow flexion/extension;hand pumps;pronation/supination  -    BUE Resistance   manual resistance- minimal  -    Recorded by  [JASON] Kalli Marina PTA [JH] NIMCO Meraz    Positioning and Restraints    Pre-Treatment Position in bed  - in bed  - in bed  -    Post Treatment Position bed  - bed  - bed  -    In Bed call light within reach;encouraged to call for assist;exit alarm on;fowlers  - call light within reach;exit alarm on;encouraged to call for assist  - sitting EOB;call light within reach;encouraged to call for assist;exit alarm on  -    Recorded by [BB] JACE Phillips/BERNADETTE [JASON] Kalli Marina PTA [] NIMCO Meraz      11/13/17 1421 11/13/17 [facility]       Rehab Assessment/Intervention    Discipline occupational therapist  -NN occupational therapist  -NN     Treatment Not Performed patient/family declined treatment  -NN other (see comments)  -NN     Treatment Not Performed, Comment Pt. deferrered getting OOB this pm. Pt. stated she was too tired from dialysis this pm. Pt. has had hx of multiple refusals . Please monitor refusals and if pt. continues to refuse OT needs to sign off. It is not therapeutic.   -NN Pt. in dialysis this am  -NN     Recorded by [NN] KOJO Juan/L [NN] Karly Canales OTR/L       User Key  (r) = Recorded By, (t) = Taken By, (c) = Cosigned By    Initials Name Effective Dates     Kalli Marina PTA 10/17/16 -     BB NIMCO Phillips 10/17/16 -       Sandra Milner, MCCORMICK/L 10/17/16 -     NN Karly Canales, OTR/L 11/08/17 -                 OT Goals       11/15/17 0945 11/14/17 1015 11/09/17 1025    Transfer Training 2 OT LTG    Transfer Training 2 OT LTG, Date Goal Reviewed 11/15/17  -BB 11/14/17  -JH 11/09/17  -BB    Strength OT LTG    Strength Goal OT LTG, Date Goal Reviewed  11/14/17  -JH 11/09/17  -BB    Static Standing Balance OT LTG    Static Standing Balance OT LTG, Date Goal Reviewed 11/15/17  -BB 11/14/17  -JH 11/09/17  -BB      11/04/17 1430 11/03/17 1526 11/02/17 1556    Transfer Training 2 OT LTG    Transfer Training 2 OT LTG, Date Established   11/02/17  -RB    Transfer Training 2 OT LTG, Time to Achieve   by discharge  -RB    Transfer Training 2 OT LTG, Activity Type   all transfers  -RB    Transfer Training 2 OT LTG, [place] Level   supervision required;contact guard assist  -RB    Transfer Training 2 OT LTG, Assist Device   walker, rolling  -RB    Transfer Training 2 OT LTG, Date Goal Reviewed 11/04/17  -LW 11/03/17  -SG     Transfer Training 2 OT LTG, Outcome goal ongoing  -LW goal ongoing  -SG     Strength OT LTG    Strength Goal OT LTG, Date Established   11/02/17  -RB    Strength Goal OT LTG, Measure to Achieve   1-2 sets of 10 reps all planes (except L shoulder flexion) with 1 lb wrist wt or dumbell for B UE strengthening to increase independence with functional mobility and ADLs.  -RB    Strength Goal OT LTG, Date Goal Reviewed 11/04/17  -LW 11/03/17  -SG     Strength Goal OT LTG, Outcome goal ongoing  -LW goal ongoing  -SG     Static Standing Balance OT LTG    Static Standing Balance OT LTG, Date Established  11/03/17  -SG 11/02/17  -RB    Static Standing Balance OT LTG, Time to Achieve   by discharge  -RB    Static Standing Balance OT LTG, [place] Level   supervision required;contact guard assist   5 minutes with functional activity  -RB    Static Standing Balance OT LTG, Assist Device   assistive device   R/W.  -RB    Static  Standing Balance OT LTG, Date Goal Reviewed 11/04/17  -LW      Static Standing Balance OT LTG, Outcome goal ongoing  -LW goal ongoing  -SG     ADL OT LTG    ADL OT LTG, Date Established  11/03/17  -SG 11/02/17  -RB    ADL OT LTG, Time to Achieve   by discharge  -RB    ADL OT LTG, Activity Type   ADL skills   Sponge bath and dress or walk-in shower.  -RB    ADL OT LTG, [place] Level   min assist;contact guard;assistive device   R/W.  -RB    ADL OT LTG, Date Goal Reviewed 11/04/17  -LW 11/03/17  -SG     ADL OT LTG, Outcome goal met  -LW goal partially met  -SG       User Key  (r) = Recorded By, (t) = Taken By, (c) = Cosigned By    Initials Name Provider Type    RB Quan Tapia, OT Occupational Therapist    BB Juliette Plummer, MCCORMICK/L Occupational Therapy Assistant     Sandra Milner, MCCORMICK/L Occupational Therapy Assistant     Desirae Shore, MCCORMICK/L Occupational Therapy Assistant    CORINNA Charles, OT Occupational Therapist          Occupational Therapy Education     Title: PT OT SLP Therapies (Active)     Topic: Occupational Therapy (Done)     Point: ADL training (Done)    Description: Instruct learner(s) on proper safety adaptation and remediation techniques during self care or transfers.   Instruct in proper use of assistive devices.    Learning Progress Summary    Learner Readiness Method Response Comment Documented by Status   Patient Acceptance E VU  BB 11/15/17 0945 Done    Acceptance E NR  BB 11/09/17 1025 Active    Acceptance E [facility] 11/04/17 1429 Done               Point: Home exercise program (Done)    Description: Instruct learner(s) on appropriate technique for monitoring, assisting and/or progressing therapeutic exercises/activities.    Learning Progress Summary    Learner Readiness Method Response Comment Documented by Status   Patient Acceptance E [facility] 11/04/17 1429 Done               Point: Precautions (Done)    Description: Instruct learner(s) on prescribed precautions during self-care  and functional transfers.    Learning Progress Summary    Learner Readiness Method Response Comment Documented by Status   Patient Acceptance E VU  BB 11/15/17 0945 Done    Acceptance E NR  BB 11/09/17 1025 Active    Acceptance E VU  LW 11/04/17 1429 Done    Acceptance E VU,NR Edu on no out of bed without assist. RB 11/02/17 1553 Done               Point: Body mechanics (Done)    Description: Instruct learner(s) on proper positioning and spine alignment during self-care, functional mobility activities and/or exercises.    Learning Progress Summary    Learner Readiness Method Response Comment Documented by Status   Patient Acceptance E VU  BB 11/15/17 0945 Done    Acceptance E NR  BB 11/09/17 1025 Active    Acceptance E VU  LW 11/04/17 1429 Done                      User Key     Initials Effective Dates Name Provider Type Discipline     06/15/16 -  Quan Tapia OT Occupational Therapist OT     10/17/16 -  JACE Phillips/L Occupational Therapy Assistant OT     10/17/16 -  JACE Justice/L Occupational Therapy Assistant OT                  OT Recommendation and Plan  Anticipated Discharge Disposition: skilled nursing facility  Planned Therapy Interventions: activity intolerance, ADL retraining, balance training, bed mobility training, energy conservation, transfer training, strengthening  Therapy Frequency:  (3-14x/wk.)  Plan of Care Review  Plan Of Care Reviewed With: patient  Progress: no change  Outcome Summary/Follow up Plan: Pt performed ADl with SBA and set up. Pt did not meet any new goals this am. Continue with current POC.        Outcome Measures       11/14/17 1332          How much help from another person do you currently need...    Turning from your back to your side while in flat bed without using bedrails? 3  -JASON      Moving from lying on back to sitting on the side of a flat bed without bedrails? 3  -JASON      Moving to and from a bed to a chair (including a wheelchair)? 2  -JASON       Standing up from a chair using your arms (e.g., wheelchair, bedside chair)? 2  -JASON      Climbing 3-5 steps with a railing? 1  -JASON      To walk in hospital room? 2  -JASON      AM-PAC 6 Clicks Score 13  -JASON        User Key  (r) = Recorded By, (t) = Taken By, (c) = Cosigned By    Initials Name Provider Type    JASON Kalli Marina, PTA Physical Therapy Assistant           Time Calculation:         Time Calculation- OT       11/15/17 0947          Time Calculation- OT    OT Start Time 0710  -BB      OT Stop Time 0805  -BB      OT Time Calculation (min) 55 min  -BB      Total Timed Code Minutes- OT 55 minute(s)  -BB      OT Received On 11/15/17  -BB        User Key  (r) = Recorded By, (t) = Taken By, (c) = Cosigned By    Initials Name Provider Type    BB JACE Phillips/L Occupational Therapy Assistant           Therapy Charges for Today     Code Description Service Date Service Provider Modifiers Qty    18766302986 HC OT SELF CARE/MGMT/TRAIN EA 15 MIN 11/15/2017 JACE Phillips/L GO 4          OT G-codes  OT Professional Judgement Used?: Yes  OT Functional Scales Options: AM-PAC 6 Clicks Daily Activity (OT)  Score: 16  Functional Limitation: Self care  Self Care Current Status (): At least 40 percent but less than 60 percent impaired, limited or restricted  Self Care Goal Status (): At least 20 percent but less than 40 percent impaired, limited or restricted    NIMCO Phillips  11/15/2017

## 2017-11-15 NOTE — PROGRESS NOTES
Nephrology Progress Note:    Seen and examined on HD.   Orders reviewed.  Small skin lesion on right thigh.   The left thigh access with no change, no inflammation       Patient Active Problem List   Diagnosis   • Constipation   • Arteriovenous fistula   • End stage renal failure on dialysis   • Controlled type 2 diabetes mellitus with chronic kidney disease on chronic dialysis, without long-term current use of insulin   • Coronary artery disease involving native coronary artery of native heart without angina pectoris   • Panlobular emphysema   • ESRD (end stage renal disease) on dialysis   • HCAP (healthcare-associated pneumonia)       Medications:    apixaban 2.5 mg Oral BID   aspirin 81 mg Oral Daily   atorvastatin 40 mg Oral Nightly   DAPTOmycin (CUBICIN)  IV 6 mg/kg Intravenous Once per day on Mon Wed Fri   epoetin dagmar 6,000 Units Subcutaneous Once per day on Mon Wed Fri   famotidine 40 mg Oral Daily   hydrALAZINE 25 mg Oral TID   levothyroxine 88 mcg Oral Daily   mirtazapine 15 mg Oral Nightly   polyethylene glycol 17 g Oral BID   QUEtiapine 50 mg Oral Nightly   sertraline 50 mg Oral Daily        Vitals:    11/14/17 1509 11/14/17 1947 11/15/17 0506 11/15/17 0700   BP: 107/52 99/56 111/51 116/56   BP Location: Right leg Right leg Right leg    Patient Position: Lying Lying Lying    Pulse: 81 81 71    Resp: 18 18 16    Temp: 97.4 °F (36.3 °C) 96.9 °F (36.1 °C) 98.1 °F (36.7 °C)    TempSrc: Temporal Artery  Temporal Artery  Temporal Artery     SpO2: 99% 98% 96%    Weight:       Height:         I/O last 3 completed shifts:  In: 560 [P.O.:560]  Out: 0        On examination:  General:Comfortable, alert and oriented,   Seen and examined in HD.   HEENT: Pallor, no icterus, eye movements are normal.  Chest: Decreased breath sounds at the lung bases.  Clear lungs  CVS: Heart sounds are irregular, no pericardial rub or gallop  Abdomen: Abdomen is soft, normal bowel sounds, complaints of some discomfort in epigastric  area  Extremities: No edema of the lower extremities.  Left thigh AV graft, no tenderness, good bruit  Neuro: No change in neurological status.  Grade 3 power both upper and lower extremities  Mentation: She is alert and oriented    Past medical illness, social history, medications, previous notes reviewed.       Laboratory results:      Recent Results (from the past 24 hour(s))   CBC Auto Differential    Collection Time: 11/15/17  6:06 AM   Result Value Ref Range    WBC 7.82 3.20 - 9.80 10*3/mm3    RBC 3.52 (L) 3.77 - 5.16 10*6/mm3    Hemoglobin 10.1 (L) 12.0 - 15.5 g/dL    Hematocrit 32.4 (L) 35.0 - 45.0 %    MCV 92.0 80.0 - 98.0 fL    MCH 28.7 26.5 - 34.0 pg    MCHC 31.2 (L) 31.4 - 36.0 g/dL    RDW 17.0 (H) 11.5 - 14.5 %    RDW-SD 55.9 (H) 36.4 - 46.3 fl    MPV 10.5 8.0 - 12.0 fL    Platelets 352 150 - 450 10*3/mm3    Neutrophil % 60.6 37.0 - 80.0 %    Lymphocyte % 26.7 10.0 - 50.0 %    Monocyte % 7.3 0.0 - 12.0 %    Eosinophil % 3.5 0.0 - 7.0 %    Basophil % 1.5 0.0 - 2.0 %    Immature Grans % 0.4 0.0 - 0.5 %    Neutrophils, Absolute 4.74 2.00 - 8.60 10*3/mm3    Lymphocytes, Absolute 2.09 0.60 - 4.20 10*3/mm3    Monocytes, Absolute 0.57 0.00 - 0.90 10*3/mm3    Eosinophils, Absolute 0.27 0.00 - 0.70 10*3/mm3    Basophils, Absolute 0.12 0.00 - 0.20 10*3/mm3    Immature Grans, Absolute 0.03 (H) 0.00 - 0.02 10*3/mm3   Renal Function Panel    Collection Time: 11/15/17  9:12 AM   Result Value Ref Range    Glucose 166 (H) 60 - 100 mg/dL    BUN 51 (H) 7 - 21 mg/dL    Creatinine 4.80 (H) 0.50 - 1.00 mg/dL    Sodium 140 137 - 145 mmol/L    Potassium 4.2 3.5 - 5.1 mmol/L    Chloride 99 95 - 110 mmol/L    CO2 27.0 22.0 - 31.0 mmol/L    Calcium 7.9 (L) 8.4 - 10.2 mg/dL    Albumin 3.40 3.40 - 4.80 g/dL    Phosphorus 6.8 (H) 2.4 - 4.4 mg/dL    Anion Gap 14.0 5.0 - 15.0 mmol/L    BUN/Creatinine Ratio 10.6 7.0 - 25.0    eGFR Non  Amer 9 (L) 39 - 90 mL/min/1.73   ]    Imaging Results (last 24 hours)     ** No results found  for the last 24 hours. **            Assessment:     End-stage renal disease  Left lower lobe pneumonia  Staphylococcal bacteremia  Altered mental status, improved  Essential hypertension  Febrile illness  Type 2 diabetes mellitus    Plan:     ESRD:   Patient is on hemodialysis on Monday Wednesday and Friday.   Seen and examined on HD.  HD orders reviewed.  Fluid removal on HD as tolerated.      Staphylococcal bacteremia, left lower lobe pneumonia.  Echocardiogram was negative.  Repeat cultures were positive.  Patient is on daptomycin, CPK stable.  Vascular evaluation was done, no definite evidence of infection over the thigh graft.  Follow-up repeat cultures.  White cell count is improving.      Abd discomfort.    On proton pump inhibitors.    Pneumonia: On IV antibiotic.  Follow cultures.  Cough better.     Altered mental status, delirium: Improved.   imaging studies were negative.    Anemia of chronic kidney disease:   Hemoglobin and hematocrit levels are stable.  On 8000 units of Epogen with dialysis.    Essential hypertension:  Blood pressure readings have been on the lower side, discontinue Hydralazine for now.      Discussed with patient.  She asked about going home and I discussed need for clearance of infection due to bacteremia and risks of endovascular infections    Chi Holden MD

## 2017-11-15 NOTE — PROGRESS NOTES
[facility] Medicine Services  INPATIENT PROGRESS NOTE    Length of Stay: 16  Date of Admission: 10/30/2017  Primary Care Physician: Rogers Perez MD    Subjective   Chief Complaint: No complaints    HPI:      11/15/2017:  No complaints.  Blood cultures negative at 48 hours.      11/14/2017:  No complaints today.  Following blood cultures.  Negative at 24 hours.     11/13/2017:  The patient received dialysis today.  Blood cultures have been recollected.      11/12/2017:  No complaints today.  She is scheduled for dialysis tomorrow.  Her 4th dose of Daptomycin is scheduled for 1500 today.  Will repeat blood cultures in the AM to reevaluate MRSA bacteremia.      11/11/2017:  The patient has no complaints today.  Continue PT/OT, dialysis and Daptomycin.     11/10/2017:  Continue with dialysis today (M-W-F).  The patient will receive Daptomycin today and on the 12th.  Will recheck blood cultures next week.      11/9/2017:  Blood culture returned this morning, again with MRSA.  She is receiving Daptomycin every 48 hours and has only received two doses.  WBC is continuing to decrease with no fever noted.  Will continue Daptomycin and recheck blood cultures in a few days.      Previous notes by NADJA Land PA:   11/8/2017: Patient continues to be more pleasant and feels better.  At 24 hours blood cultures are negative.  No nausea, vomiting, chest pain.  Should be noted that if patient blood cultures remain negative at 48 hours, can likely be discharged.  Patient will be passed on to Rosalba BENITEZ who will evaluate the patient as appropriate and determine discharge possibilities at that time.     11/7/2017: Patient feeling much better today.  She is in good spirits.  Blood cultures are negative at less than 24 hours after changing over to daptomycin.  No fever, chills, shortness of air.  Patient has been off oxygen and satting well on room air greater than 24 hours.  Vascular  has evaluated patient's fistula site and has determined it is functioning well with no infection.     11/6/2017: Patient has no chest pain, fever, chills.  Have discussed with pharmacy and there is difficulty reaching appropriate levels of vancomycin.  At this time recommendation is to start patient on daptomycin.  Nephrology has asked vascular surgery to evaluate fistula.  Urinalysis demonstrates no signs of infection.  Will repeat blood cultures today.     11/5/2017: Patient has no acute complaints.  Scheduled for redraw blood cultures in the a.m.  No further vegetation noted on echocardiogram.     11/4/2017: Dr. Adan has determined there is low suspicion for any type of endocarditis or vegetation.  Dr. Adan recommends a repeat transthoracic echocardiogram rather than a transesophageal echocardiogram.  Patient has no complaints today.  Patient does have severe pneumonia.  Will recheck blood cultures on Monday.     11/3/2017: Patient has no acute complaints today.  Patient continues to demonstrate MRSA bacteremia despite appropriate antibiotic treatment.  Have spoken with Dr. Adan of cardiology who is agreed to evaluate the patient for possible ANTHONY.  No indwelling lines or catheters.  All other possible sources fistula.  Continue antibiotic treatment.  Attempt sputum culture if possible.  No fevers or chills, dialysis is today.  Patient tolerating by mouth intake.  Essentially complains of soreness.  Patient tachycardia much improved, heart rate is 101.  Respirations are normal with no fever.  Patient hypoxia improving, 94 on 1.5 L.     Chief Complaint/HPI:  This 72-year-old  female was admitted to hospitalist services secondary to a worsening level of altered mental status.  Patient also had fever.  CT of the head was within normal limits.  Chest x-ray demonstrated left lower lobe pneumonia.  Patient has been treated with empiric antibiotics.  Patient was found to have MRSA bacteremia, no  vegetation demonstrated on echocardiogram.  Echocardiogram is transthoracic.  Though patient is in no apparent distress, she continues to be tachycardic and hypoxic.  Patient currently on vancomycin and Levaquin.  Patient will be changed to vancomycin and Zosyn per renal dosing.  EKG reordered.  Initial EKG demonstrated sinus tachycardia.  EKG rule out atrial fibrillation with RVR.  No chest pain, did have an elevated troponin, however troponin has been fairly consistent and is actually trending down.  Troponin likely secondary to ESRD and sepsis.  Have spoken with patient's nephrologist, Dr. Mireles, he is agreeable to CTA today.  We'll also order physical therapy and occupational therapy.    Review of Systems   Constitutional: Positive for fatigue.   All other systems reviewed and are negative.       All pertinent negatives and positives are as above. All other systems have been reviewed and are negative unless otherwise stated.     Objective    Temp:  [96.9 °F (36.1 °C)-98.1 °F (36.7 °C)] 98.1 °F (36.7 °C)  Heart Rate:  [71-81] 71  Resp:  [16-18] 16  BP: ()/(51-56) 116/56    Physical Exam   Constitutional: She is oriented to person, place, and time. She appears well-developed and well-nourished.   HENT:   Head: Normocephalic and atraumatic.   Eyes: EOM are normal. Pupils are equal, round, and reactive to light.   Neck: Normal range of motion. Neck supple.   Cardiovascular: Normal rate and regular rhythm.    Pulmonary/Chest: Effort normal and breath sounds normal.   Abdominal: Soft. Bowel sounds are normal.   Musculoskeletal: Normal range of motion.   Neurological: She is alert and oriented to person, place, and time.   Skin: Skin is warm and dry.   Psychiatric: She has a normal mood and affect.     Results Review:  I have reviewed the labs, radiology results, and diagnostic studies.    Laboratory Data:     Results from last 7 days  Lab Units 11/15/17  0912 11/14/17  0730 11/13/17  0816   SODIUM mmol/L 140 138  139   POTASSIUM mmol/L 4.2 4.5 4.4   CHLORIDE mmol/L 99 96 100   CO2 mmol/L 27.0 30.0 23.0   BUN mg/dL 51* 38* 57*   CREATININE mg/dL 4.80* 3.87* 5.12*   GLUCOSE mg/dL 166* 111* 189*   CALCIUM mg/dL 7.9* 8.4 8.1*   ANION GAP mmol/L 14.0 12.0 16.0*     Estimated Creatinine Clearance: 7.2 mL/min (by C-G formula based on Cr of 4.8).    Results from last 7 days  Lab Units 11/15/17  0912 11/14/17  0730 11/13/17  0816   PHOSPHORUS mg/dL 6.8* 5.9* 5.9*           Results from last 7 days  Lab Units 11/15/17  0606 11/14/17  0730 11/13/17  0812 11/12/17  0607 11/11/17  0644   WBC 10*3/mm3 7.82 7.00 7.04 7.35 7.74   HEMOGLOBIN g/dL 10.1* 10.2* 9.8* 10.1* 10.7*   HEMATOCRIT % 32.4* 32.9* 31.3* 32.8* 34.4*   PLATELETS 10*3/mm3 352 410 400 403 367           Culture Data:   Blood Culture   Date Value Ref Range Status   11/13/2017 No growth at 2 days  Preliminary   11/13/2017 No growth at 2 days  Preliminary     No results found for: URINECX  No results found for: RESPCX  No results found for: WOUNDCX  No results found for: STOOLCX  No components found for: BODYFLD    Radiology Data:   Imaging Results (last 24 hours)     ** No results found for the last 24 hours. **          I have reviewed the patient current medications.     Assessment/Plan       Plan:    1.  MRSA bacteremia:   Daptomycin q 48 hours . 5th dose due on the 15th.  Repeat blood culture negative at 48 hours.        2.  HCAP:  Rocephin completed.   3.  Deconditioning:  Continue PT/OT  4.  ESRD:  Dialysis M-W-F          Discharge Planning: I expect patient to be discharged to home in 3-4 days.      This document has been electronically signed by ELI Thorne on November 15, 2017 11:01 AM

## 2017-11-15 NOTE — THERAPY PROGRESS REPORT/RE-CERT
Acute Care - Physical Therapy Progress Note  [facility]     Patient Name: Efrem Roa  : 1945  MRN: [id number]  Today's Date: 11/15/2017  Onset of Illness/Injury or Date of Surgery Date: 10/30/17  Date of Referral to PT: 17  Referring Physician: LOS Rose    Admit Date: 10/30/2017    Visit Dx:    ICD-10-CM ICD-9-CM   1. HCAP (healthcare-associated pneumonia) J18.9 486   2. Sepsis, due to unspecified organism A41.9 038.9     995.91   3. NSTEMI (non-ST elevated myocardial infarction) I21.4 410.70   4. ESRD (end stage renal disease) N18.6 585.6   5. Essential hypertension I10 401.9   6. Hypertensive heart disease without heart failure I11.9 402.90   7. Oropharyngeal dysphagia R13.12 787.22   8. Impaired mobility and activities of daily living Z74.09 799.89   9. Impaired functional mobility, balance, gait, and endurance Z74.09 V49.89   10. Impaired mobility and ADLs Z74.09 799.89     Patient Active Problem List   Diagnosis   • Constipation   • Arteriovenous fistula   • End stage renal failure on dialysis   • Controlled type 2 diabetes mellitus with chronic kidney disease on chronic dialysis, without long-term current use of insulin   • Coronary artery disease involving native coronary artery of native heart without angina pectoris   • Panlobular emphysema   • ESRD (end stage renal disease) on dialysis   • HCAP (healthcare-associated pneumonia)               Adult Rehabilitation Note       11/15/17 0710 17 1332 17 1015    Rehab Assessment/Intervention    Discipline occupational therapy assistant  -BB physical therapy assistant  -JASON occupational therapy assistant  -    Document Type therapy note (daily note)  -DAYO therapy note (daily note)  -JASON therapy note (daily note)  -    Subjective Information agree to therapy  -DAYO agree to therapy;complains of;pain  -JASON agree to therapy  -LULU    Patient Effort, Rehab Treatment fair  -BB  fair  -LULU    Patient Effort, Rehab Treatment  Comment --   declines EOB/OOB stating she is weak and can not do it today  -BB      Symptoms Noted During/After Treatment fatigue  -BB fatigue  -JASON     Precautions/Limitations fall precautions  -BB      Recorded by [BB] JACE Phillips/BERNADETTE [JASON] Kalli Marina PTA [] MIHIR MerazA/L    Vital Signs    Pre Systolic BP Rehab  108  -JASON     Pre Treatment Diastolic BP  55  -JASON     Pretreatment Heart Rate (beats/min) 87  -BB 88  -JASON     Posttreatment Heart Rate (beats/min) 93  -BB      Pre SpO2 (%) 97  -BB  98  -JH    O2 Delivery Pre Treatment room air  -BB  room air  -JH    Post SpO2 (%) 98  -BB      O2 Delivery Post Treatment room air  -BB      Pre Patient Position Supine  -BB      Post Patient Position --   long sitting  -BB      Recorded by [BB] JACE Phillips/BERNADETTE [JASON] Kalli Marina PTA [] MIHIR MerazA/L    Pain Assessment    Pain Assessment 0-10  -BB 0-10  -JASON No/denies pain  -JH    Pain Score 10  -BB 10  -JASON     Post Pain Score 10  -BB 10  -JASON     Pain Type Chronic pain  -BB Chronic pain  -JASON     Pain Location Abdomen  -BB Abdomen  -JASON     Pain Intervention(s) Medication (See MAR)  -BB      Recorded by [BB] JACE Phillips/BERNADETTE [JASON] Kalli Marina PTA [] MIHIR MerazA/L    Cognitive Assessment/Intervention    Current Cognitive/Communication Assessment functional  -BB functional  -JASON impaired  -JH    Orientation Status oriented x 4  -BB oriented x 4  -JASON oriented to;person  -JH    Follows Commands/Answers Questions 100% of the time  -% of the time  -JASON     Personal Safety  WNL/WFL  -JASON     Personal Safety Interventions supervised activity  -BB supervised activity  -JASON     Recorded by [BB] JACE Phillips/BERNADETTE [JASON] Kalli Marina PTA [] MIHIR MerazA/L    Bed Mobility, Assessment/Treatment    Bed Mobility, Assistive Device bed rails;head of bed elevated  -BB head of bed elevated;bed rails  -JASON     Bed Mobility, Roll Left, [place] verbal cues  required  -BB verbal cues required  -JASON     Bed Mobility, Roll Right, Porter verbal cues required  -BB verbal cues required  -JASON     Bed Mob, Supine to Sit, Porter   conditional independence  -JH    Bed Mob, Sit to Supine, Porter   conditional independence  -JH    Recorded by [BB] Juliette Plummer, MCCORMICK/L [JASON] Kalli Marina, PTA [JH] Sandra Milner, MCCORMICK/L    Transfer Assessment/Treatment    Transfers, Chair-Bed Porter  not tested  -JASON     Transfer, Comment  pt declined  -JASON     Recorded by  [JASON] Kalli Marina, PTA     Gait Assessment/Treatment    Gait, Porter Level  not tested  -JASON     Gait, Comment  pt declined  -JASON     Recorded by  [JASON] Kalli Marina, PTA     Upper Body Bathing Assessment/Training    UB Bathing Assess/Train, Position long sitting  -BB      UB Bathing Assess/Train, Porter Level set up required;stand by assist  -BB      Recorded by [BB] Juliette Plummer, MCCORMICK/L      Lower Body Bathing Assessment/Training    LB Bathing Assess/Train, Position long sitting  -BB      LB Bathing Assess/Train, Porter Level set up required;stand by assist  -BB      Recorded by [BB] Juliette Plummer, MCCORMICK/L      Upper Body Dressing Assessment/Training    UB Dressing Assess/Train, Clothing Type doffing:;donning:;hospital gown  -BB      UB Dressing Assess/Train, Position long sitting  -BB      UB Dressing Assess/Train, Porter supervision required;set up required  -BB      Recorded by [BB] Juliette Plummer, MCCORMICK/L      Lower Body Dressing Assessment/Training    LB Dressing Assess/Train, Clothing Type doffing:;donning:;socks  -BB      LB Dressing Assess/Train, Position long sitting  -BB      LB Dressing Assess/Train, Porter supervision required  -BB      Recorded by [BB] MIHIR PhillipsA/L      Grooming Assessment/Training    Grooming Assess/Train, Position long sitting  -BB  edge of bed  -    Grooming Assess/Train, Indepen Level set up  required;supervision required  -  supervision required;verbal cues required  -    Grooming Assess/Train, Comment washed face, hands, applied deoderant, lotion, combed hair  -      Recorded by [BB] JACE Phillips/BERNADETTE  [] JACE Meraz/L    Therapy Exercises    Bilateral Lower Extremities  AROM:;supine;20 reps;ankle pumps/circles;hip ER;hip IR;hip abduction/adduction;SAQ;heel slides;glut sets  -     Bilateral Upper Extremity   AROM:;5 reps;sitting;elbow flexion/extension;hand pumps;pronation/supination  -    BUE Resistance   manual resistance- minimal  -    Recorded by  [JASON] Kalli Marina PTA [JH] NIMCO Meraz    Positioning and Restraints    Pre-Treatment Position in bed  - in bed  - in bed  -    Post Treatment Position bed  - bed  - bed  -    In Bed call light within reach;encouraged to call for assist;exit alarm on;fowlers  - call light within reach;exit alarm on;encouraged to call for assist  - sitting EOB;call light within reach;encouraged to call for assist;exit alarm on  -    Recorded by [BB] JACE Phillips/BERNADETTE [JASON] Kalli Marina PTA [] NIMCO Meraz      11/13/17 1421 11/13/17 [facility]       Rehab Assessment/Intervention    Discipline occupational therapist  -NN occupational therapist  -NN     Treatment Not Performed patient/family declined treatment  -NN other (see comments)  -NN     Treatment Not Performed, Comment Pt. deferrered getting OOB this pm. Pt. stated she was too tired from dialysis this pm. Pt. has had hx of multiple refusals . Please monitor refusals and if pt. continues to refuse OT needs to sign off. It is not therapeutic.   -NN Pt. in dialysis this am  -NN     Recorded by [NN] KOJO Juan/L [NN] Karly Canales OTR/L       User Key  (r) = Recorded By, (t) = Taken By, (c) = Cosigned By    Initials Name Effective Dates     Kalli Marina PTA 10/17/16 -     BB NIMCO Phillips 10/17/16 -       Sandra Milner, MCCORMICK/L 10/17/16 -     NN Karly Canales, OTR/L 11/08/17 -                 IP PT Goals       11/15/17 1738 11/14/17 1332 11/08/17 1303    Transfer Training PT LTG    Transfer Training PT  LTG, Date Goal Reviewed 11/15/17  -KORI 11/14/17  -JASON 11/08/17  -JASON    Transfer Training PT LTG, Outcome  goal ongoing  -JASON goal ongoing  -JASON    Gait Training PT LTG    Gait Training Goal PT LTG, Distance to Achieve 10FT  -KORI      Gait Training Goal PT LTG, Additional Goal 50ft  -KORI      Gait Training Goal PT LTG, Date Goal Reviewed 11/15/17  -KORI 11/14/17  -JASON 11/08/17  -JASON    Gait Training Goal PT LTG, Outcome goal revised  -KORI goal ongoing  -JASON goal ongoing  -JASON    Patient Education PT LTG    Patient Education PT LTG, Date Goal Reviewed 11/15/17  -KORI 11/14/17  -JASON 11/08/17  -JASON    Patient Education PT LTG Outcome  goal ongoing  -JASON goal ongoing  -JASON      11/07/17 0925 11/04/17 0939 11/03/17 1321    Transfer Training PT LTG    Transfer Training PT  LTG, Date Goal Reviewed 11/07/17  -CH 11/04/17  -JASON 11/03/17  -JCA    Transfer Training PT LTG, Outcome goal ongoing  -CH goal ongoing  -JASON goal ongoing  -JCA    Gait Training PT LTG    Gait Training Goal PT LTG, Date Goal Reviewed 11/07/17  -CH 11/04/17  -JASON 11/03/17  -JCA    Gait Training Goal PT LTG, Outcome goal ongoing  -CH goal ongoing  -JASON goal ongoing  -JCA    Strength Goal PT LTG    Strength Goal PT LTG, Date Goal Reviewed   11/03/17  -JCA    Strength Goal PT LTG, Outcome   goal met  -JCA    Patient Education PT LTG    Patient Education PT LTG, Date Goal Reviewed 11/07/17  -CH 11/04/17  -JASON 11/03/17  -JCA    Patient Education PT LTG Outcome goal ongoing  - goal ongoing  -JASON goal ongoing  -JCA      11/02/17 1713          Transfer Training PT LTG    Transfer Training PT LTG, Date Established 11/02/17  -KORI      Transfer Training PT LTG, Time to Achieve by discharge  -KORI      Transfer Training PT LTG, Activity Type bed to chair /chair to bed;sit to stand/stand to  sit  -      Transfer Training PT LTG, Gregg Level conditional independence  -      Transfer Training PT LTG, Outcome goal ongoing  -      Gait Training PT LTG    Gait Training Goal PT LTG, Date Established 11/02/17  -      Gait Training Goal PT LTG, Time to Achieve by discharge  -      Gait Training Goal PT LTG, Gregg Level conditional independence  -      Gait Training Goal PT LTG, Distance to Achieve 150ft;O2 sats will not drop below 92%  -      Gait Training Goal PT LTG, Outcome goal ongoing  [facility]      Strength Goal PT LTG    Strength Goal PT LTG, Date Established 11/02/17  -      Strength Goal PT LTG, Time to Achieve by discharge  -      Strength Goal PT LTG, Measure to Achieve Pt will tolerate 15 reps of AROM exercises in sitting  -      Strength Goal PT LTG, Outcome goal [facility]      Patient Education PT LTG    Patient Education PT LTG, Date Established 11/02/17  -      Patient Education PT LTG, Time to Achieve by discharge  -      Patient Education PT LTG, Education Type gait;transfers;home safety  -      Patient Education PT LTG Outcome goal [facility]        User Key  (r) = Recorded By, (t) = Taken By, (c) = Cosigned By    Initials Name Provider Type    KORI Trupti Andrade, PT Physical Therapist    JASON Marina, PTA Physical Therapy Assistant    CONNIE Malik, PTA Physical Therapy Assistant    JACKLYN Nava, PTA Physical Therapy Assistant          Physical Therapy Education     Title: PT OT SLP Therapies (Active)     Topic: Physical Therapy (Active)     Point: Mobility training (Active)    Learning Progress Summary    Learner Readiness Method Response Comment Documented by Status   Patient Acceptance E NR pt edu on benefits of OOB  11/03/17 1409 Active                      User Key     Initials Effective Dates Name Provider Type Discipline     10/17/16 -  Nemesio Malik PTA Physical Therapy Assistant PT                    PT Recommendation  and Plan  Anticipated Discharge Disposition: skilled nursing facility  Planned Therapy Interventions: balance training, bed mobility training, gait training, patient/family education, strengthening, transfer training, neuromuscular re-education  PT Frequency: other (see comments) (5-14 times per week)  Plan of Care Review  Plan Of Care Reviewed With: patient  Outcome Summary/Follow up Plan: PT recertification completed. Pt transferred supine to sit to supine with supervision. Pt transferred bed to BSC to bed with max assistance of 1. Pt not able to standing completely erect . Pt has met 1 of 4 goals this certication period and will benefit from continued PT to regain lost function. Anticipate transfer to SNF at discharge.          Outcome Measures       11/15/17 1502 11/14/17 1332       How much help from another person do you currently need...    Turning from your back to your side while in flat bed without using bedrails? 3  -KORI 3  -JASON     Moving from lying on back to sitting on the side of a flat bed without bedrails? 3  -KORI 3  -JASON     Moving to and from a bed to a chair (including a wheelchair)? 2  -KORI 2  -JASON     Standing up from a chair using your arms (e.g., wheelchair, bedside chair)? 2  -KORI 2  -JASON     Climbing 3-5 steps with a railing? 1  -KORI 1  -JASON     To walk in hospital room? 2  -KORI 2  -JASON     AM-PAC 6 Clicks Score 13  -KORI 13  -JASON     Functional Assessment    Outcome Measure Options AM-PAC 6 Clicks Basic Mobility (PT)  -        User Key  (r) = Recorded By, (t) = Taken By, (c) = Cosigned By    Initials Name Provider Type    KORI Andrade, PT Physical Therapist    JASON Marina, PTA Physical Therapy Assistant           Time Calculation:         PT Charges       11/15/17 1742          Time Calculation    Start Time 1502  -KORI      Stop Time 1555  -      Time Calculation (min) 53 min  -KORI      PT Received On 11/15/17  -      PT Goal Re-Cert Due Date 11/28/17  -      Time Calculation- PT    Total  Timed Code Minutes- PT 53 minute(s)  -KORI        User Key  (r) = Recorded By, (t) = Taken By, (c) = Cosigned By    Initials Name Provider Type    KORI Andrade PT Physical Therapist          Therapy Charges for Today     Code Description Service Date Service Provider Modifiers Qty    08258601763  PT MOBILITY CURRENT 11/15/2017 Trupti Andrade, PT GP, CK 1    47182997208 HC PT MOBILITY PROJECTED 11/15/2017 Trupti Andrade, PT GP, CJ 1    [id number]  PT THERAPEUTIC ACT EA 15 MIN 11/15/2017 Trupti Andrade, PT GP 4          PT G-Codes  PT Professional Judgement Used?: Yes  Outcome Measure Options: AM-PAC 6 Clicks Basic Mobility (PT)  Score: 13  Functional Limitation: Mobility: Walking and moving around  Mobility: Walking and Moving Around Current Status (): At least 40 percent but less than 60 percent impaired, limited or restricted  Mobility: Walking and Moving Around Goal Status (): At least 20 percent but less than 40 percent impaired, limited or restricted    Trupti Andrade PT  11/15/2017

## 2017-11-15 NOTE — PLAN OF CARE
Problem: Patient Care Overview (Adult)  Goal: Plan of Care Review  Outcome: Ongoing (interventions implemented as appropriate)    11/15/17 0945   Coping/Psychosocial Response Interventions   Plan Of Care Reviewed With patient   Patient Care Overview   Progress no change   Outcome Evaluation   Outcome Summary/Follow up Plan Pt performed ADl with SBA and set up. Pt did not meet any new goals this am. Continue with current POC.         Problem: Inpatient Occupational Therapy  Goal: Transfer Training Goal 2 LTG- OT  Outcome: Ongoing (interventions implemented as appropriate)    11/02/17 1556 11/04/17 1430 11/15/17 0945   Transfer Training 2 OT LTG   Transfer Training 2 OT LTG, Date Established 11/02/17 --  --    Transfer Training 2 OT LTG, Time to Achieve by discharge --  --    Transfer Training 2 OT LTG, Activity Type all transfers --  --    Transfer Training 2 OT LTG, [place] Level supervision required;contact guard assist --  --    Transfer Training 2 OT LTG, Assist Device walker, rolling --  --    Transfer Training 2 OT LTG, Date Goal Reviewed --  --  11/15/17   Transfer Training 2 OT LTG, Outcome --  goal ongoing --        Goal: Strength Goal LTG- OT  Outcome: Ongoing (interventions implemented as appropriate)    11/02/17 1556 11/04/17 1430 11/14/17 1015   Strength OT LTG   Strength Goal OT LTG, Date Established 11/02/17 --  --    Strength Goal OT LTG, Measure to Achieve 1-2 sets of 10 reps all planes (except L shoulder flexion) with 1 lb wrist wt or dumbell for B UE strengthening to increase independence with functional mobility and ADLs. --  --    Strength Goal OT LTG, Date Goal Reviewed --  --  11/14/17   Strength Goal OT LTG, Outcome --  goal ongoing --        Goal: Static Standing Balance Goal LTG- OT  Outcome: Ongoing (interventions implemented as appropriate)    11/02/17 1556 11/03/17 1526 11/04/17 1430   Static Standing Balance OT LTG   Static Standing Balance OT LTG, Date Established --  11/03/17 --     Static Standing Balance OT LTG, Time to Achieve by discharge --  --    Static Standing Balance OT LTG, [place] Level supervision required;contact guard assist  (5 minutes with functional activity) --  --    Static Standing Balance OT LTG, Assist Device assistive device  (R/W.) --  --    Static Standing Balance OT LTG, Date Goal Reviewed --  --  --    Static Standing Balance OT LTG, Outcome --  --  goal ongoing     11/15/17 0945   Static Standing Balance OT LTG   Static Standing Balance OT LTG, Date Established --    Static Standing Balance OT LTG, Time to Achieve --    Static Standing Balance OT LTG, [place] Level --    Static Standing Balance OT LTG, Assist Device --    Static Standing Balance OT LTG, Date Goal Reviewed 11/15/17   Static Standing Balance OT LTG, Outcome --

## 2017-11-16 VITALS
TEMPERATURE: 97.5 F | BODY MASS INDEX: 18.85 KG/M2 | DIASTOLIC BLOOD PRESSURE: 57 MMHG | OXYGEN SATURATION: 97 % | HEART RATE: 75 BPM | HEIGHT: 60 IN | WEIGHT: 96 LBS | SYSTOLIC BLOOD PRESSURE: 109 MMHG | RESPIRATION RATE: 18 BRPM

## 2017-11-16 PROBLEM — R78.81 MRSA BACTEREMIA: Status: ACTIVE | Noted: 2017-11-16

## 2017-11-16 PROBLEM — B95.62 MRSA BACTEREMIA: Status: ACTIVE | Noted: 2017-11-16

## 2017-11-16 PROBLEM — A41.9 SEPSIS (HCC): Status: ACTIVE | Noted: 2017-11-16

## 2017-11-16 LAB
ALBUMIN SERPL-MCNC: 3.3 G/DL (ref 3.4–4.8)
ANION GAP SERPL CALCULATED.3IONS-SCNC: 11 MMOL/L (ref 5–15)
BASOPHILS # BLD AUTO: 0.12 10*3/MM3 (ref 0–0.2)
BASOPHILS NFR BLD AUTO: 1.8 % (ref 0–2)
BUN BLD-MCNC: 31 MG/DL (ref 7–21)
BUN/CREAT SERPL: 9.4 (ref 7–25)
CALCIUM SPEC-SCNC: 8.3 MG/DL (ref 8.4–10.2)
CHLORIDE SERPL-SCNC: 95 MMOL/L (ref 95–110)
CO2 SERPL-SCNC: 31 MMOL/L (ref 22–31)
CREAT BLD-MCNC: 3.31 MG/DL (ref 0.5–1)
DEPRECATED RDW RBC AUTO: 56.7 FL (ref 36.4–46.3)
EOSINOPHIL # BLD AUTO: 0.27 10*3/MM3 (ref 0–0.7)
EOSINOPHIL NFR BLD AUTO: 4 % (ref 0–7)
ERYTHROCYTE [DISTWIDTH] IN BLOOD BY AUTOMATED COUNT: 17.2 % (ref 11.5–14.5)
GFR SERPL CREATININE-BSD FRML MDRD: 14 ML/MIN/1.73 (ref 39–90)
GLUCOSE BLD-MCNC: 100 MG/DL (ref 60–100)
HCT VFR BLD AUTO: 33 % (ref 35–45)
HGB BLD-MCNC: 10.2 G/DL (ref 12–15.5)
IMM GRANULOCYTES # BLD: 0.03 10*3/MM3 (ref 0–0.02)
IMM GRANULOCYTES NFR BLD: 0.4 % (ref 0–0.5)
LYMPHOCYTES # BLD AUTO: 2.65 10*3/MM3 (ref 0.6–4.2)
LYMPHOCYTES NFR BLD AUTO: 39.1 % (ref 10–50)
MCH RBC QN AUTO: 28.7 PG (ref 26.5–34)
MCHC RBC AUTO-ENTMCNC: 30.9 G/DL (ref 31.4–36)
MCV RBC AUTO: 92.7 FL (ref 80–98)
MONOCYTES # BLD AUTO: 0.6 10*3/MM3 (ref 0–0.9)
MONOCYTES NFR BLD AUTO: 8.8 % (ref 0–12)
NEUTROPHILS # BLD AUTO: 3.11 10*3/MM3 (ref 2–8.6)
NEUTROPHILS NFR BLD AUTO: 45.9 % (ref 37–80)
PHOSPHATE SERPL-MCNC: 5.6 MG/DL (ref 2.4–4.4)
PLATELET # BLD AUTO: 374 10*3/MM3 (ref 150–450)
PMV BLD AUTO: 10.2 FL (ref 8–12)
POTASSIUM BLD-SCNC: 3.8 MMOL/L (ref 3.5–5.1)
RBC # BLD AUTO: 3.56 10*6/MM3 (ref 3.77–5.16)
SODIUM BLD-SCNC: 137 MMOL/L (ref 137–145)
WBC NRBC COR # BLD: 6.78 10*3/MM3 (ref 3.2–9.8)

## 2017-11-16 PROCEDURE — 97110 THERAPEUTIC EXERCISES: CPT

## 2017-11-16 PROCEDURE — 80069 RENAL FUNCTION PANEL: CPT | Performed by: PHYSICIAN ASSISTANT

## 2017-11-16 PROCEDURE — 85025 COMPLETE CBC W/AUTO DIFF WBC: CPT | Performed by: INTERNAL MEDICINE

## 2017-11-16 RX ORDER — HYDROCODONE BITARTRATE AND ACETAMINOPHEN 7.5; 325 MG/1; MG/1
1 TABLET ORAL EVERY 4 HOURS PRN
Qty: 10 TABLET | Refills: 0 | Status: ON HOLD | OUTPATIENT
Start: 2017-11-16 | End: 2018-01-01

## 2017-11-16 RX ADMIN — APIXABAN 2.5 MG: 2.5 TABLET, FILM COATED ORAL at 09:41

## 2017-11-16 RX ADMIN — SERTRALINE HYDROCHLORIDE 50 MG: 50 TABLET ORAL at 09:41

## 2017-11-16 RX ADMIN — LEVOTHYROXINE SODIUM 88 MCG: 88 TABLET ORAL at 05:59

## 2017-11-16 RX ADMIN — FAMOTIDINE 40 MG: 40 TABLET ORAL at 09:41

## 2017-11-16 RX ADMIN — ASPIRIN 81 MG: 81 TABLET, COATED ORAL at 09:41

## 2017-11-16 NOTE — THERAPY TREATMENT NOTE
Acute Care - Physical Therapy Treatment Note  [facility]     Patient Name: Efrem Roa  : 1945  MRN: [id number]  Today's Date: 2017  Onset of Illness/Injury or Date of Surgery Date: 10/30/17  Date of Referral to PT: 17  Referring Physician: LOS Rose    Admit Date: 10/30/2017    Visit Dx:    ICD-10-CM ICD-9-CM   1. HCAP (healthcare-associated pneumonia) J18.9 486   2. Sepsis, due to unspecified organism A41.9 038.9     995.91   3. NSTEMI (non-ST elevated myocardial infarction) I21.4 410.70   4. ESRD (end stage renal disease) N18.6 585.6   5. Essential hypertension I10 401.9   6. Hypertensive heart disease without heart failure I11.9 402.90   7. Oropharyngeal dysphagia R13.12 787.22   8. Impaired mobility and activities of daily living Z74.09 799.89   9. Impaired functional mobility, balance, gait, and endurance Z74.09 V49.89   10. Impaired mobility and ADLs Z74.09 799.89     Patient Active Problem List   Diagnosis   • Constipation   • Arteriovenous fistula   • End stage renal failure on dialysis   • Controlled type 2 diabetes mellitus with chronic kidney disease on chronic dialysis, without long-term current use of insulin   • Coronary artery disease involving native coronary artery of native heart without angina pectoris   • Panlobular emphysema   • ESRD (end stage renal disease) on dialysis   • HCAP (healthcare-associated pneumonia)   • MRSA bacteremia   • Sepsis               Adult Rehabilitation Note       17 0858 17 0745 11/15/17 0710    Rehab Assessment/Intervention    Discipline physical therapy assistant  -JASON occupational therapy assistant  -BB occupational therapy assistant  -BB    Document Type progress note  -JASON therapy note (daily note)  -BB therapy note (daily note)  -BB    Subjective Information agree to therapy;no complaints  -JASON agree to therapy  -BB agree to therapy  -BB    Patient Effort, Rehab Treatment  adequate  -BB fair  -BB    Patient Effort,  Rehab Treatment Comment   --   declines EOB/OOB stating she is weak and can not do it today  -BB    Symptoms Noted During/After Treatment fatigue  -JASON  fatigue  -BB    Precautions/Limitations fall precautions  -JASON fall precautions  -BB fall precautions  -BB    Recorded by [JASON] Kalli Marina PTA [BB] JACE Phillips/L [BB] MIHIR PhillipsA/L    Vital Signs    Pre Systolic BP Rehab  111  -BB     Pre Treatment Diastolic BP  51  -BB     Pretreatment Heart Rate (beats/min) 80  -JASON 83  -BB 87  -BB    Posttreatment Heart Rate (beats/min)  86  -BB 93  -BB    Pre SpO2 (%) 97  -JASON 96  -BB 97  -BB    O2 Delivery Pre Treatment  room air  -BB room air  -BB    Post SpO2 (%)  95  -BB 98  -BB    O2 Delivery Post Treatment  room air  -BB room air  -BB    Pre Patient Position  Supine  -BB Supine  -BB    Post Patient Position  --   long sitting  -BB --   long sitting  -BB    Recorded by [JASON] Kalli Marina PTA [BB] MIHIR PhillipsA/L [BB] MIHIR PhillipsA/L    Pain Assessment    Pain Assessment No/denies pain  -JASON 0-10  -BB 0-10  -BB    Pain Score 0  -JASON 10  -BB 10  -BB    Post Pain Score 0  -JASON 10  -BB 10  -BB    Pain Type  Chronic pain  -BB Chronic pain  -BB    Pain Location  Abdomen  -BB Abdomen  -BB    Pain Intervention(s)  Medication (See MAR)  -BB Medication (See MAR)  -BB    Recorded by [JASON] Kalli Marina PTA [BB] JACE Phillips/L [BB] MIHIR PhillipsA/L    Cognitive Assessment/Intervention    Current Cognitive/Communication Assessment functional  -JASON functional  -BB functional  -BB    Orientation Status oriented x 4  -JASON oriented x 4  -BB oriented x 4  -BB    Follows Commands/Answers Questions 100% of the time  -JASON 100% of the time  -% of the time  -BB    Personal Safety WNL/WFL  -JASON      Personal Safety Interventions fall prevention program maintained;supervised activity  -JASON fall prevention program maintained  -BB supervised activity  -BB    Recorded by [JASON] Kalli  HELEN Marina PTA [BB] JACE Phillips/L [BB] MIHIR PhillipsA/L    Bed Mobility, Assessment/Treatment    Bed Mobility, Assistive Device bed rails;head of bed elevated  -JASON bed rails;head of bed elevated  -BB bed rails;head of bed elevated  -BB    Bed Mobility, Roll Left, [place]   verbal cues required  -BB    Bed Mobility, Roll Right, [place]   verbal cues required  -BB    Bed Mob, Supine to Sit, [place] supervision required  -JASON supervision required  -BB     Bed Mob, Sit to Supine, [place] supervision required  -JASON supervision required  -BB     Bed Mobility, Comment  Pt sat EOB ~7 min for grooming task  -BB     Recorded by [JASON] Kalli Marina PTA [BB] JACE Phillips/L [BB] JACE Phillips/L    Upper Body Bathing Assessment/Training    UB Bathing Assess/Train, Position   long sitting  -BB    UB Bathing Assess/Train, [place] Level   set up required;stand by assist  -BB    Recorded by   [BB] JACE Phillips/L    Lower Body Bathing Assessment/Training    LB Bathing Assess/Train, Position   long sitting  -BB    LB Bathing Assess/Train, [place] Level   set up required;stand by assist  -BB    Recorded by   [BB] JACE Phillips/L    Upper Body Dressing Assessment/Training    UB Dressing Assess/Train, Clothing Type   doffing:;donning:;hospital gown  -BB    UB Dressing Assess/Train, Position   long sitting  -BB    UB Dressing Assess/Train, [place]   supervision required;set up required  -BB    Recorded by   [BB] JACE Phillips/L    Lower Body Dressing Assessment/Training    LB Dressing Assess/Train, Clothing Type   doffing:;donning:;socks  -BB    LB Dressing Assess/Train, Position   long sitting  -BB    LB Dressing Assess/Train, [place]   supervision required  -BB    Recorded by   [BB] JACE Phillips/L    Grooming Assessment/Training    Grooming Assess/Train, Position  edge of bed  -BB long sitting  -BB    Grooming  Assess/Train, [place] Level  set up required;supervision required  -BB set up required;supervision required  -BB    Grooming Assess/Train, Comment  wash face, hands,oral care, comb hair  -BB washed face, hands, applied deoderant, lotion, combed hair  -BB    Recorded by  [BB] NIMCO Phillips [BB] JACE Phillips/L    Therapy Exercises    Bilateral Lower Extremities AROM:;20 reps;supine;ankle pumps/circles;glut sets;hip abduction/adduction;clam shell;heel slides;quad sets  -JASON      Right Upper Extremity  AROM:;10 reps;elbow flexion/extension;hand pumps;pronation/supination;shoulder extension/flexion   long sitting, wrist flexion/extension  -BB     Left Upper Extremity  10 reps;elbow flexion/extension;hand pumps;pronation/supination;shoulder extension/flexion;AAROM:;PROM:   wrist flexion/extension, long sitting  -BB     Recorded by [JASON] Kalli Marina PTA [BB] JACE Phillips/L     Positioning and Restraints    Pre-Treatment Position in bed  - in bed  - in bed  -BB    Post Treatment Position bed  -JASON bed  -BB bed  -BB    In Bed call light within reach;encouraged to call for assist;exit alarm on  -JASON sitting EOB;call light within reach;encouraged to call for assist   long sitting  - call light within reach;encouraged to call for assist;exit alarm on;fowlers  -BB    Recorded by [JASON] Kalli Marina PTA [BB] JACE Phillips/L [BB] NIMCO Phillips      11/14/17 1332 11/14/17 1015 11/13/17 1421    Rehab Assessment/Intervention    Discipline physical therapy assistant  -JASON occupational therapy assistant  -JH occupational therapist  -NN    Document Type therapy note (daily note)  -JASON therapy note (daily note)  -     Subjective Information agree to therapy;complains of;pain  - agree to therapy  -     Patient Effort, Rehab Treatment  fair  -     Treatment Not Performed   patient/family declined treatment  -    Treatment Not Performed, Comment   Pt. deferrered  getting OOB this pm. Pt. stated she was too tired from dialysis this pm. Pt. has had hx of multiple refusals . Please monitor refusals and if pt. continues to refuse OT needs to sign off. It is not therapeutic.   -NN    Symptoms Noted During/After Treatment fatigue  -JASON      Recorded by [JASON] Kalli Marina PTA [] Sandra Milner, MCCORMICK/L [NN] Karly Canales, OTR/L    Vital Signs    Pre Systolic BP Rehab 108  -JASON      Pre Treatment Diastolic BP 55  -JASON      Pretreatment Heart Rate (beats/min) 88  -JASON      Pre SpO2 (%)  98  -JH     O2 Delivery Pre Treatment  room air  -     Recorded by [JASON] Kalli Marina PTA [] MIHIR MerazA/L     Pain Assessment    Pain Assessment 0-10  -JASON No/denies pain  -     Pain Score 10  -JASON      Post Pain Score 10  -JASON      Pain Type Chronic pain  -JASON      Pain Location Abdomen  -JASON      Recorded by [JASON] Kalli Marina PTA [] Sandra Milner MCCORMICK/L     Cognitive Assessment/Intervention    Current Cognitive/Communication Assessment functional  -JASON impaired  -     Orientation Status oriented x 4  -JASON oriented to;person  -     Follows Commands/Answers Questions 100% of the time  -JASON      Personal Safety WNL/WFL  -JASON      Personal Safety Interventions supervised activity  -JASON      Recorded by [JASON] Kalli Marina PTA [] Sandra Milner MCCORMICK/L     Bed Mobility, Assessment/Treatment    Bed Mobility, Assistive Device head of bed elevated;bed rails  -JASON      Bed Mobility, Roll Left, [place] verbal cues required  -JASON      Bed Mobility, Roll Right, [place] verbal cues required  -JASON      Bed Mob, Supine to Sit, [place]  conditional independence  -     Bed Mob, Sit to Supine, [place]  conditional independence  -     Recorded by [JASON] Kalli Marina PTA [] Sandra Milner MCCORMICK/L     Transfer Assessment/Treatment    Transfers, Chair-Bed [place] not tested  -JASON      Transfer, Comment pt declined  -JASON      Recorded by [JASON] Kalli Marina PTA      Gait  Assessment/Treatment    Gait, [place] Level not tested  -      Gait, Comment pt declined  -      Recorded by [] Kalli Marina PTA      Grooming Assessment/Training    Grooming Assess/Train, Position  edge of bed  -     Grooming Assess/Train, [place] Level  supervision required;verbal cues required  -     Recorded by  [] MIHIR MerazA/L     Therapy Exercises    Bilateral Lower Extremities AROM:;supine;20 reps;ankle pumps/circles;hip ER;hip IR;hip abduction/adduction;SAQ;heel slides;glut sets  -      Bilateral Upper Extremity  AROM:;5 reps;sitting;elbow flexion/extension;hand pumps;pronation/supination  -     BUE Resistance  manual resistance- minimal  -     Recorded by [] Kalli Marina PTA [] MIHIR MerazA/L     Positioning and Restraints    Pre-Treatment Position in bed  - in bed  -     Post Treatment Position bed  - bed  -     In Bed call light within reach;exit alarm on;encouraged to call for assist  - sitting EOB;call light within reach;encouraged to call for assist;exit alarm on  -     Recorded by [] Kalli Marina PTA [] MIHIR MerazA/L       User Key  (r) = Recorded By, (t) = Taken By, (c) = Cosigned By    Initials Name Effective Dates     Kalli Marina, NIYAH 10/17/16 -     BB Juliette Plummer, MCCORMICK/L 10/17/16 -      Sandra Milner, MCCORMICK/L 10/17/16 -     NN Karly Canales, OTR/L 11/08/17 -                 IP PT Goals       11/16/17 0858 11/15/17 1738 11/14/17 1332    Transfer Training PT LTG    Transfer Training PT  LTG, Date Goal Reviewed 11/16/17  -JASON 11/15/17  -KORI 11/14/17  -    Transfer Training PT LTG, Outcome goal ongoing  -  goal ongoing  -    Gait Training PT LTG    Gait Training Goal PT LTG, Distance to Achieve  10FT  -KORI     Gait Training Goal PT LTG, Additional Goal  50ft  -KORI     Gait Training Goal PT LTG, Date Goal Reviewed 11/16/17  -JASON 11/15/17  -KORI 11/14/17  -    Gait Training Goal PT LTG, Outcome goal ongoing   - goal revised  - goal ongoing  -    Patient Education PT LTG    Patient Education PT LTG, Date Goal Reviewed  11/15/17  -KORI 11/14/17  -JASON    Patient Education PT LTG Outcome   goal ongoing  -      11/08/17 1303 11/07/17 0925 11/04/17 0939    Transfer Training PT LTG    Transfer Training PT  LTG, Date Goal Reviewed 11/08/17  -JASON 11/07/17  -CH 11/04/17  -    Transfer Training PT LTG, Outcome goal ongoing  - goal ongoing  - goal ongoing  -    Gait Training PT LTG    Gait Training Goal PT LTG, Date Goal Reviewed 11/08/17  -JASON 11/07/17  -CH 11/04/17  -    Gait Training Goal PT LTG, Outcome goal ongoing  - goal ongoing  - goal ongoing  -    Patient Education PT LTG    Patient Education PT LTG, Date Goal Reviewed 11/08/17  -JASON 11/07/17  -CH 11/04/17  -    Patient Education PT LTG Outcome goal ongoing  - goal ongoing  - goal ongoing  -      11/03/17 1321          Transfer Training PT LTG    Transfer Training PT  LTG, Date Goal Reviewed 11/03/17  -JCA      Transfer Training PT LTG, Outcome goal ongoing  -A      Gait Training PT LTG    Gait Training Goal PT LTG, Date Goal Reviewed 11/03/17  -JCA      Gait Training Goal PT LTG, Outcome goal ongoing  -A      Strength Goal PT LTG    Strength Goal PT LTG, Date Goal Reviewed 11/03/17  -JCA      Strength Goal PT LTG, Outcome goal met  -A      Patient Education PT LTG    Patient Education PT LTG, Date Goal Reviewed 11/03/17  -JCA      Patient Education PT LTG Outcome goal ongoing  -A        User Key  (r) = Recorded By, (t) = Taken By, (c) = Cosigned By    Initials Name Provider Type    KORI Andrade, PT Physical Therapist    JASON Kalli Marina, PTA Physical Therapy Assistant    CONNIE Malik, PTA Physical Therapy Assistant    JACKLYN Nava, PTA Physical Therapy Assistant          Physical Therapy Education     Title: PT OT SLP Therapies (Active)     Topic: Physical Therapy (Active)     Point: Mobility training (Active)     Learning Progress Summary    Learner Readiness Method Response Comment Documented by Status   Patient Acceptance E NR pt edu on benefits of OOB  11/03/17 1409 Active                      User Key     Initials Effective Dates Name Provider Type Discipline     10/17/16 -  Nemesio Malik PTA Physical Therapy Assistant PT                    PT Recommendation and Plan  Anticipated Discharge Disposition: skilled nursing facility  Planned Therapy Interventions: balance training, bed mobility training, gait training, patient/family education, strengthening, transfer training, neuromuscular re-education  PT Frequency: other (see comments) (5-14 times per week)  Plan of Care Review  Plan Of Care Reviewed With: patient  Progress: progress toward functional goals is gradual  Outcome Summary/Follow up Plan: pt tolerated exercises well with no c/o increased pain.  Pt is still making slow progress with goals and is expected to return to NH today.           Outcome Measures       11/16/17 0858 11/15/17 1502 11/14/17 1332    How much help from another person do you currently need...    Turning from your back to your side while in flat bed without using bedrails? 3  -JASON 3  -KORI 3  -JASON    Moving from lying on back to sitting on the side of a flat bed without bedrails? 3  -JASON 3  -KORI 3  -JASON    Moving to and from a bed to a chair (including a wheelchair)? 2  -JASON 2  -KORI 2  -JASON    Standing up from a chair using your arms (e.g., wheelchair, bedside chair)? 2  -JASON 2  -KORI 2  -JASON    Climbing 3-5 steps with a railing? 1  -JASON 1  -KORI 1  -JASON    To walk in hospital room? 2  -JASON 2  -KORI 2  -JASON    AM-PAC 6 Clicks Score 13  -JASON 13  -KORI 13  -JASON    Functional Assessment    Outcome Measure Options  AM-PAC 6 Clicks Basic Mobility (PT)  -       User Key  (r) = Recorded By, (t) = Taken By, (c) = Cosigned By    Initials Name Provider Type    KORI Andrade, PT Physical Therapist    JASON Marina PTA Physical Therapy Assistant           Time  Calculation:         PT Charges       11/16/17 1102          Time Calculation    Start Time 0858  -JASON      Stop Time 0940  -JASON      Time Calculation (min) 42 min  -JASON      Time Calculation- PT    Total Timed Code Minutes- PT 42 minute(s)  -JASON        User Key  (r) = Recorded By, (t) = Taken By, (c) = Cosigned By    Initials Name Provider Type    JASON Marina PTA Physical Therapy Assistant          Therapy Charges for Today     Code Description Service Date Service Provider Modifiers Qty    [id number] HC PT THER PROC EA 15 MIN 11/16/2017 Kalli Marina PTA GP 3          PT G-Codes  PT Professional Judgement Used?: Yes  Outcome Measure Options: AM-PAC 6 Clicks Basic Mobility (PT)  Score: 13  Functional Limitation: Mobility: Walking and moving around  Mobility: Walking and Moving Around Current Status (): At least 40 percent but less than 60 percent impaired, limited or restricted  Mobility: Walking and Moving Around Goal Status (): At least 20 percent but less than 40 percent impaired, limited or restricted    Kalli Marina PTA  11/16/2017

## 2017-11-16 NOTE — THERAPY DISCHARGE NOTE
Acute Care - Physical Therapy Discharge Summary  [facility]       Patient Name: Efrem Roa  : 1945  MRN: [id number]    Today's Date: 2017  Onset of Illness/Injury or Date of Surgery Date: 10/30/17    Date of Referral to PT: 17  Referring Physician: LOS Rose      Admit Date: 10/30/2017      PT Recommendation and Plan    Visit Dx:    ICD-10-CM ICD-9-CM   1. HCAP (healthcare-associated pneumonia) J18.9 486   2. Sepsis, due to unspecified organism A41.9 038.9     995.91   3. NSTEMI (non-ST elevated myocardial infarction) I21.4 410.70   4. ESRD (end stage renal disease) N18.6 585.6   5. Essential hypertension I10 401.9   6. Hypertensive heart disease without heart failure I11.9 402.90   7. Oropharyngeal dysphagia R13.12 787.22   8. Impaired mobility and activities of daily living Z74.09 799.89   9. Impaired functional mobility, balance, gait, and endurance Z74.09 V49.89   10. Impaired mobility and ADLs Z74.09 799.89             Outcome Measures       17 0858 11/15/17 1502 17 1332    How much help from another person do you currently need...    Turning from your back to your side while in flat bed without using bedrails? 3  -JASON 3  -KORI 3  -JASON    Moving from lying on back to sitting on the side of a flat bed without bedrails? 3  -JASON 3  -KORI 3  -JASON    Moving to and from a bed to a chair (including a wheelchair)? 2  -JASON 2  -KORI 2  -JASON    Standing up from a chair using your arms (e.g., wheelchair, bedside chair)? 2  -JASON 2  -KORI 2  -JASON    Climbing 3-5 steps with a railing? 1  -JASON 1  -KORI 1  -JASON    To walk in hospital room? 2  -JASON 2  -KORI 2  -JASON    AM-PAC 6 Clicks Score 13  -JASON 13  -KORI 13  -JASON    Functional Assessment    Outcome Measure Options  AM-PAC 6 Clicks Basic Mobility (PT)  -KORI       User Key  (r) = Recorded By, (t) = Taken By, (c) = Cosigned By    Initials Name Provider Type    KORI Trupti Andrade, PT Physical Therapist    JASON Marina, PTA Physical Therapy Assistant                 PT Charges       11/16/17 1102          Time Calculation    Start Time 0858  -      Stop Time 0940  -      Time Calculation (min) 42 min  -JASON      Time Calculation- PT    Total Timed Code Minutes- PT 42 minute(s)  -        User Key  (r) = Recorded By, (t) = Taken By, (c) = Cosigned By    Initials Name Provider Type    JASON Marina, PTA Physical Therapy Assistant                  IP PT Goals       11/16/17 1501 11/16/17 0858 11/15/17 1738    Transfer Training PT LTG    Transfer Training PT  LTG, Date Goal Reviewed  11/16/17  -JASON 11/15/17  -    Transfer Training PT LTG, Outcome goal not met  - goal ongoing  -     Gait Training PT LTG    Gait Training Goal PT LTG, Distance to Achieve   10FT  -    Gait Training Goal PT LTG, Additional Goal   50ft  -KORI    Gait Training Goal PT LTG, Date Goal Reviewed  11/16/17  -JASON 11/15/17  -KORI    Gait Training Goal PT LTG, Outcome goal not met  - goal ongoing  - goal revised  -    Patient Education PT LTG    Patient Education PT LTG, Date Goal Reviewed   11/15/17  -    Patient Education PT LTG Outcome goal not met  -KORI        11/14/17 1332 11/08/17 1303 11/07/17 0925    Transfer Training PT LTG    Transfer Training PT  LTG, Date Goal Reviewed 11/14/17  -JASON 11/08/17  -JASON 11/07/17  -CH    Transfer Training PT LTG, Outcome goal ongoing  - goal ongoing  - goal ongoing  -    Gait Training PT LTG    Gait Training Goal PT LTG, Date Goal Reviewed 11/14/17  -JASON 11/08/17  -JASON 11/07/17  -CH    Gait Training Goal PT LTG, Outcome goal ongoing  - goal ongoing  - goal ongoing  -    Patient Education PT LTG    Patient Education PT LTG, Date Goal Reviewed 11/14/17  -JASON 11/08/17  -JASON 11/07/17  -    Patient Education PT LTG Outcome goal ongoing  - goal ongoing  - goal ongoing  -CH      11/04/17 0939 11/03/17 1321       Transfer Training PT LTG    Transfer Training PT  LTG, Date Goal Reviewed 11/04/17  -JASON 11/03/17  -JCA     Transfer Training PT  LTG, Outcome goal ongoing  - goal ongoing  -A     Gait Training PT LTG    Gait Training Goal PT LTG, Date Goal Reviewed 11/04/17  -JASON 11/03/17  -JCA     Gait Training Goal PT LTG, Outcome goal ongoing  - goal ongoing  -A     Strength Goal PT LTG    Strength Goal PT LTG, Date Goal Reviewed  11/03/17  -JCA     Strength Goal PT LTG, Outcome  goal met  -A     Patient Education PT LTG    Patient Education PT LTG, Date Goal Reviewed 11/04/17  -JASON 11/03/17  -JCA     Patient Education PT LTG Outcome goal ongoing  - goal ongoing  -JCA       User Key  (r) = Recorded By, (t) = Taken By, (c) = Cosigned By    Initials Name Provider Type    KORI Trupti Andrade, PT Physical Therapist    JASON Kalli Marina, PTA Physical Therapy Assistant    CONNIE Malik, PTA Physical Therapy Assistant     Anay Nava, PTA Physical Therapy Assistant          Therapy Charges for Today     Code Description Service Date Service Provider Modifiers Qty    19595146442 HC PT MOBILITY CURRENT 11/15/2017 Trupti Andrade, PT GP, CK 1    57786225563 HC PT MOBILITY PROJECTED 11/15/2017 Trupti Andrade, PT GP, CJ 1    11774582615 HC PT THERAPEUTIC ACT EA 15 MIN 11/15/2017 Trupti Andrade, PT GP 4          PT Discharge Summary  Anticipated Discharge Disposition: skilled nursing facility  Reason for Discharge: Per MD order, Discharge from facility  Outcomes Achieved: Patient able to partially acheive established goals  Discharge Destination: SNF      Trupti Andrade, PT   11/16/2017

## 2017-11-16 NOTE — DISCHARGE PLACEMENT REQUEST
"Sirisha Gupta (72 y.o. Female)     Date of Birth Social Security Number Address Home Phone MRN    1945  [place] [address] [phone] [id number]    Amish Marital Status          Buddhism        Admission Date Admission Type Admitting Provider Attending Provider Department, Room/Bed    10/30/17 Emergency DomenduOlman MD Echendu, Anthony W, MD [address], [address]    Discharge Date Discharge Disposition Discharge Destination                      Attending Provider: Olman Trimble MD     Allergies:  Ambien [Zolpidem Tartrate], Benadryl [Diphenhydramine], Penicillins    Isolation:  Contact   Infection:  MRSA (10/31/17)   Code Status:  FULL    Ht:  60\" (152.4 cm)   Wt:  96 lb (43.5 kg)    Admission Cmt:  None   Principal Problem:  None                Active Insurance as of 10/30/2017     Primary Coverage     Payor Plan Insurance Group Employer/Plan Group    MEDICARE MEDICARE A & B      Payor Plan Address Payor Plan Phone Number Effective From Effective To    [address] [phone] 9/1/2009     [place] [address]       Subscriber Name Subscriber Birth Date Member ID       SIRISHA GUPTA 1945 [id number]                 Emergency Contacts      (Rel.) Home Phone Work Phone Mobile Phone    Dani Blackburn (Guardian) [phone] -- --              "

## 2017-11-16 NOTE — DISCHARGE SUMMARY
[facility] Medicine Services  DISCHARGE SUMMARY       Date of Admission: 10/30/2017  Date of Discharge:  11/16/2017  Primary Care Physician: Rogers Perez MD    Presenting Problem/History of Present Illness:  ESRD (end stage renal disease) [N18.6]  NSTEMI (non-ST elevated myocardial infarction) [I21.4]  HCAP (healthcare-associated pneumonia) [J18.9]  Sepsis, due to unspecified organism [A41.9]       Final Discharge Diagnoses:  Principal Problem:    Sepsis  Active Problems:    End stage renal failure on dialysis    Controlled type 2 diabetes mellitus with chronic kidney disease on chronic dialysis, without long-term current use of insulin    HCAP (healthcare-associated pneumonia)    MRSA bacteremia      Consults:   Consults     Date and Time Order Name Status Description    11/6/2017 0840 Inpatient Consult to Vascular Surgery Completed     11/3/2017 1405 Inpatient Consult to Cardiology Completed     10/30/2017 1717 Inpatient Consult to Nephrology Completed     10/30/2017 1553 Inpatient Consult to Nephrology      10/30/2017 1453 Hospitalist (on-call MD unless specified)            HPI/Hospital Course:  The patient is a 72 y.o. female with a history of ESRD, HTN, PAF, chronic pain syndrome, and CAD who presented to the ED with a complaint of fever and decreased level of consciousness.  Chest x-ray revealed LLL infiltrate and she was admitted on broad spectrum antibiotics.  Blood cultures grew MRSA.  She was initiated on vancomycin per pharmacy dosing.  The patient's blood cultures were persistently positive and cardiology was consulted to consider ANTHONY.  The patient elected not to undergo ANTHONY.  She underwent TTE which demonstrated no evidence of vegetation.  Due to persistent MRSA bacteremia with ISRAEL >2 the patient was transitioned to daptomycin.  Blood cultures were repeated at day 4 of daptomycin.  These have now been negative for 72 hours.  Sepsis has resolved.  WBC are  "now within normal limits.  She is awake and maintaining her O2 sats at 98%.  She is a resident of a SNF and will be discharged to complete a total of a 14 day course of daptomycin from first negative cultures which was obtained on 11/13/17.  She will be discharged to SNF.     Condition on Discharge:  Stable    Physical Exam on Discharge:  /51 (BP Location: Right leg, Patient Position: Lying)  Pulse 72  Temp 97.3 °F (36.3 °C) (Temporal Artery )   Resp 18  Ht 60\" (152.4 cm)  Wt 96 lb (43.5 kg)  SpO2 98%  BMI 18.75 kg/m2  Physical Exam   Constitutional: She is oriented to person, place, and time. She appears well-developed and well-nourished.   HENT:   Head: Normocephalic.   Cardiovascular: Normal rate, regular rhythm, normal heart sounds and intact distal pulses.    Pulmonary/Chest: Effort normal and breath sounds normal. No respiratory distress.   Abdominal: Soft. Bowel sounds are normal. She exhibits no distension. There is no tenderness.   Musculoskeletal: Normal range of motion. She exhibits no edema.   Neurological: She is alert and oriented to person, place, and time.   Skin: Skin is warm and dry.   Vitals reviewed.    Discharge Disposition:  Skilled Nursing Facility (DC - External)    Discharge Medications:   Crispin Osmincarla Malina   Home Medication Instructions DAVID:[id number]    Printed on:11/16/17 1050   Medication Information                      Apixaban (ELIQUIS PO)  Take 2.5 mg by mouth 2 (Two) Times a Day.             aspirin 81 MG EC tablet  Take 81 mg by mouth daily.             atorvastatin (LIPITOR) 40 MG tablet  Take 40 mg by mouth every night.             B Complex-C-Folic Acid (NGA CAPS PO)  Take 1 capsule by mouth daily.             DAPTOmycin 270 mg in sodium chloride 0.9 % 50 mL  Infuse 270 mg into a venous catheter Every Other Day for 10 days. Indications: Bacteria in the Blood             Docusate Sodium (COLACE PO)  Take 1 capsule by mouth 3 (three) times a day as needed.          "    famotidine (PEPCID) 40 MG tablet  Take 40 mg by mouth Daily.             gabapentin (NEURONTIN) 100 MG capsule  Take 100 mg by mouth 3 (three) times a day.             hydrALAZINE (APRESOLINE) 100 MG tablet  Take 100 mg by mouth 3 (three) times a day.             HYDROcodone-acetaminophen (NORCO) 7.5-325 MG per tablet  Take 1 tablet by mouth Every 4 (Four) Hours As Needed for Moderate Pain .             levothyroxine (SYNTHROID, LEVOTHROID) 88 MCG tablet  Take 88 mcg by mouth daily.             Mesalamine (APRISO PO)  Take 1 capsule by mouth daily.             mirtazapine (REMERON) 15 MG tablet  Take 15 mg by mouth Every Night.             nitroglycerin (NITROSTAT) 0.4 MG SL tablet  Place 0.4 mg under the tongue every 5 (five) minutes as needed for chest pain. Take no more than 3 doses in 15 minutes.             polyethylene glycol (MIRALAX) packet  Take 17 g by mouth 2 (two) times a day.             QUETIAPINE FUMARATE PO  Take 50 mg by mouth Every Night.             sertraline (ZOLOFT) 50 MG tablet  Take 50 mg by mouth Daily.             vitamin D (ERGOCALCIFEROL) 76390 UNITS capsule capsule  Take 50,000 Units by mouth 1 (one) time per week.                 Discharge Diet:   Diet Instructions     Diet: Soft Texture; Thin Liquids, No Restrictions; Ground       Discharge Diet:  Soft Texture   Fluid Consistency:  Thin Liquids, No Restrictions   Soft Options:  Ground                 Activity at Discharge:   Activity Instructions     Activity as Tolerated                     Follow-up Appointments:   Future Appointments  Date Time Provider Department [place]   4/12/2018 1:00 PM [facility]   4/12/2018 2:20 PM ELI Amezcua [facility] None       Test Results Pending at Discharge:    Order Current Status    Blood Culture - Blood, Preliminary result    Blood Culture - Blood, Preliminary result          ELI Geller  11/16/17  10:50 AM    Time: Discharge planning and  teaching took greater than 30 minutes.

## 2017-11-16 NOTE — PLAN OF CARE
Problem: Sepsis (Adult)  Goal: Signs and Symptoms of Listed Potential Problems Will be Absent or Manageable (Sepsis)  Outcome: Ongoing (interventions implemented as appropriate)    Problem: Fall Risk (Adult)  Goal: Absence of Falls  Outcome: Ongoing (interventions implemented as appropriate)    Problem: Renal Replacement, Continuous (Adult)  Goal: Signs and Symptoms of Listed Potential Problems Will be Absent or Manageable (Renal Replacement, Continuous)  Outcome: Ongoing (interventions implemented as appropriate)    Problem: Patient Care Overview (Adult)  Goal: Plan of Care Review  Outcome: Ongoing (interventions implemented as appropriate)    11/16/17 0437   Coping/Psychosocial Response Interventions   Plan Of Care Reviewed With patient   Patient Care Overview   Progress no change   Outcome Evaluation   Outcome Summary/Follow up Plan Pt has rested well throughout the night, v/s stable, will continue to monitor        Goal: Adult Individualization and Mutuality  Outcome: Ongoing (interventions implemented as appropriate)  Goal: Discharge Needs Assessment  Outcome: Ongoing (interventions implemented as appropriate)    Problem: Pressure Ulcer (Adult)  Goal: Signs and Symptoms of Listed Potential Problems Will be Absent or Manageable (Pressure Ulcer)  Outcome: Ongoing (interventions implemented as appropriate)    Problem: Infection, Risk/Actual (Adult)  Goal: Infection Prevention/Resolution  Outcome: Ongoing (interventions implemented as appropriate)    Problem: Skin Integrity Impairment, Risk/Actual (Adult)  Goal: Identify Related Risk Factors and Signs and Symptoms  Outcome: Outcome(s) achieved Date Met:  11/16/17  Goal: Skin Integrity/Wound Healing  Outcome: Ongoing (interventions implemented as appropriate)    Problem: Pain, Acute (Adult)  Goal: Identify Related Risk Factors and Signs and Symptoms  Outcome: Outcome(s) achieved Date Met:  11/16/17  Goal: Acceptable Pain Control/Comfort Level  Outcome: Ongoing  (interventions implemented as appropriate)

## 2017-11-16 NOTE — PLAN OF CARE
Problem: Patient Care Overview (Adult)  Goal: Plan of Care Review  Outcome: Ongoing (interventions implemented as appropriate)    11/16/17 0858   Coping/Psychosocial Response Interventions   Plan Of Care Reviewed With patient   Patient Care Overview   Progress progress toward functional goals is gradual   Outcome Evaluation   Outcome Summary/Follow up Plan pt tolerated exercises well with no c/o increased pain. Pt is still making slow progress with goals and is expected to return to NH today.          Problem: Inpatient Physical Therapy  Goal: Transfer Training Goal 1 LTG- PT  Outcome: Ongoing (interventions implemented as appropriate)    11/02/17 1713 11/16/17 0858   Transfer Training PT LTG   Transfer Training PT LTG, Date Established 11/02/17 --    Transfer Training PT LTG, Time to Achieve by discharge --    Transfer Training PT LTG, Activity Type bed to chair /chair to bed;sit to stand/stand to sit --    Transfer Training PT LTG, [place] Level conditional independence --    Transfer Training PT LTG, Date Goal Reviewed --  11/16/17   Transfer Training PT LTG, Outcome --  goal ongoing       Goal: Gait Training Goal LTG- PT  Outcome: Ongoing (interventions implemented as appropriate)    11/02/17 1713 11/15/17 1738 11/16/17 0858   Gait Training PT LTG   Gait Training Goal PT LTG, Date Established 11/02/17 --  --    Gait Training Goal PT LTG, Time to Achieve by discharge --  --    Gait Training Goal PT LTG, [place] Level conditional independence --  --    Gait Training Goal PT LTG, Distance to Achieve --  10FT --    Gait Training Goal PT LTG, Additional Goal --  50ft --    Gait Training Goal PT LTG, Date Goal Reviewed --  --  11/16/17   Gait Training Goal PT LTG, Outcome --  --  goal ongoing       Goal: Patient Education Goal LTG- PT  Outcome: Ongoing (interventions implemented as appropriate)    11/02/17 1713 11/14/17 1332 11/15/17 1738   Patient Education PT LTG   Patient Education PT LTG, Date  Established 11/02/17 --  --    Patient Education PT LTG, Time to Achieve by discharge --  --    Patient Education PT LTG, Education Type gait;transfers;home safety --  --    Patient Education PT LTG, Date Goal Reviewed --  --  11/15/17   Patient Education PT LTG Outcome --  goal ongoing --

## 2017-11-16 NOTE — SIGNIFICANT NOTE
11/16/17 1502   PT Discharge Summary   Anticipated Discharge Disposition skilled nursing facility   Reason for Discharge Per MD order;Discharge from facility   Outcomes Achieved Patient able to partially acheive established goals   Discharge Destination SNF

## 2017-11-16 NOTE — PLAN OF CARE
Problem: Inpatient Physical Therapy  Goal: Transfer Training Goal 1 LTG- PT  Outcome: Unable to achieve outcome(s) by discharge Date Met:  11/16/17 11/02/17 1713 11/16/17 1501   Transfer Training PT LTG   Transfer Training PT LTG, Date Established 11/02/17 --    Transfer Training PT LTG, Time to Achieve by discharge --    Transfer Training PT LTG, Activity Type bed to chair /chair to bed;sit to stand/stand to sit --    Transfer Training PT LTG, [place] Level conditional independence --    Transfer Training PT LTG, Outcome --  goal not met       Goal: Gait Training Goal LTG- PT  Outcome: Unable to achieve outcome(s) by discharge Date Met:  11/16/17    11/02/17 1713 11/15/17 1738 11/16/17 1501   Gait Training PT LTG   Gait Training Goal PT LTG, Date Established 11/02/17 --  --    Gait Training Goal PT LTG, Time to Achieve by discharge --  --    Gait Training Goal PT LTG, [place] Level conditional independence --  --    Gait Training Goal PT LTG, Distance to Achieve --  10FT --    Gait Training Goal PT LTG, Additional Goal --  50ft --    Gait Training Goal PT LTG, Outcome --  --  goal not met       Goal: Patient Education Goal LTG- PT  Outcome: Unable to achieve outcome(s) by discharge Date Met:  11/16/17    11/02/17 1713 11/15/17 1738 11/16/17 1501   Patient Education PT LTG   Patient Education PT LTG, Date Established 11/02/17 --  --    Patient Education PT LTG, Time to Achieve by discharge --  --    Patient Education PT LTG, Education Type gait;transfers;home safety --  --    Patient Education PT LTG, Date Goal Reviewed --  11/15/17 --    Patient Education PT LTG Outcome --  --  goal not met

## 2017-11-17 NOTE — THERAPY DISCHARGE NOTE
Acute Care - Occupational Therapy Initial Eval/Discharge  [facility]     Patient Name: Efrem Roa  : 1945  MRN: [id number]  Today's Date: 2017  Onset of Illness/Injury or Date of Surgery Date: 10/30/17  Date of Referral to OT: 17  Referring Physician: LOS Rose      Admit Date: 10/30/2017       ICD-10-CM ICD-9-CM   1. HCAP (healthcare-associated pneumonia) J18.9 486   2. Sepsis, due to unspecified organism A41.9 038.9     995.91   3. NSTEMI (non-ST elevated myocardial infarction) I21.4 410.70   4. ESRD (end stage renal disease) N18.6 585.6   5. Essential hypertension I10 401.9   6. Hypertensive heart disease without heart failure I11.9 402.90   7. Oropharyngeal dysphagia R13.12 787.22   8. Impaired mobility and activities of daily living Z74.09 799.89   9. Impaired functional mobility, balance, gait, and endurance Z74.09 V49.89   10. Impaired mobility and ADLs Z74.09 799.89     Patient Active Problem List   Diagnosis   • Constipation   • Arteriovenous fistula   • End stage renal failure on dialysis   • Controlled type 2 diabetes mellitus with chronic kidney disease on chronic dialysis, without long-term current use of insulin   • Coronary artery disease involving native coronary artery of native heart without angina pectoris   • Panlobular emphysema   • ESRD (end stage renal disease) on dialysis   • HCAP (healthcare-associated pneumonia)   • MRSA bacteremia   • Sepsis     Past Medical History:   Diagnosis Date   • Abnormal x-ray     Standard chest x ray, f/u pneumonia xray   • Acquired hypothyroidism    • Amblyopia    • Anemia of renal disease    • Anxiety    • Arteriovenous fistula     Placed 10/15/2014   • Benign essential hypertension    • Bipolar affective disorder     Current episode depression   • Bipolar disorder    • Cerebrovascular accident    • Chest wall pain    • Chronic angle-closure glaucoma    • Chronic kidney disease     Stage 4-approaching hemodialysis   •  Chronic pain syndrome    • Chronic renal failure    • Combined drug dependence excluding opioids    • Constipation    • COPD (chronic obstructive pulmonary disease)    • Coronary arteriosclerosis    • Cough    • Degeneration of cervical intervertebral disc    • Dementia     Multi-infarct, uncomplicated   • Depression    • Diabetes mellitus     No retinopathy   • Diabetes mellitus     Without mention of complication, type 2 or unspecified type, not stated as uncontrolled   • Diabetic renal disease    • Disc disorder of lumbar region    • DJD (degenerative joint disease)     Involving multiple joints   • Edema    • End stage renal failure on dialysis     LUE AV FISTULA 2015   • Epigastric pain    • Essential hypertension    • Exotropia    • Gastritis    • Generalized abdominal pain    • GERD (gastroesophageal reflux disease)     With Esophagitis   • Gout    • H/O screening mammography    • Hiatal hernia    • Hyperlipidemia    • Hypermetropia    • Insomnia    • Iron deficiency anemia    • Low back pain    • Lumbago    • Non-cardiac chest pain    • Nuclear cataract    • Osteoporosis    • Panic disorder without agoraphobia    • Peripheral nervous system disorder    • Radiculitis    • RLQ abdominal pain    • RUQ pain    • Sprain of ankle    • Sprain of knee     Resolving   • Superficial injury of shoulder and upper arm    • Surgical follow-up care    • Type 2 diabetes mellitus    • UTI (urinary tract infection)    • Visit for suture removal    • Vitamin D deficiency    • Vulvovaginitis      Past Surgical History:   Procedure Laterality Date   • APPENDECTOMY     • BACK SURGERY      x2   • BYPASS GRAFT  2015    Left brachial artery to axillary vein arteriovenous graft. Venous ultrasound unilateral extremity   •  SECTION      x2   • CHOLECYSTECTOMY     • COLONOSCOPY W/ POLYPECTOMY  2015    Colitis found in the cecum. Biopsy taken. A diverticulum was found in the sigmoid colon.   • ENDOSCOPY AND  COLONOSCOPY  2006   • ESOPHAGOSCOPY / EGD  06/17/2015    Normal esophagus. Gastritis found in the stomach. Biopsy taken. Duodenitis found in the duodenum. Biopsy take.   • ESOPHAGOSCOPY / EGD  1945    With tube-Esophagus appeared normal. Nonerosive gastritis. Duodenum appeared normal   • HYSTERECTOMY     • INJECTION OF MEDICATION  06/06/2011    Aranesp   • INJECTION OF MEDICATION  06/06/2011    Kenalog   • INTERVENTIONAL RADIOLOGY PROCEDURE Left 10/5/2017    Procedure: IR dialysis fistulagram;  Surgeon: Blane Fernandez MD;  Location: [facility];  Service:    • LEG SURGERY  06/13/2000    Cancelled. Due to uncontrolled hypertension   • OTHER SURGICAL HISTORY  07/07/2016    Tissue plasminogen activator catheter thrombolysis   • REMOVAL PERITONEAL DIALYSIS CATHETER  01/16/2014    Removal of tunneled hemodialysis catheter with cuff   • TRANSESOPHAGEAL ECHOCARDIOGRAM (ANTHONY)  03/24/2016    Mild left atrial enlargement with mild to moderate CLVH with normal aortic root size.LV systolic function well preserved with Ef of 55-60%.Mitral valve thickened.Av thickened.Aortic sclerosis.Mild mitral regurg.mild to moderate tricuspid regurg.   • TRANSESOPHAGEAL ECHOCARDIOGRAM (ANTHONY)  02/22/2012    Normal left ventricular systolic function. Moderate tricuspid regurgitation with evidenceof pulmonary hypertension          OT ASSESSMENT FLOWSHEET (last 72 hours)      OT Evaluation       11/17/17 0748 11/16/17 0944 11/16/17 0858 11/16/17 0745 11/15/17 2110    Rehab Evaluation    Document Type   progress note  -JASON --  -BB     Subjective Information   agree to therapy;no complaints  -JASON --  -BB     Patient Effort, Rehab Treatment    --  -BB     Symptoms Noted During/After Treatment   fatigue  -JAOSN      General Information    Precautions/Limitations   fall precautions  -JASON --  -BB     Clinical Impression    Anticipated Discharge Disposition skilled nursing facility  -NN        Vital Signs    Pre Systolic BP  Rehab    --  -BB     Pre Treatment Diastolic BP    --  -BB     Pretreatment Heart Rate (beats/min)   80  -JASON --  -BB     Posttreatment Heart Rate (beats/min)    --  -BB     Pre SpO2 (%)   97  -JASON --  -BB     O2 Delivery Pre Treatment    --  -BB     Post SpO2 (%)    --  -BB     O2 Delivery Post Treatment    --  -BB     Pre Patient Position    --  -BB     Post Patient Position    --  -BB     Pain Assessment    Pain Assessment   No/denies pain  - --  -BB     Pain Score   0  -JASON --  -BB     Post Pain Score   0  -JASON --  -BB     Pain Type    --  -BB     Pain Location    --  -BB     Pain Intervention(s)    --  -BB     Cognitive Assessment/Intervention    Current Cognitive/Communication Assessment   functional  - --  -     Orientation Status   oriented x 4  - --  -BB     Follows Commands/Answers Questions   100% of the time  -JASON --  -BB     Personal Safety   WNL/WFL  -      Personal Safety Interventions   fall prevention program maintained;supervised activity  - --  -BB     Muscle Tone Assessment    Muscle Tone Assessment     LUE  -    CURT Muscle Tone Assessment  moderately increased tone  -AE       Bed Mobility, Assessment/Treatment    Bed Mobility, Assistive Device   bed rails;head of bed elevated  - --  -BB     Bed Mob, Supine to Sit, [place]   supervision required  - --  -BB     Bed Mob, Sit to Supine, [place]   supervision required  - --  -BB     Bed Mobility, Comment    --  -BB     Grooming Assessment/Training    Grooming Assess/Train, Position    --  -BB     Grooming Assess/Train, Indepen Level    --  -BB     Grooming Assess/Train, Comment    --  -BB     Therapy Exercises    Bilateral Lower Extremities   AROM:;20 reps;supine;ankle pumps/circles;glut sets;hip abduction/adduction;clam shell;heel slides;quad sets  -      Right Upper Extremity    --  -BB     Left Upper Extremity    --  -BB     Positioning and Restraints    Pre-Treatment Position   in bed  - --  -BB     Post Treatment  Position   bed  -JASON --  -BB     In Bed   call light within reach;encouraged to call for assist;exit alarm on  -JASON --   long sitting  -BB       11/15/17 1700 11/15/17 0745 11/15/17 0710 11/14/17 1332 11/14/17 1015    Rehab Evaluation    Document Type progress note  -KORI  therapy note (daily note)  -BB therapy note (daily note)  -JASON therapy note (daily note)  -[facility]    Subjective Information agree to therapy;complains of;pain  -KORI  agree to therapy  -BB agree to therapy;complains of;pain  -JASON agree to therapy  -URIAH    Patient Effort, Rehab Treatment adequate  -KORI  fair  -BB  fair  -URIAH    Patient Effort, Rehab Treatment Comment   --   declines EOB/OOB stating she is weak and can not do it today  -BB      Symptoms Noted During/After Treatment fatigue  -KORI  fatigue  -BB fatigue  -     General Information    Patient Profile Review yes  -KORI        General Observations Pt in fowlers;noted IV, bed exit armed  -        Precautions/Limitations fall precautions;other (see comments)   contact/MRSA  -  fall precautions  -BB      Plans/Goals Discussed With patient  -KORI        Risks Reviewed patient:  -        Benefits Reviewed patient:  -KROI        Barriers to Rehab medically complex  -KORI        Vital Signs    Pre Systolic BP Rehab 116  -KORI   108  -JASON     Pre Treatment Diastolic BP 56  -KORI   55  -JASON     Pretreatment Heart Rate (beats/min) 86  -KORI  87  -BB 88  -JASON     Posttreatment Heart Rate (beats/min) 79  -KORI  93  -BB      Pre SpO2 (%) 95  -KORI  97  -BB  98  -URIAH    O2 Delivery Pre Treatment room air  -KORI  room air  -BB  room air  -URIAH    Post SpO2 (%) 96  -KORI  98  -BB      O2 Delivery Post Treatment room air  -KORI  room air  -BB      Pre Patient Position Supine  -KORI  Supine  -BB      Post Patient Position Supine  -KORI  --   long sitting  -BB      Pain Assessment    Pain Assessment 0-10  -KORI  0-10  -BB 0-10  -JASON No/denies pain  -[facility]    Pain Score 9  -KORI  10  -BB 10  -JASON     Post Pain Score 9  -KORI  10  -BB 10  -JASON     Pain Type    Chronic pain  -BB Chronic pain  -JASON     Pain Location Abdomen  -KORI  Abdomen  -BB Abdomen  -JASON     Pain Intervention(s) Medication (See MAR)  -KORI  Medication (See MAR)  -BB      Cognitive Assessment/Intervention    Current Cognitive/Communication Assessment functional   perseverates on piece of paper that has dentist's info on  -KORI  functional  -BB functional  -JASON impaired  -URIAH    Orientation Status oriented x 4  -KORI  oriented x 4  -BB oriented x 4  -JASON oriented to;person  -URIAH    Follows Commands/Answers Questions 100% of the time;needs increased time;needs cueing;needs repetition  -KORI  100% of the time  -% of the time  -JASON     Personal Safety WNL/WFL  -KORI   WNL/WFL  -JASON     Personal Safety Interventions fall prevention program maintained  -KORI  supervised activity  -BB supervised activity  -JASON     ROM (Range of Motion)    General ROM upper extremity range of motion deficits identified  -        General ROM Detail AROM WFL RUE and L hand, wrist, elbow;PROM shoulder WFL  -KORI        MMT (Manual Muscle Testing)    General MMT Assessment upper extremity strength deficits identified;lower extremity strength deficits identified  -        General MMT Assessment Detail 1/5 shoulder flexion;4-/5 hand wrist, elbow;RUE: 4/5 hand, wrist, elbow, shoulder  -KORI        Muscle Tone Assessment    Muscle Tone Assessment LUE  -KORI LUE  -AE       LUE Muscle Tone Assessment moderately increased tone  -KORI moderately increased tone   r/t past CVA  -AE       Bed Mobility, Assessment/Treatment    Bed Mobility, Assistive Device bed rails;head of bed elevated  -KORI  bed rails;head of bed elevated  -BB head of bed elevated;bed rails  -JASON     Bed Mobility, Roll Left, [place] verbal cues required   several times to marga/doff underware and pajama pants forbsc  -KORI  verbal cues required  -BB verbal cues required  -JASON     Bed Mobility, Roll Right, [place] verbal cues required  -KORI  verbal cues required  -BB verbal cues required   -JASON     Bed Mob, Supine to Sit, [place] supervision required  -    conditional independence  -[facility]    Bed Mob, Sit to Supine, [place] supervision required  -    conditional independence  -[facility]    Bed Mobility, Comment Pt sat EOB 5 minutes prior to transfer to [facility]  -        Transfer Assessment/Treatment    Transfers, Chair-Bed [place]    not tested  -     Transfers, Sit-Stand [place] maximum assist (25% patient effort)   pt does not stand completely erect due to weakness LLE  -        Transfers, Stand-Sit [place] maximum assist (25% patient effort)  -        Toilet Transfer, [place] maximum assist (25% patient effort)  -        Transfer, Comment Pt sat on BS 10 minutes with SBA  -   pt declined  -     Upper Body Bathing Assessment/Training    UB Bathing Assess/Train, Position   long sitting  -BB      UB Bathing Assess/Train, [place] Level   set up required;stand by assist  -BB      Lower Body Bathing Assessment/Training    LB Bathing Assess/Train, Position   long sitting  -BB      LB Bathing Assess/Train, [place] Level   set up required;stand by assist  -BB      Upper Body Dressing Assessment/Training    UB Dressing Assess/Train, Clothing Type   doffing:;donning:;hospital gown  -BB      UB Dressing Assess/Train, Position   long sitting  -BB      UB Dressing Assess/Train, [place]   supervision required;set up required  -BB      Lower Body Dressing Assessment/Training    LB Dressing Assess/Train, Clothing Type   doffing:;donning:;socks  -BB      LB Dressing Assess/Train, Position   long sitting  -BB      LB Dressing Assess/Train, [place]   supervision required  -BB      Grooming Assessment/Training    Grooming Assess/Train, Position   long sitting  -BB  edge of bed  -URIAH    Grooming Assess/Train, Indepen Level   set up required;supervision required  -BB  supervision required;verbal cues required  -[facility]    Grooming Assess/Train, Comment   washed face,  hands, applied deoderant, lotion, combed hair  -BB      Therapy Exercises    Bilateral Lower Extremities    AROM:;supine;20 reps;ankle pumps/circles;hip ER;hip IR;hip abduction/adduction;SAQ;heel slides;glut sets  -JASON     Bilateral Upper Extremity     AROM:;5 reps;sitting;elbow flexion/extension;hand pumps;pronation/supination  -URIAH    BUE Resistance     manual resistance- minimal  -URIAH    Sensory Assessment/Intervention    Light Touch LLE;RLE  -KORI        LUE Light Touch WNL  -KORI        RUE Light Touch WNL  -KORI        Edema Management    Edema Amount none  -KORI        Positioning and Restraints    Pre-Treatment Position in bed  -KORI  in bed  -BB in bed  -JASON in bed  -URIAH    Post Treatment Position bed  -KORI  bed  -BB bed  -JASON bed  -URIAH    In Bed call light within reach;encouraged to call for assist;exit alarm on  -KORI  call light within reach;encouraged to call for assist;exit alarm on;fowlers  -BB call light within reach;exit alarm on;encouraged to call for assist  -JASON sitting EOB;call light within reach;encouraged to call for assist;exit alarm on  -URIAH    Lower Extremity    Lower Ext Manual Muscle Testing Detail RLE: 4/5 ankle/foot, knee, hip;LLE: 3+/5 ankle/foot, 3/5 knee, hip  -KORI          User Key  (r) = Recorded By, (t) = Taken By, (c) = Cosigned By    Initials Name Effective Dates    KORI Trupti Andrade, PT 10/17/16 -     JASON Kalli Marina, PTA 10/17/16 -     BB Juliette Plummer, MCCORMICK/L 10/17/16 -     URIAH Sandra Milner, MCCORMICK/L 10/17/16 -     AE Conchita Cisneros, RN 10/17/16 -     JH Missy Mccray, RN 10/17/16 -     NN Karly Canales, OTR/L 11/08/17 -           Occupational Therapy Education     Title: PT OT SLP Therapies (Active)     Topic: Occupational Therapy (Resolved)     Point: ADL training (Resolved)    Description: Instruct learner(s) on proper safety adaptation and remediation techniques during self care or transfers.   Instruct in proper use of assistive devices.    Learning Progress Summary     Learner Readiness Method Response Comment Documented by Status   Patient Acceptance E VU  BB 11/15/17 0945 Done    Acceptance E NR  BB 11/09/17 1025 Active    Acceptance E VU  LW 11/04/17 1429 Done               Point: Home exercise program (Resolved)    Description: Instruct learner(s) on appropriate technique for monitoring, assisting and/or progressing therapeutic exercises/activities.    Learning Progress Summary    Learner Readiness Method Response Comment Documented by Status   Patient Acceptance E VU  LW 11/04/17 1429 Done               Point: Precautions (Resolved)    Description: Instruct learner(s) on prescribed precautions during self-care and functional transfers.    Learning Progress Summary    Learner Readiness Method Response Comment Documented by Status   Patient Acceptance E VU  BB 11/15/17 0945 Done    Acceptance E NR  BB 11/09/17 1025 Active    Acceptance E VU  LW 11/04/17 1429 Done    Acceptance E VU,NR Edu on no out of bed without assist. RB 11/02/17 1553 Done               Point: Body mechanics (Resolved)    Description: Instruct learner(s) on proper positioning and spine alignment during self-care, functional mobility activities and/or exercises.    Learning Progress Summary    Learner Readiness Method Response Comment Documented by Status   Patient Acceptance E VU  BB 11/15/17 0945 Done    Acceptance E NR  BB 11/09/17 1025 Active    Acceptance E VU  LW 11/04/17 1429 Done                      User Key     Initials Effective Dates Name Provider Type Discipline    RB 06/15/16 -  Quan Tapia OT Occupational Therapist OT     10/17/16 -  JACE Phillips/L Occupational Therapy Assistant OT     10/17/16 -  JACE Justice/L Occupational Therapy Assistant OT                OT Recommendation and Plan  Anticipated Discharge Disposition: skilled nursing facility  Planned Therapy Interventions: activity intolerance, ADL retraining, balance training, bed mobility training, energy  conservation, transfer training, strengthening  Therapy Frequency:  (3-14x/wk.)              OT Goals       11/17/17 0748 11/15/17 0945 11/14/17 1015    Transfer Training 2 OT LTG    Transfer Training 2 OT LTG, Date Goal Reviewed 11/17/17  -NN 11/15/17  -BB 11/14/17  -    Transfer Training 2 OT LTG, Outcome goal not met  -      Transfer Training 2 OT LTG, Reason Goal Not Met discharged from facility  -      Strength OT LTG    Strength Goal OT LTG, Date Goal Reviewed 11/17/17  -NN  11/14/17  -JH    Strength Goal OT LTG, Outcome goal not met  -      Strength Goal OT LTG, Reason Goal Not Met discharged from facility  -      Static Standing Balance OT LTG    Static Standing Balance OT LTG, Date Goal Reviewed 11/17/17  -NN 11/15/17  -BB 11/14/17  -    Static Standing Balance OT LTG, Outcome goal not met  -      Static Standing Balance OT LTG, Reason Goal Not Met discharged from facility  -        11/09/17 1025 11/04/17 1430       Transfer Training 2 OT LTG    Transfer Training 2 OT LTG, Date Goal Reviewed 11/09/17  -BB 11/04/17  -LW     Transfer Training 2 OT LTG, Outcome  goal ongoing  -LW     Strength OT LTG    Strength Goal OT LTG, Date Goal Reviewed 11/09/17  -BB 11/04/17  -LW     Strength Goal OT LTG, Outcome  goal ongoing  -LW     Static Standing Balance OT LTG    Static Standing Balance OT LTG, Date Goal Reviewed 11/09/17  -BB 11/04/17  -LW     Static Standing Balance OT LTG, Outcome  goal ongoing  -LW     ADL OT LTG    ADL OT LTG, Date Goal Reviewed  11/04/17  -LW     ADL OT LTG, Outcome  goal met  -LW       User Key  (r) = Recorded By, (t) = Taken By, (c) = Cosigned By    Initials Name Provider Type    DAYO Plummer, MCCORMICK/L Occupational Therapy Assistant     Sandra Milner MCCORMICK/L Occupational Therapy Assistant     Desirae Shore, MCCORMICK/L Occupational Therapy Assistant     Karly Canales, OTR/L Occupational Therapist                Outcome Measures       11/16/17 0858 11/15/17  1502 11/14/17 1332    How much help from another person do you currently need...    Turning from your back to your side while in flat bed without using bedrails? 3  -JASON 3  -KORI 3  -JASON    Moving from lying on back to sitting on the side of a flat bed without bedrails? 3  -JASON 3  -KORI 3  -JASON    Moving to and from a bed to a chair (including a wheelchair)? 2  -JASON 2  -KORI 2  -JASON    Standing up from a chair using your arms (e.g., wheelchair, bedside chair)? 2  -JASON 2  -KORI 2  -JASON    Climbing 3-5 steps with a railing? 1  -JASON 1  -KORI 1  -JASON    To walk in hospital room? 2  -JASON 2  -KORI 2  -JASON    AM-PAC 6 Clicks Score 13  -JASON 13  -KORI 13  -JASON    Functional Assessment    Outcome Measure Options  AM-PAC 6 Clicks Basic Mobility (PT)  -KORI       User Key  (r) = Recorded By, (t) = Taken By, (c) = Cosigned By    Initials Name Provider Type    KORI Andrade, PT Physical Therapist    JASON Marina, PTA Physical Therapy Assistant          Time Calculation:           OT G-codes  OT Professional Judgement Used?: Yes  OT Functional Scales Options: AM-PAC 6 Clicks Daily Activity (OT)  Score: 16  Functional Limitation: Self care  Self Care Current Status (): At least 40 percent but less than 60 percent impaired, limited or restricted  Self Care Goal Status (): At least 20 percent but less than 40 percent impaired, limited or restricted    OT Discharge Summary  Anticipated Discharge Disposition: skilled nursing facility  Reason for Discharge: Discharge from facility, Per MD order  Outcomes Achieved: Refer to plan of care for updates on goals achieved  Discharge Destination: SNF    Karly Canales OTR/L  11/17/2017

## 2017-11-17 NOTE — PLAN OF CARE
Problem: Inpatient Occupational Therapy  Goal: Transfer Training Goal 2 LTG- OT  Outcome: Unable to achieve outcome(s) by discharge Date Met:  11/17/17 11/02/17 1556 11/17/17 0748   Transfer Training 2 OT LTG   Transfer Training 2 OT LTG, Date Established 11/02/17 --    Transfer Training 2 OT LTG, Time to Achieve by discharge --    Transfer Training 2 OT LTG, Activity Type all transfers --    Transfer Training 2 OT LTG, [place] Level supervision required;contact guard assist --    Transfer Training 2 OT LTG, Assist Device walker, rolling --    Transfer Training 2 OT LTG, Date Goal Reviewed --  11/17/17   Transfer Training 2 OT LTG, Outcome --  goal not met   Transfer Training 2 OT LTG, Reason Goal Not Met --  discharged from facility       Goal: Strength Goal LTG- OT  Outcome: Unable to achieve outcome(s) by discharge Date Met:  11/17/17 11/02/17 1556 11/17/17 0748   Strength OT LTG   Strength Goal OT LTG, Date Established 11/02/17 --    Strength Goal OT LTG, Measure to Achieve 1-2 sets of 10 reps all planes (except L shoulder flexion) with 1 lb wrist wt or dumbell for B UE strengthening to increase independence with functional mobility and ADLs. --    Strength Goal OT LTG, Date Goal Reviewed --  11/17/17   Strength Goal OT LTG, Outcome --  goal not met   Strength Goal OT LTG, Reason Goal Not Met --  discharged from facility       Goal: Static Standing Balance Goal LTG- OT  Outcome: Unable to achieve outcome(s) by discharge Date Met:  11/17/17 11/02/17 1556 11/03/17 1526 11/17/17 0748   Static Standing Balance OT LTG   Static Standing Balance OT LTG, Date Established --  11/03/17 --    Static Standing Balance OT LTG, Time to Achieve by discharge --  --    Static Standing Balance OT LTG, [place] Level supervision required;contact guard assist  (5 minutes with functional activity) --  --    Static Standing Balance OT LTG, Assist Device assistive device  (R/W.) --  --    Static Standing Balance OT  LTG, Date Goal Reviewed --  --  11/17/17   Static Standing Balance OT LTG, Outcome --  --  goal not met   Static Standing Balance OT LTG, Reason Goal Not Met --  --  discharged from facility

## 2017-11-18 LAB
BACTERIA SPEC AEROBE CULT: NORMAL
BACTERIA SPEC AEROBE CULT: NORMAL

## 2017-11-24 ENCOUNTER — APPOINTMENT (OUTPATIENT)
Dept: CT IMAGING | Facility: HOSPITAL | Age: 72
End: 2017-11-24

## 2017-11-24 ENCOUNTER — APPOINTMENT (OUTPATIENT)
Dept: GENERAL RADIOLOGY | Facility: HOSPITAL | Age: 72
End: 2017-11-24

## 2017-11-24 ENCOUNTER — APPOINTMENT (OUTPATIENT)
Dept: MRI IMAGING | Facility: HOSPITAL | Age: 72
End: 2017-11-24

## 2017-11-24 ENCOUNTER — HOSPITAL ENCOUNTER (EMERGENCY)
Facility: HOSPITAL | Age: 72
Discharge: ANOTHER HOSPITAL | End: 2017-11-24
Admitting: FAMILY MEDICINE

## 2017-11-24 VITALS
HEART RATE: 76 BPM | BODY MASS INDEX: 18.02 KG/M2 | TEMPERATURE: 97.9 F | WEIGHT: 97.9 LBS | OXYGEN SATURATION: 100 % | HEIGHT: 62 IN | SYSTOLIC BLOOD PRESSURE: 106 MMHG | DIASTOLIC BLOOD PRESSURE: 54 MMHG | RESPIRATION RATE: 18 BRPM

## 2017-11-24 DIAGNOSIS — R56.9 SEIZURES (HCC): ICD-10-CM

## 2017-11-24 DIAGNOSIS — N18.6 ESRD (END STAGE RENAL DISEASE) ON DIALYSIS (HCC): ICD-10-CM

## 2017-11-24 DIAGNOSIS — J18.9 HCAP (HEALTHCARE-ASSOCIATED PNEUMONIA): Primary | ICD-10-CM

## 2017-11-24 DIAGNOSIS — Z99.2 ESRD (END STAGE RENAL DISEASE) ON DIALYSIS (HCC): ICD-10-CM

## 2017-11-24 DIAGNOSIS — N18.6 END STAGE RENAL FAILURE ON DIALYSIS (HCC): ICD-10-CM

## 2017-11-24 DIAGNOSIS — Z99.2 END STAGE RENAL FAILURE ON DIALYSIS (HCC): ICD-10-CM

## 2017-11-24 LAB
ALBUMIN SERPL-MCNC: 3.6 G/DL (ref 3.4–4.8)
ALBUMIN/GLOB SERPL: 0.8 G/DL (ref 1.1–1.8)
ALP SERPL-CCNC: 160 U/L (ref 38–126)
ALT SERPL W P-5'-P-CCNC: 17 U/L (ref 9–52)
ANION GAP SERPL CALCULATED.3IONS-SCNC: 19 MMOL/L (ref 5–15)
AST SERPL-CCNC: 43 U/L (ref 14–36)
BACTERIA UR QL AUTO: ABNORMAL /HPF
BASOPHILS # BLD AUTO: 0.01 10*3/MM3 (ref 0–0.2)
BASOPHILS NFR BLD AUTO: 0.1 % (ref 0–2)
BILIRUB SERPL-MCNC: 0.5 MG/DL (ref 0.2–1.3)
BILIRUB UR QL STRIP: ABNORMAL
BUN BLD-MCNC: 51 MG/DL (ref 7–21)
BUN/CREAT SERPL: 9.1 (ref 7–25)
CALCIUM SPEC-SCNC: 7.4 MG/DL (ref 8.4–10.2)
CHLORIDE SERPL-SCNC: 93 MMOL/L (ref 95–110)
CK MB SERPL-CCNC: 0.83 NG/ML (ref 0–5)
CK SERPL-CCNC: 46 U/L (ref 30–135)
CLARITY UR: CLEAR
CO2 SERPL-SCNC: 25 MMOL/L (ref 22–31)
COLOR UR: ABNORMAL
CREAT BLD-MCNC: 5.59 MG/DL (ref 0.5–1)
D-LACTATE SERPL-SCNC: 1.7 MMOL/L (ref 0.5–2)
DEPRECATED RDW RBC AUTO: 60.9 FL (ref 36.4–46.3)
EOSINOPHIL # BLD AUTO: 0.01 10*3/MM3 (ref 0–0.7)
EOSINOPHIL NFR BLD AUTO: 0.1 % (ref 0–7)
ERYTHROCYTE [DISTWIDTH] IN BLOOD BY AUTOMATED COUNT: 18.1 % (ref 11.5–14.5)
GFR SERPL CREATININE-BSD FRML MDRD: 7 ML/MIN/1.73 (ref 60–90)
GLOBULIN UR ELPH-MCNC: 4.3 GM/DL (ref 2.3–3.5)
GLUCOSE BLD-MCNC: 130 MG/DL (ref 60–100)
GLUCOSE UR STRIP-MCNC: NEGATIVE MG/DL
HCT VFR BLD AUTO: 27.7 % (ref 35–45)
HGB BLD-MCNC: 8.7 G/DL (ref 12–15.5)
HGB UR QL STRIP.AUTO: NEGATIVE
HYALINE CASTS UR QL AUTO: ABNORMAL /LPF
IMM GRANULOCYTES # BLD: 0.07 10*3/MM3 (ref 0–0.02)
IMM GRANULOCYTES NFR BLD: 0.4 % (ref 0–0.5)
KETONES UR QL STRIP: NEGATIVE
LEUKOCYTE ESTERASE UR QL STRIP.AUTO: NEGATIVE
LYMPHOCYTES # BLD AUTO: 0.83 10*3/MM3 (ref 0.6–4.2)
LYMPHOCYTES NFR BLD AUTO: 4.9 % (ref 10–50)
MAGNESIUM SERPL-MCNC: 2.1 MG/DL (ref 1.6–2.3)
MCH RBC QN AUTO: 29.1 PG (ref 26.5–34)
MCHC RBC AUTO-ENTMCNC: 31.4 G/DL (ref 31.4–36)
MCV RBC AUTO: 92.6 FL (ref 80–98)
MONOCYTES # BLD AUTO: 0.76 10*3/MM3 (ref 0–0.9)
MONOCYTES NFR BLD AUTO: 4.5 % (ref 0–12)
NEUTROPHILS # BLD AUTO: 15.17 10*3/MM3 (ref 2–8.6)
NEUTROPHILS NFR BLD AUTO: 90 % (ref 37–80)
NITRITE UR QL STRIP: NEGATIVE
PH UR STRIP.AUTO: 6 [PH] (ref 5–9)
PLATELET # BLD AUTO: 264 10*3/MM3 (ref 150–450)
PMV BLD AUTO: 9.9 FL (ref 8–12)
POTASSIUM BLD-SCNC: 4.2 MMOL/L (ref 3.5–5.1)
PROT SERPL-MCNC: 7.9 G/DL (ref 6.3–8.6)
PROT UR QL STRIP: ABNORMAL
RBC # BLD AUTO: 2.99 10*6/MM3 (ref 3.77–5.16)
RBC # UR: ABNORMAL /HPF
REF LAB TEST METHOD: ABNORMAL
SODIUM BLD-SCNC: 137 MMOL/L (ref 137–145)
SP GR UR STRIP: 1.02 (ref 1–1.03)
SQUAMOUS #/AREA URNS HPF: ABNORMAL /HPF
TROPONIN I SERPL-MCNC: 0.01 NG/ML
TSH SERPL DL<=0.05 MIU/L-ACNC: 0.4 MIU/ML (ref 0.46–4.68)
UROBILINOGEN UR QL STRIP: ABNORMAL
WBC NRBC COR # BLD: 16.85 10*3/MM3 (ref 3.2–9.8)
WBC UR QL AUTO: ABNORMAL /HPF
WHOLE BLOOD HOLD SPECIMEN: NORMAL

## 2017-11-24 PROCEDURE — 96376 TX/PRO/DX INJ SAME DRUG ADON: CPT

## 2017-11-24 PROCEDURE — 93010 ELECTROCARDIOGRAM REPORT: CPT | Performed by: INTERNAL MEDICINE

## 2017-11-24 PROCEDURE — 84443 ASSAY THYROID STIM HORMONE: CPT | Performed by: NURSE PRACTITIONER

## 2017-11-24 PROCEDURE — 99285 EMERGENCY DEPT VISIT HI MDM: CPT

## 2017-11-24 PROCEDURE — 71010 HC CHEST PA OR AP: CPT

## 2017-11-24 PROCEDURE — 82550 ASSAY OF CK (CPK): CPT | Performed by: NURSE PRACTITIONER

## 2017-11-24 PROCEDURE — 84484 ASSAY OF TROPONIN QUANT: CPT | Performed by: NURSE PRACTITIONER

## 2017-11-24 PROCEDURE — 83605 ASSAY OF LACTIC ACID: CPT | Performed by: NURSE PRACTITIONER

## 2017-11-24 PROCEDURE — 70551 MRI BRAIN STEM W/O DYE: CPT

## 2017-11-24 PROCEDURE — 25010000002 LORAZEPAM PER 2 MG

## 2017-11-24 PROCEDURE — 83735 ASSAY OF MAGNESIUM: CPT | Performed by: NURSE PRACTITIONER

## 2017-11-24 PROCEDURE — 96365 THER/PROPH/DIAG IV INF INIT: CPT

## 2017-11-24 PROCEDURE — 96375 TX/PRO/DX INJ NEW DRUG ADDON: CPT

## 2017-11-24 PROCEDURE — 25010000002 LEVETIRACETAM IN NACL 0.82% 500 MG/100ML SOLUTION: Performed by: NURSE PRACTITIONER

## 2017-11-24 PROCEDURE — 85025 COMPLETE CBC W/AUTO DIFF WBC: CPT | Performed by: NURSE PRACTITIONER

## 2017-11-24 PROCEDURE — 93005 ELECTROCARDIOGRAM TRACING: CPT | Performed by: NURSE PRACTITIONER

## 2017-11-24 PROCEDURE — 96361 HYDRATE IV INFUSION ADD-ON: CPT

## 2017-11-24 PROCEDURE — 96367 TX/PROPH/DG ADDL SEQ IV INF: CPT

## 2017-11-24 PROCEDURE — 81001 URINALYSIS AUTO W/SCOPE: CPT | Performed by: NURSE PRACTITIONER

## 2017-11-24 PROCEDURE — 80053 COMPREHEN METABOLIC PANEL: CPT | Performed by: NURSE PRACTITIONER

## 2017-11-24 PROCEDURE — 82553 CREATINE MB FRACTION: CPT | Performed by: NURSE PRACTITIONER

## 2017-11-24 PROCEDURE — 70450 CT HEAD/BRAIN W/O DYE: CPT

## 2017-11-24 PROCEDURE — 73502 X-RAY EXAM HIP UNI 2-3 VIEWS: CPT

## 2017-11-24 RX ORDER — SODIUM CHLORIDE 0.9 % (FLUSH) 0.9 %
10 SYRINGE (ML) INJECTION AS NEEDED
Status: DISCONTINUED | OUTPATIENT
Start: 2017-11-24 | End: 2017-11-24 | Stop reason: HOSPADM

## 2017-11-24 RX ORDER — LORAZEPAM 2 MG/ML
0.5 INJECTION INTRAMUSCULAR ONCE
Status: DISCONTINUED | OUTPATIENT
Start: 2017-11-24 | End: 2017-11-24 | Stop reason: HOSPADM

## 2017-11-24 RX ORDER — ACETAMINOPHEN 325 MG/1
650 TABLET ORAL EVERY 4 HOURS PRN
COMMUNITY

## 2017-11-24 RX ORDER — SODIUM CHLORIDE 9 MG/ML
125 INJECTION, SOLUTION INTRAVENOUS CONTINUOUS
Status: DISCONTINUED | OUTPATIENT
Start: 2017-11-24 | End: 2017-11-24

## 2017-11-24 RX ORDER — LEVETIRACETAM 5 MG/ML
500 INJECTION INTRAVASCULAR ONCE
Status: COMPLETED | OUTPATIENT
Start: 2017-11-24 | End: 2017-11-24

## 2017-11-24 RX ORDER — LORAZEPAM 2 MG/ML
0.5 INJECTION INTRAMUSCULAR ONCE
Status: COMPLETED | OUTPATIENT
Start: 2017-11-24 | End: 2017-11-24

## 2017-11-24 RX ORDER — FENTANYL 25 UG/H
1 PATCH TRANSDERMAL
COMMUNITY
End: 2018-01-01

## 2017-11-24 RX ORDER — IVERMECTIN 3 MG/1
3 TABLET ORAL ONCE
COMMUNITY
End: 2018-01-01

## 2017-11-24 RX ORDER — LORAZEPAM 2 MG/ML
INJECTION INTRAMUSCULAR
Status: COMPLETED
Start: 2017-11-24 | End: 2017-11-24

## 2017-11-24 RX ADMIN — LORAZEPAM 0.5 MG: 2 INJECTION INTRAMUSCULAR; INTRAVENOUS at 10:45

## 2017-11-24 RX ADMIN — LORAZEPAM 0.5 MG: 2 INJECTION INTRAMUSCULAR at 09:28

## 2017-11-24 RX ADMIN — VANCOMYCIN HYDROCHLORIDE 1000 MG: 1 INJECTION, POWDER, LYOPHILIZED, FOR SOLUTION INTRAVENOUS at 15:41

## 2017-11-24 RX ADMIN — SODIUM CHLORIDE 125 ML/HR: 900 INJECTION, SOLUTION INTRAVENOUS at 09:39

## 2017-11-24 RX ADMIN — LORAZEPAM 0.5 MG: 2 INJECTION, SOLUTION INTRAMUSCULAR; INTRAVENOUS at 09:28

## 2017-11-24 RX ADMIN — LEVETIRACETAM 500 MG: 5 INJECTION INTRAVENOUS at 15:27

## 2017-11-24 NOTE — ED PROVIDER NOTES
Subjective   HPI Comments: 72-year-old female from [facility] reports to the emergency department via.  Nursing home staff reports jerking type movements described  as seizure-like activity.  Patient has a history of end-stage renal disease standard Monday Wednesday Friday treatment.  This week due to holiday she was scheduled to Sunday Tuesday Friday missed today due to complaints noted.  Patient initially presents alert oriented ×3 questioning while she was sent to the emergency department.  During exam patient she started to have a myoclonic activity eyes deviated to the right stiffening of muscles and extremities that lasted for approximately 12 seconds.  Postictal stage patient's O2 saturation on room air dropped to 83%.  GCS 15 after seizure-like activity.  No incontinence noted.  Patient reports 2 days ago while in the nursing home she ambulated to the restroom and fell landing on her left hip.  Bruises noted to the left side of her face and upper extremities.  Recent admission to the hospital due to pneumonia and sepsis. Discharged on 14 day regimen of  daptomycin  Med list shows Eliquis by mouth 2.5 mg twice a day.  Nursing home staff reports anticoagulation therapy due to myocardial infarction.   Due to persistent MRSA bacteremia with ISRAEL >2 the patient was transitioned to daptomycin.  Blood cultures were repeated at day 4 which was negative     Patient is a 72 y.o. female presenting with seizures.   History provided by:  Patient, nursing home and medical records   used: No    Seizures   Seizure activity on arrival: no    Seizure type:  Myoclonic  Preceding symptoms: no sensation of an aura present, no dizziness, no headache, no numbness and no panic    Episode characteristics: eye deviation and stiffening    Episode characteristics: no incontinence and no tongue biting    Return to baseline: yes    Severity:  Mild  Duration:  12 seconds  Timing:  Once  History of  seizures: no        Review of Systems   Constitutional: Negative for activity change, chills, fatigue and fever.   HENT: Negative for congestion.    Eyes: Negative for discharge and visual disturbance.   Respiratory: Negative for cough, chest tightness and wheezing.    Cardiovascular: Negative for chest pain and palpitations.   Gastrointestinal: Negative for diarrhea, nausea and vomiting.   Genitourinary: Negative for flank pain.   Musculoskeletal: Positive for arthralgias, gait problem and myalgias.   Skin: Positive for color change and wound.   Neurological: Positive for seizures. Negative for facial asymmetry, weakness and numbness.   Psychiatric/Behavioral: Negative for confusion and decreased concentration.   All other systems reviewed and are negative.      Past Medical History:   Diagnosis Date   • Abnormal x-ray     Standard chest x ray, f/u pneumonia xray   • Acquired hypothyroidism    • Amblyopia    • Anemia of renal disease    • Anxiety    • Arteriovenous fistula     Placed 10/15/2014   • Benign essential hypertension    • Bipolar affective disorder     Current episode depression   • Bipolar disorder    • Cerebrovascular accident    • Chest wall pain    • Chronic angle-closure glaucoma    • Chronic kidney disease     Stage 4-approaching hemodialysis   • Chronic pain syndrome    • Chronic renal failure    • Combined drug dependence excluding opioids    • Constipation    • COPD (chronic obstructive pulmonary disease)    • Coronary arteriosclerosis    • Cough    • Degeneration of cervical intervertebral disc    • Dementia     Multi-infarct, uncomplicated   • Depression    • Diabetes mellitus     No retinopathy   • Diabetes mellitus     Without mention of complication, type 2 or unspecified type, not stated as uncontrolled   • Diabetic renal disease    • Disc disorder of lumbar region    • DJD (degenerative joint disease)     Involving multiple joints   • Edema    • End stage renal failure on dialysis     CURT  AV FISTULA 2015   • Epigastric pain    • Essential hypertension    • Exotropia    • Gastritis    • Generalized abdominal pain    • GERD (gastroesophageal reflux disease)     With Esophagitis   • Gout    • H/O screening mammography    • Hiatal hernia    • Hyperlipidemia    • Hypermetropia    • Insomnia    • Iron deficiency anemia    • Low back pain    • Lumbago    • Non-cardiac chest pain    • Nuclear cataract    • Osteoporosis    • Panic disorder without agoraphobia    • Peripheral nervous system disorder    • Radiculitis    • RLQ abdominal pain    • RUQ pain    • Sprain of ankle    • Sprain of knee     Resolving   • Superficial injury of shoulder and upper arm    • Surgical follow-up care    • Type 2 diabetes mellitus    • UTI (urinary tract infection)    • Visit for suture removal    • Vitamin D deficiency    • Vulvovaginitis        Allergies   Allergen Reactions   • Ambien [Zolpidem Tartrate]    • Benadryl [Diphenhydramine]    • Penicillins        Past Surgical History:   Procedure Laterality Date   • APPENDECTOMY     • BACK SURGERY      x2   • BYPASS GRAFT  2015    Left brachial artery to axillary vein arteriovenous graft. Venous ultrasound unilateral extremity   •  SECTION      x2   • CHOLECYSTECTOMY     • COLONOSCOPY W/ POLYPECTOMY  2015    Colitis found in the cecum. Biopsy taken. A diverticulum was found in the sigmoid colon.   • ENDOSCOPY AND COLONOSCOPY     • ESOPHAGOSCOPY / EGD  2015    Normal esophagus. Gastritis found in the stomach. Biopsy taken. Duodenitis found in the duodenum. Biopsy take.   • ESOPHAGOSCOPY / EGD  1944    With tube-Esophagus appeared normal. Nonerosive gastritis. Duodenum appeared normal   • HYSTERECTOMY     • INJECTION OF MEDICATION  2011    Aranesp   • INJECTION OF MEDICATION  2011    Kenalog   • INTERVENTIONAL RADIOLOGY PROCEDURE Left 10/5/2017    Procedure: IR dialysis fistulagram;  Surgeon: Blane Fernandez MD;  Location:  [facility];  Service:    • LEG SURGERY  06/13/2000    Cancelled. Due to uncontrolled hypertension   • OTHER SURGICAL HISTORY  07/07/2016    Tissue plasminogen activator catheter thrombolysis   • REMOVAL PERITONEAL DIALYSIS CATHETER  01/16/2014    Removal of tunneled hemodialysis catheter with cuff   • TRANSESOPHAGEAL ECHOCARDIOGRAM (ANTHONY)  03/24/2016    Mild left atrial enlargement with mild to moderate CLVH with normal aortic root size.LV systolic function well preserved with Ef of 55-60%.Mitral valve thickened.Av thickened.Aortic sclerosis.Mild mitral regurg.mild to moderate tricuspid regurg.   • TRANSESOPHAGEAL ECHOCARDIOGRAM (ANTHONY)  02/22/2012    Normal left ventricular systolic function. Moderate tricuspid regurgitation with evidenceof pulmonary hypertension       Family History   Problem Relation Age of Onset   • Coronary artery disease Other        Social History     Social History   • Marital status:      Spouse name: N/A   • Number of children: N/A   • Years of education: N/A     Social History Main Topics   • Smoking status: Former Smoker   • Smokeless tobacco: Never Used   • Alcohol use No   • Drug use: No   • Sexual activity: Not Asked     Other Topics Concern   • None     Social History Narrative           Objective   Physical Exam   Constitutional: She is oriented to person, place, and time. Vital signs are normal. She appears well-developed and well-nourished.   HENT:   Head: Normocephalic.   Right Ear: External ear normal.   Left Ear: External ear normal.   Mouth/Throat: Oropharynx is clear and moist.   Eyes: Pupils are equal, round, and reactive to light.   Neck: Normal range of motion.   Cardiovascular: Normal rate, regular rhythm and normal heart sounds.    Pulmonary/Chest: Effort normal and breath sounds normal.   Abdominal: Soft.   Musculoskeletal: Normal range of motion.   Fistula noted in right and left upper extremity.  Current fistula being used for dialysis is in  the upper leg   Neurological: She is alert and oriented to person, place, and time.   Skin: Skin is warm.   Psychiatric: She has a normal mood and affect.   Nursing note and vitals reviewed.      ECG 12 Lead    Date/Time: 11/24/2017 8:57 AM  Performed by: SILVIO ABDI  Authorized by: SILVIO ABDI   Interpreted by physician  Comparison: compared with previous ECG from 11/2/2017  Rhythm: sinus tachycardia  Clinical impression: normal ECG               ED Course  ED Course   Value Comment By Time   Albumin: 3.60 (Reviewed) Wes Bernard MD 11/24 1056    Hemoccult negative ELI Ye 11/24 1100    MRI ordered ELI Ye 11/24 1115   RDW: (!) 18.1 (Reviewed) ELI Ye 11/24 1425    Spoke with Dr. Anni Elise [facility] except the patient for transfer for pneumonia and new onset seizures.  Spoke with Dr. Mireles about dialysis Dr. Mireles instructs that he would contact his partners at [facility] for dialysis therapy. ELI Ye 11/24 1549                  MDM  Number of Diagnoses or Management Options      Final diagnoses:   HCAP (healthcare-associated pneumonia)   ESRD (end stage renal disease) on dialysis   End stage renal failure on dialysis   Seizures            ELI Ye  11/24/17 1946

## 2017-11-25 PROBLEM — I10 BENIGN ESSENTIAL HYPERTENSION: Status: ACTIVE | Noted: 2017-11-25

## 2017-11-25 PROBLEM — E78.5 HYPERLIPIDEMIA: Status: ACTIVE | Noted: 2017-11-25

## 2017-11-28 ENCOUNTER — OUTSIDE FACILITY SERVICE (OUTPATIENT)
Dept: FAMILY MEDICINE CLINIC | Facility: CLINIC | Age: 72
End: 2017-11-28

## 2017-11-28 PROCEDURE — 99308 SBSQ NF CARE LOW MDM 20: CPT | Performed by: FAMILY MEDICINE

## 2017-12-19 ENCOUNTER — OUTSIDE FACILITY SERVICE (OUTPATIENT)
Dept: FAMILY MEDICINE CLINIC | Facility: CLINIC | Age: 72
End: 2017-12-19

## 2017-12-19 PROCEDURE — 99308 SBSQ NF CARE LOW MDM 20: CPT | Performed by: FAMILY MEDICINE

## 2017-12-28 ENCOUNTER — HOSPITAL ENCOUNTER (OUTPATIENT)
Facility: HOSPITAL | Age: 72
Discharge: NURSING, REHAB OR LONG-TERM CARE | End: 2017-12-29
Attending: INTERNAL MEDICINE | Admitting: INTERNAL MEDICINE

## 2017-12-28 VITALS
RESPIRATION RATE: 17 BRPM | TEMPERATURE: 98 F | HEART RATE: 77 BPM | SYSTOLIC BLOOD PRESSURE: 158 MMHG | DIASTOLIC BLOOD PRESSURE: 75 MMHG | OXYGEN SATURATION: 98 %

## 2017-12-28 LAB
ABO GROUP BLD: NORMAL
BLD GP AB SCN SERPL QL: NEGATIVE
Lab: NORMAL
RH BLD: POSITIVE

## 2017-12-28 PROCEDURE — 86923 COMPATIBILITY TEST ELECTRIC: CPT

## 2017-12-28 PROCEDURE — 86901 BLOOD TYPING SEROLOGIC RH(D): CPT | Performed by: FAMILY MEDICINE

## 2017-12-28 PROCEDURE — 36430 TRANSFUSION BLD/BLD COMPNT: CPT

## 2017-12-28 PROCEDURE — P9016 RBC LEUKOCYTES REDUCED: HCPCS

## 2017-12-28 PROCEDURE — 86900 BLOOD TYPING SEROLOGIC ABO: CPT

## 2017-12-28 PROCEDURE — 86900 BLOOD TYPING SEROLOGIC ABO: CPT | Performed by: FAMILY MEDICINE

## 2017-12-28 PROCEDURE — 86850 RBC ANTIBODY SCREEN: CPT | Performed by: FAMILY MEDICINE

## 2017-12-28 RX ORDER — SODIUM CHLORIDE 0.9 % (FLUSH) 0.9 %
SYRINGE (ML) INJECTION
Status: COMPLETED
Start: 2017-12-28 | End: 2017-12-28

## 2017-12-28 RX ORDER — SODIUM CHLORIDE 9 MG/ML
INJECTION, SOLUTION INTRAVENOUS
Status: COMPLETED
Start: 2017-12-28 | End: 2017-12-28

## 2017-12-28 RX ADMIN — SODIUM CHLORIDE: 900 INJECTION, SOLUTION INTRAVENOUS at 15:00

## 2017-12-28 RX ADMIN — Medication 10 ML: at 13:45

## 2017-12-30 LAB
ABO + RH BLD: NORMAL
ABO + RH BLD: NORMAL
BH BB BLOOD EXPIRATION DATE: NORMAL
BH BB BLOOD EXPIRATION DATE: NORMAL
BH BB BLOOD TYPE BARCODE: 5100
BH BB BLOOD TYPE BARCODE: 5100
BH BB DISPENSE STATUS: NORMAL
BH BB DISPENSE STATUS: NORMAL
BH BB PRODUCT CODE: NORMAL
BH BB PRODUCT CODE: NORMAL
BH BB UNIT NUMBER: NORMAL
BH BB UNIT NUMBER: NORMAL
UNIT  ABO: NORMAL
UNIT  ABO: NORMAL
UNIT  RH: NORMAL
UNIT  RH: NORMAL

## 2018-01-01 ENCOUNTER — OUTSIDE FACILITY SERVICE (OUTPATIENT)
Dept: FAMILY MEDICINE CLINIC | Facility: CLINIC | Age: 73
End: 2018-01-01

## 2018-01-01 ENCOUNTER — APPOINTMENT (OUTPATIENT)
Dept: CT IMAGING | Facility: HOSPITAL | Age: 73
End: 2018-01-01

## 2018-01-01 ENCOUNTER — EPISODE CHANGES (OUTPATIENT)
Dept: CASE MANAGEMENT | Facility: OTHER | Age: 73
End: 2018-01-01

## 2018-01-01 ENCOUNTER — HOSPITAL ENCOUNTER (INPATIENT)
Facility: HOSPITAL | Age: 73
LOS: 4 days | Discharge: NURSING, REHAB OR LONG-TERM CARE | End: 2018-07-13
Attending: EMERGENCY MEDICINE | Admitting: INTERNAL MEDICINE

## 2018-01-01 ENCOUNTER — HOSPITAL ENCOUNTER (EMERGENCY)
Facility: HOSPITAL | Age: 73
Discharge: HOME | End: 2018-05-16
Attending: EMERGENCY MEDICINE | Admitting: EMERGENCY MEDICINE

## 2018-01-01 ENCOUNTER — APPOINTMENT (OUTPATIENT)
Dept: ONCOLOGY | Facility: HOSPITAL | Age: 73
End: 2018-01-01

## 2018-01-01 ENCOUNTER — OFFICE VISIT (OUTPATIENT)
Dept: CARDIAC SURGERY | Facility: CLINIC | Age: 73
End: 2018-01-01

## 2018-01-01 ENCOUNTER — APPOINTMENT (OUTPATIENT)
Dept: GENERAL RADIOLOGY | Facility: HOSPITAL | Age: 73
End: 2018-01-01

## 2018-01-01 ENCOUNTER — HOSPITAL ENCOUNTER (EMERGENCY)
Facility: HOSPITAL | Age: 73
Discharge: HOME | End: 2018-10-15
Attending: FAMILY MEDICINE | Admitting: FAMILY MEDICINE

## 2018-01-01 ENCOUNTER — EPISODE CHANGES (OUTPATIENT)
Dept: SOCIAL WORK | Facility: HOSPITAL | Age: 73
End: 2018-01-01

## 2018-01-01 ENCOUNTER — HOSPITAL ENCOUNTER (EMERGENCY)
Facility: HOSPITAL | Age: 73
Discharge: ANOTHER HOSPITAL | End: 2018-05-07
Attending: EMERGENCY MEDICINE | Admitting: EMERGENCY MEDICINE

## 2018-01-01 ENCOUNTER — HOSPITAL ENCOUNTER (OUTPATIENT)
Facility: HOSPITAL | Age: 73
Setting detail: OBSERVATION
Discharge: NURSING, REHAB OR LONG-TERM CARE | End: 2018-09-07
Attending: EMERGENCY MEDICINE | Admitting: FAMILY MEDICINE

## 2018-01-01 ENCOUNTER — APPOINTMENT (OUTPATIENT)
Dept: ULTRASOUND IMAGING | Facility: HOSPITAL | Age: 73
End: 2018-01-01

## 2018-01-01 VITALS
SYSTOLIC BLOOD PRESSURE: 127 MMHG | TEMPERATURE: 98.3 F | OXYGEN SATURATION: 95 % | DIASTOLIC BLOOD PRESSURE: 69 MMHG | HEIGHT: 62 IN | BODY MASS INDEX: 20.98 KG/M2 | RESPIRATION RATE: 18 BRPM | WEIGHT: 114 LBS | HEART RATE: 68 BPM

## 2018-01-01 VITALS
RESPIRATION RATE: 16 BRPM | HEIGHT: 62 IN | SYSTOLIC BLOOD PRESSURE: 110 MMHG | HEART RATE: 67 BPM | OXYGEN SATURATION: 92 % | TEMPERATURE: 96.8 F | WEIGHT: 120.3 LBS | BODY MASS INDEX: 22.14 KG/M2 | DIASTOLIC BLOOD PRESSURE: 52 MMHG

## 2018-01-01 VITALS
SYSTOLIC BLOOD PRESSURE: 124 MMHG | DIASTOLIC BLOOD PRESSURE: 56 MMHG | BODY MASS INDEX: 20.98 KG/M2 | OXYGEN SATURATION: 94 % | TEMPERATURE: 98.4 F | HEIGHT: 62 IN | HEART RATE: 96 BPM | RESPIRATION RATE: 16 BRPM | WEIGHT: 114 LBS

## 2018-01-01 VITALS
WEIGHT: 115 LBS | DIASTOLIC BLOOD PRESSURE: 62 MMHG | OXYGEN SATURATION: 97 % | HEART RATE: 81 BPM | TEMPERATURE: 97 F | BODY MASS INDEX: 21.16 KG/M2 | HEIGHT: 62 IN | SYSTOLIC BLOOD PRESSURE: 122 MMHG

## 2018-01-01 VITALS
HEART RATE: 74 BPM | WEIGHT: 115 LBS | TEMPERATURE: 97.5 F | DIASTOLIC BLOOD PRESSURE: 106 MMHG | RESPIRATION RATE: 14 BRPM | HEIGHT: 62 IN | BODY MASS INDEX: 21.16 KG/M2 | OXYGEN SATURATION: 97 % | SYSTOLIC BLOOD PRESSURE: 145 MMHG

## 2018-01-01 VITALS
DIASTOLIC BLOOD PRESSURE: 64 MMHG | HEART RATE: 71 BPM | OXYGEN SATURATION: 99 % | SYSTOLIC BLOOD PRESSURE: 124 MMHG | TEMPERATURE: 97.3 F

## 2018-01-01 VITALS
RESPIRATION RATE: 18 BRPM | SYSTOLIC BLOOD PRESSURE: 134 MMHG | TEMPERATURE: 97.2 F | OXYGEN SATURATION: 97 % | HEART RATE: 69 BPM | BODY MASS INDEX: 20.68 KG/M2 | DIASTOLIC BLOOD PRESSURE: 65 MMHG | HEIGHT: 62 IN | WEIGHT: 112.4 LBS

## 2018-01-01 DIAGNOSIS — Z99.2 ESRD (END STAGE RENAL DISEASE) ON DIALYSIS (HCC): Primary | ICD-10-CM

## 2018-01-01 DIAGNOSIS — N18.6 ESRD (END STAGE RENAL DISEASE) ON DIALYSIS (HCC): Primary | ICD-10-CM

## 2018-01-01 DIAGNOSIS — R41.82 ALTERED MENTAL STATUS, UNSPECIFIED ALTERED MENTAL STATUS TYPE: ICD-10-CM

## 2018-01-01 DIAGNOSIS — D63.1 ANEMIA DUE TO CHRONIC KIDNEY DISEASE: ICD-10-CM

## 2018-01-01 DIAGNOSIS — I95.89 HYPOTENSION DUE TO HYPOVOLEMIA: ICD-10-CM

## 2018-01-01 DIAGNOSIS — I25.10 CORONARY ARTERY DISEASE INVOLVING NATIVE CORONARY ARTERY OF NATIVE HEART WITHOUT ANGINA PECTORIS: ICD-10-CM

## 2018-01-01 DIAGNOSIS — I10 ESSENTIAL HYPERTENSION: ICD-10-CM

## 2018-01-01 DIAGNOSIS — Z99.2 CONTROLLED TYPE 2 DIABETES MELLITUS WITH CHRONIC KIDNEY DISEASE ON CHRONIC DIALYSIS, WITHOUT LONG-TERM CURRENT USE OF INSULIN (HCC): ICD-10-CM

## 2018-01-01 DIAGNOSIS — E86.1 HYPOTENSION DUE TO HYPOVOLEMIA: ICD-10-CM

## 2018-01-01 DIAGNOSIS — N13.4 HYDROURETER, LEFT: ICD-10-CM

## 2018-01-01 DIAGNOSIS — A49.02 MRSA INFECTION: Primary | ICD-10-CM

## 2018-01-01 DIAGNOSIS — N39.0 URINARY TRACT INFECTION WITHOUT HEMATURIA, SITE UNSPECIFIED: ICD-10-CM

## 2018-01-01 DIAGNOSIS — N18.9 ANEMIA DUE TO CHRONIC KIDNEY DISEASE: ICD-10-CM

## 2018-01-01 DIAGNOSIS — I72.9 PSEUDOANEURYSM (HCC): ICD-10-CM

## 2018-01-01 DIAGNOSIS — J18.9 PNEUMONIA OF RIGHT UPPER LOBE DUE TO INFECTIOUS ORGANISM: Primary | ICD-10-CM

## 2018-01-01 DIAGNOSIS — K56.41 FECAL IMPACTION (HCC): ICD-10-CM

## 2018-01-01 DIAGNOSIS — D64.9 CHRONIC ANEMIA: Primary | ICD-10-CM

## 2018-01-01 DIAGNOSIS — R53.1 WEAKNESS: ICD-10-CM

## 2018-01-01 DIAGNOSIS — E78.2 MIXED HYPERLIPIDEMIA: ICD-10-CM

## 2018-01-01 DIAGNOSIS — N18.9 CHRONIC RENAL FAILURE, UNSPECIFIED CKD STAGE: ICD-10-CM

## 2018-01-01 DIAGNOSIS — J43.1 PANLOBULAR EMPHYSEMA (HCC): ICD-10-CM

## 2018-01-01 DIAGNOSIS — Z99.2 STAGE 5 CHRONIC KIDNEY DISEASE ON CHRONIC DIALYSIS (HCC): Primary | ICD-10-CM

## 2018-01-01 DIAGNOSIS — E11.22 CONTROLLED TYPE 2 DIABETES MELLITUS WITH CHRONIC KIDNEY DISEASE ON CHRONIC DIALYSIS, WITHOUT LONG-TERM CURRENT USE OF INSULIN (HCC): ICD-10-CM

## 2018-01-01 DIAGNOSIS — N18.6 STAGE 5 CHRONIC KIDNEY DISEASE ON CHRONIC DIALYSIS (HCC): Primary | ICD-10-CM

## 2018-01-01 DIAGNOSIS — E87.1 HYPONATREMIA: ICD-10-CM

## 2018-01-01 DIAGNOSIS — E87.5 HYPERKALEMIA: ICD-10-CM

## 2018-01-01 DIAGNOSIS — N18.6 CONTROLLED TYPE 2 DIABETES MELLITUS WITH CHRONIC KIDNEY DISEASE ON CHRONIC DIALYSIS, WITHOUT LONG-TERM CURRENT USE OF INSULIN (HCC): ICD-10-CM

## 2018-01-01 DIAGNOSIS — R10.9 ABDOMINAL PAIN, UNSPECIFIED ABDOMINAL LOCATION: Primary | ICD-10-CM

## 2018-01-01 DIAGNOSIS — I77.0 ARTERIOVENOUS FISTULA (HCC): Primary | ICD-10-CM

## 2018-01-01 LAB
ABO + RH BLD: NORMAL
ABO GROUP BLD: NORMAL
ALBUMIN SERPL-MCNC: 3 G/DL (ref 3.4–4.8)
ALBUMIN SERPL-MCNC: 3 G/DL (ref 3.4–4.8)
ALBUMIN SERPL-MCNC: 3.6 G/DL (ref 3.4–4.8)
ALBUMIN SERPL-MCNC: 3.6 G/DL (ref 3.4–4.8)
ALBUMIN SERPL-MCNC: 3.8 G/DL (ref 3.4–4.8)
ALBUMIN SERPL-MCNC: 3.9 G/DL (ref 3.4–4.8)
ALBUMIN SERPL-MCNC: 4 G/DL (ref 3.4–4.8)
ALBUMIN SERPL-MCNC: 4 G/DL (ref 3.4–4.8)
ALBUMIN SERPL-MCNC: 4.2 G/DL (ref 3.4–4.8)
ALBUMIN/GLOB SERPL: 0.8 G/DL (ref 1.1–1.8)
ALBUMIN/GLOB SERPL: 0.9 G/DL (ref 1.1–1.8)
ALBUMIN/GLOB SERPL: 1 G/DL (ref 1.1–1.8)
ALP SERPL-CCNC: 132 U/L (ref 38–126)
ALP SERPL-CCNC: 151 U/L (ref 38–126)
ALP SERPL-CCNC: 153 U/L (ref 38–126)
ALP SERPL-CCNC: 156 U/L (ref 38–126)
ALP SERPL-CCNC: 160 U/L (ref 38–126)
ALP SERPL-CCNC: 171 U/L (ref 38–126)
ALP SERPL-CCNC: 172 U/L (ref 38–126)
ALP SERPL-CCNC: 185 U/L (ref 38–126)
ALP SERPL-CCNC: 230 U/L (ref 38–126)
ALT SERPL W P-5'-P-CCNC: 10 U/L (ref 9–52)
ALT SERPL W P-5'-P-CCNC: 12 U/L (ref 9–52)
ALT SERPL W P-5'-P-CCNC: 15 U/L (ref 9–52)
ALT SERPL W P-5'-P-CCNC: 15 U/L (ref 9–52)
ALT SERPL W P-5'-P-CCNC: 16 U/L (ref 9–52)
ALT SERPL W P-5'-P-CCNC: 17 U/L (ref 9–52)
ALT SERPL W P-5'-P-CCNC: 22 U/L (ref 9–52)
ALT SERPL W P-5'-P-CCNC: 26 U/L (ref 9–52)
ALT SERPL W P-5'-P-CCNC: 26 U/L (ref 9–52)
AMORPH URATE CRY URNS QL MICRO: ABNORMAL /HPF
ANION GAP SERPL CALCULATED.3IONS-SCNC: 10 MMOL/L (ref 5–15)
ANION GAP SERPL CALCULATED.3IONS-SCNC: 12 MMOL/L (ref 5–15)
ANION GAP SERPL CALCULATED.3IONS-SCNC: 13 MMOL/L (ref 5–15)
ANION GAP SERPL CALCULATED.3IONS-SCNC: 14 MMOL/L (ref 5–15)
ANION GAP SERPL CALCULATED.3IONS-SCNC: 18 MMOL/L (ref 5–15)
ANION GAP SERPL CALCULATED.3IONS-SCNC: 9 MMOL/L (ref 5–15)
ARTERIAL PATENCY WRIST A: POSITIVE
AST SERPL-CCNC: 33 U/L (ref 14–36)
AST SERPL-CCNC: 33 U/L (ref 14–36)
AST SERPL-CCNC: 37 U/L (ref 14–36)
AST SERPL-CCNC: 40 U/L (ref 14–36)
AST SERPL-CCNC: 47 U/L (ref 14–36)
AST SERPL-CCNC: 53 U/L (ref 14–36)
AST SERPL-CCNC: 62 U/L (ref 14–36)
AST SERPL-CCNC: 67 U/L (ref 14–36)
AST SERPL-CCNC: 99 U/L (ref 14–36)
ATMOSPHERIC PRESS: 754 MMHG
BACTERIA BLD CULT: ABNORMAL
BACTERIA SPEC AEROBE CULT: ABNORMAL
BACTERIA SPEC AEROBE CULT: NORMAL
BACTERIA UR QL AUTO: ABNORMAL /HPF
BASE EXCESS BLDA CALC-SCNC: -2.4 MMOL/L (ref 0–2)
BASOPHILS # BLD AUTO: 0.02 10*3/MM3 (ref 0–0.2)
BASOPHILS # BLD AUTO: 0.03 10*3/MM3 (ref 0–0.2)
BASOPHILS # BLD AUTO: 0.04 10*3/MM3 (ref 0–0.2)
BASOPHILS # BLD AUTO: 0.05 10*3/MM3 (ref 0–0.2)
BASOPHILS # BLD AUTO: 0.05 10*3/MM3 (ref 0–0.2)
BASOPHILS # BLD AUTO: 0.06 10*3/MM3 (ref 0–0.2)
BASOPHILS # BLD AUTO: 0.06 10*3/MM3 (ref 0–0.2)
BASOPHILS # BLD AUTO: 0.07 10*3/MM3 (ref 0–0.2)
BASOPHILS NFR BLD AUTO: 0.2 % (ref 0–2)
BASOPHILS NFR BLD AUTO: 0.3 % (ref 0–2)
BASOPHILS NFR BLD AUTO: 0.3 % (ref 0–2)
BASOPHILS NFR BLD AUTO: 0.4 % (ref 0–2)
BASOPHILS NFR BLD AUTO: 0.5 % (ref 0–2)
BASOPHILS NFR BLD AUTO: 0.6 % (ref 0–2)
BASOPHILS NFR BLD AUTO: 0.6 % (ref 0–2)
BASOPHILS NFR BLD AUTO: 0.7 % (ref 0–2)
BASOPHILS NFR BLD AUTO: 0.7 % (ref 0–2)
BASOPHILS NFR BLD AUTO: 0.9 % (ref 0–2)
BASOPHILS NFR BLD AUTO: 1.1 % (ref 0–2)
BASOPHILS NFR BLD AUTO: 1.2 % (ref 0–2)
BDY SITE: ABNORMAL
BH BB BLOOD EXPIRATION DATE: NORMAL
BH BB BLOOD TYPE BARCODE: 5100
BH BB DISPENSE STATUS: NORMAL
BH BB PRODUCT CODE: NORMAL
BH BB UNIT NUMBER: NORMAL
BILIRUB SERPL-MCNC: 0.3 MG/DL (ref 0.2–1.3)
BILIRUB SERPL-MCNC: 0.4 MG/DL (ref 0.2–1.3)
BILIRUB SERPL-MCNC: 0.4 MG/DL (ref 0.2–1.3)
BILIRUB SERPL-MCNC: 0.5 MG/DL (ref 0.2–1.3)
BILIRUB SERPL-MCNC: 0.6 MG/DL (ref 0.2–1.3)
BILIRUB SERPL-MCNC: 0.6 MG/DL (ref 0.2–1.3)
BILIRUB SERPL-MCNC: 0.7 MG/DL (ref 0.2–1.3)
BILIRUB UR QL STRIP: ABNORMAL
BLD GP AB SCN SERPL QL: NEGATIVE
BUN BLD-MCNC: 16 MG/DL (ref 7–21)
BUN BLD-MCNC: 20 MG/DL (ref 7–21)
BUN BLD-MCNC: 28 MG/DL (ref 7–21)
BUN BLD-MCNC: 29 MG/DL (ref 7–21)
BUN BLD-MCNC: 29 MG/DL (ref 7–21)
BUN BLD-MCNC: 35 MG/DL (ref 7–21)
BUN BLD-MCNC: 36 MG/DL (ref 7–21)
BUN BLD-MCNC: 39 MG/DL (ref 7–21)
BUN BLD-MCNC: 42 MG/DL (ref 7–21)
BUN BLD-MCNC: 43 MG/DL (ref 7–21)
BUN BLD-MCNC: 49 MG/DL (ref 7–21)
BUN BLD-MCNC: 50 MG/DL (ref 7–21)
BUN BLD-MCNC: 54 MG/DL (ref 7–21)
BUN BLD-MCNC: 72 MG/DL (ref 7–21)
BUN BLD-MCNC: 74 MG/DL (ref 7–21)
BUN/CREAT SERPL: 10.5 (ref 7–25)
BUN/CREAT SERPL: 11 (ref 7–25)
BUN/CREAT SERPL: 12.5 (ref 7–25)
BUN/CREAT SERPL: 13.2 (ref 7–25)
BUN/CREAT SERPL: 7.1 (ref 7–25)
BUN/CREAT SERPL: 7.4 (ref 7–25)
BUN/CREAT SERPL: 7.5 (ref 7–25)
BUN/CREAT SERPL: 7.5 (ref 7–25)
BUN/CREAT SERPL: 7.7 (ref 7–25)
BUN/CREAT SERPL: 8 (ref 7–25)
BUN/CREAT SERPL: 8.1 (ref 7–25)
BUN/CREAT SERPL: 8.2 (ref 7–25)
BUN/CREAT SERPL: 8.3 (ref 7–25)
BUN/CREAT SERPL: 8.8 (ref 7–25)
BUN/CREAT SERPL: 8.9 (ref 7–25)
CALCIUM SPEC-SCNC: 8.1 MG/DL (ref 8.4–10.2)
CALCIUM SPEC-SCNC: 8.2 MG/DL (ref 8.4–10.2)
CALCIUM SPEC-SCNC: 8.3 MG/DL (ref 8.4–10.2)
CALCIUM SPEC-SCNC: 8.3 MG/DL (ref 8.4–10.2)
CALCIUM SPEC-SCNC: 8.7 MG/DL (ref 8.4–10.2)
CALCIUM SPEC-SCNC: 8.8 MG/DL (ref 8.4–10.2)
CALCIUM SPEC-SCNC: 8.9 MG/DL (ref 8.4–10.2)
CALCIUM SPEC-SCNC: 9 MG/DL (ref 8.4–10.2)
CALCIUM SPEC-SCNC: 9.1 MG/DL (ref 8.4–10.2)
CALCIUM SPEC-SCNC: 9.1 MG/DL (ref 8.4–10.2)
CHLORIDE SERPL-SCNC: 81 MMOL/L (ref 95–110)
CHLORIDE SERPL-SCNC: 85 MMOL/L (ref 95–110)
CHLORIDE SERPL-SCNC: 85 MMOL/L (ref 95–110)
CHLORIDE SERPL-SCNC: 87 MMOL/L (ref 95–110)
CHLORIDE SERPL-SCNC: 88 MMOL/L (ref 95–110)
CHLORIDE SERPL-SCNC: 88 MMOL/L (ref 95–110)
CHLORIDE SERPL-SCNC: 89 MMOL/L (ref 95–110)
CHLORIDE SERPL-SCNC: 90 MMOL/L (ref 95–110)
CHLORIDE SERPL-SCNC: 92 MMOL/L (ref 95–110)
CHLORIDE SERPL-SCNC: 94 MMOL/L (ref 95–110)
CHLORIDE SERPL-SCNC: 96 MMOL/L (ref 95–110)
CHLORIDE SERPL-SCNC: 99 MMOL/L (ref 95–110)
CK MB SERPL-CCNC: 1.02 NG/ML (ref 0–5)
CK SERPL-CCNC: 47 U/L (ref 30–135)
CLARITY UR: ABNORMAL
CO2 SERPL-SCNC: 19 MMOL/L (ref 22–31)
CO2 SERPL-SCNC: 21 MMOL/L (ref 22–31)
CO2 SERPL-SCNC: 23 MMOL/L (ref 22–31)
CO2 SERPL-SCNC: 24 MMOL/L (ref 22–31)
CO2 SERPL-SCNC: 25 MMOL/L (ref 22–31)
CO2 SERPL-SCNC: 26 MMOL/L (ref 22–31)
CO2 SERPL-SCNC: 27 MMOL/L (ref 22–31)
CO2 SERPL-SCNC: 28 MMOL/L (ref 22–31)
CO2 SERPL-SCNC: 29 MMOL/L (ref 22–31)
CO2 SERPL-SCNC: 32 MMOL/L (ref 22–31)
CO2 SERPL-SCNC: 34 MMOL/L (ref 22–31)
CO2 SERPL-SCNC: 34 MMOL/L (ref 22–31)
CO2 SERPL-SCNC: 36 MMOL/L (ref 22–31)
COLOR UR: ABNORMAL
CRE SCREEN PCR: NOT DETECTED
CRE SCREEN PCR: NOT DETECTED
CREAT BLD-MCNC: 2.13 MG/DL (ref 0.5–1)
CREAT BLD-MCNC: 2.42 MG/DL (ref 0.5–1)
CREAT BLD-MCNC: 3.51 MG/DL (ref 0.5–1)
CREAT BLD-MCNC: 3.6 MG/DL (ref 0.5–1)
CREAT BLD-MCNC: 4.08 MG/DL (ref 0.5–1)
CREAT BLD-MCNC: 4.11 MG/DL (ref 0.5–1)
CREAT BLD-MCNC: 4.28 MG/DL (ref 0.5–1)
CREAT BLD-MCNC: 4.45 MG/DL (ref 0.5–1)
CREAT BLD-MCNC: 4.71 MG/DL (ref 0.5–1)
CREAT BLD-MCNC: 5.16 MG/DL (ref 0.5–1)
CREAT BLD-MCNC: 5.21 MG/DL (ref 0.5–1)
CREAT BLD-MCNC: 5.61 MG/DL (ref 0.5–1)
CREAT BLD-MCNC: 5.74 MG/DL (ref 0.5–1)
CREAT BLD-MCNC: 5.8 MG/DL (ref 0.5–1)
CREAT BLD-MCNC: 6.47 MG/DL (ref 0.5–1)
D-LACTATE SERPL-SCNC: 1.1 MMOL/L (ref 0.5–2)
D-LACTATE SERPL-SCNC: 1.7 MMOL/L (ref 0.5–2)
DEPRECATED RDW RBC AUTO: 52.1 FL (ref 36.4–46.3)
DEPRECATED RDW RBC AUTO: 54.3 FL (ref 36.4–46.3)
DEPRECATED RDW RBC AUTO: 54.4 FL (ref 36.4–46.3)
DEPRECATED RDW RBC AUTO: 55.2 FL (ref 36.4–46.3)
DEPRECATED RDW RBC AUTO: 55.5 FL (ref 36.4–46.3)
DEPRECATED RDW RBC AUTO: 56 FL (ref 36.4–46.3)
DEPRECATED RDW RBC AUTO: 56.8 FL (ref 36.4–46.3)
DEPRECATED RDW RBC AUTO: 56.8 FL (ref 36.4–46.3)
DEPRECATED RDW RBC AUTO: 57 FL (ref 36.4–46.3)
DEPRECATED RDW RBC AUTO: 57.3 FL (ref 36.4–46.3)
DEPRECATED RDW RBC AUTO: 57.4 FL (ref 36.4–46.3)
DEPRECATED RDW RBC AUTO: 59.5 FL (ref 36.4–46.3)
DEPRECATED RDW RBC AUTO: 60.1 FL (ref 36.4–46.3)
DEPRECATED RDW RBC AUTO: 62.7 FL (ref 36.4–46.3)
EOSINOPHIL # BLD AUTO: 0.01 10*3/MM3 (ref 0–0.7)
EOSINOPHIL # BLD AUTO: 0.02 10*3/MM3 (ref 0–0.7)
EOSINOPHIL # BLD AUTO: 0.05 10*3/MM3 (ref 0–0.7)
EOSINOPHIL # BLD AUTO: 0.12 10*3/MM3 (ref 0–0.7)
EOSINOPHIL # BLD AUTO: 0.15 10*3/MM3 (ref 0–0.7)
EOSINOPHIL # BLD AUTO: 0.17 10*3/MM3 (ref 0–0.7)
EOSINOPHIL # BLD AUTO: 0.17 10*3/MM3 (ref 0–0.7)
EOSINOPHIL # BLD AUTO: 0.19 10*3/MM3 (ref 0–0.7)
EOSINOPHIL # BLD AUTO: 0.24 10*3/MM3 (ref 0–0.7)
EOSINOPHIL # BLD AUTO: 0.33 10*3/MM3 (ref 0–0.7)
EOSINOPHIL # BLD AUTO: 0.38 10*3/MM3 (ref 0–0.7)
EOSINOPHIL # BLD AUTO: 0.41 10*3/MM3 (ref 0–0.7)
EOSINOPHIL # BLD AUTO: 0.5 10*3/MM3 (ref 0–0.7)
EOSINOPHIL # BLD AUTO: 0.58 10*3/MM3 (ref 0–0.7)
EOSINOPHIL NFR BLD AUTO: 0.1 % (ref 0–7)
EOSINOPHIL NFR BLD AUTO: 0.2 % (ref 0–7)
EOSINOPHIL NFR BLD AUTO: 0.5 % (ref 0–7)
EOSINOPHIL NFR BLD AUTO: 1.1 % (ref 0–7)
EOSINOPHIL NFR BLD AUTO: 1.9 % (ref 0–7)
EOSINOPHIL NFR BLD AUTO: 2.2 % (ref 0–7)
EOSINOPHIL NFR BLD AUTO: 2.7 % (ref 0–7)
EOSINOPHIL NFR BLD AUTO: 3 % (ref 0–7)
EOSINOPHIL NFR BLD AUTO: 4 % (ref 0–7)
EOSINOPHIL NFR BLD AUTO: 4 % (ref 0–7)
EOSINOPHIL NFR BLD AUTO: 5 % (ref 0–7)
EOSINOPHIL NFR BLD AUTO: 5.6 % (ref 0–7)
EOSINOPHIL NFR BLD AUTO: 9.2 % (ref 0–7)
EOSINOPHIL NFR BLD AUTO: 9.3 % (ref 0–7)
ERYTHROCYTE [DISTWIDTH] IN BLOOD BY AUTOMATED COUNT: 15.5 % (ref 11.5–14.5)
ERYTHROCYTE [DISTWIDTH] IN BLOOD BY AUTOMATED COUNT: 16.4 % (ref 11.5–14.5)
ERYTHROCYTE [DISTWIDTH] IN BLOOD BY AUTOMATED COUNT: 16.6 % (ref 11.5–14.5)
ERYTHROCYTE [DISTWIDTH] IN BLOOD BY AUTOMATED COUNT: 16.6 % (ref 11.5–14.5)
ERYTHROCYTE [DISTWIDTH] IN BLOOD BY AUTOMATED COUNT: 16.8 % (ref 11.5–14.5)
ERYTHROCYTE [DISTWIDTH] IN BLOOD BY AUTOMATED COUNT: 16.9 % (ref 11.5–14.5)
ERYTHROCYTE [DISTWIDTH] IN BLOOD BY AUTOMATED COUNT: 17 % (ref 11.5–14.5)
ERYTHROCYTE [DISTWIDTH] IN BLOOD BY AUTOMATED COUNT: 17.1 % (ref 11.5–14.5)
ERYTHROCYTE [DISTWIDTH] IN BLOOD BY AUTOMATED COUNT: 17.2 % (ref 11.5–14.5)
ERYTHROCYTE [DISTWIDTH] IN BLOOD BY AUTOMATED COUNT: 17.4 % (ref 11.5–14.5)
ERYTHROCYTE [DISTWIDTH] IN BLOOD BY AUTOMATED COUNT: 17.6 % (ref 11.5–14.5)
ERYTHROCYTE [DISTWIDTH] IN BLOOD BY AUTOMATED COUNT: 17.6 % (ref 11.5–14.5)
FLUAV AG NPH QL: NEGATIVE
FLUBV AG NPH QL IA: NEGATIVE
GFR SERPL CREATININE-BSD FRML MDRD: 10 ML/MIN/1.73 (ref 39–90)
GFR SERPL CREATININE-BSD FRML MDRD: 10 ML/MIN/1.73 (ref 39–90)
GFR SERPL CREATININE-BSD FRML MDRD: 11 ML/MIN/1.73 (ref 39–90)
GFR SERPL CREATININE-BSD FRML MDRD: 11 ML/MIN/1.73 (ref 60–90)
GFR SERPL CREATININE-BSD FRML MDRD: 12 ML/MIN/1.73 (ref 39–90)
GFR SERPL CREATININE-BSD FRML MDRD: 13 ML/MIN/1.73 (ref 39–90)
GFR SERPL CREATININE-BSD FRML MDRD: 20 ML/MIN/1.73 (ref 39–90)
GFR SERPL CREATININE-BSD FRML MDRD: 23 ML/MIN/1.73 (ref 39–90)
GFR SERPL CREATININE-BSD FRML MDRD: 6 ML/MIN/1.73 (ref 39–90)
GFR SERPL CREATININE-BSD FRML MDRD: 7 ML/MIN/1.73 (ref 39–90)
GFR SERPL CREATININE-BSD FRML MDRD: 8 ML/MIN/1.73 (ref 39–90)
GFR SERPL CREATININE-BSD FRML MDRD: 8 ML/MIN/1.73 (ref 39–90)
GFR SERPL CREATININE-BSD FRML MDRD: 9 ML/MIN/1.73 (ref 39–90)
GFR SERPL CREATININE-BSD FRML MDRD: ABNORMAL ML/MIN/1.73 (ref 39–90)
GLOBULIN UR ELPH-MCNC: 4 GM/DL (ref 2.3–3.5)
GLOBULIN UR ELPH-MCNC: 4 GM/DL (ref 2.3–3.5)
GLOBULIN UR ELPH-MCNC: 4.2 GM/DL (ref 2.3–3.5)
GLOBULIN UR ELPH-MCNC: 4.2 GM/DL (ref 2.3–3.5)
GLOBULIN UR ELPH-MCNC: 4.4 GM/DL (ref 2.3–3.5)
GLOBULIN UR ELPH-MCNC: 4.4 GM/DL (ref 2.3–3.5)
GLOBULIN UR ELPH-MCNC: 4.5 GM/DL (ref 2.3–3.5)
GLOBULIN UR ELPH-MCNC: 4.9 GM/DL (ref 2.3–3.5)
GLOBULIN UR ELPH-MCNC: 4.9 GM/DL (ref 2.3–3.5)
GLUCOSE BLD-MCNC: 100 MG/DL (ref 60–100)
GLUCOSE BLD-MCNC: 111 MG/DL (ref 60–100)
GLUCOSE BLD-MCNC: 112 MG/DL (ref 60–100)
GLUCOSE BLD-MCNC: 117 MG/DL (ref 60–100)
GLUCOSE BLD-MCNC: 126 MG/DL (ref 60–100)
GLUCOSE BLD-MCNC: 130 MG/DL (ref 60–100)
GLUCOSE BLD-MCNC: 132 MG/DL (ref 60–100)
GLUCOSE BLD-MCNC: 133 MG/DL (ref 60–100)
GLUCOSE BLD-MCNC: 173 MG/DL (ref 60–100)
GLUCOSE BLD-MCNC: 59 MG/DL (ref 60–100)
GLUCOSE BLD-MCNC: 83 MG/DL (ref 60–100)
GLUCOSE BLD-MCNC: 84 MG/DL (ref 60–100)
GLUCOSE BLD-MCNC: 84 MG/DL (ref 60–100)
GLUCOSE BLD-MCNC: 95 MG/DL (ref 60–100)
GLUCOSE BLD-MCNC: 98 MG/DL (ref 60–100)
GLUCOSE BLDC GLUCOMTR-MCNC: 76 MG/DL (ref 70–130)
GLUCOSE UR STRIP-MCNC: NEGATIVE MG/DL
GRAM STN SPEC: ABNORMAL
GRAN CASTS URNS QL MICRO: ABNORMAL /LPF
HBV SURFACE AG SERPL QL IA: NEGATIVE
HBV SURFACE AG SERPL QL IA: NEGATIVE
HCO3 BLDA-SCNC: 22 MMOL/L (ref 20–26)
HCT VFR BLD AUTO: 24.4 % (ref 35–45)
HCT VFR BLD AUTO: 26.8 % (ref 35–45)
HCT VFR BLD AUTO: 27.7 % (ref 35–45)
HCT VFR BLD AUTO: 27.8 % (ref 35–45)
HCT VFR BLD AUTO: 30 % (ref 35–45)
HCT VFR BLD AUTO: 30.3 % (ref 35–45)
HCT VFR BLD AUTO: 31.5 % (ref 35–45)
HCT VFR BLD AUTO: 32.1 % (ref 35–45)
HCT VFR BLD AUTO: 32.8 % (ref 35–45)
HCT VFR BLD AUTO: 32.9 % (ref 35–45)
HCT VFR BLD AUTO: 33.4 % (ref 35–45)
HCT VFR BLD AUTO: 33.8 % (ref 35–45)
HCT VFR BLD AUTO: 34.8 % (ref 35–45)
HCT VFR BLD AUTO: 35.2 % (ref 35–45)
HCT VFR BLD AUTO: 36.6 % (ref 35–45)
HCT VFR BLD AUTO: 38.3 % (ref 35–45)
HEMOCCULT STL QL: POSITIVE
HGB BLD-MCNC: 10.3 G/DL (ref 12–15.5)
HGB BLD-MCNC: 10.4 G/DL (ref 12–15.5)
HGB BLD-MCNC: 10.6 G/DL (ref 12–15.5)
HGB BLD-MCNC: 10.9 G/DL (ref 12–15.5)
HGB BLD-MCNC: 11 G/DL (ref 12–15.5)
HGB BLD-MCNC: 11.2 G/DL (ref 12–15.5)
HGB BLD-MCNC: 11.5 G/DL (ref 12–15.5)
HGB BLD-MCNC: 11.7 G/DL (ref 12–15.5)
HGB BLD-MCNC: 12.9 G/DL (ref 12–15.5)
HGB BLD-MCNC: 7.8 G/DL (ref 12–15.5)
HGB BLD-MCNC: 8.5 G/DL (ref 12–15.5)
HGB BLD-MCNC: 8.6 G/DL (ref 12–15.5)
HGB BLD-MCNC: 8.9 G/DL (ref 12–15.5)
HGB BLD-MCNC: 9.4 G/DL (ref 12–15.5)
HGB BLD-MCNC: 9.7 G/DL (ref 12–15.5)
HGB BLD-MCNC: 9.9 G/DL (ref 12–15.5)
HGB UR QL STRIP.AUTO: NEGATIVE
HOLD SPECIMEN: NORMAL
HYALINE CASTS UR QL AUTO: ABNORMAL /LPF
IMM GRANULOCYTES # BLD: 0.01 10*3/MM3 (ref 0–0.02)
IMM GRANULOCYTES # BLD: 0.02 10*3/MM3 (ref 0–0.02)
IMM GRANULOCYTES # BLD: 0.03 10*3/MM3 (ref 0–0.02)
IMM GRANULOCYTES NFR BLD: 0.1 % (ref 0–0.5)
IMM GRANULOCYTES NFR BLD: 0.1 % (ref 0–0.5)
IMM GRANULOCYTES NFR BLD: 0.2 % (ref 0–0.5)
IMM GRANULOCYTES NFR BLD: 0.3 % (ref 0–0.5)
IMP STRAIN: NORMAL
IMP STRAIN: NOT DETECTED
INR PPP: 1.09 (ref 0.8–1.2)
INR PPP: 1.22 (ref 0.8–1.2)
ISOLATED FROM: ABNORMAL
KETONES UR QL STRIP: ABNORMAL
KPC STRAIN: NORMAL
KPC STRAIN: NOT DETECTED
LEUKOCYTE ESTERASE UR QL STRIP.AUTO: ABNORMAL
LIPASE SERPL-CCNC: 135 U/L (ref 23–300)
LYMPHOCYTES # BLD AUTO: 0.36 10*3/MM3 (ref 0.6–4.2)
LYMPHOCYTES # BLD AUTO: 0.55 10*3/MM3 (ref 0.6–4.2)
LYMPHOCYTES # BLD AUTO: 0.55 10*3/MM3 (ref 0.6–4.2)
LYMPHOCYTES # BLD AUTO: 0.56 10*3/MM3 (ref 0.6–4.2)
LYMPHOCYTES # BLD AUTO: 0.66 10*3/MM3 (ref 0.6–4.2)
LYMPHOCYTES # BLD AUTO: 0.85 10*3/MM3 (ref 0.6–4.2)
LYMPHOCYTES # BLD AUTO: 0.95 10*3/MM3 (ref 0.6–4.2)
LYMPHOCYTES # BLD AUTO: 1.08 10*3/MM3 (ref 0.6–4.2)
LYMPHOCYTES # BLD AUTO: 1.21 10*3/MM3 (ref 0.6–4.2)
LYMPHOCYTES # BLD AUTO: 1.4 10*3/MM3 (ref 0.6–4.2)
LYMPHOCYTES # BLD AUTO: 1.46 10*3/MM3 (ref 0.6–4.2)
LYMPHOCYTES # BLD AUTO: 1.51 10*3/MM3 (ref 0.6–4.2)
LYMPHOCYTES # BLD AUTO: 2.03 10*3/MM3 (ref 0.6–4.2)
LYMPHOCYTES # BLD AUTO: 2.12 10*3/MM3 (ref 0.6–4.2)
LYMPHOCYTES NFR BLD AUTO: 10.2 % (ref 10–50)
LYMPHOCYTES NFR BLD AUTO: 11.8 % (ref 10–50)
LYMPHOCYTES NFR BLD AUTO: 11.8 % (ref 10–50)
LYMPHOCYTES NFR BLD AUTO: 12.5 % (ref 10–50)
LYMPHOCYTES NFR BLD AUTO: 17.7 % (ref 10–50)
LYMPHOCYTES NFR BLD AUTO: 2.7 % (ref 10–50)
LYMPHOCYTES NFR BLD AUTO: 20.2 % (ref 10–50)
LYMPHOCYTES NFR BLD AUTO: 20.6 % (ref 10–50)
LYMPHOCYTES NFR BLD AUTO: 23.1 % (ref 10–50)
LYMPHOCYTES NFR BLD AUTO: 35.5 % (ref 10–50)
LYMPHOCYTES NFR BLD AUTO: 37.8 % (ref 10–50)
LYMPHOCYTES NFR BLD AUTO: 4.5 % (ref 10–50)
LYMPHOCYTES NFR BLD AUTO: 5 % (ref 10–50)
LYMPHOCYTES NFR BLD AUTO: 5.2 % (ref 10–50)
Lab: ABNORMAL
Lab: NORMAL
MAGNESIUM SERPL-MCNC: 2.2 MG/DL (ref 1.6–2.3)
MCH RBC QN AUTO: 28.1 PG (ref 26.5–34)
MCH RBC QN AUTO: 29.1 PG (ref 26.5–34)
MCH RBC QN AUTO: 29.1 PG (ref 26.5–34)
MCH RBC QN AUTO: 29.2 PG (ref 26.5–34)
MCH RBC QN AUTO: 29.3 PG (ref 26.5–34)
MCH RBC QN AUTO: 29.4 PG (ref 26.5–34)
MCH RBC QN AUTO: 29.7 PG (ref 26.5–34)
MCH RBC QN AUTO: 30 PG (ref 26.5–34)
MCH RBC QN AUTO: 30.3 PG (ref 26.5–34)
MCH RBC QN AUTO: 30.8 PG (ref 26.5–34)
MCH RBC QN AUTO: 30.9 PG (ref 26.5–34)
MCH RBC QN AUTO: 31.3 PG (ref 26.5–34)
MCHC RBC AUTO-ENTMCNC: 30.8 G/DL (ref 31.4–36)
MCHC RBC AUTO-ENTMCNC: 31 G/DL (ref 31.4–36)
MCHC RBC AUTO-ENTMCNC: 31.6 G/DL (ref 31.4–36)
MCHC RBC AUTO-ENTMCNC: 31.7 G/DL (ref 31.4–36)
MCHC RBC AUTO-ENTMCNC: 31.7 G/DL (ref 31.4–36)
MCHC RBC AUTO-ENTMCNC: 32 G/DL (ref 31.4–36)
MCHC RBC AUTO-ENTMCNC: 32.2 G/DL (ref 31.4–36)
MCHC RBC AUTO-ENTMCNC: 32.7 G/DL (ref 31.4–36)
MCHC RBC AUTO-ENTMCNC: 32.7 G/DL (ref 31.4–36)
MCHC RBC AUTO-ENTMCNC: 33.5 G/DL (ref 31.4–36)
MCHC RBC AUTO-ENTMCNC: 33.7 G/DL (ref 31.4–36)
MCV RBC AUTO: 91.2 FL (ref 80–98)
MCV RBC AUTO: 91.3 FL (ref 80–98)
MCV RBC AUTO: 91.4 FL (ref 80–98)
MCV RBC AUTO: 91.8 FL (ref 80–98)
MCV RBC AUTO: 91.9 FL (ref 80–98)
MCV RBC AUTO: 91.9 FL (ref 80–98)
MCV RBC AUTO: 92.1 FL (ref 80–98)
MCV RBC AUTO: 92.5 FL (ref 80–98)
MCV RBC AUTO: 92.6 FL (ref 80–98)
MCV RBC AUTO: 92.7 FL (ref 80–98)
MCV RBC AUTO: 92.9 FL (ref 80–98)
MCV RBC AUTO: 93 FL (ref 80–98)
MCV RBC AUTO: 93.6 FL (ref 80–98)
MCV RBC AUTO: 95.9 FL (ref 80–98)
MODALITY: ABNORMAL
MONOCYTES # BLD AUTO: 0.33 10*3/MM3 (ref 0–0.9)
MONOCYTES # BLD AUTO: 0.43 10*3/MM3 (ref 0–0.9)
MONOCYTES # BLD AUTO: 0.47 10*3/MM3 (ref 0–0.9)
MONOCYTES # BLD AUTO: 0.5 10*3/MM3 (ref 0–0.9)
MONOCYTES # BLD AUTO: 0.52 10*3/MM3 (ref 0–0.9)
MONOCYTES # BLD AUTO: 0.54 10*3/MM3 (ref 0–0.9)
MONOCYTES # BLD AUTO: 0.55 10*3/MM3 (ref 0–0.9)
MONOCYTES # BLD AUTO: 0.61 10*3/MM3 (ref 0–0.9)
MONOCYTES # BLD AUTO: 0.63 10*3/MM3 (ref 0–0.9)
MONOCYTES # BLD AUTO: 0.65 10*3/MM3 (ref 0–0.9)
MONOCYTES # BLD AUTO: 0.66 10*3/MM3 (ref 0–0.9)
MONOCYTES # BLD AUTO: 0.69 10*3/MM3 (ref 0–0.9)
MONOCYTES # BLD AUTO: 0.7 10*3/MM3 (ref 0–0.9)
MONOCYTES # BLD AUTO: 1.1 10*3/MM3 (ref 0–0.9)
MONOCYTES NFR BLD AUTO: 10.1 % (ref 0–12)
MONOCYTES NFR BLD AUTO: 10.6 % (ref 0–12)
MONOCYTES NFR BLD AUTO: 10.9 % (ref 0–12)
MONOCYTES NFR BLD AUTO: 12.3 % (ref 0–12)
MONOCYTES NFR BLD AUTO: 3.6 % (ref 0–12)
MONOCYTES NFR BLD AUTO: 4.1 % (ref 0–12)
MONOCYTES NFR BLD AUTO: 4.3 % (ref 0–12)
MONOCYTES NFR BLD AUTO: 4.7 % (ref 0–12)
MONOCYTES NFR BLD AUTO: 6.8 % (ref 0–12)
MONOCYTES NFR BLD AUTO: 6.9 % (ref 0–12)
MONOCYTES NFR BLD AUTO: 7.5 % (ref 0–12)
MONOCYTES NFR BLD AUTO: 8.4 % (ref 0–12)
MONOCYTES NFR BLD AUTO: 8.9 % (ref 0–12)
MONOCYTES NFR BLD AUTO: 9.4 % (ref 0–12)
NDM STRAIN: NORMAL
NDM STRAIN: NOT DETECTED
NEUTROPHILS # BLD AUTO: 10.64 10*3/MM3 (ref 2–8.6)
NEUTROPHILS # BLD AUTO: 12.14 10*3/MM3 (ref 2–8.6)
NEUTROPHILS # BLD AUTO: 2.11 10*3/MM3 (ref 2–8.6)
NEUTROPHILS # BLD AUTO: 2.9 10*3/MM3 (ref 2–8.6)
NEUTROPHILS # BLD AUTO: 3.8 10*3/MM3 (ref 2–8.6)
NEUTROPHILS # BLD AUTO: 4.11 10*3/MM3 (ref 2–8.6)
NEUTROPHILS # BLD AUTO: 4.64 10*3/MM3 (ref 2–8.6)
NEUTROPHILS # BLD AUTO: 4.71 10*3/MM3 (ref 2–8.6)
NEUTROPHILS # BLD AUTO: 4.82 10*3/MM3 (ref 2–8.6)
NEUTROPHILS # BLD AUTO: 5.68 10*3/MM3 (ref 2–8.6)
NEUTROPHILS # BLD AUTO: 6.37 10*3/MM3 (ref 2–8.6)
NEUTROPHILS # BLD AUTO: 7.5 10*3/MM3 (ref 2–8.6)
NEUTROPHILS # BLD AUTO: 9.33 10*3/MM3 (ref 2–8.6)
NEUTROPHILS # BLD AUTO: 9.88 10*3/MM3 (ref 2–8.6)
NEUTROPHILS NFR BLD AUTO: 39.3 % (ref 37–80)
NEUTROPHILS NFR BLD AUTO: 48.5 % (ref 37–80)
NEUTROPHILS NFR BLD AUTO: 60 % (ref 37–80)
NEUTROPHILS NFR BLD AUTO: 65.8 % (ref 37–80)
NEUTROPHILS NFR BLD AUTO: 66.9 % (ref 37–80)
NEUTROPHILS NFR BLD AUTO: 69.1 % (ref 37–80)
NEUTROPHILS NFR BLD AUTO: 73.2 % (ref 37–80)
NEUTROPHILS NFR BLD AUTO: 74.8 % (ref 37–80)
NEUTROPHILS NFR BLD AUTO: 76.5 % (ref 37–80)
NEUTROPHILS NFR BLD AUTO: 82.3 % (ref 37–80)
NEUTROPHILS NFR BLD AUTO: 86 % (ref 37–80)
NEUTROPHILS NFR BLD AUTO: 88.5 % (ref 37–80)
NEUTROPHILS NFR BLD AUTO: 89.6 % (ref 37–80)
NEUTROPHILS NFR BLD AUTO: 92.7 % (ref 37–80)
NITRITE UR QL STRIP: NEGATIVE
NRBC BLD MANUAL-RTO: 0 /100 WBC (ref 0–0)
NT-PROBNP SERPL-MCNC: ABNORMAL PG/ML (ref 0–900)
OXA 48 STRAIN: NORMAL
OXA 48 STRAIN: NOT DETECTED
PCO2 BLDA: 36 MM HG (ref 35–45)
PH BLDA: 7.39 PH UNITS (ref 7.35–7.45)
PH UR STRIP.AUTO: <=5 [PH] (ref 5–9)
PHOSPHATE SERPL-MCNC: 4.1 MG/DL (ref 2.4–4.4)
PHOSPHATE SERPL-MCNC: 4.8 MG/DL (ref 2.4–4.4)
PLATELET # BLD AUTO: 160 10*3/MM3 (ref 150–450)
PLATELET # BLD AUTO: 173 10*3/MM3 (ref 150–450)
PLATELET # BLD AUTO: 174 10*3/MM3 (ref 150–450)
PLATELET # BLD AUTO: 191 10*3/MM3 (ref 150–450)
PLATELET # BLD AUTO: 204 10*3/MM3 (ref 150–450)
PLATELET # BLD AUTO: 209 10*3/MM3 (ref 150–450)
PLATELET # BLD AUTO: 221 10*3/MM3 (ref 150–450)
PLATELET # BLD AUTO: 235 10*3/MM3 (ref 150–450)
PLATELET # BLD AUTO: 239 10*3/MM3 (ref 150–450)
PLATELET # BLD AUTO: 256 10*3/MM3 (ref 150–450)
PLATELET # BLD AUTO: 279 10*3/MM3 (ref 150–450)
PLATELET # BLD AUTO: 284 10*3/MM3 (ref 150–450)
PLATELET # BLD AUTO: 336 10*3/MM3 (ref 150–450)
PLATELET # BLD AUTO: 339 10*3/MM3 (ref 150–450)
PMV BLD AUTO: 10 FL (ref 8–12)
PMV BLD AUTO: 10 FL (ref 8–12)
PMV BLD AUTO: 10.1 FL (ref 8–12)
PMV BLD AUTO: 10.2 FL (ref 8–12)
PMV BLD AUTO: 10.2 FL (ref 8–12)
PMV BLD AUTO: 10.6 FL (ref 8–12)
PMV BLD AUTO: 9.1 FL (ref 8–12)
PMV BLD AUTO: 9.4 FL (ref 8–12)
PMV BLD AUTO: 9.4 FL (ref 8–12)
PMV BLD AUTO: 9.5 FL (ref 8–12)
PMV BLD AUTO: 9.6 FL (ref 8–12)
PMV BLD AUTO: 9.7 FL (ref 8–12)
PMV BLD AUTO: 9.7 FL (ref 8–12)
PMV BLD AUTO: 9.9 FL (ref 8–12)
PO2 BLDA: 70.8 MM HG (ref 83–108)
POTASSIUM BLD-SCNC: 3 MMOL/L (ref 3.5–5.1)
POTASSIUM BLD-SCNC: 3.3 MMOL/L (ref 3.5–5.1)
POTASSIUM BLD-SCNC: 3.6 MMOL/L (ref 3.5–5.1)
POTASSIUM BLD-SCNC: 3.6 MMOL/L (ref 3.5–5.1)
POTASSIUM BLD-SCNC: 3.8 MMOL/L (ref 3.5–5.1)
POTASSIUM BLD-SCNC: 4.1 MMOL/L (ref 3.5–5.1)
POTASSIUM BLD-SCNC: 4.2 MMOL/L (ref 3.5–5.1)
POTASSIUM BLD-SCNC: 4.3 MMOL/L (ref 3.5–5.1)
POTASSIUM BLD-SCNC: 4.3 MMOL/L (ref 3.5–5.1)
POTASSIUM BLD-SCNC: 4.9 MMOL/L (ref 3.5–5.1)
POTASSIUM BLD-SCNC: 5 MMOL/L (ref 3.5–5.1)
POTASSIUM BLD-SCNC: 5.6 MMOL/L (ref 3.5–5.1)
POTASSIUM BLD-SCNC: 6.1 MMOL/L (ref 3.5–5.1)
POTASSIUM BLD-SCNC: 6.7 MMOL/L (ref 3.5–5.1)
PROT SERPL-MCNC: 7.6 G/DL (ref 6.3–8.6)
PROT SERPL-MCNC: 7.6 G/DL (ref 6.3–8.6)
PROT SERPL-MCNC: 8.1 G/DL (ref 6.3–8.6)
PROT SERPL-MCNC: 8.2 G/DL (ref 6.3–8.6)
PROT SERPL-MCNC: 8.5 G/DL (ref 6.3–8.6)
PROT SERPL-MCNC: 8.6 G/DL (ref 6.3–8.6)
PROT SERPL-MCNC: 8.6 G/DL (ref 6.3–8.6)
PROT SERPL-MCNC: 8.7 G/DL (ref 6.3–8.6)
PROT SERPL-MCNC: 9.1 G/DL (ref 6.3–8.6)
PROT UR QL STRIP: ABNORMAL
PROTHROMBIN TIME: 13.9 SECONDS (ref 11.1–15.3)
PROTHROMBIN TIME: 15.1 SECONDS (ref 11.1–15.3)
RBC # BLD AUTO: 2.65 10*6/MM3 (ref 3.77–5.16)
RBC # BLD AUTO: 2.89 10*6/MM3 (ref 3.77–5.16)
RBC # BLD AUTO: 2.96 10*6/MM3 (ref 3.77–5.16)
RBC # BLD AUTO: 3.04 10*6/MM3 (ref 3.77–5.16)
RBC # BLD AUTO: 3.32 10*6/MM3 (ref 3.77–5.16)
RBC # BLD AUTO: 3.43 10*6/MM3 (ref 3.77–5.16)
RBC # BLD AUTO: 3.52 10*6/MM3 (ref 3.77–5.16)
RBC # BLD AUTO: 3.57 10*6/MM3 (ref 3.77–5.16)
RBC # BLD AUTO: 3.58 10*6/MM3 (ref 3.77–5.16)
RBC # BLD AUTO: 3.61 10*6/MM3 (ref 3.77–5.16)
RBC # BLD AUTO: 3.63 10*6/MM3 (ref 3.77–5.16)
RBC # BLD AUTO: 3.8 10*6/MM3 (ref 3.77–5.16)
RBC # BLD AUTO: 3.94 10*6/MM3 (ref 3.77–5.16)
RBC # BLD AUTO: 4.12 10*6/MM3 (ref 3.77–5.16)
RBC # UR: ABNORMAL /HPF
REF LAB TEST METHOD: ABNORMAL
RH BLD: POSITIVE
SAO2 % BLDCOA: 93.5 % (ref 94–99)
SODIUM BLD-SCNC: 117 MMOL/L (ref 137–145)
SODIUM BLD-SCNC: 120 MMOL/L (ref 137–145)
SODIUM BLD-SCNC: 122 MMOL/L (ref 137–145)
SODIUM BLD-SCNC: 124 MMOL/L (ref 137–145)
SODIUM BLD-SCNC: 125 MMOL/L (ref 137–145)
SODIUM BLD-SCNC: 128 MMOL/L (ref 137–145)
SODIUM BLD-SCNC: 130 MMOL/L (ref 137–145)
SODIUM BLD-SCNC: 132 MMOL/L (ref 137–145)
SODIUM BLD-SCNC: 134 MMOL/L (ref 137–145)
SODIUM BLD-SCNC: 136 MMOL/L (ref 137–145)
SODIUM BLD-SCNC: 136 MMOL/L (ref 137–145)
SODIUM BLD-SCNC: 137 MMOL/L (ref 137–145)
SODIUM BLD-SCNC: 140 MMOL/L (ref 137–145)
SP GR UR STRIP: 1.02 (ref 1–1.03)
SQUAMOUS #/AREA URNS HPF: ABNORMAL /HPF
T&S EXPIRATION DATE: NORMAL
TROPONIN I SERPL-MCNC: 0.02 NG/ML
TROPONIN I SERPL-MCNC: 0.05 NG/ML
TROPONIN I SERPL-MCNC: <0.012 NG/ML
TSH SERPL DL<=0.05 MIU/L-ACNC: 2.03 MIU/ML (ref 0.46–4.68)
UNIT  ABO: NORMAL
UNIT  RH: NORMAL
UROBILINOGEN UR QL STRIP: ABNORMAL
VENTILATOR MODE: ABNORMAL
VIM STRAIN: NORMAL
VIM STRAIN: NOT DETECTED
WBC NRBC COR # BLD: 10.55 10*3/MM3 (ref 3.2–9.8)
WBC NRBC COR # BLD: 11.01 10*3/MM3 (ref 3.2–9.8)
WBC NRBC COR # BLD: 12.37 10*3/MM3 (ref 3.2–9.8)
WBC NRBC COR # BLD: 13.1 10*3/MM3 (ref 3.2–9.8)
WBC NRBC COR # BLD: 5.37 10*3/MM3 (ref 3.2–9.8)
WBC NRBC COR # BLD: 5.61 10*3/MM3 (ref 3.2–9.8)
WBC NRBC COR # BLD: 5.97 10*3/MM3 (ref 3.2–9.8)
WBC NRBC COR # BLD: 6.33 10*3/MM3 (ref 3.2–9.8)
WBC NRBC COR # BLD: 6.82 10*3/MM3 (ref 3.2–9.8)
WBC NRBC COR # BLD: 6.94 10*3/MM3 (ref 3.2–9.8)
WBC NRBC COR # BLD: 7.33 10*3/MM3 (ref 3.2–9.8)
WBC NRBC COR # BLD: 7.59 10*3/MM3 (ref 3.2–9.8)
WBC NRBC COR # BLD: 8.33 10*3/MM3 (ref 3.2–9.8)
WBC NRBC COR # BLD: 9.12 10*3/MM3 (ref 3.2–9.8)
WBC UR QL AUTO: ABNORMAL /HPF
WHOLE BLOOD HOLD SPECIMEN: NORMAL

## 2018-01-01 PROCEDURE — 25010000002 AZITHROMYCIN PER 500 MG: Performed by: EMERGENCY MEDICINE

## 2018-01-01 PROCEDURE — 85025 COMPLETE CBC W/AUTO DIFF WBC: CPT | Performed by: FAMILY MEDICINE

## 2018-01-01 PROCEDURE — G0378 HOSPITAL OBSERVATION PER HR: HCPCS

## 2018-01-01 PROCEDURE — 85025 COMPLETE CBC W/AUTO DIFF WBC: CPT | Performed by: EMERGENCY MEDICINE

## 2018-01-01 PROCEDURE — 99308 SBSQ NF CARE LOW MDM 20: CPT | Performed by: FAMILY MEDICINE

## 2018-01-01 PROCEDURE — P9016 RBC LEUKOCYTES REDUCED: HCPCS

## 2018-01-01 PROCEDURE — 80053 COMPREHEN METABOLIC PANEL: CPT | Performed by: INTERNAL MEDICINE

## 2018-01-01 PROCEDURE — 76775 US EXAM ABDO BACK WALL LIM: CPT

## 2018-01-01 PROCEDURE — 99309 SBSQ NF CARE MODERATE MDM 30: CPT | Performed by: FAMILY MEDICINE

## 2018-01-01 PROCEDURE — 87081 CULTURE SCREEN ONLY: CPT | Performed by: FAMILY MEDICINE

## 2018-01-01 PROCEDURE — 94760 N-INVAS EAR/PLS OXIMETRY 1: CPT

## 2018-01-01 PROCEDURE — 99284 EMERGENCY DEPT VISIT MOD MDM: CPT

## 2018-01-01 PROCEDURE — 25010000002 CEFTRIAXONE PER 250 MG: Performed by: FAMILY MEDICINE

## 2018-01-01 PROCEDURE — 85014 HEMATOCRIT: CPT | Performed by: NURSE PRACTITIONER

## 2018-01-01 PROCEDURE — 25010000002 AZITHROMYCIN PER 500 MG: Performed by: INTERNAL MEDICINE

## 2018-01-01 PROCEDURE — 80053 COMPREHEN METABOLIC PANEL: CPT | Performed by: EMERGENCY MEDICINE

## 2018-01-01 PROCEDURE — 86901 BLOOD TYPING SEROLOGIC RH(D): CPT | Performed by: EMERGENCY MEDICINE

## 2018-01-01 PROCEDURE — 87086 URINE CULTURE/COLONY COUNT: CPT | Performed by: FAMILY MEDICINE

## 2018-01-01 PROCEDURE — 85610 PROTHROMBIN TIME: CPT | Performed by: EMERGENCY MEDICINE

## 2018-01-01 PROCEDURE — 99285 EMERGENCY DEPT VISIT HI MDM: CPT

## 2018-01-01 PROCEDURE — 93005 ELECTROCARDIOGRAM TRACING: CPT | Performed by: EMERGENCY MEDICINE

## 2018-01-01 PROCEDURE — G0257 UNSCHED DIALYSIS ESRD PT HOS: HCPCS

## 2018-01-01 PROCEDURE — 84484 ASSAY OF TROPONIN QUANT: CPT | Performed by: FAMILY MEDICINE

## 2018-01-01 PROCEDURE — 82553 CREATINE MB FRACTION: CPT | Performed by: FAMILY MEDICINE

## 2018-01-01 PROCEDURE — 87040 BLOOD CULTURE FOR BACTERIA: CPT | Performed by: EMERGENCY MEDICINE

## 2018-01-01 PROCEDURE — 25010000002 CEFTRIAXONE PER 250 MG: Performed by: INTERNAL MEDICINE

## 2018-01-01 PROCEDURE — 85025 COMPLETE CBC W/AUTO DIFF WBC: CPT | Performed by: INTERNAL MEDICINE

## 2018-01-01 PROCEDURE — 99214 OFFICE O/P EST MOD 30 MIN: CPT | Performed by: THORACIC SURGERY (CARDIOTHORACIC VASCULAR SURGERY)

## 2018-01-01 PROCEDURE — 87081 CULTURE SCREEN ONLY: CPT | Performed by: INTERNAL MEDICINE

## 2018-01-01 PROCEDURE — 81001 URINALYSIS AUTO W/SCOPE: CPT | Performed by: EMERGENCY MEDICINE

## 2018-01-01 PROCEDURE — 84443 ASSAY THYROID STIM HORMONE: CPT | Performed by: INTERNAL MEDICINE

## 2018-01-01 PROCEDURE — 80069 RENAL FUNCTION PANEL: CPT | Performed by: INTERNAL MEDICINE

## 2018-01-01 PROCEDURE — 99307 SBSQ NF CARE SF MDM 10: CPT | Performed by: FAMILY MEDICINE

## 2018-01-01 PROCEDURE — 83605 ASSAY OF LACTIC ACID: CPT | Performed by: EMERGENCY MEDICINE

## 2018-01-01 PROCEDURE — 36430 TRANSFUSION BLD/BLD COMPNT: CPT

## 2018-01-01 PROCEDURE — 99224 PR SBSQ OBSERVATION CARE/DAY 15 MINUTES: CPT | Performed by: INTERNAL MEDICINE

## 2018-01-01 PROCEDURE — 71045 X-RAY EXAM CHEST 1 VIEW: CPT

## 2018-01-01 PROCEDURE — 99225 PR SBSQ OBSERVATION CARE/DAY 25 MINUTES: CPT | Performed by: INTERNAL MEDICINE

## 2018-01-01 PROCEDURE — 74018 RADEX ABDOMEN 1 VIEW: CPT

## 2018-01-01 PROCEDURE — 80048 BASIC METABOLIC PNL TOTAL CA: CPT | Performed by: FAMILY MEDICINE

## 2018-01-01 PROCEDURE — 93010 ELECTROCARDIOGRAM REPORT: CPT | Performed by: INTERNAL MEDICINE

## 2018-01-01 PROCEDURE — 80053 COMPREHEN METABOLIC PANEL: CPT | Performed by: FAMILY MEDICINE

## 2018-01-01 PROCEDURE — 86900 BLOOD TYPING SEROLOGIC ABO: CPT

## 2018-01-01 PROCEDURE — 25010000002 CEFTRIAXONE: Performed by: EMERGENCY MEDICINE

## 2018-01-01 PROCEDURE — 87077 CULTURE AEROBIC IDENTIFY: CPT | Performed by: EMERGENCY MEDICINE

## 2018-01-01 PROCEDURE — 83880 ASSAY OF NATRIURETIC PEPTIDE: CPT | Performed by: EMERGENCY MEDICINE

## 2018-01-01 PROCEDURE — 74176 CT ABD & PELVIS W/O CONTRAST: CPT

## 2018-01-01 PROCEDURE — 96366 THER/PROPH/DIAG IV INF ADDON: CPT

## 2018-01-01 PROCEDURE — 82803 BLOOD GASES ANY COMBINATION: CPT

## 2018-01-01 PROCEDURE — 85014 HEMATOCRIT: CPT | Performed by: INTERNAL MEDICINE

## 2018-01-01 PROCEDURE — 82272 OCCULT BLD FECES 1-3 TESTS: CPT | Performed by: NURSE PRACTITIONER

## 2018-01-01 PROCEDURE — 70450 CT HEAD/BRAIN W/O DYE: CPT

## 2018-01-01 PROCEDURE — 86850 RBC ANTIBODY SCREEN: CPT | Performed by: EMERGENCY MEDICINE

## 2018-01-01 PROCEDURE — 96365 THER/PROPH/DIAG IV INF INIT: CPT

## 2018-01-01 PROCEDURE — 85018 HEMOGLOBIN: CPT | Performed by: INTERNAL MEDICINE

## 2018-01-01 PROCEDURE — 99231 SBSQ HOSP IP/OBS SF/LOW 25: CPT | Performed by: NURSE PRACTITIONER

## 2018-01-01 PROCEDURE — 74019 RADEX ABDOMEN 2 VIEWS: CPT

## 2018-01-01 PROCEDURE — 87147 CULTURE TYPE IMMUNOLOGIC: CPT | Performed by: EMERGENCY MEDICINE

## 2018-01-01 PROCEDURE — 86900 BLOOD TYPING SEROLOGIC ABO: CPT | Performed by: EMERGENCY MEDICINE

## 2018-01-01 PROCEDURE — 94799 UNLISTED PULMONARY SVC/PX: CPT

## 2018-01-01 PROCEDURE — 87804 INFLUENZA ASSAY W/OPTIC: CPT | Performed by: FAMILY MEDICINE

## 2018-01-01 PROCEDURE — 87340 HEPATITIS B SURFACE AG IA: CPT | Performed by: INTERNAL MEDICINE

## 2018-01-01 PROCEDURE — 86923 COMPATIBILITY TEST ELECTRIC: CPT

## 2018-01-01 PROCEDURE — 87186 SC STD MICRODIL/AGAR DIL: CPT | Performed by: EMERGENCY MEDICINE

## 2018-01-01 PROCEDURE — 36600 WITHDRAWAL OF ARTERIAL BLOOD: CPT

## 2018-01-01 PROCEDURE — 25010000002 AZITHROMYCIN: Performed by: INTERNAL MEDICINE

## 2018-01-01 PROCEDURE — 63510000001 EPOETIN ALFA PER 1000 UNITS: Performed by: INTERNAL MEDICINE

## 2018-01-01 PROCEDURE — 84484 ASSAY OF TROPONIN QUANT: CPT | Performed by: EMERGENCY MEDICINE

## 2018-01-01 PROCEDURE — 84132 ASSAY OF SERUM POTASSIUM: CPT | Performed by: INTERNAL MEDICINE

## 2018-01-01 PROCEDURE — 82550 ASSAY OF CK (CPK): CPT | Performed by: FAMILY MEDICINE

## 2018-01-01 PROCEDURE — 82962 GLUCOSE BLOOD TEST: CPT

## 2018-01-01 PROCEDURE — 87150 DNA/RNA AMPLIFIED PROBE: CPT | Performed by: EMERGENCY MEDICINE

## 2018-01-01 PROCEDURE — 85018 HEMOGLOBIN: CPT | Performed by: NURSE PRACTITIONER

## 2018-01-01 PROCEDURE — 83690 ASSAY OF LIPASE: CPT | Performed by: EMERGENCY MEDICINE

## 2018-01-01 PROCEDURE — 83735 ASSAY OF MAGNESIUM: CPT | Performed by: FAMILY MEDICINE

## 2018-01-01 PROCEDURE — 25010000002 CEFTRIAXONE PER 250 MG: Performed by: EMERGENCY MEDICINE

## 2018-01-01 PROCEDURE — 99214 OFFICE O/P EST MOD 30 MIN: CPT | Performed by: NURSE PRACTITIONER

## 2018-01-01 RX ORDER — ATORVASTATIN CALCIUM 40 MG/1
40 TABLET, FILM COATED ORAL NIGHTLY
Status: DISCONTINUED | OUTPATIENT
Start: 2018-01-01 | End: 2018-01-01 | Stop reason: HOSPADM

## 2018-01-01 RX ORDER — LACTULOSE 10 G/15ML
20 SOLUTION ORAL DAILY
Status: DISCONTINUED | OUTPATIENT
Start: 2018-01-01 | End: 2018-01-01

## 2018-01-01 RX ORDER — POLYETHYLENE GLYCOL 3350 17 G/17G
17 POWDER, FOR SOLUTION ORAL DAILY PRN
Status: DISCONTINUED | OUTPATIENT
Start: 2018-01-01 | End: 2018-01-01

## 2018-01-01 RX ORDER — FAMOTIDINE 20 MG/1
20 TABLET, FILM COATED ORAL DAILY
Status: DISCONTINUED | OUTPATIENT
Start: 2018-01-01 | End: 2018-01-01 | Stop reason: HOSPADM

## 2018-01-01 RX ORDER — SEVELAMER HYDROCHLORIDE 800 MG/1
1600 TABLET, FILM COATED ORAL
Status: DISCONTINUED | OUTPATIENT
Start: 2018-01-01 | End: 2018-01-01

## 2018-01-01 RX ORDER — HYDROCODONE BITARTRATE AND ACETAMINOPHEN 7.5; 325 MG/1; MG/1
1 TABLET ORAL EVERY 4 HOURS PRN
Qty: 10 TABLET | Refills: 0 | Status: ON HOLD | OUTPATIENT
Start: 2018-01-01 | End: 2018-01-01

## 2018-01-01 RX ORDER — SEVELAMER CARBONATE 800 MG/1
1600 TABLET, FILM COATED ORAL
Status: DISCONTINUED | OUTPATIENT
Start: 2018-01-01 | End: 2018-01-01 | Stop reason: CLARIF

## 2018-01-01 RX ORDER — HYDROCODONE BITARTRATE AND ACETAMINOPHEN 7.5; 325 MG/1; MG/1
1 TABLET ORAL EVERY 4 HOURS PRN
Status: DISCONTINUED | OUTPATIENT
Start: 2018-01-01 | End: 2018-01-01 | Stop reason: HOSPADM

## 2018-01-01 RX ORDER — SODIUM CHLORIDE 0.9 % (FLUSH) 0.9 %
10 SYRINGE (ML) INJECTION AS NEEDED
Status: DISCONTINUED | OUTPATIENT
Start: 2018-01-01 | End: 2018-01-01 | Stop reason: HOSPADM

## 2018-01-01 RX ORDER — ONDANSETRON 2 MG/ML
4 INJECTION INTRAMUSCULAR; INTRAVENOUS EVERY 6 HOURS PRN
Status: DISCONTINUED | OUTPATIENT
Start: 2018-01-01 | End: 2018-01-01 | Stop reason: HOSPADM

## 2018-01-01 RX ORDER — LEVETIRACETAM 500 MG/1
500 TABLET ORAL EVERY 12 HOURS SCHEDULED
Status: DISCONTINUED | OUTPATIENT
Start: 2018-01-01 | End: 2018-01-01 | Stop reason: HOSPADM

## 2018-01-01 RX ORDER — LEVOTHYROXINE SODIUM 88 UG/1
88 TABLET ORAL
Status: DISCONTINUED | OUTPATIENT
Start: 2018-01-01 | End: 2018-01-01 | Stop reason: HOSPADM

## 2018-01-01 RX ORDER — LANOLIN ALCOHOL/MO/W.PET/CERES
50 CREAM (GRAM) TOPICAL DAILY
Status: DISCONTINUED | OUTPATIENT
Start: 2018-01-01 | End: 2018-01-01 | Stop reason: HOSPADM

## 2018-01-01 RX ORDER — ASPIRIN 81 MG/1
81 TABLET ORAL DAILY
Status: DISCONTINUED | OUTPATIENT
Start: 2018-01-01 | End: 2018-01-01

## 2018-01-01 RX ORDER — FAMOTIDINE 40 MG/1
40 TABLET, FILM COATED ORAL DAILY
Status: DISCONTINUED | OUTPATIENT
Start: 2018-01-01 | End: 2018-01-01

## 2018-01-01 RX ORDER — GABAPENTIN 100 MG/1
100 CAPSULE ORAL 3 TIMES DAILY
Qty: 10 CAPSULE | Refills: 0 | OUTPATIENT
Start: 2018-01-01 | End: 2018-01-01

## 2018-01-01 RX ORDER — PANTOPRAZOLE SODIUM 40 MG/1
40 TABLET, DELAYED RELEASE ORAL
Status: DISCONTINUED | OUTPATIENT
Start: 2018-01-01 | End: 2018-01-01 | Stop reason: HOSPADM

## 2018-01-01 RX ORDER — ALBUMIN (HUMAN) 12.5 G/50ML
12.5 SOLUTION INTRAVENOUS AS NEEDED
Status: DISCONTINUED | OUTPATIENT
Start: 2018-01-01 | End: 2018-01-01

## 2018-01-01 RX ORDER — POLYETHYLENE GLYCOL 3350 17 G/17G
17 POWDER, FOR SOLUTION ORAL DAILY PRN
Status: DISCONTINUED | OUTPATIENT
Start: 2018-01-01 | End: 2018-01-01 | Stop reason: HOSPADM

## 2018-01-01 RX ORDER — HYDROCODONE BITARTRATE AND ACETAMINOPHEN 7.5; 325 MG/1; MG/1
1 TABLET ORAL EVERY 4 HOURS PRN
Qty: 10 TABLET | Refills: 0 | Status: ON HOLD | OUTPATIENT
Start: 2018-01-01 | End: 2019-01-01 | Stop reason: SDUPTHER

## 2018-01-01 RX ORDER — DOCUSATE SODIUM 100 MG/1
100 CAPSULE, LIQUID FILLED ORAL 2 TIMES DAILY
Status: DISCONTINUED | OUTPATIENT
Start: 2018-01-01 | End: 2018-01-01 | Stop reason: HOSPADM

## 2018-01-01 RX ORDER — POLYETHYLENE GLYCOL 3350 17 G/17G
17 POWDER, FOR SOLUTION ORAL DAILY
Status: DISCONTINUED | OUTPATIENT
Start: 2018-01-01 | End: 2018-01-01 | Stop reason: HOSPADM

## 2018-01-01 RX ORDER — MIRTAZAPINE 15 MG/1
7.5 TABLET, FILM COATED ORAL NIGHTLY
Status: DISCONTINUED | OUTPATIENT
Start: 2018-01-01 | End: 2018-01-01 | Stop reason: HOSPADM

## 2018-01-01 RX ORDER — SEVELAMER CARBONATE 800 MG/1
1600 TABLET, FILM COATED ORAL
COMMUNITY

## 2018-01-01 RX ORDER — LIDOCAINE HYDROCHLORIDE 10 MG/ML
5 INJECTION, SOLUTION EPIDURAL; INFILTRATION; INTRACAUDAL; PERINEURAL DAILY PRN
Status: DISCONTINUED | OUTPATIENT
Start: 2018-01-01 | End: 2018-01-01 | Stop reason: HOSPADM

## 2018-01-01 RX ORDER — MIRTAZAPINE 15 MG/1
15 TABLET, FILM COATED ORAL NIGHTLY
Status: DISCONTINUED | OUTPATIENT
Start: 2018-01-01 | End: 2018-01-01

## 2018-01-01 RX ORDER — IPRATROPIUM BROMIDE AND ALBUTEROL SULFATE 2.5; .5 MG/3ML; MG/3ML
3 SOLUTION RESPIRATORY (INHALATION) EVERY 4 HOURS PRN
Status: DISCONTINUED | OUTPATIENT
Start: 2018-01-01 | End: 2018-01-01 | Stop reason: HOSPADM

## 2018-01-01 RX ORDER — ACETAMINOPHEN 325 MG/1
650 TABLET ORAL EVERY 6 HOURS PRN
Status: DISCONTINUED | OUTPATIENT
Start: 2018-01-01 | End: 2018-01-01 | Stop reason: HOSPADM

## 2018-01-01 RX ORDER — AZITHROMYCIN 250 MG/1
250 TABLET, FILM COATED ORAL
Status: DISCONTINUED | OUTPATIENT
Start: 2018-01-01 | End: 2018-01-01 | Stop reason: HOSPADM

## 2018-01-01 RX ORDER — SODIUM CHLORIDE 0.9 % (FLUSH) 0.9 %
1-10 SYRINGE (ML) INJECTION AS NEEDED
Status: DISCONTINUED | OUTPATIENT
Start: 2018-01-01 | End: 2018-01-01 | Stop reason: HOSPADM

## 2018-01-01 RX ORDER — HYDROCODONE BITARTRATE AND ACETAMINOPHEN 7.5; 325 MG/1; MG/1
1 TABLET ORAL EVERY 4 HOURS PRN
Qty: 10 TABLET | Refills: 0 | OUTPATIENT
Start: 2018-01-01 | End: 2018-01-01

## 2018-01-01 RX ORDER — LEVETIRACETAM 500 MG/1
500 TABLET ORAL 2 TIMES DAILY
COMMUNITY

## 2018-01-01 RX ORDER — FAMOTIDINE 20 MG/1
20 TABLET, FILM COATED ORAL
COMMUNITY

## 2018-01-01 RX ORDER — NALOXONE HCL 0.4 MG/ML
0.4 VIAL (ML) INJECTION
Status: DISCONTINUED | OUTPATIENT
Start: 2018-01-01 | End: 2018-01-01 | Stop reason: HOSPADM

## 2018-01-01 RX ORDER — AZITHROMYCIN 250 MG/1
TABLET, FILM COATED ORAL
Qty: 4 TABLET | Refills: 0 | Status: SHIPPED | OUTPATIENT
Start: 2018-01-01 | End: 2018-01-01

## 2018-01-01 RX ORDER — NITROGLYCERIN 0.4 MG/1
0.4 TABLET SUBLINGUAL
Status: DISCONTINUED | OUTPATIENT
Start: 2018-01-01 | End: 2018-01-01 | Stop reason: HOSPADM

## 2018-01-01 RX ORDER — MORPHINE SULFATE 2 MG/ML
1 INJECTION, SOLUTION INTRAMUSCULAR; INTRAVENOUS EVERY 4 HOURS PRN
Status: DISCONTINUED | OUTPATIENT
Start: 2018-01-01 | End: 2018-01-01 | Stop reason: HOSPADM

## 2018-01-01 RX ORDER — QUETIAPINE FUMARATE 25 MG/1
25 TABLET, FILM COATED ORAL NIGHTLY
Status: DISCONTINUED | OUTPATIENT
Start: 2018-01-01 | End: 2018-01-01 | Stop reason: HOSPADM

## 2018-01-01 RX ORDER — POTASSIUM CHLORIDE 750 MG/1
20 CAPSULE, EXTENDED RELEASE ORAL ONCE
Status: COMPLETED | OUTPATIENT
Start: 2018-01-01 | End: 2018-01-01

## 2018-01-01 RX ORDER — LIDOCAINE AND PRILOCAINE 25; 25 MG/G; MG/G
CREAM TOPICAL AS NEEDED
Status: DISCONTINUED | OUTPATIENT
Start: 2018-01-01 | End: 2018-01-01 | Stop reason: HOSPADM

## 2018-01-01 RX ORDER — ASPIRIN 81 MG/1
81 TABLET ORAL DAILY
Status: DISCONTINUED | OUTPATIENT
Start: 2018-01-01 | End: 2018-01-01 | Stop reason: HOSPADM

## 2018-01-01 RX ORDER — LANOLIN ALCOHOL/MO/W.PET/CERES
50 CREAM (GRAM) TOPICAL DAILY
COMMUNITY

## 2018-01-01 RX ORDER — FAMOTIDINE 20 MG/1
20 TABLET, FILM COATED ORAL DAILY
Status: DISCONTINUED | OUTPATIENT
Start: 2018-01-01 | End: 2018-01-01

## 2018-01-01 RX ORDER — QUETIAPINE FUMARATE 25 MG/1
25 TABLET, FILM COATED ORAL NIGHTLY
COMMUNITY

## 2018-01-01 RX ORDER — CIPROFLOXACIN 250 MG/1
250 TABLET, FILM COATED ORAL 2 TIMES DAILY
Status: ON HOLD | COMMUNITY
End: 2018-01-01

## 2018-01-01 RX ORDER — SEVELAMER CARBONATE FOR ORAL SUSPENSION 800 MG/1
1600 POWDER, FOR SUSPENSION ORAL
Status: DISCONTINUED | OUTPATIENT
Start: 2018-01-01 | End: 2018-01-01 | Stop reason: HOSPADM

## 2018-01-01 RX ORDER — SEVELAMER CARBONATE 800 MG/1
1600 TABLET, FILM COATED ORAL
Status: DISCONTINUED | OUTPATIENT
Start: 2018-01-01 | End: 2018-01-01

## 2018-01-01 RX ORDER — MIRTAZAPINE 15 MG/1
7.5 TABLET, FILM COATED ORAL NIGHTLY
COMMUNITY

## 2018-01-01 RX ORDER — FUROSEMIDE 40 MG/1
40 TABLET ORAL EVERY MORNING
COMMUNITY

## 2018-01-01 RX ORDER — GABAPENTIN 100 MG/1
100 CAPSULE ORAL 3 TIMES DAILY
Qty: 10 CAPSULE | Refills: 0 | Status: ON HOLD | OUTPATIENT
Start: 2018-01-01 | End: 2019-01-01 | Stop reason: SDUPTHER

## 2018-01-01 RX ORDER — DIPHENHYDRAMINE HYDROCHLORIDE 25 MG/1
25 CAPSULE ORAL DAILY
COMMUNITY
End: 2018-01-01

## 2018-01-01 RX ORDER — GABAPENTIN 100 MG/1
100 CAPSULE ORAL 3 TIMES DAILY
Status: DISCONTINUED | OUTPATIENT
Start: 2018-01-01 | End: 2018-01-01 | Stop reason: HOSPADM

## 2018-01-01 RX ORDER — FUROSEMIDE 40 MG/1
40 TABLET ORAL EVERY MORNING
Status: DISCONTINUED | OUTPATIENT
Start: 2018-01-01 | End: 2018-01-01 | Stop reason: HOSPADM

## 2018-01-01 RX ORDER — GABAPENTIN 100 MG/1
100 CAPSULE ORAL 3 TIMES DAILY
Qty: 10 CAPSULE | Refills: 0 | Status: ON HOLD | OUTPATIENT
Start: 2018-01-01 | End: 2018-01-01

## 2018-01-01 RX ORDER — MINOCYCLINE HYDROCHLORIDE 100 MG/1
100 CAPSULE ORAL 2 TIMES DAILY
COMMUNITY
Start: 2018-01-01 | End: 2018-01-01 | Stop reason: HOSPADM

## 2018-01-01 RX ORDER — ALBUMIN (HUMAN) 12.5 G/50ML
12.5 SOLUTION INTRAVENOUS AS NEEDED
Status: ACTIVE | OUTPATIENT
Start: 2018-01-01 | End: 2018-01-01

## 2018-01-01 RX ADMIN — HYDROCODONE BITARTRATE AND ACETAMINOPHEN 1 TABLET: 7.5; 325 TABLET ORAL at 23:01

## 2018-01-01 RX ADMIN — HYDROCODONE BITARTRATE AND ACETAMINOPHEN 1 TABLET: 7.5; 325 TABLET ORAL at 21:24

## 2018-01-01 RX ADMIN — QUETIAPINE FUMARATE 25 MG: 25 TABLET ORAL at 21:24

## 2018-01-01 RX ADMIN — FAMOTIDINE 40 MG: 40 TABLET ORAL at 16:06

## 2018-01-01 RX ADMIN — Medication 7.5 MG: at 22:54

## 2018-01-01 RX ADMIN — Medication 50 MG: at 16:17

## 2018-01-01 RX ADMIN — HYDRALAZINE HYDROCHLORIDE 75 MG: 50 TABLET ORAL at 15:43

## 2018-01-01 RX ADMIN — SEVELAMER CARBONATE 1.6 G: 800 POWDER, FOR SUSPENSION ORAL at 16:01

## 2018-01-01 RX ADMIN — SERTRALINE HYDROCHLORIDE 50 MG: 50 TABLET ORAL at 09:34

## 2018-01-01 RX ADMIN — SERTRALINE HYDROCHLORIDE 50 MG: 50 TABLET ORAL at 08:36

## 2018-01-01 RX ADMIN — HYDRALAZINE HYDROCHLORIDE 75 MG: 50 TABLET ORAL at 21:23

## 2018-01-01 RX ADMIN — LEVETIRACETAM 500 MG: 500 TABLET, FILM COATED ORAL at 22:35

## 2018-01-01 RX ADMIN — CEFTRIAXONE SODIUM 1 G: 1 INJECTION, POWDER, FOR SOLUTION INTRAMUSCULAR; INTRAVENOUS at 17:01

## 2018-01-01 RX ADMIN — APIXABAN 2.5 MG: 2.5 TABLET, FILM COATED ORAL at 08:36

## 2018-01-01 RX ADMIN — Medication 50 MG: at 11:23

## 2018-01-01 RX ADMIN — LEVETIRACETAM 500 MG: 500 TABLET, FILM COATED ORAL at 20:21

## 2018-01-01 RX ADMIN — Medication 7.5 MG: at 21:57

## 2018-01-01 RX ADMIN — LEVETIRACETAM 500 MG: 500 TABLET, FILM COATED ORAL at 21:23

## 2018-01-01 RX ADMIN — POLYETHYLENE GLYCOL 3350 17 G: 17 POWDER, FOR SOLUTION ORAL at 16:18

## 2018-01-01 RX ADMIN — CEFTRIAXONE SODIUM 1 G: 1 INJECTION, POWDER, FOR SOLUTION INTRAMUSCULAR; INTRAVENOUS at 13:31

## 2018-01-01 RX ADMIN — CEFTRIAXONE SODIUM 1 G: 1 INJECTION, POWDER, FOR SOLUTION INTRAMUSCULAR; INTRAVENOUS at 18:00

## 2018-01-01 RX ADMIN — GABAPENTIN 100 MG: 100 CAPSULE ORAL at 17:23

## 2018-01-01 RX ADMIN — MESALAMINE 250 MG: 250 CAPSULE ORAL at 09:34

## 2018-01-01 RX ADMIN — HYDROCODONE BITARTRATE AND ACETAMINOPHEN 1 TABLET: 7.5; 325 TABLET ORAL at 21:36

## 2018-01-01 RX ADMIN — APIXABAN 2.5 MG: 2.5 TABLET, FILM COATED ORAL at 08:02

## 2018-01-01 RX ADMIN — LEVETIRACETAM 500 MG: 500 TABLET, FILM COATED ORAL at 09:39

## 2018-01-01 RX ADMIN — LEVOTHYROXINE SODIUM 88 MCG: 88 TABLET ORAL at 08:36

## 2018-01-01 RX ADMIN — LEVOTHYROXINE SODIUM 88 MCG: 88 TABLET ORAL at 06:15

## 2018-01-01 RX ADMIN — LACTULOSE 20 G: 20 SOLUTION ORAL at 11:23

## 2018-01-01 RX ADMIN — LEVETIRACETAM 500 MG: 500 TABLET, FILM COATED ORAL at 21:24

## 2018-01-01 RX ADMIN — FAMOTIDINE 40 MG: 40 TABLET ORAL at 08:36

## 2018-01-01 RX ADMIN — DOCUSATE SODIUM 100 MG: 100 CAPSULE, LIQUID FILLED ORAL at 16:05

## 2018-01-01 RX ADMIN — HYDROCODONE BITARTRATE AND ACETAMINOPHEN 1 TABLET: 7.5; 325 TABLET ORAL at 21:20

## 2018-01-01 RX ADMIN — CEFTRIAXONE SODIUM 1 G: 1 INJECTION, POWDER, FOR SOLUTION INTRAMUSCULAR; INTRAVENOUS at 13:30

## 2018-01-01 RX ADMIN — CEFTRIAXONE SODIUM 1 G: 1 INJECTION, POWDER, FOR SOLUTION INTRAMUSCULAR; INTRAVENOUS at 14:12

## 2018-01-01 RX ADMIN — HYDROCORTISONE: 1 CREAM TOPICAL at 21:01

## 2018-01-01 RX ADMIN — ATORVASTATIN CALCIUM 40 MG: 40 TABLET, FILM COATED ORAL at 21:23

## 2018-01-01 RX ADMIN — LEVETIRACETAM 500 MG: 500 TABLET, FILM COATED ORAL at 22:54

## 2018-01-01 RX ADMIN — LEVOTHYROXINE SODIUM 88 MCG: 88 TABLET ORAL at 08:02

## 2018-01-01 RX ADMIN — GABAPENTIN 100 MG: 100 CAPSULE ORAL at 22:35

## 2018-01-01 RX ADMIN — LEVETIRACETAM 500 MG: 500 TABLET, FILM COATED ORAL at 08:02

## 2018-01-01 RX ADMIN — Medication 50 MG: at 21:15

## 2018-01-01 RX ADMIN — HYDROCODONE BITARTRATE AND ACETAMINOPHEN 1 TABLET: 7.5; 325 TABLET ORAL at 11:47

## 2018-01-01 RX ADMIN — HYDROCODONE BITARTRATE AND ACETAMINOPHEN 1 TABLET: 7.5; 325 TABLET ORAL at 11:34

## 2018-01-01 RX ADMIN — SEVELAMER CARBONATE 1.6 G: 800 POWDER, FOR SUSPENSION ORAL at 09:23

## 2018-01-01 RX ADMIN — EPOETIN ALFA 6000 UNITS: 10000 SOLUTION INTRAVENOUS; SUBCUTANEOUS at 14:15

## 2018-01-01 RX ADMIN — HYDRALAZINE HYDROCHLORIDE 75 MG: 50 TABLET ORAL at 09:39

## 2018-01-01 RX ADMIN — Medication 7.5 MG: at 21:23

## 2018-01-01 RX ADMIN — ATORVASTATIN CALCIUM 40 MG: 40 TABLET, FILM COATED ORAL at 22:35

## 2018-01-01 RX ADMIN — ATORVASTATIN CALCIUM 40 MG: 40 TABLET, FILM COATED ORAL at 20:59

## 2018-01-01 RX ADMIN — DOCUSATE SODIUM 100 MG: 100 CAPSULE, LIQUID FILLED ORAL at 21:24

## 2018-01-01 RX ADMIN — HYDROCODONE BITARTRATE AND ACETAMINOPHEN 1 TABLET: 7.5; 325 TABLET ORAL at 21:46

## 2018-01-01 RX ADMIN — ASPIRIN 81 MG: 81 TABLET, COATED ORAL at 21:14

## 2018-01-01 RX ADMIN — SEVELAMER CARBONATE 1.6 G: 800 POWDER, FOR SUSPENSION ORAL at 14:12

## 2018-01-01 RX ADMIN — GABAPENTIN 100 MG: 100 CAPSULE ORAL at 21:19

## 2018-01-01 RX ADMIN — Medication 7.5 MG: at 22:35

## 2018-01-01 RX ADMIN — APIXABAN 2.5 MG: 2.5 TABLET, FILM COATED ORAL at 09:34

## 2018-01-01 RX ADMIN — HYDROCODONE BITARTRATE AND ACETAMINOPHEN 1 TABLET: 7.5; 325 TABLET ORAL at 06:20

## 2018-01-01 RX ADMIN — LEVOTHYROXINE SODIUM 88 MCG: 88 TABLET ORAL at 06:17

## 2018-01-01 RX ADMIN — RENAGEL 1600 MG: 800 TABLET ORAL at 11:20

## 2018-01-01 RX ADMIN — EPOETIN ALFA 7200 UNITS: 10000 SOLUTION INTRAVENOUS; SUBCUTANEOUS at 09:48

## 2018-01-01 RX ADMIN — FUROSEMIDE 40 MG: 40 TABLET ORAL at 06:03

## 2018-01-01 RX ADMIN — AZITHROMYCIN 500 MG: 500 INJECTION, POWDER, LYOPHILIZED, FOR SOLUTION INTRAVENOUS at 11:10

## 2018-01-01 RX ADMIN — ATORVASTATIN CALCIUM 40 MG: 40 TABLET, FILM COATED ORAL at 21:24

## 2018-01-01 RX ADMIN — Medication 10 ML: at 16:30

## 2018-01-01 RX ADMIN — Medication 7.5 MG: at 20:59

## 2018-01-01 RX ADMIN — HYDROCORTISONE: 1 CREAM TOPICAL at 06:15

## 2018-01-01 RX ADMIN — QUETIAPINE FUMARATE 25 MG: 25 TABLET ORAL at 22:54

## 2018-01-01 RX ADMIN — FAMOTIDINE 20 MG: 20 TABLET, FILM COATED ORAL at 21:14

## 2018-01-01 RX ADMIN — FAMOTIDINE 20 MG: 20 TABLET, FILM COATED ORAL at 11:23

## 2018-01-01 RX ADMIN — HYDRALAZINE HYDROCHLORIDE 75 MG: 50 TABLET ORAL at 11:21

## 2018-01-01 RX ADMIN — HYDROCODONE BITARTRATE AND ACETAMINOPHEN 1 TABLET: 7.5; 325 TABLET ORAL at 08:36

## 2018-01-01 RX ADMIN — SERTRALINE HYDROCHLORIDE 50 MG: 50 TABLET ORAL at 16:05

## 2018-01-01 RX ADMIN — QUETIAPINE FUMARATE 25 MG: 25 TABLET ORAL at 20:59

## 2018-01-01 RX ADMIN — HYDROCODONE BITARTRATE AND ACETAMINOPHEN 1 TABLET: 7.5; 325 TABLET ORAL at 22:40

## 2018-01-01 RX ADMIN — QUETIAPINE FUMARATE 25 MG: 25 TABLET ORAL at 21:23

## 2018-01-01 RX ADMIN — HYDROCODONE BITARTRATE AND ACETAMINOPHEN 1 TABLET: 7.5; 325 TABLET ORAL at 20:21

## 2018-01-01 RX ADMIN — CEFTRIAXONE 1 G: 1 INJECTION, POWDER, FOR SOLUTION INTRAMUSCULAR; INTRAVENOUS at 18:06

## 2018-01-01 RX ADMIN — SERTRALINE HYDROCHLORIDE 50 MG: 50 TABLET ORAL at 21:41

## 2018-01-01 RX ADMIN — LEVOTHYROXINE SODIUM 88 MCG: 88 TABLET ORAL at 05:27

## 2018-01-01 RX ADMIN — GABAPENTIN 100 MG: 100 CAPSULE ORAL at 20:59

## 2018-01-01 RX ADMIN — AZITHROMYCIN 500 MG: 500 INJECTION, POWDER, LYOPHILIZED, FOR SOLUTION INTRAVENOUS at 11:19

## 2018-01-01 RX ADMIN — APIXABAN 2.5 MG: 2.5 TABLET, FILM COATED ORAL at 16:07

## 2018-01-01 RX ADMIN — MINERAL OIL 90 ML: 471.95 OIL ORAL at 11:35

## 2018-01-01 RX ADMIN — POTASSIUM CHLORIDE 20 MEQ: 750 CAPSULE, EXTENDED RELEASE ORAL at 17:45

## 2018-01-01 RX ADMIN — Medication 50 MG: at 12:58

## 2018-01-01 RX ADMIN — LEVETIRACETAM 500 MG: 500 TABLET, FILM COATED ORAL at 09:34

## 2018-01-01 RX ADMIN — AZITHROMYCIN 500 MG: 500 INJECTION, POWDER, LYOPHILIZED, FOR SOLUTION INTRAVENOUS at 12:17

## 2018-01-01 RX ADMIN — QUETIAPINE FUMARATE 25 MG: 25 TABLET ORAL at 21:14

## 2018-01-01 RX ADMIN — LEVETIRACETAM 500 MG: 500 TABLET, FILM COATED ORAL at 21:43

## 2018-01-01 RX ADMIN — HYDROCODONE BITARTRATE AND ACETAMINOPHEN 1 TABLET: 7.5; 325 TABLET ORAL at 16:13

## 2018-01-01 RX ADMIN — LEVETIRACETAM 500 MG: 500 TABLET, FILM COATED ORAL at 20:59

## 2018-01-01 RX ADMIN — DOCUSATE SODIUM 100 MG: 100 CAPSULE, LIQUID FILLED ORAL at 08:36

## 2018-01-01 RX ADMIN — RENAGEL 1600 MG: 800 TABLET ORAL at 11:30

## 2018-01-01 RX ADMIN — Medication 7.5 MG: at 21:24

## 2018-01-01 RX ADMIN — HYDROCODONE BITARTRATE AND ACETAMINOPHEN 1 TABLET: 7.5; 325 TABLET ORAL at 08:09

## 2018-01-01 RX ADMIN — APIXABAN 2.5 MG: 2.5 TABLET, FILM COATED ORAL at 22:59

## 2018-01-01 RX ADMIN — FUROSEMIDE 40 MG: 40 TABLET ORAL at 06:15

## 2018-01-01 RX ADMIN — RENAGEL 1600 MG: 800 TABLET ORAL at 17:02

## 2018-01-01 RX ADMIN — MESALAMINE 250 MG: 250 CAPSULE ORAL at 08:02

## 2018-01-01 RX ADMIN — HYDROCODONE BITARTRATE AND ACETAMINOPHEN 1 TABLET: 7.5; 325 TABLET ORAL at 12:57

## 2018-01-01 RX ADMIN — LEVETIRACETAM 500 MG: 500 TABLET, FILM COATED ORAL at 11:22

## 2018-01-01 RX ADMIN — ASPIRIN 81 MG: 81 TABLET, COATED ORAL at 09:34

## 2018-01-01 RX ADMIN — FAMOTIDINE 20 MG: 20 TABLET, FILM COATED ORAL at 09:39

## 2018-01-01 RX ADMIN — Medication 10 ML: at 11:21

## 2018-01-01 RX ADMIN — HYDROCODONE BITARTRATE AND ACETAMINOPHEN 1 TABLET: 7.5; 325 TABLET ORAL at 09:55

## 2018-01-01 RX ADMIN — SEVELAMER CARBONATE 1.6 G: 800 POWDER, FOR SUSPENSION ORAL at 22:26

## 2018-01-01 RX ADMIN — DOCUSATE SODIUM 1 ENEMA: 283 LIQUID RECTAL at 11:23

## 2018-01-01 RX ADMIN — GABAPENTIN 100 MG: 100 CAPSULE ORAL at 15:43

## 2018-01-01 RX ADMIN — LEVOTHYROXINE SODIUM 88 MCG: 88 TABLET ORAL at 09:34

## 2018-01-01 RX ADMIN — LIDOCAINE HYDROCHLORIDE 5 ML: 10 INJECTION, SOLUTION EPIDURAL; INFILTRATION; INTRACAUDAL; PERINEURAL at 19:18

## 2018-01-01 RX ADMIN — ATORVASTATIN CALCIUM 40 MG: 40 TABLET, FILM COATED ORAL at 21:40

## 2018-01-01 RX ADMIN — RENAGEL 1600 MG: 800 TABLET ORAL at 17:23

## 2018-01-01 RX ADMIN — GABAPENTIN 100 MG: 100 CAPSULE ORAL at 11:21

## 2018-01-01 RX ADMIN — LEVETIRACETAM 500 MG: 500 TABLET, FILM COATED ORAL at 16:06

## 2018-01-01 RX ADMIN — QUETIAPINE FUMARATE 25 MG: 25 TABLET ORAL at 22:35

## 2018-01-01 RX ADMIN — GABAPENTIN 100 MG: 100 CAPSULE ORAL at 17:02

## 2018-01-01 RX ADMIN — CEFTRIAXONE SODIUM 1 G: 1 INJECTION, POWDER, FOR SOLUTION INTRAMUSCULAR; INTRAVENOUS at 17:23

## 2018-01-01 RX ADMIN — PANTOPRAZOLE SODIUM 40 MG: 40 TABLET, DELAYED RELEASE ORAL at 06:17

## 2018-01-01 RX ADMIN — GABAPENTIN 100 MG: 100 CAPSULE ORAL at 21:23

## 2018-01-01 RX ADMIN — LEVOTHYROXINE SODIUM 88 MCG: 88 TABLET ORAL at 16:05

## 2018-01-01 RX ADMIN — FAMOTIDINE 40 MG: 40 TABLET ORAL at 08:02

## 2018-01-01 RX ADMIN — ASPIRIN 81 MG: 81 TABLET, COATED ORAL at 08:35

## 2018-01-01 RX ADMIN — LEVETIRACETAM 500 MG: 500 TABLET, FILM COATED ORAL at 21:15

## 2018-01-01 RX ADMIN — RENAGEL 1600 MG: 800 TABLET ORAL at 12:57

## 2018-01-01 RX ADMIN — LEVETIRACETAM 500 MG: 500 TABLET, FILM COATED ORAL at 08:36

## 2018-01-01 RX ADMIN — DOCUSATE SODIUM 100 MG: 100 CAPSULE, LIQUID FILLED ORAL at 21:57

## 2018-01-01 RX ADMIN — AZITHROMYCIN 500 MG: 500 INJECTION, POWDER, LYOPHILIZED, FOR SOLUTION INTRAVENOUS at 16:40

## 2018-01-01 RX ADMIN — ASPIRIN 81 MG: 81 TABLET, COATED ORAL at 08:01

## 2018-01-01 RX ADMIN — APIXABAN 2.5 MG: 2.5 TABLET, FILM COATED ORAL at 20:22

## 2018-01-01 RX ADMIN — DOCUSATE SODIUM 100 MG: 100 CAPSULE, LIQUID FILLED ORAL at 14:12

## 2018-01-01 RX ADMIN — AZITHROMYCIN 500 MG: 500 INJECTION, POWDER, LYOPHILIZED, FOR SOLUTION INTRAVENOUS at 15:41

## 2018-01-01 RX ADMIN — ATORVASTATIN CALCIUM 40 MG: 40 TABLET, FILM COATED ORAL at 20:23

## 2018-01-01 RX ADMIN — GABAPENTIN 100 MG: 100 CAPSULE ORAL at 16:18

## 2018-01-01 RX ADMIN — Medication 50 MG: at 09:39

## 2018-01-01 RX ADMIN — LEVETIRACETAM 500 MG: 500 TABLET, FILM COATED ORAL at 12:58

## 2018-01-01 RX ADMIN — HYDRALAZINE HYDROCHLORIDE 75 MG: 50 TABLET ORAL at 16:18

## 2018-01-01 RX ADMIN — Medication 7.5 MG: at 21:41

## 2018-01-01 RX ADMIN — FAMOTIDINE 20 MG: 20 TABLET, FILM COATED ORAL at 12:58

## 2018-01-01 RX ADMIN — Medication 7.5 MG: at 21:15

## 2018-01-01 RX ADMIN — GABAPENTIN 100 MG: 100 CAPSULE ORAL at 09:39

## 2018-01-01 RX ADMIN — QUETIAPINE FUMARATE 25 MG: 25 TABLET ORAL at 20:22

## 2018-01-01 RX ADMIN — RENAGEL 1600 MG: 800 TABLET ORAL at 09:39

## 2018-01-01 RX ADMIN — HYDRALAZINE HYDROCHLORIDE 75 MG: 50 TABLET ORAL at 21:14

## 2018-01-01 RX ADMIN — FAMOTIDINE 40 MG: 40 TABLET ORAL at 09:34

## 2018-01-01 RX ADMIN — MESALAMINE 250 MG: 250 CAPSULE ORAL at 16:07

## 2018-01-01 RX ADMIN — ATORVASTATIN CALCIUM 40 MG: 40 TABLET, FILM COATED ORAL at 21:14

## 2018-01-01 RX ADMIN — SEVELAMER CARBONATE 1.6 G: 800 POWDER, FOR SUSPENSION ORAL at 08:02

## 2018-01-01 RX ADMIN — SERTRALINE HYDROCHLORIDE 50 MG: 50 TABLET ORAL at 08:02

## 2018-01-01 RX ADMIN — ATORVASTATIN CALCIUM 40 MG: 40 TABLET, FILM COATED ORAL at 22:59

## 2018-01-01 RX ADMIN — MESALAMINE 250 MG: 250 CAPSULE ORAL at 08:35

## 2018-01-15 DIAGNOSIS — N18.6 ESRD (END STAGE RENAL DISEASE) ON DIALYSIS (HCC): ICD-10-CM

## 2018-01-15 DIAGNOSIS — Z99.2 ESRD (END STAGE RENAL DISEASE) ON DIALYSIS (HCC): ICD-10-CM

## 2018-01-15 DIAGNOSIS — I77.0 ARTERIOVENOUS FISTULA (HCC): Primary | ICD-10-CM

## 2018-01-31 ENCOUNTER — APPOINTMENT (OUTPATIENT)
Dept: GENERAL RADIOLOGY | Facility: HOSPITAL | Age: 73
End: 2018-01-31

## 2018-01-31 ENCOUNTER — HOSPITAL ENCOUNTER (EMERGENCY)
Facility: HOSPITAL | Age: 73
Discharge: HOME | End: 2018-02-01
Attending: EMERGENCY MEDICINE | Admitting: EMERGENCY MEDICINE

## 2018-01-31 ENCOUNTER — APPOINTMENT (OUTPATIENT)
Dept: CT IMAGING | Facility: HOSPITAL | Age: 73
End: 2018-01-31
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
## 2018-01-31 DIAGNOSIS — S16.1XXA STRAIN OF NECK MUSCLE, INITIAL ENCOUNTER: ICD-10-CM

## 2018-01-31 DIAGNOSIS — S01.01XA LACERATION OF SCALP, INITIAL ENCOUNTER: ICD-10-CM

## 2018-01-31 DIAGNOSIS — S39.012A STRAIN OF LUMBAR REGION, INITIAL ENCOUNTER: ICD-10-CM

## 2018-01-31 LAB
ALBUMIN SERPL-MCNC: 4.5 G/DL (ref 3.4–4.8)
ALBUMIN/GLOB SERPL: 0.9 G/DL (ref 1.1–1.8)
ALP SERPL-CCNC: 165 U/L (ref 38–126)
ALT SERPL W P-5'-P-CCNC: 23 U/L (ref 9–52)
ANION GAP SERPL CALCULATED.3IONS-SCNC: 13 MMOL/L (ref 5–15)
APTT PPP: 35.4 SECONDS (ref 20–40.3)
AST SERPL-CCNC: 62 U/L (ref 14–36)
BASOPHILS # BLD AUTO: 0.04 10*3/MM3 (ref 0–0.2)
BASOPHILS NFR BLD AUTO: 0.8 % (ref 0–2)
BILIRUB SERPL-MCNC: 0.5 MG/DL (ref 0.2–1.3)
BUN BLD-MCNC: 25 MG/DL (ref 7–21)
BUN/CREAT SERPL: 8.6 (ref 7–25)
CALCIUM SPEC-SCNC: 9.2 MG/DL (ref 8.4–10.2)
CHLORIDE SERPL-SCNC: 88 MMOL/L (ref 95–110)
CO2 SERPL-SCNC: 35 MMOL/L (ref 22–31)
CREAT BLD-MCNC: 2.92 MG/DL (ref 0.5–1)
DEPRECATED RDW RBC AUTO: 60.4 FL (ref 36.4–46.3)
EOSINOPHIL # BLD AUTO: 0.22 10*3/MM3 (ref 0–0.7)
EOSINOPHIL NFR BLD AUTO: 4.4 % (ref 0–7)
ERYTHROCYTE [DISTWIDTH] IN BLOOD BY AUTOMATED COUNT: 17.4 % (ref 11.5–14.5)
GFR SERPL CREATININE-BSD FRML MDRD: 16 ML/MIN/1.73
GLOBULIN UR ELPH-MCNC: 4.8 GM/DL (ref 2.3–3.5)
GLUCOSE BLD-MCNC: 141 MG/DL (ref 60–100)
HCT VFR BLD AUTO: 36.4 % (ref 35–45)
HGB BLD-MCNC: 11.6 G/DL (ref 12–15.5)
IMM GRANULOCYTES # BLD: 0.01 10*3/MM3 (ref 0–0.02)
IMM GRANULOCYTES NFR BLD: 0.2 % (ref 0–0.5)
INR PPP: 1.15 (ref 0.8–1.2)
LYMPHOCYTES # BLD AUTO: 1.12 10*3/MM3 (ref 0.6–4.2)
LYMPHOCYTES NFR BLD AUTO: 22.2 % (ref 10–50)
MCH RBC QN AUTO: 30.2 PG (ref 26.5–34)
MCHC RBC AUTO-ENTMCNC: 31.9 G/DL (ref 31.4–36)
MCV RBC AUTO: 94.8 FL (ref 80–98)
MONOCYTES # BLD AUTO: 0.54 10*3/MM3 (ref 0–0.9)
MONOCYTES NFR BLD AUTO: 10.7 % (ref 0–12)
NEUTROPHILS # BLD AUTO: 3.11 10*3/MM3 (ref 2–8.6)
NEUTROPHILS NFR BLD AUTO: 61.7 % (ref 37–80)
PLATELET # BLD AUTO: 240 10*3/MM3 (ref 150–450)
PMV BLD AUTO: 9.6 FL (ref 8–12)
POTASSIUM BLD-SCNC: 4.2 MMOL/L (ref 3.5–5.1)
PROT SERPL-MCNC: 9.3 G/DL (ref 6.3–8.6)
PROTHROMBIN TIME: 14.7 SECONDS (ref 11.1–15.3)
RBC # BLD AUTO: 3.84 10*6/MM3 (ref 3.77–5.16)
SODIUM BLD-SCNC: 136 MMOL/L (ref 137–145)
WBC NRBC COR # BLD: 5.04 10*3/MM3 (ref 3.2–9.8)

## 2018-01-31 PROCEDURE — 85610 PROTHROMBIN TIME: CPT | Performed by: EMERGENCY MEDICINE

## 2018-01-31 PROCEDURE — 72125 CT NECK SPINE W/O DYE: CPT

## 2018-01-31 PROCEDURE — 99284 EMERGENCY DEPT VISIT MOD MDM: CPT

## 2018-01-31 PROCEDURE — 70450 CT HEAD/BRAIN W/O DYE: CPT

## 2018-01-31 PROCEDURE — 71045 X-RAY EXAM CHEST 1 VIEW: CPT

## 2018-01-31 PROCEDURE — 85730 THROMBOPLASTIN TIME PARTIAL: CPT | Performed by: EMERGENCY MEDICINE

## 2018-01-31 PROCEDURE — 85025 COMPLETE CBC W/AUTO DIFF WBC: CPT | Performed by: EMERGENCY MEDICINE

## 2018-01-31 PROCEDURE — 72220 X-RAY EXAM SACRUM TAILBONE: CPT

## 2018-01-31 PROCEDURE — 90471 IMMUNIZATION ADMIN: CPT | Performed by: EMERGENCY MEDICINE

## 2018-01-31 PROCEDURE — 25010000002 TDAP 5-2.5-18.5 LF-MCG/0.5 SUSPENSION: Performed by: EMERGENCY MEDICINE

## 2018-01-31 PROCEDURE — 90715 TDAP VACCINE 7 YRS/> IM: CPT | Performed by: EMERGENCY MEDICINE

## 2018-01-31 PROCEDURE — 72100 X-RAY EXAM L-S SPINE 2/3 VWS: CPT

## 2018-01-31 PROCEDURE — 80053 COMPREHEN METABOLIC PANEL: CPT | Performed by: EMERGENCY MEDICINE

## 2018-01-31 RX ORDER — BACITRACIN 500 [USP'U]/G
OINTMENT OPHTHALMIC
Status: DISCONTINUED
Start: 2018-01-31 | End: 2018-02-01 | Stop reason: HOSPADM

## 2018-01-31 RX ORDER — DIAPER,BRIEF,INFANT-TODD,DISP
EACH MISCELLANEOUS ONCE
Status: COMPLETED | OUTPATIENT
Start: 2018-01-31 | End: 2018-01-31

## 2018-01-31 RX ORDER — LIDOCAINE HYDROCHLORIDE AND EPINEPHRINE 10; 10 MG/ML; UG/ML
20 INJECTION, SOLUTION INFILTRATION; PERINEURAL ONCE
Status: COMPLETED | OUTPATIENT
Start: 2018-01-31 | End: 2018-01-31

## 2018-01-31 RX ADMIN — BACITRACIN ZINC: 500 OINTMENT TOPICAL at 23:30

## 2018-01-31 RX ADMIN — TETANUS TOXOID, REDUCED DIPHTHERIA TOXOID AND ACELLULAR PERTUSSIS VACCINE, ADSORBED 0.5 ML: 5; 2.5; 8; 8; 2.5 SUSPENSION INTRAMUSCULAR at 23:30

## 2018-01-31 RX ADMIN — LIDOCAINE HYDROCHLORIDE,EPINEPHRINE BITARTRATE 20 ML: 10; .01 INJECTION, SOLUTION INFILTRATION; PERINEURAL at 22:37

## 2018-02-01 ENCOUNTER — OFFICE VISIT (OUTPATIENT)
Dept: CARDIAC SURGERY | Facility: CLINIC | Age: 73
End: 2018-02-01

## 2018-02-01 VITALS
HEIGHT: 62 IN | DIASTOLIC BLOOD PRESSURE: 85 MMHG | SYSTOLIC BLOOD PRESSURE: 132 MMHG | OXYGEN SATURATION: 98 % | TEMPERATURE: 98.8 F | BODY MASS INDEX: 20.8 KG/M2 | WEIGHT: 113 LBS | HEART RATE: 80 BPM

## 2018-02-01 VITALS
HEIGHT: 62 IN | OXYGEN SATURATION: 97 % | BODY MASS INDEX: 20.8 KG/M2 | DIASTOLIC BLOOD PRESSURE: 64 MMHG | WEIGHT: 113 LBS | RESPIRATION RATE: 16 BRPM | TEMPERATURE: 98.7 F | SYSTOLIC BLOOD PRESSURE: 109 MMHG | HEART RATE: 76 BPM

## 2018-02-01 DIAGNOSIS — N18.6 END STAGE RENAL FAILURE ON DIALYSIS (HCC): Primary | ICD-10-CM

## 2018-02-01 DIAGNOSIS — Z99.2 END STAGE RENAL FAILURE ON DIALYSIS (HCC): Primary | ICD-10-CM

## 2018-02-01 DIAGNOSIS — I77.0 ARTERIOVENOUS FISTULA (HCC): ICD-10-CM

## 2018-02-01 PROCEDURE — 99213 OFFICE O/P EST LOW 20 MIN: CPT | Performed by: NURSE PRACTITIONER

## 2018-02-01 NOTE — ED PROVIDER NOTES
Subjective   HPI Comments: 72 years old female with multiple medical problems currently resident of nursing home on anticoagulation is brought in the ER by EMS with a chief complaint of fall and scalp laceration.  Patient was walking with a walker and turned around and somehow she lost her balance and landed on the floor on her hips and hit her head against the bed railing.  There was bleeding initially which stopped after applying pressure.  She did not lose consciousness at all.  She is complaining of mild headache and neck pain.  She has a history of neck surgeries in the past.  She is also complaining of low back pain.  She remembers the whole sequence of events.  She is totally herself.  She is not complaining of any chest pain, palpitations or shortness of breath.  No nausea or vomiting reported.  No abdominal pain.    Patient is a 72 y.o. female presenting with fall.   History provided by:  Patient  Fall   Mechanism of injury: fall    Injury location:  Head/neck and pelvis  Head/neck injury location:  Scalp  Incident location:  correction  Time since incident: PTA.  Arrived directly from scene: yes    Fall:     Fall occurred:  Walking    Height of fall:  Ground level    Impact surface:  Hard floor    Point of impact:  Head and back  Tetanus status:  Up to date  Prior to arrival data:     Bystander interventions:  None  Associated symptoms: back pain and neck pain    Associated symptoms: no abdominal pain, no blindness, no chest pain, no difficulty breathing, no headaches, no hearing loss, no loss of consciousness, no nausea, no seizures and no vomiting    Risk factors: anticoagulation therapy, CAD, diabetes and kidney disease        Review of Systems   Constitutional: Negative for chills and fever.   HENT: Negative for congestion and hearing loss.    Eyes: Negative for blindness, photophobia and redness.   Respiratory: Negative for chest tightness and shortness of breath.    Cardiovascular: Negative for  chest pain.   Gastrointestinal: Negative for abdominal pain, nausea and vomiting.   Genitourinary: Negative for flank pain.   Musculoskeletal: Positive for back pain, gait problem and neck pain.   Skin: Negative for rash.   Neurological: Negative for dizziness, seizures, loss of consciousness, weakness, light-headedness and headaches.       Past Medical History:   Diagnosis Date   • Abnormal x-ray     Standard chest x ray, f/u pneumonia xray   • Acquired hypothyroidism    • Amblyopia    • Anemia of renal disease    • Anxiety    • Arteriovenous fistula     Placed 10/15/2014   • Benign essential hypertension    • Bipolar affective disorder     Current episode depression   • Bipolar disorder    • Cerebrovascular accident    • Chest wall pain    • Chronic angle-closure glaucoma    • Chronic kidney disease     Stage 4-approaching hemodialysis   • Chronic pain syndrome    • Chronic renal failure    • Combined drug dependence excluding opioids    • Constipation    • COPD (chronic obstructive pulmonary disease)    • Coronary arteriosclerosis    • Cough    • Degeneration of cervical intervertebral disc    • Dementia     Multi-infarct, uncomplicated   • Depression    • Diabetes mellitus     No retinopathy   • Diabetes mellitus     Without mention of complication, type 2 or unspecified type, not stated as uncontrolled   • Diabetic renal disease    • Disc disorder of lumbar region    • DJD (degenerative joint disease)     Involving multiple joints   • Edema    • End stage renal failure on dialysis     LUE AV FISTULA 1/2015   • Epigastric pain    • Essential hypertension    • Exotropia    • Gastritis    • Generalized abdominal pain    • GERD (gastroesophageal reflux disease)     With Esophagitis   • Gout    • H/O screening mammography    • Hiatal hernia    • Hyperlipidemia    • Hypermetropia    • Insomnia    • Iron deficiency anemia    • Low back pain    • Lumbago    • Non-cardiac chest pain    • Nuclear cataract    •  Osteoporosis    • Panic disorder without agoraphobia    • Peripheral nervous system disorder    • Radiculitis    • RLQ abdominal pain    • RUQ pain    • Sprain of ankle    • Sprain of knee     Resolving   • Superficial injury of shoulder and upper arm    • Surgical follow-up care    • Type 2 diabetes mellitus    • UTI (urinary tract infection)    • Visit for suture removal    • Vitamin D deficiency    • Vulvovaginitis        Allergies   Allergen Reactions   • Ambien [Zolpidem Tartrate]    • Benadryl [Diphenhydramine]    • Penicillins        Past Surgical History:   Procedure Laterality Date   • APPENDECTOMY     • BACK SURGERY      x2   • BYPASS GRAFT  2015    Left brachial artery to axillary vein arteriovenous graft. Venous ultrasound unilateral extremity   •  SECTION      x2   • CHOLECYSTECTOMY     • COLONOSCOPY W/ POLYPECTOMY  2015    Colitis found in the cecum. Biopsy taken. A diverticulum was found in the sigmoid colon.   • ENDOSCOPY AND COLONOSCOPY     • ESOPHAGOSCOPY / EGD  2015    Normal esophagus. Gastritis found in the stomach. Biopsy taken. Duodenitis found in the duodenum. Biopsy take.   • ESOPHAGOSCOPY / EGD  1944    With tube-Esophagus appeared normal. Nonerosive gastritis. Duodenum appeared normal   • HYSTERECTOMY     • INJECTION OF MEDICATION  2011    Aranesp   • INJECTION OF MEDICATION  2011    Kenalog   • INTERVENTIONAL RADIOLOGY PROCEDURE Left 10/5/2017    Procedure: IR dialysis fistulagram;  Surgeon: Blane Fernandez MD;  Location: [facility] LOC[facility];  Service:    • LEG SURGERY  2000    Cancelled. Due to uncontrolled hypertension   • OTHER SURGICAL HISTORY  2016    Tissue plasminogen activator catheter thrombolysis   • REMOVAL PERITONEAL DIALYSIS CATHETER  2014    Removal of tunneled hemodialysis catheter with cuff   • TRANSESOPHAGEAL ECHOCARDIOGRAM (ANTHONY)  2016    Mild left atrial enlargement with mild to  moderate CLVH with normal aortic root size.LV systolic function well preserved with Ef of 55-60%.Mitral valve thickened.Av thickened.Aortic sclerosis.Mild mitral regurg.mild to moderate tricuspid regurg.   • TRANSESOPHAGEAL ECHOCARDIOGRAM (ANTHONY)  02/22/2012    Normal left ventricular systolic function. Moderate tricuspid regurgitation with evidenceof pulmonary hypertension       Family History   Problem Relation Age of Onset   • Coronary artery disease Other        Social History     Social History   • Marital status:      Spouse name: N/A   • Number of children: N/A   • Years of education: N/A     Social History Main Topics   • Smoking status: Former Smoker   • Smokeless tobacco: Never Used   • Alcohol use No   • Drug use: No   • Sexual activity: Not Asked     Other Topics Concern   • None     Social History Narrative           Objective   Physical Exam   Constitutional: She is oriented to person, place, and time. She appears well-developed.   HENT:   Head: Normocephalic. Head is with laceration.       Nose: Nose normal.   Mouth/Throat: Oropharynx is clear and moist.   1 cm laceration with hematoma around in the left parieto-occipital area.  No active bleeding.   Eyes: Conjunctivae and EOM are normal. Pupils are equal, round, and reactive to light.   Neck: Neck supple. No tracheal deviation present. No thyromegaly present.   Cardiovascular: Normal rate, regular rhythm and normal heart sounds.    Pulmonary/Chest: Effort normal and breath sounds normal.   Abdominal: Soft. She exhibits no distension. There is no tenderness.   Musculoskeletal: Normal range of motion. She exhibits tenderness. She exhibits no edema or deformity.        Lumbar back: She exhibits tenderness, bony tenderness, pain and spasm.        Back:    Neurological: She is alert and oriented to person, place, and time. She displays no tremor. No cranial nerve deficit or sensory deficit. She exhibits normal muscle tone. Coordination normal. GCS  eye subscore is 4. GCS verbal subscore is 5. GCS motor subscore is 6.   Skin: Skin is warm.   Psychiatric: She has a normal mood and affect.   Nursing note and vitals reviewed.      Laceration Repair  Date/Time: 1/31/2018 10:34 PM  Performed by: MEREDITH URIARTE  Authorized by: MEREDITH URIARTE     Consent:     Consent obtained:  Verbal    Consent given by:  Patient    Risks discussed:  Pain, infection and poor wound healing  Universal protocol:     Procedure explained and questions answered to patient or proxy's satisfaction: yes      Relevant documents present and verified: yes      Test results available and properly labeled: yes      Imaging studies available: yes      Required blood products, implants, devices, and special equipment available: yes      Site/side marked: yes      Immediately prior to procedure, a time out was called: yes      Patient identity confirmed:  Verbally with patient  Anesthesia (see MAR for exact dosages):     Anesthesia method:  None  Laceration details:     Location:  Scalp    Scalp location:  L parietal    Length (cm):  1  Repair type:     Repair type:  Simple  Pre-procedure details:     Preparation:  Patient was prepped and draped in usual sterile fashion  Treatment:     Area cleansed with:  Hibiclens    Amount of cleaning:  Standard    Irrigation solution:  Sterile saline    Irrigation method:  Syringe  Skin repair:     Repair method:  Staples    Number of staples:  2  Approximation:     Approximation:  Close    Vermilion border: well-aligned    Post-procedure details:     Dressing:  Antibiotic ointment    Patient tolerance of procedure:  Tolerated well, no immediate complications             ED Course  ED Course        I have ruled out skull fracture, intracranial bleed or midline shift or mass effect.  Ruled out cervical spine fracture/subluxation.  C-collar is removed and is able to move her neck in all directions with minimal discomfort.  X-rays of the back are negative for any acute  finding.  She didn't want anything for pain.  Laceration of the scalp was repaired.  Patient is totally herself.  Blood work is grossly negative for any acute finding.  At this point I do not think she needs any other workup and can be discharged back to nursing home.  Discussed signs symptoms of worsening and needs to come back which patient seems understanding.  [place] is updated        Labs Reviewed   COMPREHENSIVE METABOLIC PANEL - Abnormal; Notable for the following:        Result Value    Glucose 141 (*)     BUN 25 (*)     Creatinine 2.92 (*)     Sodium 136 (*)     Chloride 88 (*)     CO2 35.0 (*)     Total Protein 9.3 (*)     AST (SGOT) 62 (*)     Alkaline Phosphatase 165 (*)     eGFR Non African Amer 16 (*)     Globulin 4.8 (*)     A/G Ratio 0.9 (*)     All other components within normal limits    Narrative:     The MDRD GFR formula is only valid for adults with stable renal function between ages 18 and 70.   CBC WITH AUTO DIFFERENTIAL - Abnormal; Notable for the following:     Hemoglobin 11.6 (*)     RDW 17.4 (*)     RDW-SD 60.4 (*)     All other components within normal limits   PROTIME-INR - Normal    Narrative:     Therapeutic range for most indications is 2.0-3.0 INR,  or 2.5-3.5 for mechanical heart valves.   APTT - Normal    Narrative:     The recommended Heparin therapeutic range is 68-97 seconds.   CBC AND DIFFERENTIAL    Narrative:     The following orders were created for panel order CBC & Differential.  Procedure                               Abnormality         Status                     ---------                               -----------         ------                     CBC Auto Differential[[id number]]        Abnormal            Final result                 Please view results for these tests on the individual orders.       Xr Spine Lumbar 2 Or 3 View    Result Date: 1/31/2018  Narrative: Lumbar spine three view on 1/31/2018 CLINICAL INDICATION: Back pain after fall COMPARISON: 4/16/2015  FINDINGS: The patient is status post vertebroplasty/kyphoplasty at T12. There is interval but chronic L1 compression fracture. Degenerative disc disease is noted throughout the lumbar spine. Degenerative facet disease is noted in the lower lumbar spine. Vascular calcifications are noted. Lumbar spine is well aligned. No acute fracture is noted.     Impression: Chronic changes in the lumbar spine as above with no acute abnormality. Electronically signed by:  Kiet Henao  1/31/2018 10:21 PM CST Workstation: [id number]    Xr Sacrum & Coccyx    Result Date: 1/31/2018  Narrative: PROCEDURE: XR SACRUM AND COCCYX VIEWS: 3 INDICATION: Pain COMPARISON: None FINDINGS:   - fracture: None   - alignment: Within normal limits   - misc multiple phleboliths are seen in the pelvis. Degenerative changes of visualized portions of lumbar spine are present.     Impression: No acute osseous abnormality identified. Examination for age. Note:  if pain or symptoms persist beyond reasonable expectations and follow-up imaging is anticipated,  cross sectional imaging  (CT and/or MRI) is suggested, as is deemed clinically appropriate. Electronically signed by:  Katey Macias MD  1/31/2018 10:28 PM CST Workstation: [id number]    Ct Head Without Contrast    Result Date: 1/31/2018  Narrative: CT head without contrast on 1/31/2018 CLINICAL INDICATION: Fall, head laceration TECHNIQUE: Multiple axial images are obtained throughout the head without the administration of contrast. This study was performed with techniques to keep radiation doses as low as reasonably achievable, (ALARA). Total DLP is 908.9 mGy*cm. COMPARISON: 11/24/2017 FINDINGS: There is again noted a large old right parieto-occipital infarct. There is a small old left cerebellar infarct again noted. There is generalized cerebral atrophy. There is mild low-density in the periventricular white matter consistent with mild chronic small vessel ischemic changes. There is no CT  evidence of acute infarct. There is no hemorrhage. There are no abnormal extra-axial fluid collections. There is left occipital scalp soft tissue swelling/scalp hematoma. There is no mass, mass effect or midline shift. No bony abnormality is noted.     Impression: New left occipital scalp soft tissue swelling/scalp hematoma with otherwise no significant change and no acute intracranial abnormality. Electronically signed by:  Kiet Henao  1/31/2018 9:24 PM CST Workstation: [id number]    Ct Cervical Spine Without Contrast    Result Date: 1/31/2018  Narrative: PROCEDURE: CT CERVICAL SPINE WO CONTRAST INDICATION:  Trauma/pain HISTORY:  Fall COMPARISON:  None TECHNIQUE:   - reconstructions: axial, coronal, sagittal   - contrast:  oral:  no                      intravenous:none  This exam was performed according to our departmental dose-optimization program, which includes automated exposure control, adjustment of the mA and/or kV according to patient size and/or use of iterative reconstruction technique. DLP is 212.2 COMMENTS: Fracture:  No evidence of fracture Alignment:   Mildly exaggerated lordosis, with no abrupt step-offs. Canal caliber:  Unremarkable Focal bone lesion(s):  None visualized Soft tissue:   Unremarkable Misc:  Age related degenerative changes. Mild diffuse facet arthropathy is present     Impression: No evidence of acute traumatic osseous injury. If pain or symptoms persist beyond reasonable expectations, a follow up radiographic and/or MRI examination is suggested, as is deemed clinically indicated. Electronically signed by:  Katey Macias MD  1/31/2018 9:50 PM CST Workstation: [id number]    Xr Chest 1 View    Result Date: 1/31/2018  Narrative: Chest single view on  1/31/2018 CLINICAL INDICATION: Chest pain after fall COMPARISON: 11/24/2017 FINDINGS: There is evidence of calcified granulomatous disease in the chest. Vascular calcification is noted in the aorta. The patient is status post  vertebroplasty/kyphoplasty at the thoracolumbar junction. Heart is upper limits normal for size. Mild chronic interstitial changes are noted. No acute pulmonary opacity is noted. There is an old ununited left proximal humerus fracture again noted.     Impression: No acute disease. Electronically signed by:  Kiet Henao  1/31/2018 10:15 PM [facility] Workstation: [id number]          [facility]    Final diagnoses:   Fall, initial encounter   Laceration of scalp, initial encounter   Strain of neck muscle, initial encounter   Strain of lumbar region, initial encounter            Jose Art MD  01/31/18 [id number]

## 2018-02-01 NOTE — DISCHARGE INSTRUCTIONS
Follow-up with her primary care physician for reevaluation.  Follow head injury instructions.  Use walker all the time.  Staples should removal in 7-10 days.  Return to ER for worsening.

## 2018-02-01 NOTE — ED NOTES
Bacitracin applied to laceration on head, covered with sterile gauze and kerlix to entire head.      Jeanie Falcon, LOUIS  02/01/18 0028

## 2018-02-01 NOTE — ED NOTES
BP L arm 94/53. Pt states she feels fine. Dr valdez notified.      Jeanie Falcon, RNA  01/31/18 2124

## 2018-02-01 NOTE — ED NOTES
On phone at this time attempting to obtain consent for treat due to pt being [place]. 1st called [phone], then given the number [phone]. On hold at this time.     Justina Sanders RN  01/31/18 2019       Justina Sanders RN  01/31/18 2020

## 2018-02-01 NOTE — PATIENT INSTRUCTIONS
Fistula Duplex:  Fistula open and flowing   Recheck in 6 months unless problems occur  Continue dialysis as scheduled. If problems should arise including difficulty with access, high pressure alarms, or inability to complete dialysis please notify [facility] immediately for evaluation.

## 2018-02-01 NOTE — ED NOTES
Nursing report called to Cathy at [facility]     Justina Sanders RN  01/31/18 [id number]

## 2018-02-01 NOTE — ED NOTES
Telephone consent for treatment obtained from Rogers Kaur with [employer]. Elizabeth Sheehan, RN second nurse phone witness.     Justina Sanders RN  01/31/18 2024

## 2018-02-06 ENCOUNTER — OUTSIDE FACILITY SERVICE (OUTPATIENT)
Dept: FAMILY MEDICINE CLINIC | Facility: CLINIC | Age: 73
End: 2018-02-06

## 2018-02-06 PROCEDURE — 99307 SBSQ NF CARE SF MDM 10: CPT | Performed by: FAMILY MEDICINE

## 2018-02-06 NOTE — PROGRESS NOTES
Subjective   Patient ID: Efrem Roa is a 72 y.o. female is here today for scheduled follow-up for fistula evaluation.   CC:   Denies any problems with dialysis.  Fell recently lacerating scalp which required staples.  VAS 9 legs and hands   History of Present Illness  PCP:  Dr Perez  Nephrology:  Dr Mireles, [facility]    72yr woman with ESRD on hemodialysis, HTN, COPD, sleep apnea, Stroke, GERD, LEFT arm fracture 20yrs ago.  moderate chronic pain lower back, LEFT leg.  occasional bleeding after decannulation.  recent clotted graft, unable to declot.  possible central venous occlusion.  LEFT femoral tunnel catheter. Recent new AV fistula placement. . End stage renal disease, No clotted access or problems during dialysis,   L Femoral AV graft functioning well.  Has memory and falling balance problems.  In WC today.  Returning in scheduled FU.     7/29/2013 Upper extremity vein mapping:  RIGHT cephalic 1.3-4.5mm, basilic 2.5-3.5mm.  LEFT cephalic 2.0-3.3mm, basilic 2.1-3.2mm.  8/19/13: Placement RIGHT chest Tunnel Cath  10/15/13 RIGHT Brachial-Axillary Vein AV graft  11/26/13 RIGHT AV duplex: Anast dist 517, dist 446, mid 93, prx 146, Ax Anast 183. Lg hematoma prx arm >7cm  3/24/14: RIGHT AV duplex: Patent fistula. maxPSV 870cm/s (distal). Size 3.2-6.6mm. Flows 887-2394ml/m. Prx hematoma 3.96cm  7/30/14  RIGHT AV duplex: Patent fistula. maxPSV 869cm/s (distal). Size 9.3mm. Flows 2472ml/m.  elevated velocity at elbow level  1/6/15: LEFT Brachial-axillary AV artegraft  3/11/15: LEFT AV Duplex: Patent fistula. maxPSV 605cm/s. Size 1.7-6.9mm. Flows 165-1219ml/m.   8/3/2015 LEFT AV Duplex: Patent fistula. maxPSV 255cm/s. Size 2.1-7.5mm. Flows 193-1007ml/m.   2/2016 LEFT fistulagram:  fistula end of life.  tunnel cath placed.  10/4/16: LEFT Leg AV graft SFA-SFV  12/08/16  Left AV Fistula  Duplex:  Patent fistula. maxPSV 446cm/s (arterial anast). Size 2.3-5.6mm. Flows 236-1319 ml/m.  01/03/16   Left AV  Fistula  Duplex:  Patent fistula. maxPSV 586cm/s (arterial anast). Size 3.4-5.9mm. Flows 767-1567 ml/m.  02/23/17  Tunneled catheter removed.  03/23/17   Left AV Fistula  Duplex:  Patent fistula. maxPSV 398cm/s (mid ). Size 0.31-0.59 cm. Flows 549-1842 ml/m.  Sm pseudoaneurysm 1.5cm  Hematoma distal surrounding graft 5.64cm   06/06/17  Left AV Fistula  Duplex:  Patent fistula. maxPSV 384cm/s (mid ). Size 0.31-0.59 cm. Flows 549-1842 ml/m.  Sm pseudoaneurysm 1.1cm  Hematoma distal surrounding graft 4.1cm, second hematoma 2.2 (distal)   02/01/18   Left AV Fistula  Duplex:  Patent fistula. maxPSV 470cm/s (mid ). Size3.4-6.3  mm. Flows 453-1688 ml/min Distal thigh hematoma 5.6cm.     The following portions of the patient's history were reviewed and updated as appropriate: allergies, current medications, past family history, past medical history, past social history, past surgical history and problem list.  See HPI   Allergies   Allergen Reactions   • Ambien [Zolpidem Tartrate]    • Benadryl [Diphenhydramine]    • Penicillins          Current Outpatient Prescriptions:   •  acetaminophen (TYLENOL) 325 MG tablet, Take 650 mg by mouth Every 6 (Six) Hours As Needed for Mild Pain ., Disp: , Rfl:   •  Apixaban (ELIQUIS PO), Take 2.5 mg by mouth 2 (Two) Times a Day., Disp: , Rfl:   •  aspirin 81 MG EC tablet, Take 81 mg by mouth daily., Disp: , Rfl:   •  atorvastatin (LIPITOR) 40 MG tablet, Take 40 mg by mouth every night., Disp: , Rfl:   •  B Complex-C-Folic Acid (NGA CAPS PO), Take 1 capsule by mouth daily., Disp: , Rfl:   •  cholecalciferol (VITAMIN D3) 40836 units capsule, Take 50,000 Units by mouth Daily., Disp: , Rfl:   •  Docusate Sodium (COLACE PO), Take 1 capsule by mouth 3 (three) times a day as needed., Disp: , Rfl:   •  famotidine (PEPCID) 40 MG tablet, Take 40 mg by mouth Daily., Disp: , Rfl:   •  fentaNYL (DURAGESIC) 25 MCG/HR patch, Place 1 patch on the skin Every 72 (Seventy-Two) Hours., Disp: , Rfl:   •   gabapentin (NEURONTIN) 100 MG capsule, Take 100 mg by mouth 3 (three) times a day., Disp: , Rfl:   •  hydrALAZINE (APRESOLINE) 100 MG tablet, Take 100 mg by mouth 3 (three) times a day., Disp: , Rfl:   •  HYDROcodone-acetaminophen (NORCO) 7.5-325 MG per tablet, Take 1 tablet by mouth Every 4 (Four) Hours As Needed for Moderate Pain ., Disp: 10 tablet, Rfl: 0  •  ivermectin (STROMECTOL) 3 MG tablet tablet, Take 3 mg by mouth 1 (One) Time., Disp: , Rfl:   •  levothyroxine (SYNTHROID, LEVOTHROID) 88 MCG tablet, Take 88 mcg by mouth daily., Disp: , Rfl:   •  Mesalamine (APRISO PO), Take 1 capsule by mouth daily., Disp: , Rfl:   •  mirtazapine (REMERON) 15 MG tablet, Take 15 mg by mouth Every Night., Disp: , Rfl:   •  nitroglycerin (NITROSTAT) 0.4 MG SL tablet, Place 0.4 mg under the tongue every 5 (five) minutes as needed for chest pain. Take no more than 3 doses in 15 minutes., Disp: , Rfl:   •  polyethylene glycol (MIRALAX) packet, Take 17 g by mouth 2 (two) times a day., Disp: , Rfl:   •  QUETIAPINE FUMARATE PO, Take 50 mg by mouth Every Night., Disp: , Rfl:   •  sertraline (ZOLOFT) 50 MG tablet, Take 50 mg by mouth Daily., Disp: , Rfl:   •  vitamin D (ERGOCALCIFEROL) 89077 UNITS capsule capsule, Take 50,000 Units by mouth 1 (one) time per week., Disp: , Rfl:     Review of Systems   Constitution: Positive for weakness and malaise/fatigue. Negative for chills, decreased appetite and fever.   HENT: Negative for congestion, ear pain, hoarse voice, nosebleeds and sore throat.    Eyes: Negative for visual disturbance.   Cardiovascular: Positive for leg swelling. Negative for chest pain, claudication, cyanosis and dyspnea on exertion.        Fistula bleeding:  N   Fistula pain:N  Extremity pain:  Y Extremity discoloration:  N   Extremity numbness/tingling:  N  Both Arms and legs hurt.    Respiratory: Positive for cough and shortness of breath. Negative for hemoptysis.    Hematologic/Lymphatic: Bruises/bleeds easily.    Skin: Positive for dry skin. Negative for color change, flushing, itching, poor wound healing and rash.        Scalp laceration  Staples    Musculoskeletal: Positive for arthritis, back pain, falls and myalgias. Negative for joint swelling and muscle cramps.        VAS 9 all extremities    Gastrointestinal: Negative for abdominal pain, anorexia, change in bowel habit, hematemesis, melena and nausea.   Neurological: Negative for brief paralysis, focal weakness, headaches, numbness, paresthesias and sensory change.   Psychiatric/Behavioral: Negative for altered mental status.        Objective   Physical Exam   Constitutional: She is oriented to person, place, and time. She appears well-nourished.   HENT:   Head: Normocephalic.   Mouth/Throat: Oropharynx is clear and moist.   Eyes: Conjunctivae are normal. Pupils are equal, round, and reactive to light.   Constricted    Neck: Neck supple. No JVD present.   Cardiovascular: Normal rate, regular rhythm, normal heart sounds and intact distal pulses.    Fistula Present LL Extremity: (Y)thrill/(Y)bruit/(Y)pulse. Aneurysmal (N). Water Hammer Pulse (Y). Thinning (N).  Flows <800ml/min (Y). Flows < previous (Y). Skin warm/dry/pink/intact (Y).   Pulmonary/Chest: Effort normal and breath sounds normal. No stridor. She has no wheezes.   Abdominal: Soft. Bowel sounds are normal.   Musculoskeletal: She exhibits edema (ankle pedal ) and tenderness (Left thigh hematoma site. ).   Neurological: She is alert and oriented to person, place, and time.   Skin: Skin is warm and dry. No rash noted. No erythema. No pallor.   Left thigh AV fistula well healed  Small  hematoma NON pulsating.   Fistula +thrill bruit pulse.  Distal  pulse +  + sensation and mobility LEG   warm pink    Psychiatric: Judgment normal.   Nursing note and vitals reviewed.    Vitals:    02/01/18 1520   BP: 132/85   Pulse: 80   Temp: 98.8 °F (37.1 °C)   SpO2: 98%             Assessment/Plan   Independent Review of  Radiographic Studies:    02/01/18   Left AV Fistula  Duplex:  Patent fistula. maxPSV 470cm/s (mid ). Size3.4-6.3  mm. Flows 453-1688 ml/min Distal thigh hematoma 5.6cm.     1. End stage renal failure on dialysis  Fistula functioning well with dialysis.  Continue to use fistula.      2. Arteriovenous fistula  Fistula open and flowing well.  Hematoma stable/improved.    Recheck in 6 months unless problems occur  Continue dialysis as scheduled. If problems should arise including difficulty with access, high pressure alarms, or inability to complete dialysis please [facility] immediately for evaluation..

## 2018-02-13 ENCOUNTER — OUTSIDE FACILITY SERVICE (OUTPATIENT)
Dept: FAMILY MEDICINE CLINIC | Facility: CLINIC | Age: 73
End: 2018-02-13

## 2018-02-13 PROCEDURE — 99308 SBSQ NF CARE LOW MDM 20: CPT | Performed by: FAMILY MEDICINE

## 2018-03-06 ENCOUNTER — OUTSIDE FACILITY SERVICE (OUTPATIENT)
Dept: FAMILY MEDICINE CLINIC | Facility: CLINIC | Age: 73
End: 2018-03-06

## 2018-03-06 PROCEDURE — 99307 SBSQ NF CARE SF MDM 10: CPT | Performed by: FAMILY MEDICINE

## 2018-04-30 PROBLEM — I72.9 PSEUDOANEURYSM (HCC): Status: ACTIVE | Noted: 2018-01-01

## 2018-05-01 NOTE — PROGRESS NOTES
Subjective   Patient ID: Efrem Roa is a 72 y.o. female is here today  for fistula evaluation, as site has become erythemic and edema.   Bld CS Gm+ cocci  On Vanc with CHD.   CC:   Denies any problems with dialysis.  VAS 9 LEFT  Leg  Denies fever or chills  Has swollen red bump on fistula    History of Present Illness  PCP:  Dr Perez  Nephrology:  Dr Mireles, [facility]    72yr woman with ESRD on hemodialysis, HTN, COPD, sleep apnea, Stroke, GERD, LEFT arm fracture 20yrs ago.  moderate chronic pain lower back, LEFT leg.  occasional bleeding after decannulation.  recent clotted graft, unable to declot.  possible central venous occlusion.  LEFT femoral tunnel catheter. Recent new AV fistula placement. . End stage renal disease, No clotted access or problems during dialysis,   L Femoral AV graft functioning well.  Has memory and falling balance problems.  In WC today.  Returning for fistula evaluation.  Seen today with Dr Fernandez     7/29/2013 Upper extremity vein mapping:  RIGHT cephalic 1.3-4.5mm, basilic 2.5-3.5mm.  LEFT cephalic 2.0-3.3mm, basilic 2.1-3.2mm.  8/19/13: Placement RIGHT chest Tunnel Cath  10/15/13 RIGHT Brachial-Axillary Vein AV graft  11/26/13 RIGHT AV duplex: Anast dist 517, dist 446, mid 93, prx 146, Ax Anast 183. Lg hematoma prx arm >7cm  3/24/14: RIGHT AV duplex: Patent fistula. maxPSV 870cm/s (distal). Size 3.2-6.6mm. Flows 887-2394ml/m. Prx hematoma 3.96cm  7/30/14  RIGHT AV duplex: Patent fistula. maxPSV 869cm/s (distal). Size 9.3mm. Flows 2472ml/m.  elevated velocity at elbow level  1/6/15: LEFT Brachial-axillary AV artegraft  3/11/15: LEFT AV Duplex: Patent fistula. maxPSV 605cm/s. Size 1.7-6.9mm. Flows 165-1219ml/m.   8/3/2015 LEFT AV Duplex: Patent fistula. maxPSV 255cm/s. Size 2.1-7.5mm. Flows 193-1007ml/m.   2/2016 LEFT fistulagram:  fistula end of life.  tunnel cath placed.  10/4/16: LEFT Leg AV graft SFA-SFV  12/08/16  Left AV Fistula  Duplex:  Patent fistula.  maxPSV 446cm/s (arterial anast). Size 2.3-5.6mm. Flows 236-1319 ml/m.  01/03/16   Left AV Fistula  Duplex:  Patent fistula. maxPSV 586cm/s (arterial anast). Size 3.4-5.9mm. Flows 767-1567 ml/m.  02/23/17  Tunneled catheter removed.  03/23/17   Left AV Fistula  Duplex:  Patent fistula. maxPSV 398cm/s (mid ). Size 0.31-0.59 cm. Flows 549-1842 ml/m.  Sm pseudoaneurysm 1.5cm  Hematoma distal surrounding graft 5.64cm   06/06/17  Left AV Fistula  Duplex:  Patent fistula. maxPSV 384cm/s (mid ). Size 0.31-0.59 cm. Flows 549-1842 ml/m.  Sm pseudoaneurysm 1.1cm  Hematoma distal surrounding graft 4.1cm, second hematoma 2.2 (distal)   02/01/18   Left AV Fistula  Duplex:  Patent fistula. maxPSV 470cm/s (mid ). Size3.4-6.3  mm. Flows 453-1688 ml/min Distal thigh hematoma 5.6cm.   04/30/18  Left AV Fistula  Duplex:  Patent fistula. maxPSV 470cm/s (mid ). Size2.7-6.2  mm. Flows 349-1258 ml/min Distal thigh hematoma 6.58cm   Pseudoaneurysm mid thight 2.56 X 1.40cm  See image below            The following portions of the patient's history were reviewed and updated as appropriate: allergies, current medications, past family history, past medical history, past social history, past surgical history and problem list.  See HPI   Allergies   Allergen Reactions   • Ambien [Zolpidem Tartrate]    • Benadryl [Diphenhydramine]    • Penicillins          Current Outpatient Prescriptions:   •  acetaminophen (TYLENOL) 325 MG tablet, Take 650 mg by mouth Every 6 (Six) Hours As Needed for Mild Pain ., Disp: , Rfl:   •  Apixaban (ELIQUIS PO), Take 2.5 mg by mouth 2 (Two) Times a Day., Disp: , Rfl:   •  aspirin 81 MG EC tablet, Take 81 mg by mouth daily., Disp: , Rfl:   •  atorvastatin (LIPITOR) 40 MG tablet, Take 40 mg by mouth every night., Disp: , Rfl:   •  B Complex-C-Folic Acid (NGA CAPS PO), Take 1 capsule by mouth daily., Disp: , Rfl:   •  ciprofloxacin (CIPRO) 250 MG tablet, Take 250 mg by mouth 2 (Two) Times a Day., Disp: , Rfl:   •   Docusate Sodium (COLACE PO), Take 1 capsule by mouth 3 (three) times a day as needed., Disp: , Rfl:   •  famotidine (PEPCID) 40 MG tablet, Take 40 mg by mouth Daily., Disp: , Rfl:   •  gabapentin (NEURONTIN) 100 MG capsule, Take 100 mg by mouth 3 (three) times a day., Disp: , Rfl:   •  hydrALAZINE (APRESOLINE) 100 MG tablet, Take 100 mg by mouth 3 (three) times a day., Disp: , Rfl:   •  HYDROcodone-acetaminophen (NORCO) 7.5-325 MG per tablet, Take 1 tablet by mouth Every 4 (Four) Hours As Needed for Moderate Pain ., Disp: 10 tablet, Rfl: 0  •  levETIRAcetam (KEPPRA) 500 MG tablet, Take 500 mg by mouth 2 (Two) Times a Day., Disp: , Rfl:   •  levothyroxine (SYNTHROID, LEVOTHROID) 88 MCG tablet, Take 88 mcg by mouth daily., Disp: , Rfl:   •  Mesalamine (APRISO PO), Take 1 capsule by mouth daily., Disp: , Rfl:   •  mirtazapine (REMERON) 15 MG tablet, Take 15 mg by mouth Every Night., Disp: , Rfl:   •  nitroglycerin (NITROSTAT) 0.4 MG SL tablet, Place 0.4 mg under the tongue every 5 (five) minutes as needed for chest pain. Take no more than 3 doses in 15 minutes., Disp: , Rfl:   •  polyethylene glycol (MIRALAX) packet, Take 17 g by mouth 2 (two) times a day., Disp: , Rfl:   •  QUETIAPINE FUMARATE PO, Take 50 mg by mouth Every Night., Disp: , Rfl:   •  sertraline (ZOLOFT) 50 MG tablet, Take 50 mg by mouth Daily., Disp: , Rfl:   •  sevelamer (RENVELA) 800 MG tablet, Take 1,600 mg by mouth 3 (Three) Times a Day With Meals., Disp: , Rfl:   •  vitamin B-6 (PYRIDOXINE) 25 MG tablet, Take 25 mg by mouth Daily., Disp: , Rfl:   •  vitamin D (ERGOCALCIFEROL) 08601 UNITS capsule capsule, Take 50,000 Units by mouth 1 (one) time per week., Disp: , Rfl:     Review of Systems   Constitution: Positive for weakness and malaise/fatigue. Negative for chills, decreased appetite and fever.   HENT: Negative for congestion, ear pain, hoarse voice, nosebleeds and sore throat.    Eyes: Negative for visual disturbance.   Cardiovascular: Positive  for leg swelling. Negative for chest pain, claudication, cyanosis and dyspnea on exertion.        Fistula bleeding:  N   Fistula pain:Y  Extremity pain:  Y Extremity discoloration:  Y   Extremity numbness/tingling:  N  .    Respiratory: Positive for cough and shortness of breath. Negative for hemoptysis.    Hematologic/Lymphatic: Bruises/bleeds easily.   Skin: Positive for dry skin. Negative for color change, flushing, itching, poor wound healing and rash.        Scalp laceration  Staples    Musculoskeletal: Positive for arthritis, back pain, falls and myalgias. Negative for joint swelling and muscle cramps.        VAS 9 all extremities    Gastrointestinal: Negative for abdominal pain, anorexia, change in bowel habit, hematemesis, melena and nausea.   Neurological: Negative for brief paralysis, focal weakness, headaches, numbness, paresthesias and sensory change.   Psychiatric/Behavioral: Negative for altered mental status.        Objective   Physical Exam   Constitutional: She is oriented to person, place, and time. She appears well-nourished.   HENT:   Head: Normocephalic.   Mouth/Throat: Oropharynx is clear and moist.   Eyes: Conjunctivae are normal. Pupils are equal, round, and reactive to light.   Constricted    Neck: Neck supple. No JVD present.   Cardiovascular: Normal rate, regular rhythm, normal heart sounds and intact distal pulses.    Fistula Present LL Extremity: (Y)thrill/(Y)bruit/(Y)pulse. Aneurysmal (Y. Water Hammer Pulse (Y). Thinning (Y).  Flows <800ml/min (Y). Flows < previous (Y). Skin warm/dry/pink/intact (Y).  Pseud +   Pulmonary/Chest: Effort normal and breath sounds normal. No stridor. She has no wheezes.   Abdominal: Soft. Bowel sounds are normal.   Musculoskeletal: She exhibits edema (ankle pedal ) and tenderness (Left thigh hematoma site. ).   Neurological: She is alert and oriented to person, place, and time.   Skin: Skin is warm and dry. No rash noted. There is erythema. No pallor.   Left  thigh AV fistula well healed  Small  hematoma   Fistula +thrill bruit pulse.  Distal  pulse +  + sensation and mobility LEG   warm pink     Erythremic ecchymotic area see picture Pseudoaneurysm   Psychiatric: Her behavior is normal. Judgment normal.   Nursing note and vitals reviewed.    Vitals:    04/30/18 1413   BP: 122/62   Pulse: 81   Temp: 97 °F (36.1 °C)   SpO2: 97%       Seen Today with Dr Fernandez       Assessment/Plan   Independent Review of Radiographic [id number]  Left AV Fistula  Duplex:  Patent fistula. maxPSV 470cm/s (mid ). Size2.7-6.2  mm. Flows 349-1258 ml/min Distal thigh hematoma 6.58cm   Pseudoaneurysm mid thight 2.56 X 1.40cm  See image below    1. End stage renal failure on dialysis  Fistula functioning well with dialysis.  Continue to use fistula.      2. Arteriovenous fistula  Fistula open and flowing well.  Hematoma stable  Left AV graft SFA->SFV    pseudoaneurysm   No abscess   Stick below   Pseudoaneurysmal area.  Avoid sticking near.      Recheck as scheduled  unless problems occur  Continue dialysis as scheduled. If problems should arise including difficulty with access, high pressure alarms, or inability to complete dialysis please [facility] immediately for evaluation..

## 2018-05-07 NOTE — ED NOTES
Ashley RN at [facility] notified of patient begin transferred to [facility] in [place].     Ban Correia RN  05/07/18 [id number]

## 2018-05-07 NOTE — ED NOTES
Consent to treat and transfer patient obtained from Mandy Cain to this nurse and Tierney JERRY RN.      Ban Correia RN  05/07/18 [id number]

## 2018-05-07 NOTE — ED PROVIDER NOTES
Subjective   Patient presents with probable shunt infection on the left.  Patient been dealing with this for approximately 1-2 weeks.  Patient is seen her nephrologist,, Dr. Mireles, as well as her vascular surgeon, Dr. Fernandez.  Patient was noted to have MRSA in the shunt up approximately a week ago and she was started on vancomycin.  Patient is received 4 doses of vancomycin in the past week.  Patient received her last dose today.  Blood cultures note that MRSA still in the shunt at this point the patient was sent to the ED for possible further medications and vascular consultation for possible removal of the shunt.  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  Denying systemic symptoms right now fevers chills nausea vomiting.  Patient states she was able to dialyze today.          Review of Systems   Constitutional: Negative.  Negative for appetite change, chills and fever.   HENT: Negative.  Negative for congestion.    Eyes: Negative.  Negative for photophobia and visual disturbance.   Respiratory: Negative.  Negative for cough, chest tightness and shortness of breath.    Cardiovascular: Negative.  Negative for chest pain and palpitations.   Gastrointestinal: Negative.  Negative for abdominal pain, constipation, diarrhea, nausea and vomiting.   Endocrine: Negative.    Genitourinary: Negative.  Negative for decreased urine volume, dysuria, flank pain and hematuria.   Musculoskeletal: Negative.  Negative for arthralgias, back pain, myalgias, neck pain and neck stiffness.   Skin: Positive for color change. Negative for pallor.   Neurological: Negative.  Negative for dizziness, syncope, weakness, light-headedness, numbness and headaches.   Psychiatric/Behavioral: Negative.  Negative for confusion and suicidal ideas. The patient is not nervous/anxious.        Past Medical History:   Diagnosis  Date   • Abnormal x-ray     Standard chest x ray, f/u pneumonia xray   • Acquired hypothyroidism    • Amblyopia    • Anemia of renal disease    • Anxiety    • Arteriovenous fistula     Placed 10/15/2014   • Benign essential hypertension    • Bipolar affective disorder     Current episode depression   • Bipolar disorder    • Cerebrovascular accident    • Chest wall pain    • Chronic angle-closure glaucoma    • Chronic kidney disease     Stage 4-approaching hemodialysis   • Chronic pain syndrome    • Chronic renal failure    • Combined drug dependence excluding opioids    • Constipation    • COPD (chronic obstructive pulmonary disease)    • Coronary arteriosclerosis    • Cough    • Degeneration of cervical intervertebral disc    • Dementia     Multi-infarct, uncomplicated   • Depression    • Diabetes mellitus     No retinopathy   • Diabetes mellitus     Without mention of complication, type 2 or unspecified type, not stated as uncontrolled   • Diabetic renal disease    • Disc disorder of lumbar region    • DJD (degenerative joint disease)     Involving multiple joints   • Edema    • End stage renal failure on dialysis     LUE AV FISTULA 1/2015   • Epigastric pain    • Essential hypertension    • Exotropia    • Gastritis    • Generalized abdominal pain    • GERD (gastroesophageal reflux disease)     With Esophagitis   • Gout    • H/O screening mammography    • Hiatal hernia    • Hyperlipidemia    • Hypermetropia    • Insomnia    • Iron deficiency anemia    • Low back pain    • Lumbago    • Non-cardiac chest pain    • Nuclear cataract    • Osteoporosis    • Panic disorder without agoraphobia    • Peripheral nervous system disorder    • Radiculitis    • RLQ abdominal pain    • RUQ pain    • Sprain of ankle    • Sprain of knee     Resolving   • Superficial injury of shoulder and upper arm    • Surgical follow-up care    • Type 2 diabetes mellitus    • UTI (urinary tract infection)    • Visit for suture removal    •  Vitamin D deficiency    • Vulvovaginitis        Allergies   Allergen Reactions   • Ambien [Zolpidem Tartrate]    • Benadryl [Diphenhydramine]    • Penicillins        Past Surgical History:   Procedure Laterality Date   • APPENDECTOMY     • BACK SURGERY      x2   • BYPASS GRAFT  2015    Left brachial artery to axillary vein arteriovenous graft. Venous ultrasound unilateral extremity   •  SECTION      x2   • CHOLECYSTECTOMY     • COLONOSCOPY W/ POLYPECTOMY  2015    Colitis found in the cecum. Biopsy taken. A diverticulum was found in the sigmoid colon.   • ENDOSCOPY AND COLONOSCOPY     • ESOPHAGOSCOPY / EGD  2015    Normal esophagus. Gastritis found in the stomach. Biopsy taken. Duodenitis found in the duodenum. Biopsy take.   • ESOPHAGOSCOPY / EGD  1944    With tube-Esophagus appeared normal. Nonerosive gastritis. Duodenum appeared normal   • HYSTERECTOMY     • INJECTION OF MEDICATION  2011    Aranesp   • INJECTION OF MEDICATION  2011    Kenalog   • INTERVENTIONAL RADIOLOGY PROCEDURE Left 10/5/2017    Procedure: IR dialysis fistulagram;  Surgeon: Blane Fernandez MD;  Location: [facility]VASIVE LOCATION;  Service:    • LEG SURGERY  2000    Cancelled. Due to uncontrolled hypertension   • OTHER SURGICAL HISTORY  2016    Tissue plasminogen activator catheter thrombolysis   • REMOVAL PERITONEAL DIALYSIS CATHETER  2014    Removal of tunneled hemodialysis catheter with cuff   • TRANSESOPHAGEAL ECHOCARDIOGRAM (ANTHONY)  2016    Mild left atrial enlargement with mild to moderate CLVH with normal aortic root size.LV systolic function well preserved with Ef of 55-60%.Mitral valve thickened.Av thickened.Aortic sclerosis.Mild mitral regurg.mild to moderate tricuspid regurg.   • TRANSESOPHAGEAL ECHOCARDIOGRAM (ANTHONY)  2012    Normal left ventricular systolic function. Moderate tricuspid regurgitation with evidenceof pulmonary hypertension        Family History   Problem Relation Age of Onset   • Coronary artery disease Other        Social History     Social History   • Marital status:      Social History Main Topics   • Smoking status: Former Smoker   • Smokeless tobacco: Never Used   • Alcohol use No   • Drug use: No     Other Topics Concern   • Not on file           Objective   Physical Exam   Constitutional: She is oriented to person, place, and time. She appears well-developed and well-nourished. No distress.   HENT:   Head: Normocephalic and atraumatic.   Nose: Nose normal.   Mouth/Throat: Oropharynx is clear and moist.   Eyes: Conjunctivae and EOM are normal. No scleral icterus.   Neck: Normal range of motion. Neck supple. No JVD present.   Cardiovascular: Normal rate, regular rhythm, normal heart sounds and intact distal pulses.  Exam reveals no gallop and no friction rub.    No murmur heard.  Pulmonary/Chest: Effort normal. No respiratory distress. She has no wheezes. She has no rales. She exhibits no tenderness.   Abdominal: Soft. She exhibits no distension and no mass. There is no tenderness. There is no rebound and no guarding.   Musculoskeletal: Normal range of motion. She exhibits no edema, tenderness or deformity.   The left thigh access site shows soft tissue swelling over the proximal thigh approximately 3-4 cm.  There is no palpable thrill in this area.  Distal this area does have a palpable thrill.  There is some soreness to this raised area to palpation.  There is minimal erythema.  There does not appear to be a local cellulitis.   Lymphadenopathy:     She has no cervical adenopathy.   Neurological: She is alert and oriented to person, place, and time. No cranial nerve deficit. She exhibits normal muscle tone.   Skin: Skin is warm and dry. No rash noted. She is not diaphoretic. No erythema. There is pallor.   Ashen appearance to skin.  Especially in the facial region.   Psychiatric: She has a normal mood and affect. Her  behavior is normal. Judgment and thought content normal.   Nursing note and vitals reviewed.      Procedures           ED Course  ED Course      Labs Reviewed   COMPREHENSIVE METABOLIC PANEL - Abnormal; Notable for the following:        Result Value    Glucose 173 (*)     Creatinine 2.42 (*)     Chloride 88 (*)     CO2 36.0 (*)     Total Protein 9.1 (*)     Alkaline Phosphatase 160 (*)     eGFR Non African Amer 20 (*)     Globulin 4.9 (*)     A/G Ratio 0.9 (*)     All other components within normal limits    Narrative:     The MDRD GFR formula is only valid for adults with stable renal function between ages 18 and 70.   CBC WITH AUTO DIFFERENTIAL - Abnormal; Notable for the following:     WBC 11.01 (*)     RBC 3.52 (*)     Hemoglobin 9.9 (*)     Hematocrit 32.1 (*)     MCHC 30.8 (*)     RDW 17.0 (*)     RDW-SD 56.8 (*)     Neutrophil % 89.6 (*)     Lymphocyte % 5.0 (*)     Neutrophils, Absolute 9.88 (*)     Lymphocytes, Absolute 0.55 (*)     Immature Grans, Absolute 0.03 (*)     All other components within normal limits   BLOOD CULTURE   BLOOD CULTURE   RAINBOW DRAW    Narrative:     The following orders were created for panel order Mullen Draw.  Procedure                               Abnormality         Status                     ---------                               -----------         ------                     Light Blue Top[[id number]]                                   Final result               Green Top (Gel)[[id number]]                                  Final result               Lavender Top[[id number]]                                     Final result               Gold Top - SST[[id number]]                                   Final result                 Please view results for these tests on the individual orders.   CBC AND DIFFERENTIAL    Narrative:     The following orders were created for panel order CBC & Differential.  Procedure                               Abnormality         Status                      ---------                               -----------         ------                     CBC Auto Differential[[id number]]        Abnormal            Final result                 Please view results for these tests on the individual orders.   LIGHT BLUE TOP   GREEN TOP   LAVENDER TOP   GOLD TOP - SST   EXTRA TUBES    Narrative:     The following orders were created for panel order Extra Tubes.  Procedure                               Abnormality         Status                     ---------                               -----------         ------                     Green Top (Gel)[[id number]]                                  Final result                 Please view results for these tests on the individual orders.   GREEN TOP       No orders to display     Patient with continued MRSA infection thought secondary to a shunt infection.  Patient sent, by Dr. Mireles for further IV antibiotics.  The hospitalist was consulted and there was concern for the patient's infection being resistant to vancomycin, possibly needing infectious disease consultation.  Case was discussed with Dr. Ohara from [facility], as this is also hospital Dr. Mireles is covering.  Patient will be seen in the ED for further management.  Discussed with patient who agrees to the transfer.            MDM      Final diagnoses:   MRSA infection   Pseudoaneurysm            Amish Hernandez MD  05/07/18 [id number]

## 2018-05-16 NOTE — ED PROVIDER NOTES
Subjective   Patient presents from dialysis with low hemoglobin.  Patient is not particularly symptomatic.  Patient denies any bright red blood per rectum.  No melena.  Patient has chronic anemia.  Patient has had recent surgery on the left thigh and vascular access.  The area was infected and is currently under care with a wound VAC and antibiotics.  Patient denies fevers chills nausea vomiting.  Patient had an uneventful dialysis run today.  Hemoglobin at dialysis was noted be 6.6.            Review of Systems   Constitutional: Negative for appetite change, chills and fever.   HENT: Negative.  Negative for congestion.    Eyes: Negative.  Negative for photophobia and visual disturbance.   Respiratory: Negative.  Negative for cough, chest tightness and shortness of breath.    Cardiovascular: Negative.  Negative for chest pain and palpitations.   Gastrointestinal: Negative.  Negative for abdominal pain, constipation, diarrhea, nausea and vomiting.   Endocrine: Negative.    Genitourinary: Negative.  Negative for decreased urine volume, dysuria, flank pain and hematuria.   Musculoskeletal: Negative.  Negative for arthralgias, back pain, myalgias, neck pain and neck stiffness.   Skin: Negative.  Negative for pallor.   Neurological: Positive for weakness. Negative for dizziness, syncope, light-headedness, numbness and headaches.   Psychiatric/Behavioral: Negative.  Negative for confusion and suicidal ideas. The patient is not nervous/anxious.        Past Medical History:   Diagnosis Date   • Abnormal x-ray     Standard chest x ray, f/u pneumonia xray   • Acquired hypothyroidism    • Amblyopia    • Anemia of renal disease    • Anxiety    • Arteriovenous fistula     Placed 10/15/2014   • Benign essential hypertension    • Bipolar affective disorder     Current episode depression   • Bipolar disorder    • Cerebrovascular accident    • Chest wall pain    • Chronic angle-closure glaucoma    • Chronic kidney disease     Stage  4-approaching hemodialysis   • Chronic pain syndrome    • Chronic renal failure    • Combined drug dependence excluding opioids    • Constipation    • COPD (chronic obstructive pulmonary disease)    • Coronary arteriosclerosis    • Cough    • Degeneration of cervical intervertebral disc    • Dementia     Multi-infarct, uncomplicated   • Depression    • Diabetes mellitus     No retinopathy   • Diabetes mellitus     Without mention of complication, type 2 or unspecified type, not stated as uncontrolled   • Diabetic renal disease    • Disc disorder of lumbar region    • DJD (degenerative joint disease)     Involving multiple joints   • Edema    • End stage renal failure on dialysis     LUE AV FISTULA 2015   • Epigastric pain    • Essential hypertension    • Exotropia    • Gastritis    • Generalized abdominal pain    • GERD (gastroesophageal reflux disease)     With Esophagitis   • Gout    • H/O screening mammography    • Hiatal hernia    • Hyperlipidemia    • Hypermetropia    • Insomnia    • Iron deficiency anemia    • Low back pain    • Lumbago    • Non-cardiac chest pain    • Nuclear cataract    • Osteoporosis    • Panic disorder without agoraphobia    • Peripheral nervous system disorder    • Radiculitis    • RLQ abdominal pain    • RUQ pain    • Sprain of ankle    • Sprain of knee     Resolving   • Superficial injury of shoulder and upper arm    • Surgical follow-up care    • Type 2 diabetes mellitus    • UTI (urinary tract infection)    • Visit for suture removal    • Vitamin D deficiency    • Vulvovaginitis        Allergies   Allergen Reactions   • Ambien [Zolpidem Tartrate]    • Benadryl [Diphenhydramine]    • Penicillins        Past Surgical History:   Procedure Laterality Date   • APPENDECTOMY     • BACK SURGERY      x2   • BYPASS GRAFT  2015    Left brachial artery to axillary vein arteriovenous graft. Venous ultrasound unilateral extremity   •  SECTION      x2   • CHOLECYSTECTOMY     •  COLONOSCOPY W/ POLYPECTOMY  06/17/2015    Colitis found in the cecum. Biopsy taken. A diverticulum was found in the sigmoid colon.   • ENDOSCOPY AND COLONOSCOPY  2006   • ESOPHAGOSCOPY / EGD  06/17/2015    Normal esophagus. Gastritis found in the stomach. Biopsy taken. Duodenitis found in the duodenum. Biopsy take.   • ESOPHAGOSCOPY / EGD  1944    With tube-Esophagus appeared normal. Nonerosive gastritis. Duodenum appeared normal   • HYSTERECTOMY     • INJECTION OF MEDICATION  06/06/2011    Aranesp   • INJECTION OF MEDICATION  06/06/2011    Kenalog   • INTERVENTIONAL RADIOLOGY PROCEDURE Left 10/5/2017    Procedure: IR dialysis fistulagram;  Surgeon: Blane Fernandez MD;  Location: [facility] [place] [facility] INVAS[facility] LOCATION;  Service:    • LEG SURGERY  06/13/2000    Cancelled. Due to uncontrolled hypertension   • OTHER SURGICAL HISTORY  07/07/2016    Tissue plasminogen activator catheter thrombolysis   • REMOVAL PERITONEAL DIALYSIS CATHETER  01/16/2014    Removal of tunneled hemodialysis catheter with cuff   • TRANSESOPHAGEAL ECHOCARDIOGRAM (ANTHONY)  03/24/2016    Mild left atrial enlargement with mild to moderate CLVH with normal aortic root size.LV systolic function well preserved with Ef of 55-60%.Mitral valve thickened.Av thickened.Aortic sclerosis.Mild mitral regurg.mild to moderate tricuspid regurg.   • TRANSESOPHAGEAL ECHOCARDIOGRAM (ANTHONY)  02/22/2012    Normal left ventricular systolic function. Moderate tricuspid regurgitation with evidenceof pulmonary hypertension       Family History   Problem Relation Age of Onset   • Coronary artery disease Other        Social History     Social History   • Marital status:      Social History Main Topics   • Smoking status: Former Smoker   • Smokeless tobacco: Never Used   • Alcohol use No   • Drug use: No     Other Topics Concern   • Not on file           Objective   Physical Exam   Constitutional: She is oriented to person, place, and time. She appears  well-developed and well-nourished. No distress.   HENT:   Head: Normocephalic and atraumatic.   Nose: Nose normal.   Mouth/Throat: Oropharynx is clear and moist.   Eyes: Conjunctivae and EOM are normal. No scleral icterus.   Neck: Normal range of motion. Neck supple. No JVD present.   Cardiovascular: Normal rate, regular rhythm, normal heart sounds and intact distal pulses.  Exam reveals no gallop and no friction rub.    No murmur heard.  Pulmonary/Chest: Effort normal. No respiratory distress. She has no wheezes. She has no rales. She exhibits no tenderness.   Abdominal: Soft. She exhibits no distension and no mass. There is no tenderness. There is no rebound and no guarding.   Musculoskeletal: Normal range of motion. She exhibits no edema, tenderness or deformity.   Left thigh with staples in place.  There is mild erythema around the site.  There is no purulence noted.  Wound VAC is in place.  Appears functioning properly.   Lymphadenopathy:     She has no cervical adenopathy.   Neurological: She is alert and oriented to person, place, and time. No cranial nerve deficit. She exhibits normal muscle tone.   Skin: Skin is warm and dry. No rash noted. She is not diaphoretic. No erythema. There is pallor.   Pale, ashen appearance.   Psychiatric: She has a normal mood and affect. Her behavior is normal. Judgment and thought content normal.   Nursing note and vitals reviewed.      Procedures           ED Course  ED Course as of May 16 1818   Wed May 16, 2018   1807 Patient with minimal symptoms in the ED.  Patient's hemoglobin here looks to be a level VII.8.  Discussed with Dr. Mireles.  Patient's is having symptoms of weakness and orthostatic dizziness.  With that in mind in the patient's borderline and hemoglobin level since her surgery was recommended that the patient have one unit of blood before discharge.  This will be done in the ED the patient be returned to the nursing home after that.  Patient gives informed  consent after discussion of the risk and benefits of transfusion.  [PC]      ED Course User Index  [PC] Amish Hernandez MD          Patient with acute on chronic anemia.  Patient transfused 1 unit in the ED.  Patient will follow-up with Dr. Mireles at dialysis.  Plan is for her to have increased seepage and during dialysis to help with her chronic kidney disease anemia.  We will discharge to nursing home to outpatient management.        MDM      Final diagnoses:   Chronic anemia   Weakness   Anemia due to chronic kidney disease            Amish Hernandez MD  05/17/18 0947

## 2018-07-09 PROBLEM — J18.9 PNEUMONIA OF RIGHT UPPER LOBE DUE TO INFECTIOUS ORGANISM: Status: ACTIVE | Noted: 2018-01-01

## 2018-07-09 NOTE — ED PROVIDER NOTES
Subjective   Patient presents emergency department after an episode of altered mental status decreased level of consciousness this morning.  Patient, which she remembers, states that she was getting out of bed became lightheaded and fell back onto the bed.  Patient denies any other trauma.  Patient was noted by the nursing home personnel to be confused and lethargic and unresponsive for a period of time.  This had continued on EMS arrival as far as her lethargy goes.  Patient was given Narcan IV which seemed to wake the patient up.  Patient is on chronic narcotics for pain issues.  Patient denies any fevers chills nausea vomiting though does not appear to be reliable historian at this point secondary to the daily if her condition.  The patient has been dialyzing regularly, patient of Dr. Mireles's.  Dr. Mireles is in the ED to evaluate the patient as well as she was to dialyze today.            Review of Systems   Constitutional: Positive for activity change.   Skin: Positive for pallor.   Neurological: Positive for weakness.   Psychiatric/Behavioral: Positive for confusion and decreased concentration.       Past Medical History:   Diagnosis Date   • Abnormal x-ray     Standard chest x ray, f/u pneumonia xray   • Acquired hypothyroidism    • Amblyopia    • Anemia of renal disease    • Anxiety    • Arteriovenous fistula (CMS/HCC)     Placed 10/15/2014   • Benign essential hypertension    • Bipolar affective disorder (CMS/HCC)     Current episode depression   • Bipolar disorder (CMS/HCC)    • Cerebrovascular accident (CMS/HCC)    • Chest wall pain    • Chronic angle-closure glaucoma    • Chronic kidney disease     Stage 4-approaching hemodialysis   • Chronic pain syndrome    • Chronic renal failure    • Combined drug dependence excluding opioids (CMS/HCC)    • Constipation    • COPD (chronic obstructive pulmonary disease) (CMS/HCC)    • Coronary arteriosclerosis    • Cough    • Degeneration of cervical intervertebral disc     • Dementia     Multi-infarct, uncomplicated   • Depression    • Diabetes mellitus (CMS/[facility])     No retinopathy   • Diabetes mellitus (CMS/[facility])     Without mention of complication, type 2 or unspecified type, not stated as uncontrolled   • Diabetic renal disease (CMS/[facility])    • Disc disorder of lumbar region    • DJD (degenerative joint disease)     Involving multiple joints   • Edema    • End stage renal failure on dialysis (CMS/[facility])     LUE AV FISTULA 2015   • Epigastric pain    • Essential hypertension    • Exotropia    • Gastritis    • Generalized abdominal pain    • GERD (gastroesophageal reflux disease)     With Esophagitis   • Gout    • H/O screening mammography    • Hiatal hernia    • Hyperlipidemia    • Hypermetropia    • Insomnia    • Iron deficiency anemia    • Low back pain    • Lumbago    • Non-cardiac chest pain    • Nuclear cataract    • Osteoporosis    • Panic disorder without agoraphobia    • Peripheral nervous system disorder    • Radiculitis    • RLQ abdominal pain    • RUQ pain    • Sprain of ankle    • Sprain of knee     Resolving   • Superficial injury of shoulder and upper arm    • Surgical follow-up care    • Type 2 diabetes mellitus (CMS/[facility])    • UTI (urinary tract infection)    • Visit for suture removal    • Vitamin D deficiency    • Vulvovaginitis        Allergies   Allergen Reactions   • Ambien [Zolpidem Tartrate]    • Benadryl [Diphenhydramine]    • Penicillins        Past Surgical History:   Procedure Laterality Date   • APPENDECTOMY     • BACK SURGERY      x2   • BYPASS GRAFT  2015    Left brachial artery to axillary vein arteriovenous graft. Venous ultrasound unilateral extremity   •  SECTION      x2   • CHOLECYSTECTOMY     • COLONOSCOPY W/ POLYPECTOMY  2015    Colitis found in the cecum. Biopsy taken. A diverticulum was found in the sigmoid colon.   • ENDOSCOPY AND COLONOSCOPY     • ESOPHAGOSCOPY / EGD  2015    Normal esophagus. Gastritis found in  the stomach. Biopsy taken. Duodenitis found in the duodenum. Biopsy take.   • ESOPHAGOSCOPY / EGD  1944    With tube-Esophagus appeared normal. Nonerosive gastritis. Duodenum appeared normal   • HYSTERECTOMY     • INJECTION OF MEDICATION  06/06/2011    Aranesp   • INJECTION OF MEDICATION  06/06/2011    Kenalog   • INTERVENTIONAL RADIOLOGY PROCEDURE Left 10/5/2017    Procedure: IR dialysis fistulagram;  Surgeon: Blane Fernandez MD;  Location: [facility];  Service:    • LEG SURGERY  06/13/2000    Cancelled. Due to uncontrolled hypertension   • OTHER SURGICAL HISTORY  07/07/2016    Tissue plasminogen activator catheter thrombolysis   • REMOVAL PERITONEAL DIALYSIS CATHETER  01/16/2014    Removal of tunneled hemodialysis catheter with cuff   • TRANSESOPHAGEAL ECHOCARDIOGRAM (ANTHONY)  03/24/2016    Mild left atrial enlargement with mild to moderate CLVH with normal aortic root size.LV systolic function well preserved with Ef of 55-60%.Mitral valve thickened.Av thickened.Aortic sclerosis.Mild mitral regurg.mild to moderate tricuspid regurg.   • TRANSESOPHAGEAL ECHOCARDIOGRAM (ANTHONY)  02/22/2012    Normal left ventricular systolic function. Moderate tricuspid regurgitation with evidenceof pulmonary hypertension       Family History   Problem Relation Age of Onset   • Coronary artery disease Other        Social History     Social History   • Marital status:      Social History Main Topics   • Smoking status: Former Smoker   • Smokeless tobacco: Never Used   • Alcohol use No   • Drug use: No     Other Topics Concern   • Not on file           Objective   Physical Exam   Constitutional:   Patient is somnolent and generally ill appearing deconditioned female.   HENT:   Bluish hue to the skin of the face, subacute.   Eyes: EOM are normal.   Neck: Normal range of motion. Neck supple.   Cardiovascular: Regular rhythm.    Tachycardia   Pulmonary/Chest: Effort normal and breath sounds normal. No  respiratory distress. She has no wheezes. She has no rales.   Abdominal: Soft. She exhibits no mass. There is no tenderness. There is no rebound and no guarding. No hernia.   Musculoskeletal:   1+ pitting edema bilateral lower extremities.  Patient has range of motion at the hips and knees without appreciable discomfort.   Skin: Skin is warm and dry. Capillary refill takes 2 to 3 seconds. No rash noted. There is pallor.   Psychiatric: Her affect is labile. Her speech is not delayed. She is slowed. Cognition and memory are impaired. She is attentive.       Procedures           ED Course      Labs Reviewed   TROPONIN (IN-HOUSE) - Abnormal; Notable for the following:        Result Value    Troponin I 0.050 (*)     All other components within normal limits   COMPREHENSIVE METABOLIC PANEL - Abnormal; Notable for the following:     BUN 72 (*)     Creatinine 5.74 (*)     Sodium 120 (*)     Potassium 6.7 (*)     Chloride 81 (*)     CO2 21.0 (*)     Alkaline Phosphatase 172 (*)     eGFR Non African Amer 7 (*)     Globulin 4.2 (*)     A/G Ratio 1.0 (*)     Anion Gap 18.0 (*)     All other components within normal limits    Narrative:     The MDRD GFR formula is only valid for adults with stable renal function between ages 18 and 70.   BNP (IN-HOUSE) - Abnormal; Notable for the following:     proBNP 23,300.0 (*)     All other components within normal limits   CBC WITH AUTO DIFFERENTIAL - Abnormal; Notable for the following:     WBC 13.10 (*)     RDW 17.6 (*)     RDW-SD 60.1 (*)     Neutrophil % 92.7 (*)     Lymphocyte % 2.7 (*)     Neutrophils, Absolute 12.14 (*)     Lymphocytes, Absolute 0.36 (*)     Immature Grans, Absolute 0.03 (*)     All other components within normal limits   BLOOD GAS, ARTERIAL - Abnormal; Notable for the following:     pO2, Arterial 70.8 (*)     Base Excess, Arterial -2.4 (*)     O2 Saturation, Arterial 93.5 (*)     All other components within normal limits   PROTIME-INR - Normal    Narrative:      Therapeutic range for most indications is 2.0-3.0 INR,  or 2.5-3.5 for mechanical heart valves.   LACTIC ACID, PLASMA - Normal   RAINBOW DRAW    Narrative:     The following orders were created for panel order Arnold Draw.  Procedure                               Abnormality         Status                     ---------                               -----------         ------                     Light Blue Top[[id number]]                                   Final result               Green Top (Gel)[[id number]]                                  Final result               Lavender Top[[id number]]                                     Final result               Gold Top - SST[[id number]]                                   Final result                 Please view results for these tests on the individual orders.   BLOOD GAS, ARTERIAL   TROPONIN (IN-HOUSE)   CBC AND DIFFERENTIAL    Narrative:     The following orders were created for panel order CBC & Differential.  Procedure                               Abnormality         Status                     ---------                               -----------         ------                     CBC Auto Differential[[id number]]        Abnormal            Final result                 Please view results for these tests on the individual orders.   LIGHT BLUE TOP   GREEN TOP   LAVENDER TOP   GOLD TOP - SST   EXTRA TUBES    Narrative:     The following orders were created for panel order Extra Tubes.  Procedure                               Abnormality         Status                     ---------                               -----------         ------                     Blood Culture Bottle Set[[id number]]                         Final result               Blood Culture Bottle Set[[id number]]                         Final result                 Please view results for these tests on the individual orders.   BLOOD CULTURE BOTTLE SET   BLOOD CULTURE BOTTLE SET       CT Head Without Contrast    Final Result   1.  No acute intracranial abnormality.   2.  Old stable infarct involving the right posterior temporal   lobe, right occipital lobe, and right posterior inferior parietal   lobe.   3.  Moderate cerebral involutional changes.      Electronically signed by:  Cedrick Kinney MD  7/9/2018 10:24 AM CDT   Workstation: [id number]      XR Chest 1 View   Final Result   CONCLUSION: Chronic appearing infiltrative changes bilaterally.   Increase in right upper lobe infiltrative changes since prior   exam.      Electronically signed by:  Wiliam Pearce MD  7/9/2018 10:23 AM CDT   Workstation: [id number]        With appearance of new onset right upper lobe pneumonia as well as altered mental status.  Tachycardia improving with fluids and antibiotics.  This could also be related to the patient's hyponatremia at 120.  Patient significantly hyperkalemic at 6.7 without any EKG changes associated with that.  Patient is due for dialysis today.  This was discussed with Dr. Mireles who will assure the patient gets dialysis inpatient.  Patient was admitted to the hospitalist service.            MDM      Final diagnoses:   Pneumonia of right upper lobe due to infectious organism (CMS/[facility])   Altered mental status, unspecified altered mental status type   Hyponatremia   Hyperkalemia   Chronic renal failure, unspecified CKD stage            Amish Hernandez MD  07/09/18 [id number]

## 2018-07-09 NOTE — CONSULTS
NEPHROLOGY CONSULTATION:    Presenting complaints:  End-stage renal disease    History of presenting complaints:   72-year-old patient with a history of ESRD, hypertension, previous sepsis, type 2 diabetes mellitus, anemia of chronic kidney disease, sent to the emergency room from nursing home with altered mental status and drowsiness.  Last dialysis was on Friday.  There is a history of MRSA bacteremia, left thigh graft infection, for which he had surgical repair, and a wound VAC in place.  Patient followed up with surgery at [facility], and the wound VAC was subsequently removed.  Patient has been on oral antibiotics, she refused IV antibiotics of the dialysis unit.  The last dialysis was on Friday.  Nursing home and from the dialysis unit today that the patient was being sent to the emergency room due to fatigue and lethargy.    I saw and examined the patient in the emergency room.  She is arousable, although lethargic than baseline.  She recognized me and answered most questions.  Has been complaining of some pain.  Denies abdominal pain or shortness of breath.  Left thigh AV graft with dressings on place.  No fever or chills have been reported.      Review of systems:   12 system review of systems was done, positive information is included in the history of present illness.  Other systems were reviewed and negative.  Patient did not offer much complaints.  Available positive information is included under HPI.  Other systems were reviewed and negative.  No bleeding manifestations.  No new skin rashes or itching.  History of chronic pain syndrome.      Past medical illness:  Patient Active Problem List   Diagnosis   • Constipation   • Arteriovenous fistula (CMS/HCC)   • End stage renal failure on dialysis (CMS/[facility])   • Controlled type 2 diabetes mellitus with chronic kidney disease on chronic dialysis, without long-term current use of insulin (CMS/[facility])   • Coronary artery disease involving native coronary artery  of native heart without angina pectoris   • Panlobular emphysema (CMS/HCC)   • ESRD (end stage renal disease) on dialysis (CMS/HCC)   • HCAP (healthcare-associated pneumonia)   • MRSA bacteremia   • Sepsis (CMS/HCC)   • Benign essential hypertension   • Hyperlipidemia   • Pseudoaneurysm (CMS/HCC)     Past Surgical History:   Procedure Laterality Date   • APPENDECTOMY     • BACK SURGERY      x2   • BYPASS GRAFT  2015    Left brachial artery to axillary vein arteriovenous graft. Venous ultrasound unilateral extremity   •  SECTION      x2   • CHOLECYSTECTOMY     • COLONOSCOPY W/ POLYPECTOMY  2015    Colitis found in the cecum. Biopsy taken. A diverticulum was found in the sigmoid colon.   • ENDOSCOPY AND COLONOSCOPY     • ESOPHAGOSCOPY / EGD  2015    Normal esophagus. Gastritis found in the stomach. Biopsy taken. Duodenitis found in the duodenum. Biopsy take.   • ESOPHAGOSCOPY / EGD  1944    With tube-Esophagus appeared normal. Nonerosive gastritis. Duodenum appeared normal   • HYSTERECTOMY     • INJECTION OF MEDICATION  2011    Aranesp   • INJECTION OF MEDICATION  2011    Kenalog   • INTERVENTIONAL RADIOLOGY PROCEDURE Left 10/5/2017    Procedure: IR dialysis fistulagram;  Surgeon: Blane Fernandez MD;  Location: [facility] LOCATION;  Service:    • LEG SURGERY  2000    Cancelled. Due to uncontrolled hypertension   • OTHER SURGICAL HISTORY  2016    Tissue plasminogen activator catheter thrombolysis   • REMOVAL PERITONEAL DIALYSIS CATHETER  2014    Removal of tunneled hemodialysis catheter with cuff   • TRANSESOPHAGEAL ECHOCARDIOGRAM (ANTHONY)  2016    Mild left atrial enlargement with mild to moderate CLVH with normal aortic root size.LV systolic function well preserved with Ef of 55-60%.Mitral valve thickened.Av thickened.Aortic sclerosis.Mild mitral regurg.mild to moderate tricuspid regurg.   • TRANSESOPHAGEAL ECHOCARDIOGRAM (ANTHONY)   02/22/2012    Normal left ventricular systolic function. Moderate tricuspid regurgitation with evidenceof pulmonary hypertension       Social history:    Social History     Social History   • Marital status:      Spouse name: N/A   • Number of children: N/A   • Years of education: N/A     Occupational History   • Not on file.     Social History Main Topics   • Smoking status: Former Smoker   • Smokeless tobacco: Never Used   • Alcohol use No   • Drug use: No   • Sexual activity: Not on file     Other Topics Concern   • Not on file     Social History Narrative   • No narrative on file       Family history:    Family History   Problem Relation Age of Onset   • Coronary artery disease Other        Medication list:    No current facility-administered medications on file prior to encounter.      Current Outpatient Prescriptions on File Prior to Encounter   Medication Sig Dispense Refill   • acetaminophen (TYLENOL) 325 MG tablet Take 650 mg by mouth Every 6 (Six) Hours As Needed for Mild Pain .     • Apixaban (ELIQUIS PO) Take 2.5 mg by mouth 2 (Two) Times a Day.     • aspirin 81 MG EC tablet Take 81 mg by mouth daily.     • atorvastatin (LIPITOR) 40 MG tablet Take 40 mg by mouth every night.     • B Complex-C-Folic Acid (NGA CAPS PO) Take 1 capsule by mouth daily.     • ciprofloxacin (CIPRO) 250 MG tablet Take 250 mg by mouth 2 (Two) Times a Day.     • Docusate Sodium (COLACE PO) Take 1 capsule by mouth 3 (three) times a day as needed.     • famotidine (PEPCID) 40 MG tablet Take 40 mg by mouth Daily.     • gabapentin (NEURONTIN) 100 MG capsule Take 100 mg by mouth 3 (three) times a day.     • hydrALAZINE (APRESOLINE) 100 MG tablet Take 100 mg by mouth 3 (three) times a day.     • HYDROcodone-acetaminophen (NORCO) 7.5-325 MG per tablet Take 1 tablet by mouth Every 4 (Four) Hours As Needed for Moderate Pain . 10 tablet 0   • levETIRAcetam (KEPPRA) 500 MG tablet Take 500 mg by mouth 2 (Two) Times a Day.     •  "levothyroxine (SYNTHROID, LEVOTHROID) 88 MCG tablet Take 88 mcg by mouth daily.     • Mesalamine (APRISO PO) Take 1 capsule by mouth daily.     • mirtazapine (REMERON) 15 MG tablet Take 15 mg by mouth Every Night.     • nitroglycerin (NITROSTAT) 0.4 MG SL tablet Place 0.4 mg under the tongue every 5 (five) minutes as needed for chest pain. Take no more than 3 doses in 15 minutes.     • polyethylene glycol (MIRALAX) packet Take 17 g by mouth 2 (two) times a day.     • QUETIAPINE FUMARATE PO Take 50 mg by mouth Every Night.     • sertraline (ZOLOFT) 50 MG tablet Take 50 mg by mouth Daily.     • sevelamer (RENVELA) 800 MG tablet Take 1,600 mg by mouth 3 (Three) Times a Day With Meals.     • vitamin B-6 (PYRIDOXINE) 25 MG tablet Take 25 mg by mouth Daily.     • vitamin D (ERGOCALCIFEROL) 63957 UNITS capsule capsule Take 50,000 Units by mouth 1 (one) time per week.       Scheduled Meds:  azithromycin 500 mg Intravenous Once   ceftriaxone 1 g Intravenous Once     Continuous Infusions:   PRN Meds:sodium chloride    Allergies:  Ambien [zolpidem tartrate]; Benadryl [diphenhydramine]; and Penicillins    On examination:  Vitals:    07/09/18 0944 07/09/18 0951 07/09/18 1056 07/09/18 1225   BP: 141/92  110/72 114/56   BP Location: Right arm  Right arm Right arm   Patient Position: Lying  Lying Lying   Pulse: 119  107 101   Resp: 20 18 18   Temp:  98.5 °F (36.9 °C)     TempSrc:  Oral     SpO2: 97%  100% 100%   Weight: 51.6 kg (113 lb 11.2 oz)      Height: 157.5 cm (62\")        General:  Comfortable, no distress, thin built, chronically ill.    Skin: No skin rashes or mucosal bleeding   HEENT:  Pallor present, no icterus.     Neck:  No jugular venous distention, or thyroid enlargement.  Chest:  Clear.  No rales or wheezes audible.  Air entry appears equal bilaterally.  CVS: Heart sounds are regular, there is no pericardial rub or gallop.  Abdomen: Soft, nontender, normal bowel sounds, no organomegaly.  No rebound or guarding.  No " bruit.  Extremities:  2+ lower extremity edema.  Left thigh AV graft with a good bruit.  Dressings on the top part of the graft.  Musculoskeletal: No acute joint inflammation.  Neuro:   Limited examination, lethargic.  Mentation:  Limited examination, lethargic    Past medical illness, social history, medications, previous notes reviewed.       Laboratory tests:  Imaging Results (last 24 hours)     Procedure Component Value Units Date/Time    CT Head Without Contrast [[id number]] Collected:  07/09/18 1006     Updated:  07/09/18 1026    Narrative:         PROCEDURE: CT head without contrast    REASON FOR EXAM: AMS fall    This exam was performed according to our departmental  dose-optimization program, which includes automated exposure  control, adjustment of the mA and/or kV according to patient size  and/or use of iterative reconstruction technique.    FINDINGS: Comparison study dated January 31, 2018. Axial computer  tomography sequential imaging of the head was performed from the  vertex to the base of the skull. .Sagittal and coronal  reformation was performed . Patient motion during exam limits  study.    The skull vault is intact. Paranasal sinuses and bilateral  mastoid air cells are well aerated. Moderate cerebral  involutional changes. Right posterior temporal, right occipital,  and right posterior inferior parietal lobe stable foci of  hypodensity which are CSF attenuation and causing negative mass  effect on the adjacent overlying sulci as well as ventricular  system. Otherwise cerebral and cerebellar parenchymal are normal.  Ventricular system and subarachnoid spaces are normal.      Impression:       1.  No acute intracranial abnormality.  2.  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  3.  Moderate cerebral involutional changes.    Electronically signed by:  Cedrick Kinney MD  7/9/2018 10:24 AM CDT  Workstation: [id number]     XR Chest 1 View [[id number]] Collected:  07/09/18 0958     Updated:  07/09/18 1024    Narrative:       Chest single view     CLINICAL INDICATION: Shortness of breath. Chest pain protocol    COMPARISON: Chest January 31, 2018. CT chest November 2, 2017.    FINDINGS: Cardiac silhouette is normal in size. Pulmonary  vascularity is unremarkable.     Chronic infiltrative changes right upper lobe, right infrahilar  region and left perihilar regions. Infiltrative changes in the  right upper lobe are now slightly more pronounced than on prior  examination, unfavorable change.    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 Unchanged since prior exam.     Radiodensity in the upper lumbar spine, L1 from  kyphoplasty/vertebroplasty.      Impression:       CONCLUSION: Chronic appearing infiltrative changes bilaterally.  Increase in right upper lobe infiltrative changes since prior  exam.    Electronically signed by:  Wiliam Pearce MD  7/9/2018 10:23 AM CDT  Workstation: [id number]          Recent Results (from the past 24 hour(s))   Blood Gas, Arterial    Collection Time: 07/09/18 10:32 AM   Result Value Ref Range    Site Right Radial     Turner's Test Positive     pH, Arterial 7.394 7.350 - 7.450 pH units    pCO2, Arterial 36.0 35.0 - 45.0 mm Hg    pO2, Arterial 70.8 (L) 83.0 - 108.0 mm Hg    HCO3, Arterial 22.0 20.0 - 26.0 mmol/L    Base Excess, Arterial -2.4 (L) 0.0 - 2.0 mmol/L    O2 Saturation, Arterial 93.5 (L) 94.0 - 99.0 %    Barometric Pressure for Blood Gas 754 mmHg    Modality Room Air     Ventilator Mode NA     Collected by loc beltrán rrt    Blood Culture Bottle Set    Collection Time: 07/09/18 10:50 AM   Result Value Ref Range    Extra Tube Hold for add-ons.    Troponin    Collection Time: 07/09/18 11:08 AM   Result Value Ref Range    Troponin I 0.050 (H) <=0.034 ng/mL   Comprehensive Metabolic Panel    Collection Time:  07/09/18 11:08 AM   Result Value Ref Range    Glucose 98 60 - 100 mg/dL    BUN 72 (H) 7 - 21 mg/dL    Creatinine 5.74 (H) 0.50 - 1.00 mg/dL    Sodium 120 (C) 137 - 145 mmol/L    Potassium 6.7 (C) 3.5 - 5.1 mmol/L    Chloride 81 (L) 95 - 110 mmol/L    CO2 21.0 (L) 22.0 - 31.0 mmol/L    Calcium 8.8 8.4 - 10.2 mg/dL    Total Protein 8.2 6.3 - 8.6 g/dL    Albumin 4.00 3.40 - 4.80 g/dL    ALT (SGPT) 12 9 - 52 U/L    AST (SGOT) 33 14 - 36 U/L    Alkaline Phosphatase 172 (H) 38 - 126 U/L    Total Bilirubin 0.7 0.2 - 1.3 mg/dL    eGFR Non  Amer 7 (L) 39 - 90 mL/min/1.73    Globulin 4.2 (H) 2.3 - 3.5 gm/dL    A/G Ratio 1.0 (L) 1.1 - 1.8 g/dL    BUN/Creatinine Ratio 12.5 7.0 - 25.0    Anion Gap 18.0 (H) 5.0 - 15.0 mmol/L   BNP    Collection Time: 07/09/18 11:08 AM   Result Value Ref Range    proBNP 23,300.0 (H) 0.0 - 900.0 pg/mL   Protime-INR    Collection Time: 07/09/18 11:08 AM   Result Value Ref Range    Protime 13.9 11.1 - 15.3 Seconds    INR 1.09 0.80 - 1.20   Light Blue Top    Collection Time: 07/09/18 11:08 AM   Result Value Ref Range    Extra Tube hold for add-on    Green Top (Gel)    Collection Time: 07/09/18 11:08 AM   Result Value Ref Range    Extra Tube Hold for add-ons.    Lavender Top    Collection Time: 07/09/18 11:08 AM   Result Value Ref Range    Extra Tube hold for add-on    Gold Top - SST    Collection Time: 07/09/18 11:08 AM   Result Value Ref Range    Extra Tube Hold for add-ons.    CBC Auto Differential    Collection Time: 07/09/18 11:08 AM   Result Value Ref Range    WBC 13.10 (H) 3.20 - 9.80 10*3/mm3    RBC 4.12 3.77 - 5.16 10*6/mm3    Hemoglobin 12.9 12.0 - 15.5 g/dL    Hematocrit 38.3 35.0 - 45.0 %    MCV 93.0 80.0 - 98.0 fL    MCH 31.3 26.5 - 34.0 pg    MCHC 33.7 31.4 - 36.0 g/dL    RDW 17.6 (H) 11.5 - 14.5 %    RDW-SD 60.1 (H) 36.4 - 46.3 fl    MPV 9.7 8.0 - 12.0 fL    Platelets 209 150 - 450 10*3/mm3    Neutrophil % 92.7 (H) 37.0 - 80.0 %    Lymphocyte % 2.7 (L) 10.0 - 50.0 %    Monocyte %  4.1 0.0 - 12.0 %    Eosinophil % 0.1 0.0 - 7.0 %    Basophil % 0.2 0.0 - 2.0 %    Immature Grans % 0.2 0.0 - 0.5 %    Neutrophils, Absolute 12.14 (H) 2.00 - 8.60 10*3/mm3    Lymphocytes, Absolute 0.36 (L) 0.60 - 4.20 10*3/mm3    Monocytes, Absolute 0.54 0.00 - 0.90 10*3/mm3    Eosinophils, Absolute 0.01 0.00 - 0.70 10*3/mm3    Basophils, Absolute 0.02 0.00 - 0.20 10*3/mm3    Immature Grans, Absolute 0.03 (H) 0.00 - 0.02 10*3/mm3    nRBC 0.0 0.0 - 0.0 /100 WBC   Blood Culture Bottle Set    Collection Time: 07/09/18 11:08 AM   Result Value Ref Range    Extra Tube Hold for add-ons.    Lactic Acid, Plasma    Collection Time: 07/09/18 11:21 AM   Result Value Ref Range    Lactate 1.7 0.5 - 2.0 mmol/L   ]      IMPRESSION:     End-stage renal disease  Altered mental status  History of staphylococcal bacteremia  History of left thigh AV graft infection in the past  Anemia of chronic kidney disease    PLAN:    ESRD.  Patient was seen and examined in the emergency room.  Discussed with Dr. Keller.  Workup for altered mental status, including infections.  Patient is due for dialysis today.  Will follow lab tests, and recommendations.  If admitted to the hospital, we will plan for hemodialysis today.  Left thigh AV graft with dressings on, has bruit.  Has 1-2+ edema of the lower extremities.  Discussed with patient.    Chi Holden MD    Addendum 1257 pm  : Lab tests were reviewed.  Serum sodium is reported to be 120 with a serum potassium of 6.7.  BUN is 72 and a creatinine of 5.74.  I called the dialysis unit, patient did not have dialysis on Saturday.  Liver function tests are stable.  White cell count is marginally up at 13.1, hemoglobin is 12.9.  CT scan of the head shows old stable infarct.  Chest x-ray shows chronic infiltrative changes, worse on the right upper lobe.    Possible pneumonia.  Workup has been initiated.  I discussed the lab tests with Dr. Keller.    Hemodialysis has been arranged, discussed with  dialysis RN.  Will follow potassium levels after dialysis.  Dialyze with a low-sodium bath, to avoid rapid correction of hyponatremia if correct.    Recheck BMP prior to dialysis to make sure the lab tests are accurate.    Chi Holden MD      ADDENDUM 1500:   Labs with sodium of 117 and K is high.   Not sure about etiology for severe hyponatremia.  Fluid removal on HD as tolerated.  Dialyzing with low sodium bath to avoid rapid correction.   Check BMP in AM.   Follow TSH.   Fluid removal on HD limited by lower BP readings on dialysis.   Discussed with dialysis RN.     Cih Holden MD

## 2018-07-09 NOTE — PLAN OF CARE
Problem: Patient Care Overview  Goal: Plan of Care Review  Outcome: Ongoing (interventions implemented as appropriate)   07/09/18 1605   Coping/Psychosocial   Plan of Care Reviewed With patient   Plan of Care Review   Progress no change   OTHER   Outcome Summary new patient; diaylsis today     Goal: Individualization and Mutuality  Outcome: Ongoing (interventions implemented as appropriate)    Goal: Discharge Needs Assessment  Outcome: Ongoing (interventions implemented as appropriate)    Goal: Interprofessional Rounds/Family Conf  Outcome: Ongoing (interventions implemented as appropriate)      Problem: Fall Risk (Adult)  Goal: Identify Related Risk Factors and Signs and Symptoms  Outcome: Ongoing (interventions implemented as appropriate)    Goal: Absence of Fall  Outcome: Ongoing (interventions implemented as appropriate)      Problem: Pneumonia (Adult)  Goal: Signs and Symptoms of Listed Potential Problems Will be Absent, Minimized or Managed (Pneumonia)  Outcome: Ongoing (interventions implemented as appropriate)

## 2018-07-09 NOTE — H&P
[facility] Medicine Admission      Date of Admission: 7/9/2018      Primary Care Physician: No Known Provider      Chief Complaint   Patient presents with   • Altered Mental Status       HPI:  Admitted to the hospital for lethargy weakness has been gone for the last few days and appears to be confused but able to give some history response to verbal stimulation  Review of systems unable to get you the current condition with the patient  But patient denies any chest pain denies any vomiting  Past Medical History:   Past Medical History:   Diagnosis Date   • Abnormal x-ray     Standard chest x ray, f/u pneumonia xray   • Acquired hypothyroidism    • Amblyopia    • Anemia of renal disease    • Anxiety    • Arteriovenous fistula (CMS/[facility])     Placed 10/15/2014   • Benign essential hypertension    • Bipolar affective disorder (CMS/[facility])     Current episode depression   • Bipolar disorder (CMS/[facility])    • Cerebrovascular accident (CMS/[facility])    • Chest wall pain    • Chronic angle-closure glaucoma    • Chronic kidney disease     Stage 4-approaching hemodialysis   • Chronic pain syndrome    • Chronic renal failure    • Combined drug dependence excluding opioids (CMS/[facility])    • Constipation    • COPD (chronic obstructive pulmonary disease) (CMS/[facility])    • Coronary arteriosclerosis    • Cough    • Degeneration of cervical intervertebral disc    • Dementia     Multi-infarct, uncomplicated   • Depression    • Diabetes mellitus (CMS/[facility])     No retinopathy   • Diabetes mellitus (CMS/[facility])     Without mention of complication, type 2 or unspecified type, not stated as uncontrolled   • Diabetic renal disease (CMS/[facility])    • Disc disorder of lumbar region    • DJD (degenerative joint disease)     Involving multiple joints   • Edema    • End stage renal failure on dialysis (CMS/[facility])     LUE AV FISTULA 1/2015   • Epigastric pain    • Essential hypertension    • Exotropia    • Gastritis    •  Generalized abdominal pain    • GERD (gastroesophageal reflux disease)     With Esophagitis   • Gout    • H/O screening mammography    • Hiatal hernia    • Hyperlipidemia    • Hypermetropia    • Insomnia    • Iron deficiency anemia    • Low back pain    • Lumbago    • Non-cardiac chest pain    • Nuclear cataract    • Osteoporosis    • Panic disorder without agoraphobia    • Peripheral nervous system disorder    • Radiculitis    • RLQ abdominal pain    • RUQ pain    • Sprain of ankle    • Sprain of knee     Resolving   • Superficial injury of shoulder and upper arm    • Surgical follow-up care    • Type 2 diabetes mellitus (CMS/HCC)    • UTI (urinary tract infection)    • Visit for suture removal    • Vitamin D deficiency    • Vulvovaginitis        Past Surgical History:   Past Surgical History:   Procedure Laterality Date   • APPENDECTOMY     • BACK SURGERY      x2   • BYPASS GRAFT  2015    Left brachial artery to axillary vein arteriovenous graft. Venous ultrasound unilateral extremity   •  SECTION      x2   • CHOLECYSTECTOMY     • COLONOSCOPY W/ POLYPECTOMY  2015    Colitis found in the cecum. Biopsy taken. A diverticulum was found in the sigmoid colon.   • ENDOSCOPY AND COLONOSCOPY     • ESOPHAGOSCOPY / EGD  2015    Normal esophagus. Gastritis found in the stomach. Biopsy taken. Duodenitis found in the duodenum. Biopsy take.   • ESOPHAGOSCOPY / EGD  1944    With tube-Esophagus appeared normal. Nonerosive gastritis. Duodenum appeared normal   • HYSTERECTOMY     • INJECTION OF MEDICATION  2011    Aranesp   • INJECTION OF MEDICATION  2011    Kenalog   • INTERVENTIONAL RADIOLOGY PROCEDURE Left 10/5/2017    Procedure: IR dialysis fistulagram;  Surgeon: Blane Fernandez MD;  Location: [facility]GIO INVASIVE LOCATION;  Service:    • LEG SURGERY  2000    Cancelled. Due to uncontrolled hypertension   • OTHER SURGICAL HISTORY  2016    Tissue plasminogen  activator catheter thrombolysis   • REMOVAL PERITONEAL DIALYSIS CATHETER  01/16/2014    Removal of tunneled hemodialysis catheter with cuff   • TRANSESOPHAGEAL ECHOCARDIOGRAM (ANTHONY)  03/24/2016    Mild left atrial enlargement with mild to moderate CLVH with normal aortic root size.LV systolic function well preserved with Ef of 55-60%.Mitral valve thickened.Av thickened.Aortic sclerosis.Mild mitral regurg.mild to moderate tricuspid regurg.   • TRANSESOPHAGEAL ECHOCARDIOGRAM (ANTHONY)  02/22/2012    Normal left ventricular systolic function. Moderate tricuspid regurgitation with evidenceof pulmonary hypertension       Family History:   Family History   Problem Relation Age of Onset   • Coronary artery disease Other        Social History:   Social History     Social History   • Marital status:      Social History Main Topics   • Smoking status: Former Smoker   • Smokeless tobacco: Never Used   • Alcohol use No   • Drug use: No     Other Topics Concern   • Not on file       Allergies:   Allergies   Allergen Reactions   • Ambien [Zolpidem Tartrate]    • Benadryl [Diphenhydramine]    • Penicillins        Medications:   Prior to Admission medications    Medication Sig Start Date End Date Taking? Authorizing Provider   furosemide (LASIX) 40 MG tablet Take 40 mg by mouth 2 (Two) Times a Day.   Yes Historical Provider, MD   minocycline (MINOCIN,DYNACIN) 100 MG capsule Take 100 mg by mouth 2 (Two) Times a Day. 6/14/18 7/14/18 Yes Historical Provider, MD   acetaminophen (TYLENOL) 325 MG tablet Take 650 mg by mouth Every 6 (Six) Hours As Needed for Mild Pain .    Historical Provider, MD   Apixaban (ELIQUIS PO) Take 2.5 mg by mouth 2 (Two) Times a Day.    Historical Provider, MD   aspirin 81 MG EC tablet Take 81 mg by mouth daily.    Historical Provider, MD   atorvastatin (LIPITOR) 40 MG tablet Take 40 mg by mouth every night.    Historical Provider, MD   B Complex-C-Folic Acid (NGA CAPS PO) Take 1 capsule by mouth daily.     Historical Provider, MD   ciprofloxacin (CIPRO) 250 MG tablet Take 250 mg by mouth 2 (Two) Times a Day.    Historical Provider, MD   Docusate Sodium (COLACE PO) Take 1 capsule by mouth 3 (three) times a day as needed.    Historical Provider, MD   famotidine (PEPCID) 40 MG tablet Take 40 mg by mouth Daily.    Historical Provider, MD   gabapentin (NEURONTIN) 100 MG capsule Take 100 mg by mouth 3 (three) times a day.    Robert Provider, MD   hydrALAZINE (APRESOLINE) 100 MG tablet Take 100 mg by mouth 3 (three) times a day.    Historical Provider, MD   HYDROcodone-acetaminophen (NORCO) 7.5-325 MG per tablet Take 1 tablet by mouth Every 4 (Four) Hours As Needed for Moderate Pain . 11/16/17   ELI Geller   levETIRAcetam (KEPPRA) 500 MG tablet Take 500 mg by mouth 2 (Two) Times a Day.    Historical Provider, MD   levothyroxine (SYNTHROID, LEVOTHROID) 88 MCG tablet Take 88 mcg by mouth daily.    Historical Provider, MD   Mesalamine (APRISO PO) Take 1 capsule by mouth daily.    Historical Provider, MD   mirtazapine (REMERON) 15 MG tablet Take 15 mg by mouth Every Night.    Historical Provider, MD   nitroglycerin (NITROSTAT) 0.4 MG SL tablet Place 0.4 mg under the tongue every 5 (five) minutes as needed for chest pain. Take no more than 3 doses in 15 minutes.    Historical MD Erika   polyethylene glycol (MIRALAX) packet Take 17 g by mouth 2 (two) times a day.    Historical Provider, MD   QUETIAPINE FUMARATE PO Take 50 mg by mouth Every Night.    Historical Provider, MD   sertraline (ZOLOFT) 50 MG tablet Take 50 mg by mouth Daily.    Historical Provider, MD   sevelamer (RENVELA) 800 MG tablet Take 1,600 mg by mouth 3 (Three) Times a Day With Meals.    Historical Provider, MD   vitamin B-6 (PYRIDOXINE) 25 MG tablet Take 25 mg by mouth Daily.    Historical Provider, MD   vitamin D (ERGOCALCIFEROL) 49365 UNITS capsule capsule Take 50,000 Units by mouth 1 (one) time per week.    Historical Provider, MD        Review of Systems:    -  Physical Exam:    Physical Exam     Temp:  [98.5 °F (36.9 °C)] 98.5 °F (36.9 °C)  Heart Rate:  [101-119] 110  Resp:  [18-20] 18  BP: ()/(56-92) 97/65    -General:Patient confused arousable  -HEENT: Head: Normocephalic and atraumatic  Eyes: Conjunctivae and EOM are normal. Pupils are equal, round, and reactive to light. Right eye exhibits no discharge. Left eye exhibits no discharge.   Neck: Normal range of motion. Neck supple. No JVD present. No tracheal deviation present. No thyromegaly present.   -CVS: Normal rate, regular rhythm, normal heart soundsNo murmur heard.  -Pulmonary: Crackly bilaterally wheezes. No rales or tenderness.   -Abdominal: Soft, Bowel sounds are normal. No distension and no mass. There is no tenderness. There is no rebound and no guarding. No hernia.   -Musc: No Cyanosis clubbing or edema.  -Lymph: No cervical adenopathy.   -Neuro: p confused l.   -Skin: Skin is warm. No rash noted. Patient is not diaphoretic. No erythema. No pallor.   -Psych: Patient  has a normal mood and affect. behavior is normal. Judgment and thought content normal. Denies suicidal ideations or plans.    Nursing note and vitals reviewed.    Results Reviewed:  I have personally reviewed current lab, radiology, and data and agree with results.  Lab Results (last 24 hours)     Procedure Component Value Units Date/Time    Comprehensive Metabolic Panel [[id number]]  (Abnormal) Collected:  07/09/18 1108    Specimen:  Blood Updated:  07/09/18 1225     Glucose 98 mg/dL      BUN 72 (H) mg/dL      Creatinine 5.74 (H) mg/dL      Sodium 120 (C) mmol/L      Potassium 6.7 (C) mmol/L      Chloride 81 (L) mmol/L      CO2 21.0 (L) mmol/L      Calcium 8.8 mg/dL      Total Protein 8.2 g/dL      Albumin 4.00 g/dL      ALT (SGPT) 12 U/L      AST (SGOT) 33 U/L      Alkaline Phosphatase 172 (H) U/L      Total Bilirubin 0.7 mg/dL      eGFR Non African Amer 7 (L) mL/min/1.73      Globulin 4.2 (H) gm/dL       A/G Ratio 1.0 (L) g/dL      BUN/Creatinine Ratio 12.5     Anion Gap 18.0 (H) mmol/L     Narrative:       The MDRD GFR formula is only valid for adults with stable renal function between ages 18 and 70.    [place] Draw [[id number]] Collected:  07/09/18 1108    Specimen:  Blood Updated:  07/09/18 1215    Narrative:       The following orders were created for panel order [place] Draw.  Procedure                               Abnormality         Status                     ---------                               -----------         ------                     Light Blue Top[[id number]]                                   Final result               Green Top (Gel)[[id number]]                                  Final result               Lavender Top[[id number]]                                     Final result               Gold Top - SST[[id number]]                                   Final result                 Please view results for these tests on the individual orders.    Light Blue Top [[id number]] Collected:  07/09/18 1108    Specimen:  Blood Updated:  07/09/18 1215     Extra Tube hold for add-on     Comment: Auto resulted       Green Top (Gel) [[id number]] Collected:  07/09/18 1108    Specimen:  Blood Updated:  07/09/18 1215     Extra Tube Hold for add-ons.     Comment: Auto resulted.       Lavender Top [[id number]] Collected:  07/09/18 1108    Specimen:  Blood Updated:  07/09/18 1215     Extra Tube hold for add-on     Comment: Auto resulted       Gold Top - SST [[id number]] Collected:  07/09/18 1108    Specimen:  Blood Updated:  07/09/18 1215     Extra Tube Hold for add-ons.     Comment: Auto resulted.       Extra Tubes [[id number]] Collected:  07/09/18 1050    Specimen:  Blood from Blood, Venous Line Updated:  07/09/18 1215    Narrative:       The following orders were created for panel order Extra Tubes.  Procedure                               Abnormality         Status                     ---------                                -----------         ------                     Blood Culture Bottle Set[[id number]]                         Final result               Blood Culture Bottle Set[[id number]]                         Final result                 Please view results for these tests on the individual orders.    Blood Culture Bottle Set [[id number]] Collected:  07/09/18 1108    Specimen:  Blood from Arm, Right Updated:  07/09/18 1215     Extra Tube Hold for add-ons.     Comment: Auto resulted.       Troponin [[id number]]  (Abnormal) Collected:  07/09/18 1108    Specimen:  Blood Updated:  07/09/18 1208     Troponin I 0.050 (H) ng/mL     BNP [[id number]]  (Abnormal) Collected:  07/09/18 1108    Specimen:  Blood Updated:  07/09/18 1208     proBNP 23,300.0 (H) pg/mL     Blood Culture Bottle Set [[id number]] Collected:  07/09/18 1050    Specimen:  Blood from Arm, Right Updated:  07/09/18 1200     Extra Tube Hold for add-ons.     Comment: Auto resulted.       Protime-INR [[id number]]  (Normal) Collected:  07/09/18 1108    Specimen:  Blood Updated:  07/09/18 1156     Protime 13.9 Seconds      INR 1.09    Narrative:       Therapeutic range for most indications is 2.0-3.0 INR,  or 2.5-3.5 for mechanical heart valves.    Lactic Acid, Plasma [[id number]]  (Normal) Collected:  07/09/18 1121    Specimen:  Blood Updated:  07/09/18 1156     Lactate 1.7 mmol/L     CBC & Differential [[id number]] Collected:  07/09/18 1108    Specimen:  Blood Updated:  07/09/18 1140    Narrative:       The following orders were created for panel order CBC & Differential.  Procedure                               Abnormality         Status                     ---------                               -----------         ------                     CBC Auto Differential[[id number]]        Abnormal            Final result                 Please view results for these tests on the individual orders.    CBC Auto Differential [[id number]]  (Abnormal) Collected:  07/09/18 1108    Specimen:   Blood Updated:  07/09/18 1140     WBC 13.10 (H) 10*3/mm3      RBC 4.12 10*6/mm3      Hemoglobin 12.9 g/dL      Hematocrit 38.3 %      MCV 93.0 fL      MCH 31.3 pg      MCHC 33.7 g/dL      RDW 17.6 (H) %      RDW-SD 60.1 (H) fl      MPV 9.7 fL      Platelets 209 10*3/mm3      Neutrophil % 92.7 (H) %      Lymphocyte % 2.7 (L) %      Monocyte % 4.1 %      Eosinophil % 0.1 %      Basophil % 0.2 %      Immature Grans % 0.2 %      Neutrophils, Absolute 12.14 (H) 10*3/mm3      Lymphocytes, Absolute 0.36 (L) 10*3/mm3      Monocytes, Absolute 0.54 10*3/mm3      Eosinophils, Absolute 0.01 10*3/mm3      Basophils, Absolute 0.02 10*3/mm3      Immature Grans, Absolute 0.03 (H) 10*3/mm3      nRBC 0.0 /100 WBC     Blood Gas, Arterial [[id number]]  (Abnormal) Collected:  07/09/18 1032    Specimen:  Arterial Blood Updated:  07/09/18 1051     Site Right Radial     Turner's Test Positive     pH, Arterial 7.394 pH units      pCO2, Arterial 36.0 mm Hg      pO2, Arterial 70.8 (L) mm Hg      HCO3, Arterial 22.0 mmol/L      Base Excess, Arterial -2.4 (L) mmol/L      O2 Saturation, Arterial 93.5 (L) %      Barometric Pressure for Blood Gas 754 mmHg      Modality Room Air     Ventilator Mode NA     Collected by loc beltrán rrt        Imaging Results (last 24 hours)     Procedure Component Value Units Date/Time    CT Head Without Contrast [[id number]] Collected:  07/09/18 1006     Updated:  07/09/18 1026    Narrative:         PROCEDURE: CT head without contrast    REASON FOR EXAM: AMS fall    This exam was performed according to our departmental  dose-optimization program, which includes automated exposure  control, adjustment of the mA and/or kV according to patient size  and/or use of iterative reconstruction technique.    FINDINGS: Comparison study dated January 31, 2018. Axial computer  tomography sequential imaging of the head was performed from the  vertex to the base of the skull. .Sagittal and coronal  reformation was performed . Patient  motion during exam limits  study.    The skull vault is intact. Paranasal sinuses and bilateral  mastoid air cells are well aerated. Moderate cerebral  involutional changes. Right posterior temporal, right occipital,  and right posterior inferior parietal lobe stable foci of  hypodensity which are CSF attenuation and causing negative mass  effect on the adjacent overlying sulci as well as ventricular  system. Otherwise cerebral and cerebellar parenchymal are normal.  Ventricular system and subarachnoid spaces are normal.      Impression:       1.  No acute intracranial abnormality.  2.  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  3.  Moderate cerebral involutional changes.    Electronically signed by:  Cedrick Kinney MD  7/9/2018 10:24 AM CDT  Workstation: [id number]    XR Chest 1 View [[id number]] Collected:  07/09/18 0958     Updated:  07/09/18 1024    Narrative:       Chest single view     CLINICAL INDICATION: Shortness of breath. Chest pain protocol    COMPARISON: Chest January 31, 2018. CT chest November 2, 2017.    FINDINGS: Cardiac silhouette is normal in size. Pulmonary  vascularity is unremarkable.     Chronic infiltrative changes right upper lobe, right infrahilar  region and left perihilar regions. Infiltrative changes in the  right upper lobe are now slightly more pronounced than on prior  examination, unfavorable change.    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 Unchanged since prior exam.     Radiodensity in the upper lumbar spine, L1 from  kyphoplasty/vertebroplasty.      Impression:       CONCLUSION: Chronic appearing infiltrative changes bilaterally.  Increase in right upper lobe infiltrative changes since prior  exam.    Electronically signed by:  Wiliam Pearce MD  7/9/2018 10:23 AM CDT  Workstation: [id number]           Assessment/Plan         Hospital Problem List     Pneumonia of right upper lobe due to infectious organism (CMS/[facility])          Assessment / Plan  Pneumonia we'll continue patient on Rocephin and Zithromax  Hyponatremia and hyperkalemia management as per nephrology patient will have dialysis today  Multiple sedatives and polypharmacy will try to hold  some SEDATIVES  End-stage renal disease  Admit the patient to the stepdown unit  --GI/DVT prophylaxis    I discussed the patients findings and my recommendations with the patient, and the patient verbalized understanding of the plan, risks and benefits of the plan.    This document has been electronically signed by Jose Pittman MD on July 9, 2018 1:37 PM

## 2018-07-09 NOTE — NURSING NOTE
"Patient screaming \"Give me my shoes and I know you have them and my cookies\".  The only belongings from ER is a bag with a sweat shirt. Patient unconsolable and demanding about cookies and shoes. ER called to verify that patient did not have shoes when arrived per ambulance.  "

## 2018-07-10 NOTE — PLAN OF CARE
Problem: Patient Care Overview  Goal: Plan of Care Review  Outcome: Ongoing (interventions implemented as appropriate)   07/10/18 3030   Coping/Psychosocial   Plan of Care Reviewed With patient   Plan of Care Review   Progress no change   OTHER   Outcome Summary Pt has been oriented to person only throughout shift. Pt picked at dialysis shunt on left leg as well as removed the dressing on the wound above it. Bleeding was controlled with pressure dressing.      Goal: Individualization and Mutuality  Outcome: Ongoing (interventions implemented as appropriate)    Goal: Discharge Needs Assessment  Outcome: Ongoing (interventions implemented as appropriate)    Goal: Interprofessional Rounds/Family Conf  Outcome: Ongoing (interventions implemented as appropriate)      Problem: Fall Risk (Adult)  Goal: Identify Related Risk Factors and Signs and Symptoms  Outcome: Ongoing (interventions implemented as appropriate)    Goal: Absence of Fall  Outcome: Ongoing (interventions implemented as appropriate)      Problem: Pneumonia (Adult)  Goal: Signs and Symptoms of Listed Potential Problems Will be Absent, Minimized or Managed (Pneumonia)  Outcome: Ongoing (interventions implemented as appropriate)      Problem: Skin Injury Risk (Adult)  Goal: Identify Related Risk Factors and Signs and Symptoms  Outcome: Ongoing (interventions implemented as appropriate)    Goal: Skin Health and Integrity  Outcome: Ongoing (interventions implemented as appropriate)

## 2018-07-10 NOTE — NURSING NOTE
Attempted to call Dani (Patients guardian) but had no answer. I left Dani a voicemail to return call.

## 2018-07-10 NOTE — SIGNIFICANT NOTE
Walked by patient's room, pt was sitting on the edge of her bed picking at her leg. I noticed that there was blood, went in the room and noticed that the patient was sitting in a pool of blood surrounding her leg. The pt was picking at her dialysis shunt, which is where the blood was coming from. The pt had also pulled the dressing off of wound on her leg above the shunt and thrown it on the floor along with a few tissues of blood. Charge nurse Geneva, Irina, Mandy, and Katy also came in the room. Irina applied gauze with coban and wrapped tightly for pressure dressing. Dr. Tribmle was notified and came into the room. We notified him that this is the shunt site, he asked when she had dialysis last and he was informed that she had it yesterday and that she was also picking at the site. Dr. Trimble saw the pool of blood and stated to leave the applied pressure dressing on the site. No other new orders were obtained at this time. No bleeding has been observed from pressure dressing since application.

## 2018-07-10 NOTE — NURSING NOTE
I talked to patient's guardian Dani to obtain consent to treat patient. Dani gave verbal consent to treat patient.Two RN's witnessed Geneva De Paz and Irina Rivera.

## 2018-07-10 NOTE — PROGRESS NOTES
NEPHROLOGY PROGRESS NOTE:    History of present illness:   ESRD.  Had HD yesterday.   Feels well.  Alert.  States there are bugs in room.   Left thigh AVG.   Sodium was low at 117 yesterday.      Review of systems:  12 system review of systems was done, positives are included in the history of present illness.  Other systems were reviewed and negative.   No CP or SOA.  No abd pain.       Patient Active Problem List   Diagnosis   • Constipation   • Arteriovenous fistula (CMS/[facility])   • End stage renal failure on dialysis (CMS/[facility])   • Controlled type 2 diabetes mellitus with chronic kidney disease on chronic dialysis, without long-term current use of insulin (CMS/[facility])   • Coronary artery disease involving native coronary artery of native heart without angina pectoris   • Panlobular emphysema (CMS/[facility])   • ESRD (end stage renal disease) on dialysis (CMS/[facility])   • HCAP (healthcare-associated pneumonia)   • MRSA bacteremia   • Sepsis (CMS/[facility])   • Benign essential hypertension   • Hyperlipidemia   • Pseudoaneurysm (CMS/[facility])   • Pneumonia of right upper lobe due to infectious organism (CMS/[facility])       Medications:    apixaban 2.5 mg Oral Q12H   aspirin 81 mg Oral Daily   atorvastatin 40 mg Oral Nightly   azithromycin 500 mg Intravenous Q24H   ceftriaxone 1 g Intravenous Q24H   famotidine 40 mg Oral Daily   levETIRAcetam 500 mg Oral Q12H   levothyroxine 88 mcg Oral QAM AC   mesalamine 250 mg Oral Daily   mirtazapine 7.5 mg Oral Nightly   sertraline 50 mg Oral Daily   sevelamer 1,600 mg Oral TID With Meals        Vitals:    07/09/18 2029 07/09/18 2200 07/10/18 0047 07/10/18 0319   BP: 119/53   98/55   BP Location: Right leg   Right arm   Patient Position: Lying   Lying   Pulse: 98  87 71   Resp: 18   18   Temp: 99.2 °F (37.3 °C)   97.2 °F (36.2 °C)   TempSrc: Oral   Oral   SpO2: 94% 95%  94%   Weight:    54.7 kg (120 lb 8 oz)   Height:         I/O last 3 completed shifts:  In: 350 [IV Piggyback:350]  Out: 2000 [Other:2000]  No  intake/output data recorded.    On examination:  General:  Comfortable, no distress.  Thin built.   Chronically ill.   Skin: No skin rashes or mucosal bleeding   HEENT:  Pallor present, no icterus.     Neck:  No jugular venous distention, or thyroid enlargement.  Chest:  Clear.  No rales or wheezes audible.  Air entry appears equal bilaterally.  CVS: Heart sounds are regular, there is no pericardial rub or gallop.  Abdomen: Soft, nontender, normal bowel sounds, no organomegaly.  No rebound or guarding.  No bruit.  Extremities:  No significant edema of the lower extremities.  Left thigh AVG with bruit.    Musculoskeletal: No acute joint inflammation.  Neuro:  No focal motor neurological deficits.  No asterixis.  Mentation:  Alert and oriented.   States seeing bugs in room.     Past medical illness, family history, social history, medications, previous notes reviewed.       Laboratory results:      Recent Results (from the past 24 hour(s))   Blood Gas, Arterial    Collection Time: 07/09/18 10:32 AM   Result Value Ref Range    Site Right Radial     Turner's Test Positive     pH, Arterial 7.394 7.350 - 7.450 pH units    pCO2, Arterial 36.0 35.0 - 45.0 mm Hg    pO2, Arterial 70.8 (L) 83.0 - 108.0 mm Hg    HCO3, Arterial 22.0 20.0 - 26.0 mmol/L    Base Excess, Arterial -2.4 (L) 0.0 - 2.0 mmol/L    O2 Saturation, Arterial 93.5 (L) 94.0 - 99.0 %    Barometric Pressure for Blood Gas 754 mmHg    Modality Room Air     Ventilator Mode NA     Collected by loc beltrán [facility]    Blood Culture Bottle Set    Collection Time: 07/09/18 10:50 AM   Result Value Ref Range    Extra Tube Hold for add-ons.    Troponin    Collection Time: 07/09/18 11:08 AM   Result Value Ref Range    Troponin I 0.050 (H) <=0.034 ng/mL   Comprehensive Metabolic Panel    Collection Time: 07/09/18 11:08 AM   Result Value Ref Range    Glucose 98 60 - 100 mg/dL    BUN 72 (H) 7 - 21 mg/dL    Creatinine 5.74 (H) 0.50 - 1.00 mg/dL    Sodium 120 (C) 137 - 145 mmol/L     Potassium 6.7 (C) 3.5 - 5.1 mmol/L    Chloride 81 (L) 95 - 110 mmol/L    CO2 21.0 (L) 22.0 - 31.0 mmol/L    Calcium 8.8 8.4 - 10.2 mg/dL    Total Protein 8.2 6.3 - 8.6 g/dL    Albumin 4.00 3.40 - 4.80 g/dL    ALT (SGPT) 12 9 - 52 U/L    AST (SGOT) 33 14 - 36 U/L    Alkaline Phosphatase 172 (H) 38 - 126 U/L    Total Bilirubin 0.7 0.2 - 1.3 mg/dL    eGFR Non  Amer 7 (L) 39 - 90 mL/min/1.73    Globulin 4.2 (H) 2.3 - 3.5 gm/dL    A/G Ratio 1.0 (L) 1.1 - 1.8 g/dL    BUN/Creatinine Ratio 12.5 7.0 - 25.0    Anion Gap 18.0 (H) 5.0 - 15.0 mmol/L   BNP    Collection Time: 07/09/18 11:08 AM   Result Value Ref Range    proBNP 23,300.0 (H) 0.0 - 900.0 pg/mL   Protime-INR    Collection Time: 07/09/18 11:08 AM   Result Value Ref Range    Protime 13.9 11.1 - 15.3 Seconds    INR 1.09 0.80 - 1.20   Light Blue Top    Collection Time: 07/09/18 11:08 AM   Result Value Ref Range    Extra Tube hold for add-on    Green Top (Gel)    Collection Time: 07/09/18 11:08 AM   Result Value Ref Range    Extra Tube Hold for add-ons.    Lavender Top    Collection Time: 07/09/18 11:08 AM   Result Value Ref Range    Extra Tube hold for add-on    Gold Top - SST    Collection Time: 07/09/18 11:08 AM   Result Value Ref Range    Extra Tube Hold for add-ons.    CBC Auto Differential    Collection Time: 07/09/18 11:08 AM   Result Value Ref Range    WBC 13.10 (H) 3.20 - 9.80 10*3/mm3    RBC 4.12 3.77 - 5.16 10*6/mm3    Hemoglobin 12.9 12.0 - 15.5 g/dL    Hematocrit 38.3 35.0 - 45.0 %    MCV 93.0 80.0 - 98.0 fL    MCH 31.3 26.5 - 34.0 pg    MCHC 33.7 31.4 - 36.0 g/dL    RDW 17.6 (H) 11.5 - 14.5 %    RDW-SD 60.1 (H) 36.4 - 46.3 fl    MPV 9.7 8.0 - 12.0 fL    Platelets 209 150 - 450 10*3/mm3    Neutrophil % 92.7 (H) 37.0 - 80.0 %    Lymphocyte % 2.7 (L) 10.0 - 50.0 %    Monocyte % 4.1 0.0 - 12.0 %    Eosinophil % 0.1 0.0 - 7.0 %    Basophil % 0.2 0.0 - 2.0 %    Immature Grans % 0.2 0.0 - 0.5 %    Neutrophils, Absolute 12.14 (H) 2.00 - 8.60 10*3/mm3     Lymphocytes, Absolute 0.36 (L) 0.60 - 4.20 10*3/mm3    Monocytes, Absolute 0.54 0.00 - 0.90 10*3/mm3    Eosinophils, Absolute 0.01 0.00 - 0.70 10*3/mm3    Basophils, Absolute 0.02 0.00 - 0.20 10*3/mm3    Immature Grans, Absolute 0.03 (H) 0.00 - 0.02 10*3/mm3    nRBC 0.0 0.0 - 0.0 /100 WBC   Blood Culture Bottle Set    Collection Time: 07/09/18 11:08 AM   Result Value Ref Range    Extra Tube Hold for add-ons.    Lactic Acid, Plasma    Collection Time: 07/09/18 11:21 AM   Result Value Ref Range    Lactate 1.7 0.5 - 2.0 mmol/L   Troponin    Collection Time: 07/09/18  2:14 PM   Result Value Ref Range    Troponin I 0.017 <=0.034 ng/mL   Renal Function Panel    Collection Time: 07/09/18  2:14 PM   Result Value Ref Range    Glucose 100 60 - 100 mg/dL    BUN 74 (H) 7 - 21 mg/dL    Creatinine 5.61 (H) 0.50 - 1.00 mg/dL    Sodium 117 (C) 137 - 145 mmol/L    Potassium 6.1 (C) 3.5 - 5.1 mmol/L    Chloride 85 (L) 95 - 110 mmol/L    CO2 19.0 (L) 22.0 - 31.0 mmol/L    Calcium 8.1 (L) 8.4 - 10.2 mg/dL    Albumin 3.00 (L) 3.40 - 4.80 g/dL    Phosphorus 4.8 (H) 2.4 - 4.4 mg/dL    Anion Gap 13.0 5.0 - 15.0 mmol/L    BUN/Creatinine Ratio 13.2 7.0 - 25.0    eGFR Non  Amer 7 (L) 39 - 90 mL/min/1.73   Potassium    Collection Time: 07/09/18  5:44 PM   Result Value Ref Range    Potassium 3.0 (L) 3.5 - 5.1 mmol/L   Comprehensive Metabolic Panel    Collection Time: 07/10/18  6:00 AM   Result Value Ref Range    Glucose 83 60 - 100 mg/dL    BUN 49 (H) 7 - 21 mg/dL    Creatinine 4.45 (H) 0.50 - 1.00 mg/dL    Sodium 125 (L) 137 - 145 mmol/L    Potassium 4.2 3.5 - 5.1 mmol/L    Chloride 87 (L) 95 - 110 mmol/L    CO2 24.0 22.0 - 31.0 mmol/L    Calcium 8.7 8.4 - 10.2 mg/dL    Total Protein 8.6 6.3 - 8.6 g/dL    Albumin 4.20 3.40 - 4.80 g/dL    ALT (SGPT) 16 9 - 52 U/L    AST (SGOT) 99 (H) 14 - 36 U/L    Alkaline Phosphatase 151 (H) 38 - 126 U/L    Total Bilirubin 0.5 0.2 - 1.3 mg/dL    eGFR Non  Amer 10 (L) 39 - 90 mL/min/1.73     Globulin 4.4 (H) 2.3 - 3.5 gm/dL    A/G Ratio 1.0 (L) 1.1 - 1.8 g/dL    BUN/Creatinine Ratio 11.0 7.0 - 25.0    Anion Gap 14.0 5.0 - 15.0 mmol/L   TSH    Collection Time: 07/10/18  6:00 AM   Result Value Ref Range    TSH 2.030 0.460 - 4.680 mIU/mL   CBC Auto Differential    Collection Time: 07/10/18  6:00 AM   Result Value Ref Range    WBC 9.12 3.20 - 9.80 10*3/mm3    RBC 3.80 3.77 - 5.16 10*6/mm3    Hemoglobin 11.5 (L) 12.0 - 15.5 g/dL    Hematocrit 35.2 35.0 - 45.0 %    MCV 92.6 80.0 - 98.0 fL    MCH 30.3 26.5 - 34.0 pg    MCHC 32.7 31.4 - 36.0 g/dL    RDW 17.4 (H) 11.5 - 14.5 %    RDW-SD 59.5 (H) 36.4 - 46.3 fl    MPV 10.2 8.0 - 12.0 fL    Platelets 191 150 - 450 10*3/mm3    Neutrophil % 82.3 (H) 37.0 - 80.0 %    Lymphocyte % 11.8 10.0 - 50.0 %    Monocyte % 3.6 0.0 - 12.0 %    Eosinophil % 1.9 0.0 - 7.0 %    Basophil % 0.2 0.0 - 2.0 %    Immature Grans % 0.2 0.0 - 0.5 %    Neutrophils, Absolute 7.50 2.00 - 8.60 10*3/mm3    Lymphocytes, Absolute 1.08 0.60 - 4.20 10*3/mm3    Monocytes, Absolute 0.33 0.00 - 0.90 10*3/mm3    Eosinophils, Absolute 0.17 0.00 - 0.70 10*3/mm3    Basophils, Absolute 0.02 0.00 - 0.20 10*3/mm3    Immature Grans, Absolute 0.02 0.00 - 0.02 10*3/mm3   ]    Imaging Results (last 24 hours)     Procedure Component Value Units Date/Time    CT Head Without Contrast [[id number]] Collected:  07/09/18 1006     Updated:  07/09/18 1026    Narrative:         PROCEDURE: CT head without contrast    REASON FOR EXAM: AMS fall    This exam was performed according to our departmental  dose-optimization program, which includes automated exposure  control, adjustment of the mA and/or kV according to patient size  and/or use of iterative reconstruction technique.    FINDINGS: Comparison study dated January 31, 2018. Axial computer  tomography sequential imaging of the head was performed from the  vertex to the base of the skull. .Sagittal and coronal  reformation was performed . Patient motion during exam  limits  study.    The skull vault is intact. Paranasal sinuses and bilateral  mastoid air cells are well aerated. Moderate cerebral  involutional changes. Right posterior temporal, right occipital,  and right posterior inferior parietal lobe stable foci of  hypodensity which are CSF attenuation and causing negative mass  effect on the adjacent overlying sulci as well as ventricular  system. Otherwise cerebral and cerebellar parenchymal are normal.  Ventricular system and subarachnoid spaces are normal.      Impression:       1.  No acute intracranial abnormality.  2.  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  3.  Moderate cerebral involutional changes.    Electronically signed by:  Cedrick Kinney MD  7/9/2018 10:24 AM CDT  Workstation: [id number]    XR Chest 1 View [[id number]] Collected:  07/09/18 0958     Updated:  07/09/18 1024    Narrative:       Chest single view     CLINICAL INDICATION: Shortness of breath. Chest pain protocol    COMPARISON: Chest January 31, 2018. CT chest November 2, 2017.    FINDINGS: Cardiac silhouette is normal in size. Pulmonary  vascularity is unremarkable.     Chronic infiltrative changes right upper lobe, right infrahilar  region and left perihilar regions. Infiltrative changes in the  right upper lobe are now slightly more pronounced than on prior  examination, unfavorable change.    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 Unchanged since prior exam.     Radiodensity in the upper lumbar spine, L1 from  kyphoplasty/vertebroplasty.      Impression:       CONCLUSION: Chronic appearing infiltrative changes bilaterally.  Increase in right upper lobe infiltrative changes since prior  exam.    Electronically signed by:  Wiliam Pearce MD  7/9/2018 10:23 AM CDT  Workstation: [id number]            ASSESSMENT:       ESRD  Hyponatremia  Hyperkalemia  Pneumonia  Altered mental status      PLAN:     ESRD:  Had HD yesterday.  K better.  Using left thigh AVG.  Planned HD tomorrow    Hyponatremia:  Etiology for severe hyponatremia unclear.   TSH pending.  Fluid restriction.  Sodium 125 (previous 117) and gradual correction.  Check in AM.  We dialyzed with low sodium dialysate    Hyperkalemia:   Improved.  Renal diet.      Pneumonia:  On antibiotics.  Follow cultures.       Discussed with patient.        MD MOR Serrano/Transcription disclaimer:   Some parts of this note dictated by nicole, with translation of spoken language to printed text.

## 2018-07-10 NOTE — PROGRESS NOTES
[facility] Medicine Services  INPATIENT PROGRESS NOTE    Length of Stay: 1  Date of Admission: 7/9/2018  Primary Care Physician: No Known Provider    Subjective   Chief Complaint: Not applicable as patient has altered mental status.  HPI: Patient is seen for follow-up today.  She has history of end-stage renal disease and was admitted yesterday for pneumonia and hyponatremia.   She is pleasantly confused but tolerating prescribed diet.  No new changes have been reported.    Review of Systems  Unable to review due to altered mental status.  Objective    Temp:  [97.2 °F (36.2 °C)-99.2 °F (37.3 °C)] 97.4 °F (36.3 °C)  Heart Rate:  [] 83  Resp:  [18-22] 18  BP: ()/(45-71) 122/71    Physical Exam   Constitutional: She appears well-developed and well-nourished. She is cooperative. No distress.   HENT:   Head: Normocephalic and atraumatic.   Right Ear: External ear normal.   Left Ear: External ear normal.   Nose: Nose normal.   Mouth/Throat: Oropharynx is clear and moist.   Eyes: Conjunctivae are normal. Pupils are equal, round, and reactive to light.   Neck: Normal range of motion. Neck supple. No JVD present. No thyromegaly present.   Cardiovascular: Normal rate, regular rhythm, normal heart sounds and intact distal pulses.  Exam reveals no gallop and no friction rub.    No murmur heard.  Pulmonary/Chest: Effort normal and breath sounds normal. No stridor. No respiratory distress. She has no wheezes. She has no rales. She exhibits no tenderness.   Abdominal: Soft. Bowel sounds are normal. She exhibits no distension and no mass. There is no tenderness. There is no rebound and no guarding. No hernia.   Musculoskeletal: Normal range of motion. She exhibits no edema, tenderness or deformity.   Neurological: She is alert. She has normal reflexes. She exhibits normal muscle tone.   Patient is alert and oriented ×2.   Skin: Skin is warm and dry. No rash noted. She is not  diaphoretic. No erythema. No pallor.   Psychiatric: She has a normal mood and affect.   Nursing note and vitals reviewed.        Medication Review:    Current Facility-Administered Medications:   •  acetaminophen (TYLENOL) tablet 650 mg, 650 mg, Oral, Q6H PRN, Jose Pittman MD  •  apixaban (ELIQUIS) tablet 2.5 mg, 2.5 mg, Oral, Q12H, Jose Pittman MD, 2.5 mg at 07/10/18 0802  •  aspirin EC tablet 81 mg, 81 mg, Oral, Daily, Jose Pittman MD, 81 mg at 07/10/18 0801  •  atorvastatin (LIPITOR) tablet 40 mg, 40 mg, Oral, Nightly, Jose Pittman MD, 40 mg at 07/09/18 2140  •  AZITHROMYCIN 500 MG/250 ML 0.9% NS IVPB (vial-mate), 500 mg, Intravenous, Q24H, Jose Pittman MD  •  cefTRIAXone (ROCEPHIN) 1 g/100 mL 0.9% NS (MBP), 1 g, Intravenous, Q24H, Jose Pittman MD  •  famotidine (PEPCID) tablet 40 mg, 40 mg, Oral, Daily, Jose Pittman MD, 40 mg at 07/10/18 0802  •  HYDROcodone-acetaminophen (NORCO) 7.5-325 MG per tablet 1 tablet, 1 tablet, Oral, Q4H PRN, Jose Pittman MD, 1 tablet at 07/10/18 0809  •  ipratropium-albuterol (DUO-NEB) nebulizer solution 3 mL, 3 mL, Nebulization, Q4H PRN, Jose Pittman MD  •  levETIRAcetam (KEPPRA) tablet 500 mg, 500 mg, Oral, Q12H, Jose Pittman MD, 500 mg at 07/10/18 0802  •  levothyroxine (SYNTHROID, LEVOTHROID) tablet 88 mcg, 88 mcg, Oral, QAM AC, Jose Pittman MD, 88 mcg at 07/10/18 0802  •  lidocaine PF 1% (XYLOCAINE) injection 5 mL, 5 mL, Infiltration, Daily PRN, Chi Holden MD, 5 mL at 07/09/18 1918  •  mesalamine (PENTASA) CR capsule 250 mg, 250 mg, Oral, Daily, Jose Pittman MD, 250 mg at 07/10/18 0802  •  mirtazapine (REMERON) half tablet 7.5 mg, 7.5 mg, Oral, Nightly, Jose Pittman MD, 7.5 mg at 07/09/18 2141  •  ondansetron (ZOFRAN) injection 4 mg, 4 mg, Intravenous, Q6H PRN, Jose Pittman MD  •  sertraline (ZOLOFT) tablet 50 mg, 50 mg, Oral, Daily, Jose Pittman MD, 50 mg at 07/10/18 0802  •  sevelamer (RENVELA) packet 1.6 g, 1,600 mg, Oral, TID With Meals,  Jose Pittman MD, 1.6 g at 07/10/18 0802  •  sodium chloride 0.9 % flush 10 mL, 10 mL, Intravenous, PRN, Amish Hernandez MD    Results Review:  I have reviewed the labs, radiology results, and diagnostic studies.    Laboratory Data:     Results from last 7 days  Lab Units 07/10/18  0600 07/09/18  1744 07/09/18  1414 07/09/18  1108   SODIUM mmol/L 125*  --  117* 120*   POTASSIUM mmol/L 4.2 3.0* 6.1* 6.7*   CHLORIDE mmol/L 87*  --  85* 81*   CO2 mmol/L 24.0  --  19.0* 21.0*   BUN mg/dL 49*  --  74* 72*   CREATININE mg/dL 4.45*  --  5.61* 5.74*   GLUCOSE mg/dL 83  --  100 98   CALCIUM mg/dL 8.7  --  8.1* 8.8   BILIRUBIN mg/dL 0.5  --   --  0.7   ALK PHOS U/L 151*  --   --  172*   ALT (SGPT) U/L 16  --   --  12   AST (SGOT) U/L 99*  --   --  33   ANION GAP mmol/L 14.0  --  13.0 18.0*     Estimated Creatinine Clearance: 9.7 mL/min (A) (by C-G formula based on SCr of 4.45 mg/dL (H)).    Results from last 7 days  Lab Units 07/09/18  1414   PHOSPHORUS mg/dL 4.8*           Results from last 7 days  Lab Units 07/10/18  0600 07/09/18  1108   WBC 10*3/mm3 9.12 13.10*   HEMOGLOBIN g/dL 11.5* 12.9   HEMATOCRIT % 35.2 38.3   PLATELETS 10*3/mm3 191 209       Results from last 7 days  Lab Units 07/09/18  1108   INR  1.09       Culture Data:   No results found for: BLOODCX  No results found for: URINECX  No results found for: RESPCX  No results found for: WOUNDCX  No results found for: STOOLCX  No components found for: BODYFLD    Radiology Data:   Imaging Results (last 24 hours)     ** No results found for the last 24 hours. **          I have reviewed the patient's current medications.     Assessment/Plan     Hospital Problem List:  Active Problems:  - Pneumonia of right upper lobe due to infectious organism: Continue broad-spectrum antibiotics.  Reactive leukocytosis has resolved.  - Hyponatremia (hypervolemic): Improving with hemodialysis.  We'll continue to monitor.    - End-stage renal disease: Continue hemodialysis.   Nephrologist is following.  - Elevated LFTs likely reactive.  We'll continue to monitor.  - Dementia versus delirium: Patient's baseline mental status is currently unknown.  Continue supportive management.  - Hypokalemia: Resolved.  - Hypothyroidism: Continue Synthroid.  TSH is normal.  - Depressive disorder: Continue home medications.  - Continue GI and DVT prophylaxis.    Discharge Planning: In progress.    Olman Trimble MD   07/10/18   12:21 PM

## 2018-07-11 NOTE — PROGRESS NOTES
[facility]  INPATIENT PROGRESS NOTE    Length of Stay: 2  Date of Admission: 7/9/2018  Primary Care Physician: No Known Provider    Subjective   Chief Complaint: Not applicable as patient has altered mental status.  HPI: Patient is seen for follow-up today.  She has history of end-stage renal disease and was admitted for pneumonia and hyponatremia.   She remains pleasantly confused which is reported to be her baseline but tolerating prescribed diet.  No new changes have been reported.    Review of Systems  Unable to review due to altered mental status.  Objective    Temp:  [96.2 °F (35.7 °C)-97.8 °F (36.6 °C)] 96.2 °F (35.7 °C)  Heart Rate:  [] 96  Resp:  [18] 18  BP: (108-126)/(70-78) 121/70    Physical Exam   Constitutional: She appears well-developed and well-nourished. She is cooperative. No distress.   HENT:   Head: Normocephalic and atraumatic.   Right Ear: External ear normal.   Left Ear: External ear normal.   Nose: Nose normal.   Mouth/Throat: Oropharynx is clear and moist.   Eyes: Conjunctivae are normal. Pupils are equal, round, and reactive to light.   Neck: Normal range of motion. Neck supple. No JVD present. No thyromegaly present.   Cardiovascular: Normal rate, regular rhythm, normal heart sounds and intact distal pulses.  Exam reveals no gallop and no friction rub.    No murmur heard.  Pulmonary/Chest: Effort normal and breath sounds normal. No stridor. No respiratory distress. She has no wheezes. She has no rales. She exhibits no tenderness.   Abdominal: Soft. Bowel sounds are normal. She exhibits no distension and no mass. There is no tenderness. There is no rebound and no guarding. No hernia.   Musculoskeletal: Normal range of motion. She exhibits no edema, tenderness or deformity.   Neurological: She is alert. She has normal reflexes. She exhibits normal muscle tone.   Patient is alert and oriented ×2.   Skin: Skin is warm and dry.  No rash noted. She is not diaphoretic. No erythema. No pallor.   Psychiatric: She has a normal mood and affect.   Nursing note and vitals reviewed.        Medication Review:    Current Facility-Administered Medications:   •  acetaminophen (TYLENOL) tablet 650 mg, 650 mg, Oral, Q6H PRN, Jose Pittman MD  •  apixaban (ELIQUIS) tablet 2.5 mg, 2.5 mg, Oral, Q12H, Jose Pittman MD, Stopped at 07/10/18 2100  •  aspirin EC tablet 81 mg, 81 mg, Oral, Daily, Jose Pittman MD, 81 mg at 07/10/18 0801  •  atorvastatin (LIPITOR) tablet 40 mg, 40 mg, Oral, Nightly, Jose Pittman MD, 40 mg at 07/10/18 2124  •  AZITHROMYCIN 500 MG/250 ML 0.9% NS IVPB (vial-mate), 500 mg, Intravenous, Q24H, Jose Pittman MD, 500 mg at 07/10/18 1541  •  cefTRIAXone (ROCEPHIN) 1 g/100 mL 0.9% NS (MBP), 1 g, Intravenous, Q24H, Jose Pittman MD, 1 g at 07/10/18 1412  •  docusate sodium (COLACE) capsule 100 mg, 100 mg, Oral, BID, Olman Trimble MD, 100 mg at 07/10/18 2124  •  famotidine (PEPCID) tablet 40 mg, 40 mg, Oral, Daily, Jose Pittman MD, 40 mg at 07/10/18 0802  •  HYDROcodone-acetaminophen (NORCO) 7.5-325 MG per tablet 1 tablet, 1 tablet, Oral, Q4H PRN, Jose Pittman MD, 1 tablet at 07/11/18 1147  •  ipratropium-albuterol (DUO-NEB) nebulizer solution 3 mL, 3 mL, Nebulization, Q4H PRN, Jose Pittman MD  •  levETIRAcetam (KEPPRA) tablet 500 mg, 500 mg, Oral, Q12H, Jose Pittman MD, 500 mg at 07/10/18 2124  •  levothyroxine (SYNTHROID, LEVOTHROID) tablet 88 mcg, 88 mcg, Oral, QAM AC, Jose Pittman MD, 88 mcg at 07/10/18 0802  •  lidocaine PF 1% (XYLOCAINE) injection 5 mL, 5 mL, Infiltration, Daily PRN, Chi Holden MD, 5 mL at 07/09/18 1918  •  mesalamine (PENTASA) CR capsule 250 mg, 250 mg, Oral, Daily, Jose Pittman MD, 250 mg at 07/10/18 0802  •  mirtazapine (REMERON) half tablet 7.5 mg, 7.5 mg, Oral, Nightly, Jose Pittman MD, 7.5 mg at 07/10/18 2124  •  ondansetron (ZOFRAN) injection 4 mg, 4 mg, Intravenous, Q6H PRN,  Jose Pittman MD  •  polyethylene glycol (MIRALAX) powder 17 g, 17 g, Oral, Daily PRN, Olman Trimble MD  •  QUEtiapine (SEROquel) tablet 25 mg, 25 mg, Oral, Nightly, Olman Trimble MD, 25 mg at 07/10/18 2124  •  sertraline (ZOLOFT) tablet 50 mg, 50 mg, Oral, Daily, Jose Pittman MD, 50 mg at 07/10/18 0802  •  sevelamer (RENVELA) packet 1.6 g, 1,600 mg, Oral, TID With Meals, Jose Pittman MD, 1.6 g at 07/11/18 0923  •  sodium chloride 0.9 % flush 10 mL, 10 mL, Intravenous, PRN, Amish Hernandez MD    Results Review:  I have reviewed the labs, radiology results, and diagnostic studies.    Laboratory Data:     Results from last 7 days  Lab Units 07/10/18  0600 07/09/18  1744 07/09/18  1414 07/09/18  1108   SODIUM mmol/L 125*  --  117* 120*   POTASSIUM mmol/L 4.2 3.0* 6.1* 6.7*   CHLORIDE mmol/L 87*  --  85* 81*   CO2 mmol/L 24.0  --  19.0* 21.0*   BUN mg/dL 49*  --  74* 72*   CREATININE mg/dL 4.45*  --  5.61* 5.74*   GLUCOSE mg/dL 83  --  100 98   CALCIUM mg/dL 8.7  --  8.1* 8.8   BILIRUBIN mg/dL 0.5  --   --  0.7   ALK PHOS U/L 151*  --   --  172*   ALT (SGPT) U/L 16  --   --  12   AST (SGOT) U/L 99*  --   --  33   ANION GAP mmol/L 14.0  --  13.0 18.0*     Estimated Creatinine Clearance: 9.7 mL/min (A) (by C-G formula based on SCr of 4.45 mg/dL (H)).    Results from last 7 days  Lab Units 07/09/18  1414   PHOSPHORUS mg/dL 4.8*           Results from last 7 days  Lab Units 07/11/18  1200 07/10/18  1218 07/10/18  0600 07/09/18  1108   WBC 10*3/mm3 10.55*  --  9.12 13.10*   HEMOGLOBIN g/dL 11.0* 10.9* 11.5* 12.9   HEMATOCRIT % 32.8* 33.8* 35.2 38.3   PLATELETS 10*3/mm3 173  --  191 209       Results from last 7 days  Lab Units 07/09/18  1108   INR  1.09       Culture Data:   No results found for: BLOODCX  No results found for: URINECX  No results found for: RESPCX  No results found for: WOUNDCX  No results found for: STOOLCX  No components found for: BODYFLD    Radiology Data:   Imaging Results (last 24 hours)      ** No results found for the last 24 hours. **          I have reviewed the patient's current medications.     Assessment/Plan     Hospital Problem List:  Active Problems:  - Pneumonia of right upper lobe due to infectious organism: Continue broad-spectrum antibiotics.  Reactive leukocytosis has resolved.  - Hyponatremia (hypervolemic): Improving with hemodialysis.  Will continue to monitor.    - End-stage renal disease: Continue hemodialysis.  Nephrologist is following.  - Elevated LFTs likely reactive.  Will continue to monitor.  - Dementia versus delirium: Patient's mental status is at baseline.  Continue supportive management.  - Hypokalemia: Resolved.  - Hypothyroidism: Continue Synthroid.  TSH is normal.  - Depressive disorder: Continue home medications.  - Continue GI and DVT prophylaxis.    Discharge Planning: Likely discharge in the a.m.    Olman Trimble MD   07/11/18   12:29 PM

## 2018-07-11 NOTE — PLAN OF CARE
Problem: Patient Care Overview  Goal: Plan of Care Review  Outcome: Ongoing (interventions implemented as appropriate)   07/11/18 0886   Coping/Psychosocial   Plan of Care Reviewed With patient   Plan of Care Review   Progress no change   OTHER   Outcome Summary Pt has been confused for majority of the day. Pt went to dialysis today as well.     Goal: Individualization and Mutuality  Outcome: Ongoing (interventions implemented as appropriate)    Goal: Discharge Needs Assessment  Outcome: Ongoing (interventions implemented as appropriate)    Goal: Interprofessional Rounds/Family Conf  Outcome: Ongoing (interventions implemented as appropriate)      Problem: Fall Risk (Adult)  Goal: Identify Related Risk Factors and Signs and Symptoms  Outcome: Ongoing (interventions implemented as appropriate)    Goal: Absence of Fall  Outcome: Ongoing (interventions implemented as appropriate)      Problem: Pneumonia (Adult)  Goal: Signs and Symptoms of Listed Potential Problems Will be Absent, Minimized or Managed (Pneumonia)  Outcome: Ongoing (interventions implemented as appropriate)      Problem: Skin Injury Risk (Adult)  Goal: Identify Related Risk Factors and Signs and Symptoms  Outcome: Ongoing (interventions implemented as appropriate)    Goal: Skin Health and Integrity  Outcome: Ongoing (interventions implemented as appropriate)

## 2018-07-11 NOTE — PROGRESS NOTES
CVTS requested by Dr Mireles to check LLE Fistula.   Bleeding from scratching site   PCP:  Dr Perez  Nephrology:  Dr Mireles, [facility]  CC:  Bleeding near LLE fistula site.  No problems with dialysis     Subjective   Efrem Roa is a 72 y.o. female is here today  for fistula evaluation, as site has become erythemic and edema.   Bld CS Gm+ cocci  On Vanc with CHD.     History of Present Illness  72yr woman with ESRD on hemodialysis, HTN, COPD, sleep apnea, Stroke, GERD, LEFT arm fracture 20yrs ago.  moderate chronic pain lower back, LEFT leg.  occasional bleeding after decannulation.  recent clotted graft, unable to declot.  possible central venous occlusion.  LEFT femoral tunnel catheter. Recent new AV fistula placement. . End stage renal disease, No clotted access or problems during dialysis,   L Femoral AV graft functioning well.  Has memory and falling balance problems. Seen today in hospital at Dr Mireles request for bleeding near fistula site.  Since last seen in office, underwent fistula revision in [place] with wound vac.  Wound is not much improved near healed.  Fistula is functioning well with dialysis.   04/30/18  Left AV Fistula  Duplex:  Patent fistula. maxPSV 470cm/s (mid ). Size2.7-6.2  mm. Flows 349-1258 ml/min Distal thigh hematoma 6.58cm   Pseudoaneurysm mid thight 2.56 X 1.40cm  5/7/18   AV fistula revision with wound vac placement. [facility]  Constitution: Positive for weakness and malaise/fatigue. Negative for chills, decreased appetite and fever.   HENT: Negative for congestion, ear pain, hoarse voice, nosebleeds and sore throat.    Eyes: Negative for visual disturbance.   Cardiovascular: Positive for leg swelling. Negative for chest pain, claudication, cyanosis and dyspnea on exertion.        Fistula bleeding:  N Bleeding below fistula    Fistula pain:Y  Extremity pain:  Y Extremity discoloration:  Y   Extremity numbness/tingling:  N  .    Respiratory:  Positive for cough and shortness of breath. Negative for hemoptysis.    Hematologic/Lymphatic: Bruises/bleeds easily.   Skin: Positive for dry skin. Negative for color change, flushing, itching, poor wound healing and rash.      Bleeding below fistula   Musculoskeletal: Positive for arthritis, back pain, falls and myalgias. Negative for joint swelling and muscle cramps.        Sleepy this am    Gastrointestinal: Negative for abdominal pain, anorexia, change in bowel habit, hematemesis, melena and nausea.   Neurological: Negative for brief paralysis, focal weakness, headaches, numbness, paresthesias and sensory change.       Current Facility-Administered Medications:   •  acetaminophen (TYLENOL) tablet 650 mg, 650 mg, Oral, Q6H PRN, Jose Pittman MD  •  apixaban (ELIQUIS) tablet 2.5 mg, 2.5 mg, Oral, Q12H, Jose Pittman MD, Stopped at 07/10/18 2100  •  aspirin EC tablet 81 mg, 81 mg, Oral, Daily, Jose Pittman MD, 81 mg at 07/10/18 0801  •  atorvastatin (LIPITOR) tablet 40 mg, 40 mg, Oral, Nightly, Jose Pittman MD, 40 mg at 07/10/18 2124  •  AZITHROMYCIN 500 MG/250 ML 0.9% NS IVPB (vial-mate), 500 mg, Intravenous, Q24H, Jose Pittman MD, 500 mg at 07/10/18 1541  •  cefTRIAXone (ROCEPHIN) 1 g/100 mL 0.9% NS (MBP), 1 g, Intravenous, Q24H, Jose Pittman MD, 1 g at 07/10/18 1412  •  docusate sodium (COLACE) capsule 100 mg, 100 mg, Oral, BID, Olman Trimble MD, 100 mg at 07/10/18 2124  •  famotidine (PEPCID) tablet 40 mg, 40 mg, Oral, Daily, Jose Pittman MD, 40 mg at 07/10/18 0802  •  HYDROcodone-acetaminophen (NORCO) 7.5-325 MG per tablet 1 tablet, 1 tablet, Oral, Q4H PRN, Jose Pittman MD, 1 tablet at 07/10/18 2124  •  ipratropium-albuterol (DUO-NEB) nebulizer solution 3 mL, 3 mL, Nebulization, Q4H PRN, Jose Pittman MD  •  levETIRAcetam (KEPPRA) tablet 500 mg, 500 mg, Oral, Q12H, Jose Pittman MD, 500 mg at 07/10/18 2124  •  levothyroxine (SYNTHROID, LEVOTHROID) tablet 88 mcg, 88 mcg, Oral, QAM AC,  Jose Pittman MD, 88 mcg at 07/10/18 0802  •  lidocaine PF 1% (XYLOCAINE) injection 5 mL, 5 mL, Infiltration, Daily PRN, Chi Holden MD, 5 mL at 07/09/18 1918  •  mesalamine (PENTASA) CR capsule 250 mg, 250 mg, Oral, Daily, Jose Pittman MD, 250 mg at 07/10/18 0802  •  mirtazapine (REMERON) half tablet 7.5 mg, 7.5 mg, Oral, Nightly, Jose Pittman MD, 7.5 mg at 07/10/18 2124  •  ondansetron (ZOFRAN) injection 4 mg, 4 mg, Intravenous, Q6H PRN, Jose Pittman MD  •  polyethylene glycol (MIRALAX) powder 17 g, 17 g, Oral, Daily PRN, Olman Trimble MD  •  QUEtiapine (SEROquel) tablet 25 mg, 25 mg, Oral, Nightly, Olman Trimble MD, 25 mg at 07/10/18 2124  •  sertraline (ZOLOFT) tablet 50 mg, 50 mg, Oral, Daily, Jose Pittman MD, 50 mg at 07/10/18 0802  •  sevelamer (RENVELA) packet 1.6 g, 1,600 mg, Oral, TID With Meals, Jose Pittman MD, 1.6 g at 07/10/18 1412  •  sodium chloride 0.9 % flush 10 mL, 10 mL, Intravenous, PRN, Amish Hernandez MD       Objective      Physical Exam   Constitutional: She is oriented to person, place, and time. She appears well-nourished.   HENT:   Head: Normocephalic.   Mouth/Throat: Oropharynx is clear and moist.   Eyes: Conjunctivae are normal. Pupils are equal, round, and reactive to light.   Constricted    Neck: Neck supple. No JVD present.   Cardiovascular: Normal rate, regular rhythm, normal heart sounds and intact distal pulses.    Fistula Present LL Extremity: (Y)thrill/(Y)bruit/(Y)pulse. Aneurysmal (N. Water Hammer Pulse (Y). Thinning (Y).  Flows <800ml/min (Y). Flows < previous (Y). Skin warm/dry/pink/intact (Y).  Small wounds at previous pseudo site  Healing well   Pulmonary/Chest: Effort normal and breath sounds normal. No stridor. She has no wheezes.   Abdominal: Soft. Bowel sounds are normal.   Musculoskeletal: She exhibits edema (ankle pedal ) and tenderness (Left thigh hematoma site. ).   Neurological: She is alert and oriented to person, place, and time.    Skin: Skin is warm and dry. No rash noted. There is erythema. No pallor.   Left thigh AV fistula well healed  Distal thigh scratch skin surface Site of bleeding  No longer bleeding  No bruising or hematoma.   Fistula +thrill bruit pulse.  Distal  pulse +  + sensation and mobility LEG   warm pink     Psychiatric: Her behavior is normal. Seepy   Nursing note and vitals reviewed       Assessment/Plan     1. End stage renal failure on dialysis  Fistula functioning well with dialysis.  Continue to use fistula.       2. Arteriovenous fistula  Fistula functioning well  Distal scratch Continue dressing and wrap of leg SP dialysis.    May require pressure dressing with dialysis.   Continue dialysis as scheduled. If problems should arise including difficulty with access, high pressure alarms, or inability to complete dialysis please notify [facility] immediately for evaluation..

## 2018-07-11 NOTE — PROGRESS NOTES
NEPHROLOGY PROGRESS NOTE:    History of present illness:   ESRD.  Pt had hemodialysis today.  Asking to go home.  Afebrile.  No cough.  Patient was on oral doxycycline as outpatient after previous surgery for access infection.    Patient Active Problem List   Diagnosis   • Constipation   • Arteriovenous fistula (CMS/[facility])   • End stage renal failure on dialysis (CMS/[facility])   • Controlled type 2 diabetes mellitus with chronic kidney disease on chronic dialysis, without long-term current use of insulin (CMS/[facility])   • Coronary artery disease involving native coronary artery of native heart without angina pectoris   • Panlobular emphysema (CMS/[facility])   • ESRD (end stage renal disease) on dialysis (CMS/[facility])   • HCAP (healthcare-associated pneumonia)   • MRSA bacteremia   • Sepsis (CMS/[facility])   • Benign essential hypertension   • Hyperlipidemia   • Pseudoaneurysm (CMS/[facility])   • Pneumonia of right upper lobe due to infectious organism (CMS/[facility])       Medications:    apixaban 2.5 mg Oral Q12H   aspirin 81 mg Oral Daily   atorvastatin 40 mg Oral Nightly   azithromycin 500 mg Intravenous Q24H   ceftriaxone 1 g Intravenous Q24H   docusate sodium 100 mg Oral BID   famotidine 40 mg Oral Daily   levETIRAcetam 500 mg Oral Q12H   levothyroxine 88 mcg Oral QAM AC   mesalamine 250 mg Oral Daily   mirtazapine 7.5 mg Oral Nightly   QUEtiapine 25 mg Oral Nightly   sertraline 50 mg Oral Daily   sevelamer 1,600 mg Oral TID With Meals        Vitals:    07/11/18 0855 07/11/18 1518 07/11/18 1539 07/11/18 1542   BP:  112/66 110/62    BP Location:  Right arm Right arm    Patient Position:  Lying     Pulse: 96 72 103 84   Resp: 18 22 20    Temp:  98.3 °F (36.8 °C) 96.8 °F (36 °C)    TempSrc:  Tympanic Tympanic    SpO2: 90%  93%    Weight:       Height:         I/O last 3 completed shifts:  In: 900 [P.O.:900]  Out: -   I/O this shift:  In: 480 [P.O.:480]  Out: 1800 [Other:1800]    On examination:  General:  Comfortable, no distress.  Thin built.    Chronically ill.   Skin: No skin rashes or mucosal bleeding   HEENT:  Pallor present, no icterus.     Neck:  No jugular venous distention, or thyroid enlargement.  Chest:  Clear.  No rales or wheezes audible.  Air entry appears equal bilaterally.  CVS: Heart sounds are regular, there is no pericardial rub or gallop.  Abdomen: Soft, nontender, normal bowel sounds, no organomegaly.  No rebound or guarding.  No bruit.  Extremities:  No significant edema of the lower extremities.  Left thigh AVG with bruit.    Musculoskeletal: No acute joint inflammation.  Neuro:  No focal motor neurological deficits.  No asterixis.  Mentation:  Alert and oriented.   Recent delirium.     Past medical illness, family history, social history, medications, previous notes reviewed.       Laboratory results:      Recent Results (from the past 24 hour(s))   Comprehensive Metabolic Panel    Collection Time: 07/11/18 12:00 PM   Result Value Ref Range    Glucose 132 (H) 60 - 100 mg/dL    BUN 54 (H) 7 - 21 mg/dL    Creatinine 5.16 (H) 0.50 - 1.00 mg/dL    Sodium 122 (L) 137 - 145 mmol/L    Potassium 5.6 (H) 3.5 - 5.1 mmol/L    Chloride 88 (L) 95 - 110 mmol/L    CO2 21.0 (L) 22.0 - 31.0 mmol/L    Calcium 8.2 (L) 8.4 - 10.2 mg/dL    Total Protein 7.6 6.3 - 8.6 g/dL    Albumin 3.60 3.40 - 4.80 g/dL    ALT (SGPT) 26 9 - 52 U/L    AST (SGOT) 67 (H) 14 - 36 U/L    Alkaline Phosphatase 132 (H) 38 - 126 U/L    Total Bilirubin 0.4 0.2 - 1.3 mg/dL    eGFR Non  Amer 8 (L) 39 - 90 mL/min/1.73    Globulin 4.0 (H) 2.3 - 3.5 gm/dL    A/G Ratio 0.9 (L) 1.1 - 1.8 g/dL    BUN/Creatinine Ratio 10.5 7.0 - 25.0    Anion Gap 13.0 5.0 - 15.0 mmol/L   CBC Auto Differential    Collection Time: 07/11/18 12:00 PM   Result Value Ref Range    WBC 10.55 (H) 3.20 - 9.80 10*3/mm3    RBC 3.57 (L) 3.77 - 5.16 10*6/mm3    Hemoglobin 11.0 (L) 12.0 - 15.5 g/dL    Hematocrit 32.8 (L) 35.0 - 45.0 %    MCV 91.9 80.0 - 98.0 fL    MCH 30.8 26.5 - 34.0 pg    MCHC 33.5 31.4 - 36.0  g/dL    RDW 17.0 (H) 11.5 - 14.5 %    RDW-SD 57.4 (H) 36.4 - 46.3 fl    MPV 10.2 8.0 - 12.0 fL    Platelets 173 150 - 450 10*3/mm3    Neutrophil % 88.5 (H) 37.0 - 80.0 %    Lymphocyte % 5.2 (L) 10.0 - 50.0 %    Monocyte % 4.7 0.0 - 12.0 %    Eosinophil % 1.1 0.0 - 7.0 %    Basophil % 0.3 0.0 - 2.0 %    Immature Grans % 0.2 0.0 - 0.5 %    Neutrophils, Absolute 9.33 (H) 2.00 - 8.60 10*3/mm3    Lymphocytes, Absolute 0.55 (L) 0.60 - 4.20 10*3/mm3    Monocytes, Absolute 0.50 0.00 - 0.90 10*3/mm3    Eosinophils, Absolute 0.12 0.00 - 0.70 10*3/mm3    Basophils, Absolute 0.03 0.00 - 0.20 10*3/mm3    Immature Grans, Absolute 0.02 0.00 - 0.02 10*3/mm3   ]    Imaging Results (last 24 hours)     ** No results found for the last 24 hours. **            ASSESSMENT:      ESRD  Hyponatremia  Hyperkalemia  Pneumonia  Altered mental status      PLAN:     ESRD:  Patient had hemodialysis today.  Fluid removal is limited by low blood pressure readings on dialysis.  Follow lab tests in the morning.    Hyponatremia:  Etiology for severe hyponatremia unclear.  TSH was normal.  Patient had dialysis today.  BMP in the morning.  Fluid restriction.  Next    Hyperkalemia: Follow-up with the dialysis treatment.  Next    Delirium, change in mentation.  Had previously mentioned about seeing bugs in the home.  Today she is alert, seems to be at her baseline mentation.    Pneumonia: Patient is on antibiotics.  Currently on Rocephin and azithromycin.  She was completing antibiotics as outpatient for previous acess infection, had been on doxycycline, and this can be restarted once current dose of Lasix twice and is completed.    Follow blood culture results.    Discussed with patient in detail.      Chi Holden MD

## 2018-07-12 NOTE — PROGRESS NOTES
[facility] Medicine Services  INPATIENT PROGRESS NOTE    Length of Stay: 3  Date of Admission: 7/9/2018  Primary Care Physician: No Known Provider    Subjective   Chief Complaint: Not applicable as patient has altered mental status.  HPI: Patient is seen for follow-up today.  She has history of end-stage renal disease and was admitted for pneumonia and hyponatremia.   She remains pleasantly confused which is reported to be her baseline but tolerating prescribed diet.  No new changes have been reported.    Review of Systems  Unable to review due to altered mental status.  Objective    Temp:  [95.7 °F (35.4 °C)-98.3 °F (36.8 °C)] 95.7 °F (35.4 °C)  Heart Rate:  [] 62  Resp:  [16-22] 16  BP: ()/(42-89) 117/89    Physical Exam   Constitutional: She appears well-developed and well-nourished. She is cooperative. No distress.   HENT:   Head: Normocephalic and atraumatic.   Right Ear: External ear normal.   Left Ear: External ear normal.   Nose: Nose normal.   Mouth/Throat: Oropharynx is clear and moist.   Eyes: Conjunctivae are normal. Pupils are equal, round, and reactive to light.   Neck: Normal range of motion. Neck supple. No JVD present. No thyromegaly present.   Cardiovascular: Normal rate, regular rhythm, normal heart sounds and intact distal pulses.  Exam reveals no gallop and no friction rub.    No murmur heard.  Pulmonary/Chest: Effort normal and breath sounds normal. No stridor. No respiratory distress. She has no wheezes. She has no rales. She exhibits no tenderness.   Abdominal: Soft. Bowel sounds are normal. She exhibits no distension and no mass. There is no tenderness. There is no rebound and no guarding. No hernia.   Musculoskeletal: Normal range of motion. She exhibits no edema, tenderness or deformity.   Neurological: She is alert. She has normal reflexes. She exhibits normal muscle tone.   Patient is alert and oriented ×2.   Skin: Skin is warm and  dry. No rash noted. She is not diaphoretic. No erythema. No pallor.   Psychiatric: She has a normal mood and affect.   Nursing note and vitals reviewed.        Medication Review:    Current Facility-Administered Medications:   •  acetaminophen (TYLENOL) tablet 650 mg, 650 mg, Oral, Q6H PRN, Jose Pittman MD  •  apixaban (ELIQUIS) tablet 2.5 mg, 2.5 mg, Oral, Q12H, Jose Pittman MD, 2.5 mg at 07/12/18 0836  •  aspirin EC tablet 81 mg, 81 mg, Oral, Daily, Jose Pittman MD, 81 mg at 07/12/18 0835  •  atorvastatin (LIPITOR) tablet 40 mg, 40 mg, Oral, Nightly, Jose Pittman MD, 40 mg at 07/11/18 2259  •  AZITHROMYCIN 500 MG/250 ML 0.9% NS IVPB (vial-mate), 500 mg, Intravenous, Q24H, Jose Pittman MD, 500 mg at 07/11/18 1640  •  cefTRIAXone (ROCEPHIN) 1 g/100 mL 0.9% NS (MBP), 1 g, Intravenous, Q24H, Jose Pittman MD, 1 g at 07/11/18 1800  •  docusate sodium (COLACE) capsule 100 mg, 100 mg, Oral, BID, Olman Trimble MD, 100 mg at 07/12/18 0836  •  famotidine (PEPCID) tablet 40 mg, 40 mg, Oral, Daily, Jose Pittman MD, 40 mg at 07/12/18 0836  •  HYDROcodone-acetaminophen (NORCO) 7.5-325 MG per tablet 1 tablet, 1 tablet, Oral, Q4H PRN, Jose Pittman MD, 1 tablet at 07/12/18 0836  •  ipratropium-albuterol (DUO-NEB) nebulizer solution 3 mL, 3 mL, Nebulization, Q4H PRN, Jose Pittman MD  •  levETIRAcetam (KEPPRA) tablet 500 mg, 500 mg, Oral, Q12H, Jose Pittman MD, 500 mg at 07/12/18 0836  •  levothyroxine (SYNTHROID, LEVOTHROID) tablet 88 mcg, 88 mcg, Oral, QAM AC, Jose Pittman MD, 88 mcg at 07/12/18 0836  •  lidocaine PF 1% (XYLOCAINE) injection 5 mL, 5 mL, Infiltration, Daily PRN, Chi MD Navin, 5 mL at 07/09/18 1918  •  mesalamine (PENTASA) CR capsule 250 mg, 250 mg, Oral, Daily, Jose Pittman MD, 250 mg at 07/12/18 0875  •  mirtazapine (REMERON) half tablet 7.5 mg, 7.5 mg, Oral, Nightly, Jose Pittman MD, 7.5 mg at 07/11/18 2255  •  ondansetron (ZOFRAN) injection 4 mg, 4 mg, Intravenous, Q6H PRN,  Jose Pittman MD  •  polyethylene glycol (MIRALAX) powder 17 g, 17 g, Oral, Daily PRN, Olman Trimble MD  •  QUEtiapine (SEROquel) tablet 25 mg, 25 mg, Oral, Nightly, Olman Trimble MD, 25 mg at 07/11/18 2254  •  sertraline (ZOLOFT) tablet 50 mg, 50 mg, Oral, Daily, Jose Pittman MD, 50 mg at 07/12/18 0836  •  sevelamer (RENVELA) packet 1.6 g, 1,600 mg, Oral, TID With Meals, Jose Pittman MD, 1.6 g at 07/11/18 1601  •  sodium chloride 0.9 % flush 10 mL, 10 mL, Intravenous, PRN, Amish Hernandez MD    Results Review:  I have reviewed the labs, radiology results, and diagnostic studies.    Laboratory Data:     Results from last 7 days  Lab Units 07/12/18  0707 07/11/18  1200 07/10/18  0600   SODIUM mmol/L 128* 122* 125*   POTASSIUM mmol/L 4.3 5.6* 4.2   CHLORIDE mmol/L 89* 88* 87*   CO2 mmol/L 27.0 21.0* 24.0   BUN mg/dL 36* 54* 49*   CREATININE mg/dL 4.11* 5.16* 4.45*   GLUCOSE mg/dL 84 132* 83   CALCIUM mg/dL 8.3* 8.2* 8.7   BILIRUBIN mg/dL 0.4 0.4 0.5   ALK PHOS U/L 153* 132* 151*   ALT (SGPT) U/L 22 26 16   AST (SGOT) U/L 62* 67* 99*   ANION GAP mmol/L 12.0 13.0 14.0     Estimated Creatinine Clearance: 10.5 mL/min (A) (by C-G formula based on SCr of 4.11 mg/dL (H)).    Results from last 7 days  Lab Units 07/09/18  1414   PHOSPHORUS mg/dL 4.8*           Results from last 7 days  Lab Units 07/12/18  0707 07/11/18  1200 07/10/18  1218 07/10/18  0600 07/09/18  1108   WBC 10*3/mm3 7.59 10.55*  --  9.12 13.10*   HEMOGLOBIN g/dL 11.2* 11.0* 10.9* 11.5* 12.9   HEMATOCRIT % 34.8* 32.8* 33.8* 35.2 38.3   PLATELETS 10*3/mm3 174 173  --  191 209       Results from last 7 days  Lab Units 07/09/18  1108   INR  1.09       Culture Data:   No results found for: BLOODCX  No results found for: URINECX  No results found for: RESPCX  No results found for: WOUNDCX  No results found for: STOOLCX  No components found for: BODYFLD    Radiology Data:   Imaging Results (last 24 hours)     ** No results found for the last 24 hours.  **          I have reviewed the patient's current medications.     Assessment/Plan     Hospital Problem List:  Active Problems:  - Pneumonia of right upper lobe due to infectious organism: Continue broad-spectrum antibiotics.  Reactive leukocytosis has resolved.  - Hyponatremia (hypervolemic): Improving with hemodialysis.  Will continue to monitor.    - End-stage renal disease: Continue hemodialysis.  Nephrologist is following.  - Elevated LFTs likely reactive and much improved.  Will continue to monitor.  - Dementia versus delirium: Patient's mental status is at baseline. Continue supportive management.  - Hypothyroidism: Continue Synthroid.  TSH is normal.  - Depressive disorder: Continue home medications.  - Continue GI and DVT prophylaxis.    Discharge Planning: Likely discharge in the a.m.    Olman Trimble MD   07/12/18   11:09 AM

## 2018-07-12 NOTE — PROGRESS NOTES
NEPHROLOGY PROGRESS NOTE:    History of present illness:   ESRD.  Patient had hemodialysis yesterday.  Insistent on going home.  Denies cough or expiration.  Planned hemodialysis tomorrow.  Afebrile.    Patient Active Problem List   Diagnosis   • Constipation   • Arteriovenous fistula (CMS/[facility])   • End stage renal failure on dialysis (CMS/[facility])   • Controlled type 2 diabetes mellitus with chronic kidney disease on chronic dialysis, without long-term current use of insulin (CMS/[facility])   • Coronary artery disease involving native coronary artery of native heart without angina pectoris   • Panlobular emphysema (CMS/[facility])   • ESRD (end stage renal disease) on dialysis (CMS/[facility])   • HCAP (healthcare-associated pneumonia)   • MRSA bacteremia   • Sepsis (CMS/[facility])   • Benign essential hypertension   • Hyperlipidemia   • Pseudoaneurysm (CMS/[facility])   • Pneumonia of right upper lobe due to infectious organism (CMS/[facility])       Medications:    apixaban 2.5 mg Oral Q12H   aspirin 81 mg Oral Daily   atorvastatin 40 mg Oral Nightly   azithromycin 500 mg Intravenous Q24H   ceftriaxone 1 g Intravenous Q24H   docusate sodium 100 mg Oral BID   famotidine 40 mg Oral Daily   levETIRAcetam 500 mg Oral Q12H   levothyroxine 88 mcg Oral QAM AC   mesalamine 250 mg Oral Daily   mirtazapine 7.5 mg Oral Nightly   QUEtiapine 25 mg Oral Nightly   sertraline 50 mg Oral Daily   sevelamer 1,600 mg Oral TID With Meals        Vitals:    07/12/18 1033 07/12/18 1514 07/12/18 1528 07/12/18 1732   BP: 117/89  150/66    BP Location: Right leg  Right leg    Patient Position: Lying  Lying    Pulse: 62 94  61   Resp: 16 16     Temp: 95.7 °F (35.4 °C) 97.4 °F (36.3 °C)     TempSrc: Axillary Axillary     SpO2: 97% 98%     Weight:       Height:         I/O last 3 completed shifts:  In: 840 [P.O.:840]  Out: 1800 [Other:1800]  I/O this shift:  In: 480 [P.O.:480]  Out: -     On examination:  General:  Comfortable, no distress.  Thin built.   Chronically ill.   Skin: No  skin rashes or mucosal bleeding   HEENT:  Pallor present, no icterus.     Neck:  No jugular venous distention, or thyroid enlargement.  Chest:  Clear.  No rales or wheezes audible.  Air entry appears equal bilaterally.  CVS: Heart sounds are regular, there is no pericardial rub or gallop.  Abdomen: Soft, nontender, normal bowel sounds, no organomegaly.  No rebound or guarding.  No bruit.  Extremities:  No significant edema of the lower extremities.  Left thigh AVG with bruit.    Musculoskeletal: No acute joint inflammation.  Neuro:  No focal motor neurological deficits.  No asterixis.  Mentation:  Alert and oriented.   Recent delirium.     Past medical illness, family history, social history, medications, previous notes reviewed.       Laboratory results:      Recent Results (from the past 24 hour(s))   Comprehensive Metabolic Panel    Collection Time: 07/12/18  7:07 AM   Result Value Ref Range    Glucose 84 60 - 100 mg/dL    BUN 36 (H) 7 - 21 mg/dL    Creatinine 4.11 (H) 0.50 - 1.00 mg/dL    Sodium 128 (L) 137 - 145 mmol/L    Potassium 4.3 3.5 - 5.1 mmol/L    Chloride 89 (L) 95 - 110 mmol/L    CO2 27.0 22.0 - 31.0 mmol/L    Calcium 8.3 (L) 8.4 - 10.2 mg/dL    Total Protein 8.1 6.3 - 8.6 g/dL    Albumin 3.90 3.40 - 4.80 g/dL    ALT (SGPT) 22 9 - 52 U/L    AST (SGOT) 62 (H) 14 - 36 U/L    Alkaline Phosphatase 153 (H) 38 - 126 U/L    Total Bilirubin 0.4 0.2 - 1.3 mg/dL    eGFR Non  Amer 11 (L) 39 - 90 mL/min/1.73    Globulin 4.2 (H) 2.3 - 3.5 gm/dL    A/G Ratio 0.9 (L) 1.1 - 1.8 g/dL    BUN/Creatinine Ratio 8.8 7.0 - 25.0    Anion Gap 12.0 5.0 - 15.0 mmol/L   CBC Auto Differential    Collection Time: 07/12/18  7:07 AM   Result Value Ref Range    WBC 7.59 3.20 - 9.80 10*3/mm3    RBC 3.63 (L) 3.77 - 5.16 10*6/mm3    Hemoglobin 11.2 (L) 12.0 - 15.5 g/dL    Hematocrit 34.8 (L) 35.0 - 45.0 %    MCV 95.9 80.0 - 98.0 fL    MCH 30.9 26.5 - 34.0 pg    MCHC 32.2 31.4 - 36.0 g/dL    RDW 17.6 (H) 11.5 - 14.5 %    RDW-SD  62.7 (H) 36.4 - 46.3 fl    MPV 9.4 8.0 - 12.0 fL    Platelets 174 150 - 450 10*3/mm3    Neutrophil % 74.8 37.0 - 80.0 %    Lymphocyte % 12.5 10.0 - 50.0 %    Monocyte % 6.9 0.0 - 12.0 %    Eosinophil % 5.0 0.0 - 7.0 %    Basophil % 0.5 0.0 - 2.0 %    Immature Grans % 0.3 0.0 - 0.5 %    Neutrophils, Absolute 5.68 2.00 - 8.60 10*3/mm3    Lymphocytes, Absolute 0.95 0.60 - 4.20 10*3/mm3    Monocytes, Absolute 0.52 0.00 - 0.90 10*3/mm3    Eosinophils, Absolute 0.38 0.00 - 0.70 10*3/mm3    Basophils, Absolute 0.04 0.00 - 0.20 10*3/mm3    Immature Grans, Absolute 0.02 0.00 - 0.02 10*3/mm3    nRBC 0.0 0.0 - 0.0 /100 WBC   ]    Imaging Results (last 24 hours)     ** No results found for the last 24 hours. **            ASSESSMENT:      ESRD  Hyponatremia  Hyperkalemia  Pneumonia  Altered mental status      PLAN:     ESRD:  Patient had hemodialysis yesterday.  Volume status is stable.  Plan the hemodialysis tomorrow.  We are using AV graft for hemodialysis.    Hyponatremia, serum sodium of 128.  This is improving.  Fluid restriction.  Follow-up with dialysis tomorrow.    Hyperkalemia, improved, serum potassium was 4.3.    Leukocytosis: Improving.  White cell count 7.59.  Patient is on antibiotics.    Previously was on oral doxycycline, and this has been held during the hospital stay.  Once IV azithromycin is completed, restart doxycycline on an outpatient schedule.    Patient was insistent on going back to the nursing home.  I did discuss with her that workup including for infections, correction of electrolyte abnormalities, need to be completed and stable before we can discharge her back to the nursing home.      Chi Holden MD

## 2018-07-12 NOTE — PLAN OF CARE
Problem: Patient Care Overview  Goal: Plan of Care Review  Outcome: Ongoing (interventions implemented as appropriate)   07/12/18 1323   Coping/Psychosocial   Plan of Care Reviewed With patient   Plan of Care Review   Progress no change   OTHER   Outcome Summary pt still confused. vss. agitated. diet increased to heart healty renal diet.        Problem: Fall Risk (Adult)  Goal: Identify Related Risk Factors and Signs and Symptoms  Outcome: Ongoing (interventions implemented as appropriate)    Goal: Absence of Fall  Outcome: Ongoing (interventions implemented as appropriate)      Problem: Pneumonia (Adult)  Goal: Signs and Symptoms of Listed Potential Problems Will be Absent, Minimized or Managed (Pneumonia)  Outcome: Ongoing (interventions implemented as appropriate)      Problem: Skin Injury Risk (Adult)  Goal: Identify Related Risk Factors and Signs and Symptoms  Outcome: Outcome(s) achieved Date Met: 07/12/18    Goal: Skin Health and Integrity  Outcome: Ongoing (interventions implemented as appropriate)      Problem: Pain, Chronic (Adult)  Goal: Identify Related Risk Factors and Signs and Symptoms  Outcome: Outcome(s) achieved Date Met: 07/12/18    Goal: Acceptable Pain/Comfort Level and Functional Ability  Outcome: Ongoing (interventions implemented as appropriate)      Problem: Confusion, Chronic (Adult)  Goal: Identify Related Risk Factors and Signs and Symptoms  Outcome: Outcome(s) achieved Date Met: 07/12/18    Goal: Cognitive/Functional Impairments Minimized  Outcome: Ongoing (interventions implemented as appropriate)    Goal: Free from Harm/Injuries  Outcome: Ongoing (interventions implemented as appropriate)

## 2018-07-13 NOTE — PROGRESS NOTES
NEPHROLOGY PROGRESS NOTE:    History of present illness:   ESRD.  Planned hemodialysis today.  Denies chest pain or shortness of breath.  Cough is improving.  She is alert and comfortable.  For hemodialysis this afternoon.    Patient Active Problem List   Diagnosis   • Constipation   • Arteriovenous fistula (CMS/[facility])   • End stage renal failure on dialysis (CMS/[facility])   • Controlled type 2 diabetes mellitus with chronic kidney disease on chronic dialysis, without long-term current use of insulin (CMS/[facility])   • Coronary artery disease involving native coronary artery of native heart without angina pectoris   • Panlobular emphysema (CMS/[facility])   • ESRD (end stage renal disease) on dialysis (CMS/[facility])   • HCAP (healthcare-associated pneumonia)   • MRSA bacteremia   • Sepsis (CMS/[facility])   • Benign essential hypertension   • Hyperlipidemia   • Pseudoaneurysm (CMS/[facility])   • Pneumonia of right upper lobe due to infectious organism (CMS/[facility])       Medications:    apixaban 2.5 mg Oral Q12H   aspirin 81 mg Oral Daily   atorvastatin 40 mg Oral Nightly   azithromycin 500 mg Intravenous Q24H   ceftriaxone 1 g Intravenous Q24H   docusate sodium 100 mg Oral BID   famotidine 40 mg Oral Daily   levETIRAcetam 500 mg Oral Q12H   levothyroxine 88 mcg Oral QAM AC   mesalamine 250 mg Oral Daily   mirtazapine 7.5 mg Oral Nightly   QUEtiapine 25 mg Oral Nightly   sertraline 50 mg Oral Daily   sevelamer 1,600 mg Oral TID With Meals        Vitals:    07/13/18 0340 07/13/18 0719 07/13/18 0724 07/13/18 0908   BP: 102/60   118/63   BP Location: Right arm   Right leg   Patient Position: Lying   Lying   Pulse: 71  78 69   Resp: 18   18   Temp: 97.7 °F (36.5 °C)   98 °F (36.7 °C)   TempSrc:    Axillary   SpO2: 92%   93%   Weight:  54.6 kg (120 lb 4.8 oz)     Height:         I/O last 3 completed shifts:  In: 480 [P.O.:480]  Out: -   I/O this shift:  In: 240 [P.O.:240]  Out: -     On examination:  General:  Comfortable, no distress.  Thin built.    Chronically ill.   Skin: No skin rashes or mucosal bleeding   HEENT:  Pallor present, no icterus.     Neck:  No jugular venous distention, or thyroid enlargement.  Chest:  Clear.  No rales or wheezes audible.  Air entry appears equal bilaterally.  CVS: Heart sounds are regular, there is no pericardial rub or gallop.  Abdomen: Soft, nontender, normal bowel sounds, no organomegaly.  No rebound or guarding.  No bruit.  Extremities:  No significant edema of the lower extremities.  Left thigh AVG with bruit.    Musculoskeletal: No acute joint inflammation.  Neuro:  No focal motor neurological deficits.  No asterixis.  Mentation:  Alert and oriented.   Recent delirium.     Past medical illness, family history, social history, medications, previous notes reviewed.       Laboratory results:      No results found for this or any previous visit (from the past 24 hour(s)).]    Imaging Results (last 24 hours)     ** No results found for the last 24 hours. **            ASSESSMENT:      ESRD  Hyponatremia  Hyperkalemia  Pneumonia  Altered mental status      PLAN:     ESRD:  On hemodialysis Monday Wednesday and Friday.  Patient is for hemodialysis this afternoon.  Hemodialysis orders have been reviewed.    Hyponatremia, serum sodium of 128.  Repeat lab indiscretions are being done at dialysis.    Hyperkalemia, improved, serum potassium was 4.3.    Leukocytosis: Improving.  White cell count 7.59.  Patient is on antibiotics.    Previously was on oral doxycycline, and this has been held during the hospital stay.  Once IV azithromycin is completed, restart doxycycline on an outpatient schedule.    If patient is discharged back to the nursing home, outpatient dialysis on scheduled on Monday Wednesday and Friday.      Chi Holden MD

## 2018-07-13 NOTE — PLAN OF CARE
Problem: Patient Care Overview  Goal: Plan of Care Review  Outcome: Ongoing (interventions implemented as appropriate)   07/12/18 7266   Coping/Psychosocial   Plan of Care Reviewed With patient   OTHER   Outcome Summary pt still confused to place, time, situation       Problem: Fall Risk (Adult)  Goal: Identify Related Risk Factors and Signs and Symptoms  Outcome: Ongoing (interventions implemented as appropriate)    Goal: Absence of Fall  Outcome: Ongoing (interventions implemented as appropriate)      Problem: Pneumonia (Adult)  Goal: Signs and Symptoms of Listed Potential Problems Will be Absent, Minimized or Managed (Pneumonia)  Outcome: Ongoing (interventions implemented as appropriate)      Problem: Skin Injury Risk (Adult)  Goal: Skin Health and Integrity  Outcome: Ongoing (interventions implemented as appropriate)      Problem: Pain, Chronic (Adult)  Goal: Acceptable Pain/Comfort Level and Functional Ability  Outcome: Ongoing (interventions implemented as appropriate)      Problem: Confusion, Chronic (Adult)  Goal: Cognitive/Functional Impairments Minimized  Outcome: Ongoing (interventions implemented as appropriate)    Goal: Free from Harm/Injuries  Outcome: Ongoing (interventions implemented as appropriate)

## 2018-07-13 NOTE — DISCHARGE SUMMARY
[facility] Medicine Services  DISCHARGE SUMMARY       Date of Admission: 7/9/2018  Date of Discharge:  7/13/2018  Primary Care Physician: No Known Provider    Presenting Problem/History of Present Illness:  Hyperkalemia [E87.5]  Hyponatremia [E87.1]  Pneumonia of right upper lobe due to infectious organism (CMS/HCC) [J18.1]  Altered mental status, unspecified altered mental status type [R41.82]  Chronic renal failure, unspecified CKD stage [N18.9] 72-year-old female with history of bipolar disorder, hypertension, end-stage renal disease (on hemodialysis), dementia, hypothyroidism was transferred from nursing home to emergency departments clinic to lethargy, increasing weakness and worsening mental status change.  She had chest x-ray noted to have right-sided pneumonia.      Final Discharge Diagnoses:  Hospital Problem List     Pneumonia of right upper lobe due to infectious organism (CMS/HCC)          Consults:   Consults     Date and Time Order Name Status Description    7/9/2018 1917 Inpatient Nephrology Consult      7/9/2018 1254 Nephrology - [facility] (on-call MD from group unless specified)            Procedures Performed: None                Pertinent Test Results:   Lab Results (last 7 days)     Procedure Component Value Units Date/Time    Basic Metabolic Panel [[id number]]  (Abnormal) Collected:  07/13/18 1607    Specimen:  Blood Updated:  07/13/18 1629     Glucose 117 (H) mg/dL      BUN 16 mg/dL      Creatinine 2.13 (H) mg/dL      Sodium 132 (L) mmol/L      Potassium 3.3 (L) mmol/L      Chloride 94 (L) mmol/L      CO2 26.0 mmol/L      Calcium 9.0 mg/dL      eGFR Non African Amer 23 (L) mL/min/1.73      BUN/Creatinine Ratio 7.5     Anion Gap 12.0 mmol/L     Narrative:       The MDRD GFR formula is only valid for adults with stable renal function between ages 18 and 70.    Comprehensive Metabolic Panel [[id number]]  (Abnormal) Collected:  07/13/18 1250    Specimen:  Blood  Updated:  07/13/18 1317     Glucose 126 (H) mg/dL      BUN 42 (H) mg/dL      Creatinine 4.71 (H) mg/dL      Sodium 124 (L) mmol/L      Potassium 5.0 mmol/L      Chloride 90 (L) mmol/L      CO2 21.0 (L) mmol/L      Calcium 8.1 (L) mg/dL      Total Protein 7.6 g/dL      Albumin 3.60 g/dL      ALT (SGPT) 15 U/L      AST (SGOT) 53 (H) U/L      Alkaline Phosphatase 156 (H) U/L      Total Bilirubin 0.3 mg/dL      eGFR Non African Amer 9 (L) mL/min/1.73      Globulin 4.0 (H) gm/dL      A/G Ratio 0.9 (L) g/dL      BUN/Creatinine Ratio 8.9     Anion Gap 13.0 mmol/L     Narrative:       The MDRD GFR formula is only valid for adults with stable renal function between ages 18 and 70.    CBC & Differential [[id number]] Collected:  07/13/18 1250    Specimen:  Blood Updated:  07/13/18 1301    Narrative:       The following orders were created for panel order CBC & Differential.  Procedure                               Abnormality         Status                     ---------                               -----------         ------                     CBC Auto Differential[[id number]]        Abnormal            Final result                 Please view results for these tests on the individual orders.    CBC Auto Differential [[id number]]  (Abnormal) Collected:  07/13/18 1250    Specimen:  Blood Updated:  07/13/18 1301     WBC 8.33 10*3/mm3      RBC 3.43 (L) 10*6/mm3      Hemoglobin 10.3 (L) g/dL      Hematocrit 31.5 (L) %      MCV 91.8 fL      MCH 30.0 pg      MCHC 32.7 g/dL      RDW 16.9 (H) %      RDW-SD 57.0 (H) fl      MPV 10.0 fL      Platelets 160 10*3/mm3      Neutrophil % 76.5 %      Lymphocyte % 10.2 %      Monocyte % 8.4 %      Eosinophil % 4.0 %      Basophil % 0.7 %      Immature Grans % 0.2 %      Neutrophils, Absolute 6.37 10*3/mm3      Lymphocytes, Absolute 0.85 10*3/mm3      Monocytes, Absolute 0.70 10*3/mm3      Eosinophils, Absolute 0.33 10*3/mm3      Basophils, Absolute 0.06 10*3/mm3      Immature Grans, Absolute  0.02 10*3/mm3     Comprehensive Metabolic Panel [[id number]]  (Abnormal) Collected:  07/12/18 0707    Specimen:  Blood Updated:  07/12/18 0731     Glucose 84 mg/dL      BUN 36 (H) mg/dL      Creatinine 4.11 (H) mg/dL      Sodium 128 (L) mmol/L      Potassium 4.3 mmol/L      Chloride 89 (L) mmol/L      CO2 27.0 mmol/L      Calcium 8.3 (L) mg/dL      Total Protein 8.1 g/dL      Albumin 3.90 g/dL      ALT (SGPT) 22 U/L      AST (SGOT) 62 (H) U/L      Alkaline Phosphatase 153 (H) U/L      Total Bilirubin 0.4 mg/dL      eGFR Non African Amer 11 (L) mL/min/1.73      Globulin 4.2 (H) gm/dL      A/G Ratio 0.9 (L) g/dL      BUN/Creatinine Ratio 8.8     Anion Gap 12.0 mmol/L     Narrative:       The MDRD GFR formula is only valid for adults with stable renal function between ages 18 and 70.    CBC & Differential [[id number]] Collected:  07/12/18 0707    Specimen:  Blood Updated:  07/12/18 0716    Narrative:       The following orders were created for panel order CBC & Differential.  Procedure                               Abnormality         Status                     ---------                               -----------         ------                     CBC Auto Differential[[id number]]        Abnormal            Final result                 Please view results for these tests on the individual orders.    CBC Auto Differential [[id number]]  (Abnormal) Collected:  07/12/18 0707    Specimen:  Blood Updated:  07/12/18 0716     WBC 7.59 10*3/mm3      RBC 3.63 (L) 10*6/mm3      Hemoglobin 11.2 (L) g/dL      Hematocrit 34.8 (L) %      MCV 95.9 fL      MCH 30.9 pg      MCHC 32.2 g/dL      RDW 17.6 (H) %      RDW-SD 62.7 (H) fl      MPV 9.4 fL      Platelets 174 10*3/mm3      Neutrophil % 74.8 %      Lymphocyte % 12.5 %      Monocyte % 6.9 %      Eosinophil % 5.0 %      Basophil % 0.5 %      Immature Grans % 0.3 %      Neutrophils, Absolute 5.68 10*3/mm3      Lymphocytes, Absolute 0.95 10*3/mm3      Monocytes, Absolute 0.52 10*3/mm3       Eosinophils, Absolute 0.38 10*3/mm3      Basophils, Absolute 0.04 10*3/mm3      Immature Grans, Absolute 0.02 10*3/mm3      nRBC 0.0 /100 WBC     Comprehensive Metabolic Panel [[id number]]  (Abnormal) Collected:  07/11/18 1200    Specimen:  Blood Updated:  07/11/18 1230     Glucose 132 (H) mg/dL      BUN 54 (H) mg/dL      Creatinine 5.16 (H) mg/dL      Sodium 122 (L) mmol/L      Potassium 5.6 (H) mmol/L      Chloride 88 (L) mmol/L      CO2 21.0 (L) mmol/L      Calcium 8.2 (L) mg/dL      Total Protein 7.6 g/dL      Albumin 3.60 g/dL      ALT (SGPT) 26 U/L      AST (SGOT) 67 (H) U/L      Alkaline Phosphatase 132 (H) U/L      Total Bilirubin 0.4 mg/dL      eGFR Non African Amer 8 (L) mL/min/1.73      Globulin 4.0 (H) gm/dL      A/G Ratio 0.9 (L) g/dL      BUN/Creatinine Ratio 10.5     Anion Gap 13.0 mmol/L     Narrative:       The MDRD GFR formula is only valid for adults with stable renal function between ages 18 and 70.    CBC & Differential [[id number]] Collected:  07/11/18 1200    Specimen:  Blood Updated:  07/11/18 1225    Narrative:       The following orders were created for panel order CBC & Differential.  Procedure                               Abnormality         Status                     ---------                               -----------         ------                     CBC Auto Differential[[id number]]        Abnormal            Final result                 Please view results for these tests on the individual orders.    CBC Auto Differential [[id number]]  (Abnormal) Collected:  07/11/18 1200    Specimen:  Blood Updated:  07/11/18 1225     WBC 10.55 (H) 10*3/mm3      RBC 3.57 (L) 10*6/mm3      Hemoglobin 11.0 (L) g/dL      Hematocrit 32.8 (L) %      MCV 91.9 fL      MCH 30.8 pg      MCHC 33.5 g/dL      RDW 17.0 (H) %      RDW-SD 57.4 (H) fl      MPV 10.2 fL      Platelets 173 10*3/mm3      Neutrophil % 88.5 (H) %      Lymphocyte % 5.2 (L) %      Monocyte % 4.7 %      Eosinophil % 1.1 %      Basophil % 0.3 %       Immature Grans % 0.2 %      Neutrophils, Absolute 9.33 (H) 10*3/mm3      Lymphocytes, Absolute 0.55 (L) 10*3/mm3      Monocytes, Absolute 0.50 10*3/mm3      Eosinophils, Absolute 0.12 10*3/mm3      Basophils, Absolute 0.03 10*3/mm3      Immature Grans, Absolute 0.02 10*3/mm3     Hepatitis B Surface Antigen [[id number]]  (Normal) Collected:  07/09/18 1414    Specimen:  Blood Updated:  07/11/18 0443     Hepatitis B Surface Ag Negative    Hemoglobin & Hematocrit, Blood [[id number]]  (Abnormal) Collected:  07/10/18 1218    Specimen:  Blood Updated:  07/10/18 1259     Hemoglobin 10.9 (L) g/dL      Hematocrit 33.8 (L) %     CRE Screen by PCR - Swab, Large Intestine, Rectum [[id number]] Collected:  07/10/18 1028    Specimen:  Swab from Large Intestine, Rectum Updated:  07/10/18 1224     CRE SCREEN Not Detected     Comment: Test performed by real-time polymerase chain reaction (qPCR).        OXA 48 Strain --     Comment: Not Applicable.        IMP STRAIN --     Comment: Not Applicable        VIM STRAING --     Comment: Not Applicable        NDM Strain --     Comment: Not Applicable        KPC Strain --     Comment: Not Applicable       TSH [[id number]]  (Normal) Collected:  07/10/18 0600    Specimen:  Blood Updated:  07/10/18 0715     TSH 2.030 mIU/mL     Comprehensive Metabolic Panel [[id number]]  (Abnormal) Collected:  07/10/18 0600    Specimen:  Blood Updated:  07/10/18 0652     Glucose 83 mg/dL      BUN 49 (H) mg/dL      Creatinine 4.45 (H) mg/dL      Sodium 125 (L) mmol/L      Potassium 4.2 mmol/L      Chloride 87 (L) mmol/L      CO2 24.0 mmol/L      Calcium 8.7 mg/dL      Total Protein 8.6 g/dL      Albumin 4.20 g/dL      ALT (SGPT) 16 U/L      AST (SGOT) 99 (H) U/L      Alkaline Phosphatase 151 (H) U/L      Total Bilirubin 0.5 mg/dL      eGFR Non African Amer 10 (L) mL/min/1.73      Globulin 4.4 (H) gm/dL      A/G Ratio 1.0 (L) g/dL      BUN/Creatinine Ratio 11.0     Anion Gap 14.0 mmol/L     Narrative:       The  MDRD GFR formula is only valid for adults with stable renal function between ages 18 and 70.    CBC & Differential [[id number]] Collected:  07/10/18 0600    Specimen:  Blood Updated:  07/10/18 0632    Narrative:       The following orders were created for panel order CBC & Differential.  Procedure                               Abnormality         Status                     ---------                               -----------         ------                     CBC Auto Differential[[id number]]        Abnormal            Final result                 Please view results for these tests on the individual orders.    CBC Auto Differential [[id number]]  (Abnormal) Collected:  07/10/18 0600    Specimen:  Blood Updated:  07/10/18 0632     WBC 9.12 10*3/mm3      RBC 3.80 10*6/mm3      Hemoglobin 11.5 (L) g/dL      Hematocrit 35.2 %      MCV 92.6 fL      MCH 30.3 pg      MCHC 32.7 g/dL      RDW 17.4 (H) %      RDW-SD 59.5 (H) fl      MPV 10.2 fL      Platelets 191 10*3/mm3      Neutrophil % 82.3 (H) %      Lymphocyte % 11.8 %      Monocyte % 3.6 %      Eosinophil % 1.9 %      Basophil % 0.2 %      Immature Grans % 0.2 %      Neutrophils, Absolute 7.50 10*3/mm3      Lymphocytes, Absolute 1.08 10*3/mm3      Monocytes, Absolute 0.33 10*3/mm3      Eosinophils, Absolute 0.17 10*3/mm3      Basophils, Absolute 0.02 10*3/mm3      Immature Grans, Absolute 0.02 10*3/mm3     Potassium [[id number]]  (Abnormal) Collected:  07/09/18 1744    Specimen:  Blood Updated:  07/09/18 1822     Potassium 3.0 (L) mmol/L     Renal Function Panel [[id number]]  (Abnormal) Collected:  07/09/18 1414    Specimen:  Blood Updated:  07/09/18 1518     Glucose 100 mg/dL      BUN 74 (H) mg/dL      Creatinine 5.61 (H) mg/dL      Sodium 117 (C) mmol/L      Potassium 6.1 (C) mmol/L      Chloride 85 (L) mmol/L      CO2 19.0 (L) mmol/L      Calcium 8.1 (L) mg/dL      Albumin 3.00 (L) g/dL      Phosphorus 4.8 (H) mg/dL      Anion Gap 13.0 mmol/L      BUN/Creatinine Ratio  13.2     eGFR Non African Amer 7 (L) mL/min/1.73     Narrative:       The MDRD GFR formula is only valid for adults with stable renal function between ages 18 and 70.    Troponin [[id number]]  (Normal) Collected:  07/09/18 1414    Specimen:  Blood Updated:  07/09/18 1458     Troponin I 0.017 ng/mL     Comprehensive Metabolic Panel [[id number]]  (Abnormal) Collected:  07/09/18 1108    Specimen:  Blood Updated:  07/09/18 1225     Glucose 98 mg/dL      BUN 72 (H) mg/dL      Creatinine 5.74 (H) mg/dL      Sodium 120 (C) mmol/L      Potassium 6.7 (C) mmol/L      Chloride 81 (L) mmol/L      CO2 21.0 (L) mmol/L      Calcium 8.8 mg/dL      Total Protein 8.2 g/dL      Albumin 4.00 g/dL      ALT (SGPT) 12 U/L      AST (SGOT) 33 U/L      Alkaline Phosphatase 172 (H) U/L      Total Bilirubin 0.7 mg/dL      eGFR Non African Amer 7 (L) mL/min/1.73      Globulin 4.2 (H) gm/dL      A/G Ratio 1.0 (L) g/dL      BUN/Creatinine Ratio 12.5     Anion Gap 18.0 (H) mmol/L     Narrative:       The MDRD GFR formula is only valid for adults with stable renal function between ages 18 and 70.    [place] Draw [[id number]] Collected:  07/09/18 1108    Specimen:  Blood Updated:  07/09/18 1215    Narrative:       The following orders were created for panel order [place] Draw.  Procedure                               Abnormality         Status                     ---------                               -----------         ------                     Light Blue Top[[id number]]                                   Final result               Green Top (Gel)[[id number]]                                  Final result               Lavender Top[[id number]]                                     Final result               Gold Top - SST[[id number]]                                   Final result                 Please view results for these tests on the individual orders.    Light Blue Top [[id number]] Collected:  07/09/18 1108    Specimen:  Blood Updated:  07/09/18 1215      Extra Tube hold for add-on     Comment: Auto resulted       Green Top (Gel) [[id number]] Collected:  07/09/18 1108    Specimen:  Blood Updated:  07/09/18 1215     Extra Tube Hold for add-ons.     Comment: Auto resulted.       Lavender Top [[id number]] Collected:  07/09/18 1108    Specimen:  Blood Updated:  07/09/18 1215     Extra Tube hold for add-on     Comment: Auto resulted       Gold Top - SST [[id number]] Collected:  07/09/18 1108    Specimen:  Blood Updated:  07/09/18 1215     Extra Tube Hold for add-ons.     Comment: Auto resulted.       Extra Tubes [[id number]] Collected:  07/09/18 1050    Specimen:  Blood from Blood, Venous Line Updated:  07/09/18 1215    Narrative:       The following orders were created for panel order Extra Tubes.  Procedure                               Abnormality         Status                     ---------                               -----------         ------                     Blood Culture Bottle Set[[id number]]                         Final result               Blood Culture Bottle Set[[id number]]                         Final result                 Please view results for these tests on the individual orders.    Blood Culture Bottle Set [[id number]] Collected:  07/09/18 1108    Specimen:  Blood from Arm, Right Updated:  07/09/18 1215     Extra Tube Hold for add-ons.     Comment: Auto resulted.       Troponin [[id number]]  (Abnormal) Collected:  07/09/18 1108    Specimen:  Blood Updated:  07/09/18 1208     Troponin I 0.050 (H) ng/mL     BNP [[id number]]  (Abnormal) Collected:  07/09/18 1108    Specimen:  Blood Updated:  07/09/18 1208     proBNP 23,300.0 (H) pg/mL     Blood Culture Bottle Set [[id number]] Collected:  07/09/18 1050    Specimen:  Blood from Arm, Right Updated:  07/09/18 1200     Extra Tube Hold for add-ons.     Comment: Auto resulted.       Protime-INR [[id number]]  (Normal) Collected:  07/09/18 1108    Specimen:  Blood Updated:  07/09/18 1156     Protime 13.9 Seconds       INR 1.09    Narrative:       Therapeutic range for most indications is 2.0-3.0 INR,  or 2.5-3.5 for mechanical heart valves.    Lactic Acid, Plasma [[id number]]  (Normal) Collected:  07/09/18 1121    Specimen:  Blood Updated:  07/09/18 1156     Lactate 1.7 mmol/L     CBC & Differential [[id number]] Collected:  07/09/18 1108    Specimen:  Blood Updated:  07/09/18 1140    Narrative:       The following orders were created for panel order CBC & Differential.  Procedure                               Abnormality         Status                     ---------                               -----------         ------                     CBC Auto Differential[[id number]]        Abnormal            Final result                 Please view results for these tests on the individual orders.    CBC Auto Differential [[id number]]  (Abnormal) Collected:  07/09/18 1108    Specimen:  Blood Updated:  07/09/18 1140     WBC 13.10 (H) 10*3/mm3      RBC 4.12 10*6/mm3      Hemoglobin 12.9 g/dL      Hematocrit 38.3 %      MCV 93.0 fL      MCH 31.3 pg      MCHC 33.7 g/dL      RDW 17.6 (H) %      RDW-SD 60.1 (H) fl      MPV 9.7 fL      Platelets 209 10*3/mm3      Neutrophil % 92.7 (H) %      Lymphocyte % 2.7 (L) %      Monocyte % 4.1 %      Eosinophil % 0.1 %      Basophil % 0.2 %      Immature Grans % 0.2 %      Neutrophils, Absolute 12.14 (H) 10*3/mm3      Lymphocytes, Absolute 0.36 (L) 10*3/mm3      Monocytes, Absolute 0.54 10*3/mm3      Eosinophils, Absolute 0.01 10*3/mm3      Basophils, Absolute 0.02 10*3/mm3      Immature Grans, Absolute 0.03 (H) 10*3/mm3      nRBC 0.0 /100 WBC     Blood Gas, Arterial [[id number]]  (Abnormal) Collected:  07/09/18 1032    Specimen:  Arterial Blood Updated:  07/09/18 1051     Site Right Radial     Turner's Test Positive     pH, Arterial 7.394 pH units      pCO2, Arterial 36.0 mm Hg      pO2, Arterial 70.8 (L) mm Hg      HCO3, Arterial 22.0 mmol/L      Base Excess, Arterial -2.4 (L) mmol/L      O2 Saturation,  Arterial 93.5 (L) %      Barometric Pressure for Blood Gas 754 mmHg      Modality Room Air     Ventilator Mode NA     Collected by loc beltrán rrt        Imaging Results (last 7 days)     Procedure Component Value Units Date/Time    CT Head Without Contrast [[id number]] Collected:  07/09/18 1006     Updated:  07/09/18 1026    Narrative:         PROCEDURE: CT head without contrast    REASON FOR EXAM: AMS fall    This exam was performed according to our departmental  dose-optimization program, which includes automated exposure  control, adjustment of the mA and/or kV according to patient size  and/or use of iterative reconstruction technique.    FINDINGS: Comparison study dated January 31, 2018. Axial computer  tomography sequential imaging of the head was performed from the  vertex to the base of the skull. .Sagittal and coronal  reformation was performed . Patient motion during exam limits  study.    The skull vault is intact. Paranasal sinuses and bilateral  mastoid air cells are well aerated. Moderate cerebral  involutional changes. Right posterior temporal, right occipital,  and right posterior inferior parietal lobe stable foci of  hypodensity which are CSF attenuation and causing negative mass  effect on the adjacent overlying sulci as well as ventricular  system. Otherwise cerebral and cerebellar parenchymal are normal.  Ventricular system and subarachnoid spaces are normal.      Impression:       1.  No acute intracranial abnormality.  2.  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  3.  Moderate cerebral involutional changes.    Electronically signed by:  Cedrick Kinney MD  7/9/2018 10:24 AM CDT  Workstation: [id number]    XR Chest 1 View [[id number]] Collected:  07/09/18 0958     Updated:  07/09/18 1024    Narrative:       Chest single view     CLINICAL INDICATION: Shortness of breath. Chest pain protocol    COMPARISON: Chest  "January 31, 2018. CT chest November 2, 2017.    FINDINGS: Cardiac silhouette is normal in size. Pulmonary  vascularity is unremarkable.     Chronic infiltrative changes right upper lobe, right infrahilar  region and left perihilar regions. Infiltrative changes in the  right upper lobe are now slightly more pronounced than on prior  examination, unfavorable change.    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 Unchanged since prior exam.     Radiodensity in the upper lumbar spine, L1 from  kyphoplasty/vertebroplasty.      Impression:       CONCLUSION: Chronic appearing infiltrative changes bilaterally.  Increase in right upper lobe infiltrative changes since prior  exam.    Electronically signed by:  Wiliam Pearce MD  7/9/2018 10:23 AM CDT  Workstation: [id number]            Chief Complaint on Day of Discharge: None.    Hospital Course:  -The patient was admitted to a monitored bed for pneumonia and commenced on broad-spectrum antibiotics.  She did improve, was less symptomatic and reactive leukocytosis resolve before discharge.   - Hyponatremia: This improved with hemodialysis and sodium was 132 on discharge.  - End-stage Stage renal disease: She was seen by the nephrologist and commenced on hemodialysis on scheduled days.  - Hypothyroidism: She was continued on Synthroid.  TSH was normal.  - Depressive disorder.  She was continued on her medications.  - Dementia: Patient was managed supportively.  - She also benefited from GI and DVT prophylaxis and did receive potassium repletion prior to discharge for hypokalemia.    Condition on Discharge:  Improved.    Physical Exam on Discharge:  /52 (BP Location: Right leg, Patient Position: Lying)   Pulse 67   Temp 96.8 °F (36 °C) (Tympanic)   Resp 16   Ht 157.5 cm (62\")   Wt 54.6 kg (120 lb 4.8 oz)   SpO2 92%   BMI 22.00 kg/m²   Physical Exam  Constitutional: She appears " well-developed and well-nourished. She is cooperative. No distress.   HENT:   Head: Normocephalic and atraumatic.   Right Ear: External ear normal.   Left Ear: External ear normal.   Nose: Nose normal.   Mouth/Throat: Oropharynx is clear and moist.   Eyes: Conjunctivae are normal. Pupils are equal, round, and reactive to light.   Neck: Normal range of motion. Neck supple. No JVD present. No thyromegaly present.   Cardiovascular: Normal rate, regular rhythm, normal heart sounds and intact distal pulses.  Exam reveals no gallop and no friction rub.    No murmur heard.  Pulmonary/Chest: Effort normal and breath sounds normal. No stridor. No respiratory distress. She has no wheezes. She has no rales. She exhibits no tenderness.   Abdominal: Soft. Bowel sounds are normal. She exhibits no distension and no mass. There is no tenderness. There is no rebound and no guarding. No hernia.   Musculoskeletal: Normal range of motion. She exhibits no edema, tenderness or deformity.   Neurological: She is alert. She has normal reflexes. She exhibits normal muscle tone.   Patient is alert and oriented ×2.   Skin: Skin is warm and dry. No rash noted. She is not diaphoretic. No erythema. No pallor.   Psychiatric: She has a normal mood and affect.   Nursing note and vitals reviewed.       Discharge Disposition:  Skilled Nursing Facility (DC - External)    Discharge Medications:     Discharge Medications      New Medications      Instructions Start Date   azithromycin 250 MG tablet  Commonly known as:  ZITHROMAX   Take 2 tablets the first day, then 1 tablet daily for 4 days.         Continue These Medications      Instructions Start Date   acetaminophen 325 MG tablet  Commonly known as:  TYLENOL   650 mg, Oral, Every 6 Hours PRN      APRISO PO   1 capsule, Oral, Daily      aspirin 81 MG EC tablet   81 mg, Oral, Daily      atorvastatin 40 MG tablet  Commonly known as:  LIPITOR   40 mg, Oral, Nightly      Camphor-Menthol-Methyl Sal 4-10-30  % cream   1 application, Apply externally, Every 4 Hours PRN      COLACE PO   1 capsule, Oral, 2 Times Daily      famotidine 20 MG tablet  Commonly known as:  PEPCID   20 mg, Oral, Every Night at Bedtime      furosemide 40 MG tablet  Commonly known as:  LASIX   40 mg, Oral, Every Morning      gabapentin 100 MG capsule  Commonly known as:  NEURONTIN   100 mg, Oral, 3 Times Daily      hydrALAZINE 100 MG tablet  Commonly known as:  APRESOLINE   100 mg, Oral, 3 Times Daily      HYDROcodone-acetaminophen 7.5-325 MG per tablet  Commonly known as:  NORCO   1 tablet, Oral, Every 4 Hours PRN      levETIRAcetam 500 MG tablet  Commonly known as:  KEPPRA   500 mg, Oral, 2 Times Daily      levothyroxine 88 MCG tablet  Commonly known as:  SYNTHROID, LEVOTHROID   88 mcg, Oral, Daily      mirtazapine 7.5 MG half tablet  Commonly known as:  REMERON   7.5 mg, Oral, Nightly      nitroglycerin 0.4 MG SL tablet  Commonly known as:  NITROSTAT   0.4 mg, Sublingual, Every 5 Minutes PRN, Take no more than 3 doses in 15 minutes.       polyethylene glycol packet  Commonly known as:  MIRALAX   17 g, Oral, Daily PRN      PREPARATION H 1-0.25-14.4-15 % cream  Generic drug:  Pramox-PE-Glycerin-Petrolatum   1 application, Rectal, Every 4 Hours PRN      QUEtiapine 25 MG tablet  Commonly known as:  SEROquel   25 mg, Oral, Nightly      NGA CAPS PO   1 capsule, Oral, Daily      sevelamer 800 MG tablet  Commonly known as:  RENVELA   1,600 mg, Oral, 3 Times Daily With Meals      vitamin B-6 50 MG tablet  Commonly known as:  PYRIDOXINE   50 mg, Oral, Daily      vitamin D 79715 units capsule capsule  Commonly known as:  ERGOCALCIFEROL   50,000 Units, Oral, Every 14 Days         Stop These Medications    minocycline 100 MG capsule  Commonly known as:  MINOCIN,DYNACIN            Discharge Diet:  cardiac and renal.    Activity at Discharge:  as tolerated.    Discharge Care Plan/Instructions: She to be administered her medications as prescribed and to return  to the emergency department in the event of any worsening in symptoms.    Follow-up Appointments:   Future Appointments  Date Time Provider Department Center   8/9/2018 1:00 PM EVELIA [facility]   8/9/2018 1:30 PM Blane Fernandez MD [facility] None       Test Results Pending at Discharge:     Olman Trimble MD  07/13/18  5:05 PM    Time: 40 minutes.

## 2018-07-13 NOTE — PLAN OF CARE
Problem: Patient Care Overview  Goal: Plan of Care Review  Outcome: Ongoing (interventions implemented as appropriate)   07/13/18 1419   Coping/Psychosocial   Plan of Care Reviewed With patient   Plan of Care Review   Progress no change   OTHER   Outcome Summary no acute changes, VSS      07/13/18 1419   Coping/Psychosocial   Plan of Care Reviewed With patient   Plan of Care Review   Progress no change   OTHER   Outcome Summary no acute changes, VSS, pt went to dialysis this afternoon       Problem: Fall Risk (Adult)  Goal: Identify Related Risk Factors and Signs and Symptoms  Outcome: Outcome(s) achieved Date Met: 07/13/18    Goal: Absence of Fall  Outcome: Ongoing (interventions implemented as appropriate)      Problem: Pneumonia (Adult)  Goal: Signs and Symptoms of Listed Potential Problems Will be Absent, Minimized or Managed (Pneumonia)  Outcome: Ongoing (interventions implemented as appropriate)      Problem: Skin Injury Risk (Adult)  Goal: Skin Health and Integrity  Outcome: Ongoing (interventions implemented as appropriate)      Problem: Pain, Chronic (Adult)  Goal: Acceptable Pain/Comfort Level and Functional Ability  Outcome: Ongoing (interventions implemented as appropriate)      Problem: Confusion, Chronic (Adult)  Goal: Cognitive/Functional Impairments Minimized  Outcome: Ongoing (interventions implemented as appropriate)    Goal: Free from Harm/Injuries  Outcome: Ongoing (interventions implemented as appropriate)

## 2018-08-23 NOTE — PROGRESS NOTES
8/9/2018    Efrem Radford Crispin  1945    Chief Complaint:  ESRD      HPI:      PCP:  Provider, No Known  Nephrology:  Dr Mireles,  [facility]    72yr woman with ESRD on hemodialysis, HTN, COPD, sleep apnea, Stroke, GERD, LEFT arm fracture 20yrs ago.  moderate chronic pain lower back, LEFT leg.  occasional bleeding after decannulation.  recent clotted graft, unable to declot.  possible central venous occlusion.  LEFT femoral tunnel catheter. Recent new AV fistula placement. . End stage renal disease, No clotted access or problems during dialysis,   L Femoral AV graft functioning well.  Has memory and falling balance problems.  In WC today.  Returning for fistula evaluation.  No other associated signs, symptoms or modifying factors.    7/29/2013 Upper extremity vein mapping:  RIGHT cephalic 1.3-4.5mm, basilic 2.5-3.5mm.  LEFT cephalic 2.0-3.3mm, basilic 2.1-3.2mm.  8/19/13: Placement RIGHT chest Tunnel Cath  10/15/13 RIGHT Brachial-Axillary Vein AV graft  11/26/13 RIGHT AV duplex: Anast dist 517, dist 446, mid 93, prx 146, Ax Anast 183. Lg hematoma prx arm >7cm  3/24/14: RIGHT AV duplex: Patent fistula. maxPSV 870cm/s (distal). Size 3.2-6.6mm. Flows 887-2394ml/m. Prx hematoma 3.96cm  7/30/14  RIGHT AV duplex: Patent fistula. maxPSV 869cm/s (distal). Size 9.3mm. Flows 2472ml/m.  elevated velocity at elbow level  1/6/15: LEFT Brachial-axillary AV artegraft  3/11/15: LEFT AV Duplex: Patent fistula. maxPSV 605cm/s. Size 1.7-6.9mm. Flows 165-1219ml/m.   8/3/2015 LEFT AV Duplex: Patent fistula. maxPSV 255cm/s. Size 2.1-7.5mm. Flows 193-1007ml/m.   2/2016 LEFT fistulagram:  fistula end of life.  tunnel cath placed.  10/4/16: LEFT Leg AV graft SFA-SFV  12/08/16  Left AV Fistula  Duplex:  Patent fistula. maxPSV 446cm/s (arterial anast). Size 2.3-5.6mm. Flows 236-1319 ml/m.  1/03/16   Left AV Fistula  Duplex:  Patent fistula. maxPSV 586cm/s (arterial anast). Size 3.4-5.9mm. Flows 767-1567 ml/m.  2/23/17  Tunneled  catheter removed.  3/23/17   Left AV Fistula  Duplex:  Patent fistula. maxPSV 398cm/s (mid ). Size 0.31-0.59 cm. Flows 549-1842 ml/m.  Sm pseudoaneurysm 1.5cm  Hematoma distal surrounding graft 5.64cm   6/06/17  Left AV Fistula  Duplex:  Patent fistula. maxPSV 384cm/s (mid ). Size 0.31-0.59 cm. Flows 549-1842 ml/m.  Sm pseudoaneurysm 1.1cm  Hematoma distal surrounding graft 4.1cm, second hematoma 2.2 (distal)   2/01/18   Left AV Fistula  Duplex:  Patent fistula. maxPSV 470cm/s (mid ). Size3.4-6.3  mm. Flows 453-1688 ml/min Distal thigh hematoma 5.6cm.   4/30/18  Left AV Fistula  Duplex:  Patent fistula. maxPSV 470cm/s (mid ). Size2.7-6.2  mm. Flows 349-1258 ml/min Distal thigh hematoma 6.58cm   Pseudoaneurysm mid thight 2.56 X 1.40cm         5/2018 revision AV fistula, excision and reroute thigh AV graft ([place])       8/2018  Left AV Fistula  Duplex:  Patent fistula. maxPSV 371cm/s (art anast). Size 2.0-6.8  mm. Flows 338-1778 ml/min Distal thigh hematoma 6.2cm hematoma.      The following portions of the patient's history were reviewed and updated as appropriate: allergies, current medications, past family history, past medical history, past social history, past surgical history and problem list.  Recent images independently reviewed.  Available laboratory values reviewed.    PMH:  Past Medical History:   Diagnosis Date   • Abnormal x-ray     Standard chest x ray, f/u pneumonia xray   • Acquired hypothyroidism    • Amblyopia    • Anemia of renal disease    • Anxiety    • Arteriovenous fistula (CMS/HCC)     Placed 10/15/2014   • Bipolar affective disorder (CMS/HCC)     Current episode depression   • Bipolar disorder (CMS/HCC)    • Cerebrovascular accident (CMS/HCC)    • Chest wall pain    • Chronic angle-closure glaucoma    • Chronic kidney disease     Stage 4-approaching hemodialysis   • Chronic pain syndrome    • Combined drug dependence excluding opioids (CMS/HCC)    • Constipation    • COPD (chronic  obstructive pulmonary disease) (CMS/[facility])    • Cough    • Degeneration of cervical intervertebral disc    • Dementia     Multi-infarct, uncomplicated   • Depression    • Diabetes mellitus (CMS/[facility])     No retinopathy   • Diabetes mellitus (CMS/[facility])     Without mention of complication, type 2 or unspecified type, not stated as uncontrolled   • Diabetic renal disease (CMS/[facility])    • Disc disorder of lumbar region    • DJD (degenerative joint disease)     Involving multiple joints   • Edema    • Epigastric pain    • Essential hypertension    • Exotropia    • Gastritis    • Generalized abdominal pain    • GERD (gastroesophageal reflux disease)     With Esophagitis   • Gout    • H/O screening mammography    • Hiatal hernia    • Hyperlipidemia    • Hypermetropia    • Insomnia    • Iron deficiency anemia    • Low back pain    • Lumbago    • Non-cardiac chest pain    • Nuclear cataract    • Osteoporosis    • Panic disorder without agoraphobia    • Peripheral nervous system disorder    • Radiculitis    • RLQ abdominal pain    • RUQ pain    • Sprain of ankle    • Sprain of knee     Resolving   • Superficial injury of shoulder and upper arm    • Surgical follow-up care    • Type 2 diabetes mellitus (CMS/[facility])    • UTI (urinary tract infection)    • Visit for suture removal    • Vitamin D deficiency    • Vulvovaginitis        ALLERGIES:  Allergies   Allergen Reactions   • Ambien [Zolpidem Tartrate]    • Benadryl [Diphenhydramine]    • Penicillins          MEDICATIONS:    Current Outpatient Prescriptions:   •  acetaminophen (TYLENOL) 325 MG tablet, Take 650 mg by mouth Every 6 (Six) Hours As Needed for Mild Pain ., Disp: , Rfl:   •  aspirin 81 MG EC tablet, Take 81 mg by mouth daily., Disp: , Rfl:   •  atorvastatin (LIPITOR) 40 MG tablet, Take 40 mg by mouth every night., Disp: , Rfl:   •  B Complex-C-Folic Acid (NGA CAPS PO), Take 1 capsule by mouth daily., Disp: , Rfl:   •  Camphor-Menthol-Methyl Sal 4-10-30 % cream, Apply 1  application topically Every 4 (Four) Hours As Needed (joint arthritis pain)., Disp: , Rfl:   •  Docusate Sodium (COLACE PO), Take 1 capsule by mouth 2 (Two) Times a Day., Disp: , Rfl:   •  famotidine (PEPCID) 20 MG tablet, Take 20 mg by mouth every night at bedtime., Disp: , Rfl:   •  furosemide (LASIX) 40 MG tablet, Take 40 mg by mouth Every Morning., Disp: , Rfl:   •  gabapentin (NEURONTIN) 100 MG capsule, Take 1 capsule by mouth 3 (Three) Times a Day., Disp: 10 capsule, Rfl: 0  •  hydrALAZINE (APRESOLINE) 100 MG tablet, Take 100 mg by mouth 3 (three) times a day., Disp: , Rfl:   •  HYDROcodone-acetaminophen (NORCO) 7.5-325 MG per tablet, Take 1 tablet by mouth Every 4 (Four) Hours As Needed for Moderate Pain ., Disp: 10 tablet, Rfl: 0  •  levETIRAcetam (KEPPRA) 500 MG tablet, Take 500 mg by mouth 2 (Two) Times a Day., Disp: , Rfl:   •  levothyroxine (SYNTHROID, LEVOTHROID) 88 MCG tablet, Take 88 mcg by mouth daily., Disp: , Rfl:   •  Mesalamine (APRISO PO), Take 1 capsule by mouth daily., Disp: , Rfl:   •  mirtazapine (REMERON) 7.5 MG half tablet, Take 7.5 mg by mouth Every Night., Disp: , Rfl:   •  nitroglycerin (NITROSTAT) 0.4 MG SL tablet, Place 0.4 mg under the tongue every 5 (five) minutes as needed for chest pain. Take no more than 3 doses in 15 minutes., Disp: , Rfl:   •  polyethylene glycol (MIRALAX) packet, Take 17 g by mouth Daily As Needed (constipation)., Disp: , Rfl:   •  Pramox-PE-Glycerin-Petrolatum (PREPARATION H) 1-0.25-14.4-15 % cream, Insert 1 application into the rectum Every 4 (Four) Hours As Needed (hemorrhoids)., Disp: , Rfl:   •  QUEtiapine (SEROquel) 25 MG tablet, Take 25 mg by mouth Every Night., Disp: , Rfl:   •  sevelamer (RENVELA) 800 MG tablet, Take 1,600 mg by mouth 3 (Three) Times a Day With Meals., Disp: , Rfl:   •  vitamin B-6 (PYRIDOXINE) 50 MG tablet, Take 50 mg by mouth Daily., Disp: , Rfl:   •  vitamin D (ERGOCALCIFEROL) 44417 UNITS capsule capsule, Take 50,000 Units by mouth  Every 14 (Fourteen) Days., Disp: , Rfl:     Review of Systems   Review of Systems   Constitution: Positive for weakness and malaise/fatigue. Negative for weight loss.   Cardiovascular: Positive for dyspnea on exertion. Negative for chest pain and claudication.   Respiratory: Negative for cough and shortness of breath.    Skin: Positive for poor wound healing. Negative for color change.   Musculoskeletal: Positive for arthritis, muscle cramps and muscle weakness.   Neurological: Negative for dizziness and numbness.       Physical Exam   Physical Exam   Constitutional: She is oriented to person, place, and time. She is active and cooperative. She does not appear ill. No distress.   HENT:   Right Ear: Hearing normal.   Left Ear: Hearing normal.   Nose: No nasal deformity. No epistaxis.   Mouth/Throat: She does not have dentures. Normal dentition.   Cardiovascular: Normal rate and regular rhythm.    No murmur heard.  Pulses:       Carotid pulses are 2+ on the right side, and 2+ on the left side.       Radial pulses are 2+ on the right side, and 2+ on the left side.        Dorsalis pedis pulses are 1+ on the right side, and 1+ on the left side.   Good thrill  No open wounds.   Pulmonary/Chest: Effort normal and breath sounds normal.   Abdominal: Soft. She exhibits no distension and no mass. There is no tenderness.   Musculoskeletal: She exhibits no deformity.   Gait normal.    Neurological: She is alert and oriented to person, place, and time. She has normal strength.   Skin: Skin is warm and dry. No cyanosis or erythema. No pallor.   No venous staining   Psychiatric: She has a normal mood and affect. Her speech is normal. Judgment and thought content normal.     Results for SIRISHA GUPTA (MRN [id number]) as of 8/23/2018 15:08  GFR 23 Ref. Range 7/13/2018 16:07   Creatinine Latest Ref Range: 0.50 - 1.00 mg/dL 2.13 (H)   BUN Latest Ref Range: 7 - 21 mg/dL 16     ASSESSMENT:  Sirisha was seen today for hemodialysis  access.    Diagnoses and all orders for this visit:    ESRD (end stage renal disease) on dialysis (CMS/[facility])  -     Duplex Hemodialysis Access CAR; Future    Mixed hyperlipidemia    Essential hypertension    Coronary artery disease involving native coronary artery of native heart without angina pectoris    Panlobular emphysema (CMS/[facility])    Controlled type 2 diabetes mellitus with chronic kidney disease on chronic dialysis, without long-term current use of insulin (CMS/[facility])    PLAN:  Detailed discussion with Efrem Roa regarding situation and options.  Stable ESRD, functional AV fistula.  Multiple risk factors with severe comorbidities.  No intervention indicated at this time.  Will follow with interval imaging.  Risks, benefits discussed.  Understands and wishes to proceed with plan.    Return in 6 months with fistula duplex    Return after above studies complete  Recommended regular physical activity, progressive walking program.  Continue current medications as directed.    Thank you for the opportunity to participate in this patient's care.    Copy to primary care provider.    EMR Dragon/Transcription disclaimer:   Much of this encounter note is an electronic transcription/translation of spoken language to printed text. The electronic translation of spoken language may permit erroneous, or at times, nonsensical words or phrases to be inadvertently transcribed; Although I have reviewed the note for such errors, some may still exist.

## 2018-09-03 PROBLEM — R10.9 ABDOMINAL PAIN: Status: ACTIVE | Noted: 2018-01-01

## 2018-09-03 NOTE — ED NOTES
Pt placed in a gown and belonging placed in pt's personal bag. Two RN's verified pt belongings placed in pt personal bag.      Tierney Tomlinson, RN  09/03/18 [id number]

## 2018-09-03 NOTE — ED PROVIDER NOTES
Subjective   Patient presents to the emergency department after dialysis with some subacute complaints of abdominal discomfort was in the suprapubic region as well as the bilateral flanks.  Patient notes she's been telling her caregivers at the nursing home about this for several days though she states they've been unresponsive to her complaints to look into it.  The patient also states that she has been unable to get people to change her in a timely manner when she wets her adult diapers.  Patient states she has some irritation over the superior portion of her vagina.  Patient denies any nausea vomiting.  Patient's major complaints at this time her pain.  Patient states she's noted noted no rash around the inguinal region.  This been no bowel or bladder changes.  Patient does make urine despite her dialysis.            Review of Systems   Constitutional: Negative.  Negative for appetite change, chills and fever.   HENT: Negative.  Negative for congestion.    Eyes: Negative.  Negative for photophobia and visual disturbance.   Respiratory: Negative.  Negative for cough, chest tightness and shortness of breath.    Cardiovascular: Negative.  Negative for chest pain and palpitations.   Gastrointestinal: Negative.  Negative for abdominal pain, constipation, diarrhea, nausea and vomiting.   Endocrine: Negative.    Genitourinary: Positive for flank pain. Negative for decreased urine volume, dysuria and hematuria.   Musculoskeletal: Negative for arthralgias, back pain, myalgias, neck pain and neck stiffness.   Skin: Negative.  Negative for pallor.   Neurological: Negative.  Negative for dizziness, syncope, weakness, light-headedness, numbness and headaches.   Psychiatric/Behavioral: Negative.  Negative for confusion and suicidal ideas. The patient is not nervous/anxious.    All other systems reviewed and are negative.      Past Medical History:   Diagnosis Date   • Abnormal x-ray     Standard chest x ray, f/u pneumonia xray    • Acquired hypothyroidism    • Amblyopia    • Anemia of renal disease    • Anxiety    • Arteriovenous fistula (CMS/[facility])     Placed 10/15/2014   • Bipolar affective disorder (CMS/[facility])     Current episode depression   • Bipolar disorder (CMS/[facility])    • Cerebrovascular accident (CMS/[facility])    • Chest wall pain    • Chronic angle-closure glaucoma    • Chronic kidney disease     Stage 4-approaching hemodialysis   • Chronic pain syndrome    • Combined drug dependence excluding opioids (CMS/[facility])    • Constipation    • COPD (chronic obstructive pulmonary disease) (CMS/[facility])    • Cough    • Degeneration of cervical intervertebral disc    • Dementia     Multi-infarct, uncomplicated   • Depression    • Diabetes mellitus (CMS/[facility])     No retinopathy   • Diabetes mellitus (CMS/[facility])     Without mention of complication, type 2 or unspecified type, not stated as uncontrolled   • Diabetic renal disease (CMS/[facility])    • Disc disorder of lumbar region    • DJD (degenerative joint disease)     Involving multiple joints   • Edema    • Epigastric pain    • Essential hypertension    • Exotropia    • Gastritis    • Generalized abdominal pain    • GERD (gastroesophageal reflux disease)     With Esophagitis   • Gout    • H/O screening mammography    • Hiatal hernia    • Hyperlipidemia    • Hypermetropia    • Insomnia    • Iron deficiency anemia    • Low back pain    • Lumbago    • Non-cardiac chest pain    • Nuclear cataract    • Osteoporosis    • Panic disorder without agoraphobia    • Peripheral nervous system disorder    • Radiculitis    • RLQ abdominal pain    • RUQ pain    • Sprain of ankle    • Sprain of knee     Resolving   • Superficial injury of shoulder and upper arm    • Surgical follow-up care    • Type 2 diabetes mellitus (CMS/[facility])    • UTI (urinary tract infection)    • Visit for suture removal    • Vitamin D deficiency    • Vulvovaginitis        Allergies   Allergen Reactions   • Ambien [Zolpidem Tartrate]    • Benadryl  [Diphenhydramine]    • Penicillins        Past Surgical History:   Procedure Laterality Date   • APPENDECTOMY     • BACK SURGERY      x2   • BYPASS GRAFT  2015    Left brachial artery to axillary vein arteriovenous graft. Venous ultrasound unilateral extremity   •  SECTION      x2   • CHOLECYSTECTOMY     • COLONOSCOPY W/ POLYPECTOMY  2015    Colitis found in the cecum. Biopsy taken. A diverticulum was found in the sigmoid colon.   • ENDOSCOPY AND COLONOSCOPY     • ESOPHAGOSCOPY / EGD  2015    Normal esophagus. Gastritis found in the stomach. Biopsy taken. Duodenitis found in the duodenum. Biopsy take.   • ESOPHAGOSCOPY / EGD  1944    With tube-Esophagus appeared normal. Nonerosive gastritis. Duodenum appeared normal   • HYSTERECTOMY     • INJECTION OF MEDICATION  2011    Aranesp   • INJECTION OF MEDICATION  2011    Kenalog   • INTERVENTIONAL RADIOLOGY PROCEDURE Left 10/5/2017    Procedure: IR dialysis fistulagram;  Surgeon: Blane Fernandez MD;  Location: [facility] LOC[facility];  Service:    • LEG SURGERY  2000    Cancelled. Due to uncontrolled hypertension   • OTHER SURGICAL HISTORY  2016    Tissue plasminogen activator catheter thrombolysis   • REMOVAL PERITONEAL DIALYSIS CATHETER  2014    Removal of tunneled hemodialysis catheter with cuff   • TRANSESOPHAGEAL ECHOCARDIOGRAM (ANTHONY)  2016    Mild left atrial enlargement with mild to moderate CLVH with normal aortic root size.LV systolic function well preserved with Ef of 55-60%.Mitral valve thickened.Av thickened.Aortic sclerosis.Mild mitral regurg.mild to moderate tricuspid regurg.   • TRANSESOPHAGEAL ECHOCARDIOGRAM (ANTHONY)  2012    Normal left ventricular systolic function. Moderate tricuspid regurgitation with evidenceof pulmonary hypertension       Family History   Problem Relation Age of Onset   • Coronary artery disease Other        Social History     Social History   •  Marital status:      Social History Main Topics   • Smoking status: Former Smoker   • Smokeless tobacco: Never Used   • Alcohol use No   • Drug use: No     Other Topics Concern   • Not on file           Objective   Physical Exam   Constitutional: She is oriented to person, place, and time. She appears well-developed and well-nourished. She appears distressed.   HENT:   Head: Normocephalic and atraumatic.   Nose: Nose normal.   Mouth/Throat: Oropharynx is clear and moist.   Eyes: Conjunctivae and EOM are normal. No scleral icterus.   Neck: Normal range of motion. Neck supple. No JVD present.   Cardiovascular: Normal rate, regular rhythm, normal heart sounds and intact distal pulses.  Exam reveals no gallop and no friction rub.    No murmur heard.  Pulmonary/Chest: Effort normal. No respiratory distress. She has no wheezes. She has no rales. She exhibits no tenderness.   Abdominal: Soft. She exhibits no distension and no mass. There is tenderness. There is no rebound and no guarding.   Difuse abdominal tenderness.   Musculoskeletal: Normal range of motion. She exhibits no edema, tenderness or deformity.   Lymphadenopathy:     She has no cervical adenopathy.   Neurological: She is alert and oriented to person, place, and time. No cranial nerve deficit. She exhibits normal muscle tone.   Skin: Skin is warm and dry. Capillary refill takes less than 2 seconds. No rash noted. She is not diaphoretic. No erythema. No pallor.   Psychiatric: She has a normal mood and affect. Her behavior is normal. Judgment and thought content normal.   Nursing note and vitals reviewed.      Procedures           ED Course        Labs Reviewed   COMPREHENSIVE METABOLIC PANEL - Abnormal; Notable for the following:        Result Value    Glucose 112 (*)     BUN 28 (*)     Creatinine 3.51 (*)     CO2 34.0 (*)     AST (SGOT) 37 (*)     Alkaline Phosphatase 185 (*)     eGFR Non African Amer 13 (*)     Globulin 4.5 (*)     A/G Ratio 0.9 (*)      All other components within normal limits    Narrative:     The MDRD GFR formula is only valid for adults with stable renal function between ages 18 and 70.   URINALYSIS W/ MICROSCOPIC IF INDICATED (NO CULTURE) - Abnormal; Notable for the following:     Appearance, UA Cloudy (*)     Ketones, UA Trace (*)     Bilirubin, UA Moderate (2+) (*)     Protein,  mg/dL (2+) (*)     Leuk Esterase, UA Moderate (2+) (*)     All other components within normal limits   CBC WITH AUTO DIFFERENTIAL - Abnormal; Notable for the following:     RBC 3.58 (*)     Hemoglobin 10.4 (*)     Hematocrit 32.9 (*)     RDW 16.4 (*)     RDW-SD 54.4 (*)     All other components within normal limits   URINALYSIS, MICROSCOPIC ONLY - Abnormal; Notable for the following:     RBC, UA 13-20 (*)     WBC, UA 6-12 (*)     Bacteria, UA Trace (*)     Squamous Epithelial Cells, UA 3-5 (*)     All other components within normal limits   LIPASE - Normal   LACTIC ACID, PLASMA - Normal   RAINBOW DRAW    Narrative:     The following orders were created for panel order [place] Draw.  Procedure                               Abnormality         Status                     ---------                               -----------         ------                     Light Blue Top[[id number]]                                   Final result               Green Top (Gel)[[id number]]                                                             Lavender Top[[id number]]                                     Final result               Gold Top - [facility][[id number]]                                                                Please view results for these tests on the individual orders.   CBC AND DIFFERENTIAL    Narrative:     The following orders were created for panel order CBC & Differential.  Procedure                               Abnormality         Status                     ---------                               -----------         ------                     CBC Auto  Differential[[id number]]        Abnormal            Final result                 Please view results for these tests on the individual orders.   LIGHT BLUE TOP   LAVENDER TOP   EXTRA TUBES    Narrative:     The following orders were created for panel order Extra Tubes.  Procedure                               Abnormality         Status                     ---------                               -----------         ------                     Light Blue Top[[id number]]                                   Final result               Lavender Top[[id number]]                                     Final result                 Please view results for these tests on the individual orders.   LIGHT BLUE TOP   LAVENDER TOP       CT Abdomen Pelvis Without Contrast   Final Result   Conclusion:   Fecal impaction with large amount retained feces throughout the   colon.   6 cm hiatal hernia.   Subcutaneous edema or anasarca.   Atrophic kidneys.   Minimal to moderate left hydronephrosis and hydroureter without   ureteral calculus identified.   There may be compression of the left ureter by the feces   distended rectum.   Cholecystectomy.   Coronary artery calcifications.      [id number]      Electronically signed by:  Torito Bautista MD  9/3/2018 5:45 PM CDT   Workstation: [email or web address]        Patient was CT findings consistent with fecal impaction rather than significant urinary findings.  Patient does have hydroureter on the left thought secondary to the impaction and some markers for UTI.  Patient was given Rocephin in the ED.  Suspect the patient will need multiple enemas to clear her current impaction.  Plan inpatient management for the same.  Will stay away from the magnesium-containing products secondary to the patient's renal failure.          MDM      Final diagnoses:   Abdominal pain, unspecified abdominal location   Fecal impaction (CMS/[facility])   Urinary tract infection without hematuria, site unspecified   Hydroureter, left             Amish Hernandez MD  09/03/18 7787

## 2018-09-04 NOTE — CONSULTS
"Adult Nutrition  Assessment    Patient Name:  Efrem Roa  YOB: 1945  MRN: [id number]  Admit Date:  9/3/2018    Assessment Date:  9/4/2018    Comments:  Pt admitted from the Nursing Home with UTI and Fecal Retention with reports of decreased appetite.  .  She has a hx of ESRD.  She states that she has lost wt but based on wt hx her wt has been relatively stable.  FEcal retention resolved at this time.  RD will monitor and add Nepro twice daily.  Encouraged high fiber foods.  Will monitor.           Reason for Assessment     Row Name 09/04/18 1454          Reason for Assessment    Reason For Assessment identified at risk by screening criteria     Diagnosis gastrointestinal disease     Identified At Risk by Screening Criteria MST SCORE 2+               Nutrition/Diet History     Row Name 09/04/18 4166          Nutrition/Diet History    Typical Food/Fluid Intake Pt very voiceful.  States that she wants fried chicken.  She was not eating well at the nursing home.  \"I am leaving that place\"  She has a good appetite at this time.  +constipation, now better.  Denies any eating difficulties.                 Labs/Tests/Procedures/Meds     Row Name 09/04/18 1500          Labs/Procedures/Meds    Lab Results Reviewed reviewed, pertinent     Lab Results Comments FSBS:  59/76; BUn 35; Creat 4.28        Diagnostic Tests/Procedures    Diagnostic Test/Procedure Reviewed reviewed, pertinent     Diagnostic Test/Procedures Comments XRay        Medications    Pertinent Medications Reviewed reviewed, pertinent     Pertinent Medications Comments Enema; LActulose             Physical Findings     Row Name 09/04/18 1502          Physical Findings    Gastrointestinal other (see comments)   constipation             Estimated/Assessed Needs     Row Name 09/04/18 1504          Calculation Measurements    Weight Used For Calculations 52.6 kg (116 lb)        Estimated/Assessed Needs    Additional Documentation Additional " Requirements (Group);Fluid Requirements (Group);Alton-St. Jeor Equation (Group);Protein Requirements (Group);KCAL/KG (Group);Calorie Requirements (Group)        Calorie Requirements    Estimated Calorie Need Method kcal/kg     Measured Resting Energy Expenditure (MREE) 1315 Kcal/day        KCAL/KG    14 Kcal/Kg (kcal) 736.64     15 Kcal/Kg (kcal) 789.26     18 Kcal/Kg (kcal) 947.11     20 Kcal/Kg (kcal) 1052.34     25 Kcal/Kg (kcal) 1315.42     30 Kcal/Kg (kcal) 1578.51     35 Kcal/Kg (kcal) 1841.6     40 Kcal/Kg (kcal) 2104.68     45 Kcal/Kg (kcal) 2367.76     50 Kcal/Kg (kcal) 2630.85     kcal/kg (Specify) 25        Alton-St. Jeor Equation    RMR (Alton-St. Jeor Equation) 984.42        Protein Requirements    Est Protein Requirement Amount (gms/kg) 1.2 gm protein     Estimated Protein Requirements (gms/day) 63.14        Fluid Requirements    Estimated Fluid Requirements (mL/day) 1200   Dialysis     Estimated Fluid Requirement Method other (see comments)     RDA Method (mL) 1200     Charmaine-Clara Method (over 20 kg) 2552.34             Nutrition Prescription Ordered     Row Name 09/04/18 1504          Nutrition Prescription PO    Current PO Diet Regular     Fluid Consistency Thin     Common Modifiers Cardiac;Consistent Carbohydrate;Renal             Evaluation of Received Nutrient/Fluid Intake     Row Name 09/04/18 1505 09/04/18 1504       Calculation Measurements    Weight Used For Calculations  -- 52.6 kg (116 lb)       PO Evaluation    Number of Days PO Intake Evaluated Insufficient Data  --            Evaluation of Prescribed Nutrient/Fluid Intake     Row Name 09/04/18 1504          Calculation Measurements    Weight Used For Calculations 52.6 kg (116 lb)           Electronically signed by:  Alyx Ojeda RD  09/04/18 3:11 PM

## 2018-09-04 NOTE — PROGRESS NOTES
RENAL Note    CC: End-stage renal disease    72-year-old lady with history of end-stage renal disease admitted with altered mental status and hyperkalemia.  PMH:   Past Medical History:   Diagnosis Date   • Abnormal x-ray     Standard chest x ray, f/u pneumonia xray   • Acquired hypothyroidism    • Amblyopia    • Anemia of renal disease    • Anxiety    • Arteriovenous fistula (CMS/[facility])     Placed 10/15/2014   • Bipolar affective disorder (CMS/[facility])     Current episode depression   • Bipolar disorder (CMS/[facility])    • Cerebrovascular accident (CMS/[facility])    • Chest wall pain    • Chronic angle-closure glaucoma    • Chronic kidney disease     Stage 4-approaching hemodialysis   • Chronic pain syndrome    • Combined drug dependence excluding opioids (CMS/[facility])    • Constipation    • COPD (chronic obstructive pulmonary disease) (CMS/[facility])    • Cough    • Degeneration of cervical intervertebral disc    • Dementia     Multi-infarct, uncomplicated   • Depression    • Diabetes mellitus (CMS/[facility])     No retinopathy   • Diabetes mellitus (CMS/[facility])     Without mention of complication, type 2 or unspecified type, not stated as uncontrolled   • Diabetic renal disease (CMS/[facility])    • Disc disorder of lumbar region    • DJD (degenerative joint disease)     Involving multiple joints   • Edema    • Epigastric pain    • Essential hypertension    • Exotropia    • Gastritis    • Generalized abdominal pain    • GERD (gastroesophageal reflux disease)     With Esophagitis   • Gout    • H/O screening mammography    • Hiatal hernia    • Hyperlipidemia    • Hypermetropia    • Insomnia    • Iron deficiency anemia    • Low back pain    • Lumbago    • Non-cardiac chest pain    • Nuclear cataract    • Osteoporosis    • Panic disorder without agoraphobia    • Peripheral nervous system disorder    • Radiculitis    • RLQ abdominal pain    • RUQ pain    • Sprain of ankle    • Sprain of knee     Resolving   • Superficial injury of shoulder and upper arm   "  • Surgical follow-up care    • Type 2 diabetes mellitus (CMS/[facility])    • UTI (urinary tract infection)    • Visit for suture removal    • Vitamin D deficiency    • Vulvovaginitis        Current Meds: Scheduled Meds:  atorvastatin 40 mg Oral Nightly   ceftriaxone 1 g Intravenous Q24H   docusate sodium 1 enema Rectal Daily   famotidine 20 mg Oral Daily   furosemide 40 mg Oral QAM   gabapentin 100 mg Oral TID   hydrALAZINE 75 mg Oral TID   lactulose 20 g Oral Daily   levETIRAcetam 500 mg Oral Q12H   levothyroxine 88 mcg Oral Q AM   mirtazapine 7.5 mg Oral Nightly   polyethylene glycol 17 g Oral Daily   QUEtiapine 25 mg Oral Nightly   sevelamer 1,600 mg Oral TID With Meals   vitamin B-6 50 mg Oral Daily     Continuous Infusions:   PRN Meds:.•  acetaminophen  •  HYDROcodone-acetaminophen  •  Morphine **AND** naloxone  •  nitroglycerin  •  ondansetron  •  sodium chloride  •  sodium chloride    Examination:    /58 (BP Location: Left arm, Patient Position: Lying)   Pulse 56   Temp 98.2 °F (36.8 °C) (Oral)   Resp 18   Ht 157.5 cm (62\")   Wt 52.6 kg (116 lb)   SpO2 95%   BMI 21.22 kg/m²       No JVD    Pulm: CTA,     Heart:  No gallop or rub,     Abdomen: Soft,     Ext: Warm,     Neuro: Stable        Results from last 7 days  Lab Units 09/04/18  0551 09/03/18  1636   SODIUM mmol/L 137 140   POTASSIUM mmol/L 3.8 3.8   CHLORIDE mmol/L 99 96   CO2 mmol/L 29.0 34.0*   BUN mg/dL 35* 28*   CREATININE mg/dL 4.28* 3.51*   GLUCOSE mg/dL 59* 112*   CALCIUM mg/dL 8.2* 8.9         Results from last 7 days  Lab Units 09/04/18  0643 09/03/18  1636 09/03/18  1607   WBC 10*3/mm3 5.97 6.94 6.82   HEMOGLOBIN g/dL 8.5* 10.6* 10.4*   HEMATOCRIT % 26.8* 33.4* 32.9*   PLATELETS 10*3/mm3 235 279 284         [unfilled]    Imaging Results (last 24 hours)     Procedure Component Value Units Date/Time    XR Abdomen Flat & Upright [[id number]] Updated:  09/04/18 1024    CT Abdomen Pelvis Without Contrast [[id number]] Collected:  09/03/18 1648 "     Updated:  09/03/18 1746    Narrative:         CT Abdomen and Pelvis Without Contrast    History: Bilateral flank pain.    Axials spiral scans of the abdomen and pelvis were obtained  without contrast.  Coronal and sagital reconstructions were  preformed.      This exam was performed according to our departmental  dose-optimization program, which includes automated exposure  control, adjustment of the mA and/or kV according to patient size  and/or use of iterative reconstruction technique.    DLP: 308.40    Comparison: August 22, 2013    Linear atelectasis or scar lung bases.  Old granulomatous disease.  Coronary artery calcifications.    No acute osseous abnormality.  Degenerative changes and degenerative disc disease in the lumbar  spine.  Vertebroplasty T12.  Old moderate compression deformity L1 vertebral body.    Hepatic and splenic granulomata.  Cholecystectomy.  The pancreas is unremarkable.  The adrenal glands are unremarkable.    Unremarkable bladder.  No pelvic mass.    No abdominal aortic aneurysm.  No adenopathy.    Fecal impaction with large amount retained feces throughout the  colon.  Appendix not identified.  No pericecal inflammatory changes.  No free air.  No free fluid.    6 cm hiatal hernia.  Subcutaneous edema or anasarca.    No renal or ureteral calculi.  Stable 5 mm mass arising posteriorly from the lower pole of the  left kidney.  Atrophic kidneys.  Minimal to moderate left hydronephrosis and hydroureter without  ureteral calculus identified.  There may be compression of the left ureter by the feces  distended rectum.      Impression:       Conclusion:  Fecal impaction with large amount retained feces throughout the  colon.  6 cm hiatal hernia.  Subcutaneous edema or anasarca.  Atrophic kidneys.  Minimal to moderate left hydronephrosis and hydroureter without  ureteral calculus identified.  There may be compression of the left ureter by the feces  distended  rectum.  Cholecystectomy.  Coronary artery calcifications.    [id number]    Electronically signed by:  Torito Bautista MD  9/3/2018 5:45 PM CDT  Workstation: [employer]              Impression:    End-stage renal disease    Electrolyte disorder    *Anemia**    Rx Plans:    Agent had dialysis yesterday.  Sodium and potassium has improved.  No indication for dialysis today.    Involving dropped no obvious source of bleeding.  We will recheck it again tomorrow.    And phosphate binders.    She is a low something.    Jair Bustillo MD

## 2018-09-04 NOTE — PLAN OF CARE
Problem: Patient Care Overview  Goal: Plan of Care Review  Outcome: Ongoing (interventions implemented as appropriate)  New admit with UTI, abd pain, VSS, dialysis M,W,F.  Resting at this time   09/04/18 0238   Coping/Psychosocial   Plan of Care Reviewed With patient   Plan of Care Review   Progress no change     Goal: Individualization and Mutuality  Outcome: Ongoing (interventions implemented as appropriate)   09/04/18 0238   Individualization   Patient Specific Preferences request sprite zero in a bottle only       Problem: Fall Risk (Adult)  Goal: Absence of Fall  Outcome: Ongoing (interventions implemented as appropriate)   09/04/18 0238   Fall Risk (Adult)   Absence of Fall achieves outcome

## 2018-09-04 NOTE — PLAN OF CARE
Problem: Hemodialysis (Adult)  Goal: Signs and Symptoms of Listed Potential Problems Will be Absent, Minimized or Managed (Hemodialysis)  Outcome: Ongoing (interventions implemented as appropriate)   09/04/18 2575   Goal/Outcome Evaluation   Problems Assessed (Hemodialysis) all   Problems Present (Hemodialysis) none

## 2018-09-04 NOTE — PLAN OF CARE
Problem: Patient Care Overview  Goal: Plan of Care Review  Outcome: Ongoing (interventions implemented as appropriate)   09/04/18 1509   Coping/Psychosocial   Plan of Care Reviewed With caregiver;patient   Plan of Care Review   Progress no change   OTHER   Outcome Summary New assessement. Pt with hx of ESRD. Reports decreased appetite. Will add Nepro twice daily.        Problem: Skin Injury Risk (Adult)  Intervention: Promote/Optimize Nutrition   09/04/18 1509   Nutrition Interventions   Oral Nutrition Promotion calorie dense foods provided;calorie dense liquids provided;nutritional therapy counseling provided

## 2018-09-04 NOTE — NURSING NOTE
Permission to treat has been granted by Velia Wilkinson at after hours guardianship.  09/03/2018 2025   [phone]

## 2018-09-04 NOTE — PLAN OF CARE
Problem: Patient Care Overview  Goal: Plan of Care Review  Outcome: Ongoing (interventions implemented as appropriate)   09/04/18 6197   Coping/Psychosocial   Plan of Care Reviewed With patient   Plan of Care Review   Progress no change   OTHER   Outcome Summary pt started on IV abx. has had several BMs today. overall improving     Goal: Individualization and Mutuality  Outcome: Ongoing (interventions implemented as appropriate)    Goal: Discharge Needs Assessment  Outcome: Ongoing (interventions implemented as appropriate)      Problem: Fall Risk (Adult)  Goal: Identify Related Risk Factors and Signs and Symptoms  Outcome: Ongoing (interventions implemented as appropriate)    Goal: Absence of Fall  Outcome: Ongoing (interventions implemented as appropriate)      Problem: Skin Injury Risk (Adult)  Goal: Identify Related Risk Factors and Signs and Symptoms  Outcome: Ongoing (interventions implemented as appropriate)    Goal: Skin Health and Integrity  Outcome: Ongoing (interventions implemented as appropriate)      Problem: Urinary Tract Infection (Adult)  Goal: Signs and Symptoms of Listed Potential Problems Will be Absent, Minimized or Managed (Urinary Tract Infection)  Outcome: Ongoing (interventions implemented as appropriate)      Problem: Hemodialysis (Adult)  Goal: Signs and Symptoms of Listed Potential Problems Will be Absent, Minimized or Managed (Hemodialysis)  Outcome: Ongoing (interventions implemented as appropriate)

## 2018-09-04 NOTE — PROGRESS NOTES
[facility] Medicine Services  INPATIENT PROGRESS NOTE    Length of Stay: 0  Date of Admission: 9/3/2018  Primary Care Physician: Provider, No Known    Subjective   Chief Complaint: No complaints    HPI:      9/4/2018:  The patient states she has had several bowel movements overnight.  Abdominal x-ray is pending to recheck hydronephrosis.    H&P by Dr. Saldivar:  72 yr old female with CMH of ESRD on HD, CAD, DM II presents from dialysis after she was having some abdominal pain that started today.  Patient had a CT done in the ER which showed some fecal retention and also some hydroureter . She was being treated for UTI in the nursing as well. She reports her last bowel movement was yesterday and it is usually hard.     Review of Systems   HENT: Positive for congestion.    Gastrointestinal: Positive for abdominal distention and abdominal pain.      All pertinent negatives and positives are as above. All other systems have been reviewed and are negative unless otherwise stated.     Objective    Temp:  [96.6 °F (35.9 °C)-99.3 °F (37.4 °C)] 97.6 °F (36.4 °C)  Heart Rate:  [56-95] 75  Resp:  [16-18] 18  BP: (100-125)/(54-74) 110/55    Physical Exam   Constitutional: She is oriented to person, place, and time. She appears well-developed and well-nourished.   HENT:   Head: Normocephalic and atraumatic.   Eyes: Pupils are equal, round, and reactive to light. EOM are normal.   Neck: Normal range of motion. Neck supple.   Cardiovascular: Normal rate and regular rhythm.    Pulmonary/Chest: Effort normal and breath sounds normal.   Abdominal: Soft. Bowel sounds are normal.   Musculoskeletal: Normal range of motion.   Neurological: She is alert and oriented to person, place, and time.   Skin: Skin is warm and dry.   Psychiatric: She has a normal mood and affect.     Results Review:  I have reviewed the labs, radiology results, and diagnostic studies.    Laboratory Data:     Results from last  7 days  Lab Units 09/04/18  0551 09/03/18  1636   SODIUM mmol/L 137 140   POTASSIUM mmol/L 3.8 3.8   CHLORIDE mmol/L 99 96   CO2 mmol/L 29.0 34.0*   BUN mg/dL 35* 28*   CREATININE mg/dL 4.28* 3.51*   GLUCOSE mg/dL 59* 112*   CALCIUM mg/dL 8.2* 8.9   BILIRUBIN mg/dL  --  0.6   ALK PHOS U/L  --  185*   ALT (SGPT) U/L  --  17   AST (SGOT) U/L  --  37*   ANION GAP mmol/L 9.0 10.0     Estimated Creatinine Clearance: 9.7 mL/min (A) (by C-G formula based on SCr of 4.28 mg/dL (H)).            Results from last 7 days  Lab Units 09/04/18  0643 09/03/18  1636 09/03/18  1607   WBC 10*3/mm3 5.97 6.94 6.82   HEMOGLOBIN g/dL 8.5* 10.6* 10.4*   HEMATOCRIT % 26.8* 33.4* 32.9*   PLATELETS 10*3/mm3 235 279 284           Culture Data:   No results found for: BLOODCX  Urine Culture   Date Value Ref Range Status   09/03/2018 Culture in progress  Preliminary     No results found for: RESPCX  No results found for: WOUNDCX  No results found for: STOOLCX  No components found for: BODYFLD    Radiology Data:   Imaging Results (last 24 hours)     Procedure Component Value Units Date/Time    XR Abdomen Flat & Upright [[id number]] Updated:  09/04/18 1024    CT Abdomen Pelvis Without Contrast [[id number]] Collected:  09/03/18 1648     Updated:  09/03/18 1746    Narrative:         CT Abdomen and Pelvis Without Contrast    History: Bilateral flank pain.    Axials spiral scans of the abdomen and pelvis were obtained  without contrast.  Coronal and sagital reconstructions were  preformed.      This exam was performed according to our departmental  dose-optimization program, which includes automated exposure  control, adjustment of the mA and/or kV according to patient size  and/or use of iterative reconstruction technique.    DLP: 308.40    Comparison: August 22, 2013    Linear atelectasis or scar lung bases.  Old granulomatous disease.  Coronary artery calcifications.    No acute osseous abnormality.  Degenerative changes and degenerative disc disease  in the lumbar  spine.  Vertebroplasty T12.  Old moderate compression deformity L1 vertebral body.    Hepatic and splenic granulomata.  Cholecystectomy.  The pancreas is unremarkable.  The adrenal glands are unremarkable.    Unremarkable bladder.  No pelvic mass.    No abdominal aortic aneurysm.  No adenopathy.    Fecal impaction with large amount retained feces throughout the  colon.  Appendix not identified.  No pericecal inflammatory changes.  No free air.  No free fluid.    6 cm hiatal hernia.  Subcutaneous edema or anasarca.    No renal or ureteral calculi.  Stable 5 mm mass arising posteriorly from the lower pole of the  left kidney.  Atrophic kidneys.  Minimal to moderate left hydronephrosis and hydroureter without  ureteral calculus identified.  There may be compression of the left ureter by the feces  distended rectum.      Impression:       Conclusion:  Fecal impaction with large amount retained feces throughout the  colon.  6 cm hiatal hernia.  Subcutaneous edema or anasarca.  Atrophic kidneys.  Minimal to moderate left hydronephrosis and hydroureter without  ureteral calculus identified.  There may be compression of the left ureter by the feces  distended rectum.  Cholecystectomy.  Coronary artery calcifications.    16000    Electronically signed by:  Torito Bautista MD  9/3/2018 5:45 PM CDT  Workstation: [id number]          I have reviewed the patient's current medications.     Assessment/Plan     Active Hospital Problems    Diagnosis Date Noted   • Abdominal pain [R10.9] 09/03/2018       Plan:    1.  Abdominal pain/ fecal retention:  Miralax, Lactulose, enemas, and colace ordered.  Disimpaction ordered.      2.  Hydronephrosis secondary to fecal retention:  KUB in am for recheck.  Patient noted to have several BMs today.    3.  ESRD on HD:  Dr. Bustillo consulted.  4.  Anemia:  Occult stool pending. Will monitor.       Discharge Planning: I expect patient to be discharged to home in 1-2 days.      This  document has been electronically signed by ELI Thorne on September 4, 2018 3:37 PM

## 2018-09-05 NOTE — PROGRESS NOTES
NEPHROLOGY PROGRESS NOTE:    History of present illness:   ESRD, seen in HD.   Lethargic.    Opened eyes to call, but did not answer any questions.   NO SOA.  C/o abd pain.      Patient Active Problem List   Diagnosis   • Constipation   • Arteriovenous fistula (CMS/HCC)   • Controlled type 2 diabetes mellitus with chronic kidney disease on chronic dialysis, without long-term current use of insulin (CMS/HCC)   • Coronary artery disease involving native coronary artery of native heart without angina pectoris   • Panlobular emphysema (CMS/HCC)   • ESRD (end stage renal disease) on dialysis (CMS/[facility])   • HCAP (healthcare-associated pneumonia)   • MRSA bacteremia   • Sepsis (CMS/[facility])   • Essential hypertension   • Hyperlipidemia   • Pseudoaneurysm (CMS/[facility])   • Pneumonia of right upper lobe due to infectious organism (CMS/[facility])   • Abdominal pain       Medications:    atorvastatin 40 mg Oral Nightly   ceftriaxone 1 g Intravenous Q24H   docusate sodium 1 enema Rectal Daily   famotidine 20 mg Oral Daily   furosemide 40 mg Oral QAM   gabapentin 100 mg Oral TID   hydrALAZINE 75 mg Oral TID   lactulose 20 g Oral Daily   levETIRAcetam 500 mg Oral Q12H   levothyroxine 88 mcg Oral Q AM   mirtazapine 7.5 mg Oral Nightly   polyethylene glycol 17 g Oral Daily   QUEtiapine 25 mg Oral Nightly   sevelamer 1,600 mg Oral TID With Meals   vitamin B-6 50 mg Oral Daily        Vitals:    09/05/18 0123 09/05/18 0611 09/05/18 0754 09/05/18 1147   BP:  117/58  133/41   BP Location:  Right arm  Right leg   Patient Position:  Lying  Lying   Pulse: 78 71 61 71   Resp:  18  16   Temp:  96.5 °F (35.8 °C)  98.3 °F (36.8 °C)   TempSrc:  Axillary  Tympanic   SpO2:  97%     Weight:  57.2 kg (126 lb)     Height:         I/O last 3 completed shifts:  In: 720 [P.O.:720]  Out: 0   I/O this shift:  In: 120 [P.O.:120]  Out: 2500 [Other:2500]    On examination:  General:  Comfortable, no distress.  Lethargic.    Skin: No skin rashes or mucosal bleeding    HEENT:  Pallor present, no icterus.     Neck:  No jugular venous distention, or thyroid enlargement.  Chest:  Clear.  No rales or wheezes audible.  Air entry appears equal bilaterally.  CVS: Heart sounds are regular, there is no pericardial rub or gallop.  Abdomen: Soft, mild diffuse tenderness on palpation (drowsy on evaluation)  Extremities:  No significant edema of the lower extremities.  Left thigh AVG  Musculoskeletal: No acute joint inflammation.  Neuro: Lethargic.  Limited examination.     Past medical illness, family history, social history, medications, previous notes reviewed.       Laboratory results:      Recent Results (from the past 24 hour(s))   Hemoglobin & Hematocrit, Blood    Collection Time: 09/04/18  7:18 PM   Result Value Ref Range    Hemoglobin 9.4 (L) 12.0 - 15.5 g/dL    Hematocrit 30.0 (L) 35.0 - 45.0 %   Renal Function Panel    Collection Time: 09/05/18  8:49 AM   Result Value Ref Range    Glucose 84 60 - 100 mg/dL    BUN 43 (H) 7 - 21 mg/dL    Creatinine 5.80 (H) 0.50 - 1.00 mg/dL    Sodium 137 137 - 145 mmol/L    Potassium 3.6 3.5 - 5.1 mmol/L    Chloride 99 95 - 110 mmol/L    CO2 28.0 22.0 - 31.0 mmol/L    Calcium 8.1 (L) 8.4 - 10.2 mg/dL    Albumin 3.00 (L) 3.40 - 4.80 g/dL    Phosphorus 4.1 2.4 - 4.4 mg/dL    Anion Gap 10.0 5.0 - 15.0 mmol/L    BUN/Creatinine Ratio 7.4 7.0 - 25.0    eGFR Non  Amer 7 (L) 39 - 90 mL/min/1.73   CBC Auto Differential    Collection Time: 09/05/18  8:49 AM   Result Value Ref Range    WBC 7.33 3.20 - 9.80 10*3/mm3    RBC 3.04 (L) 3.77 - 5.16 10*6/mm3    Hemoglobin 8.9 (L) 12.0 - 15.5 g/dL    Hematocrit 27.8 (L) 35.0 - 45.0 %    MCV 91.4 80.0 - 98.0 fL    MCH 29.3 26.5 - 34.0 pg    MCHC 32.0 31.4 - 36.0 g/dL    RDW 16.6 (H) 11.5 - 14.5 %    RDW-SD 54.3 (H) 36.4 - 46.3 fl    MPV 10.0 8.0 - 12.0 fL    Platelets 239 150 - 450 10*3/mm3    Neutrophil % 65.8 37.0 - 80.0 %    Lymphocyte % 20.6 10.0 - 50.0 %    Monocyte % 7.5 0.0 - 12.0 %    Eosinophil % 5.6  0.0 - 7.0 %    Basophil % 0.4 0.0 - 2.0 %    Immature Grans % 0.1 0.0 - 0.5 %    Neutrophils, Absolute 4.82 2.00 - 8.60 10*3/mm3    Lymphocytes, Absolute 1.51 0.60 - 4.20 10*3/mm3    Monocytes, Absolute 0.55 0.00 - 0.90 10*3/mm3    Eosinophils, Absolute 0.41 0.00 - 0.70 10*3/mm3    Basophils, Absolute 0.03 0.00 - 0.20 10*3/mm3    Immature Grans, Absolute 0.01 0.00 - 0.02 10*3/mm3   ]    Imaging Results (last 24 hours)     Procedure Component Value Units Date/Time    XR Abdomen KUB [[id number]] Updated:  09/05/18 1220    XR Abdomen Flat & Upright [[id number]] Collected:  09/04/18 1006     Updated:  09/05/18 0940    Narrative:       [facility] [place]    Patient Name: SIRISHA GUPTA    ORDERING: DB HOLLIDAY    ATTENDING: MARÍA ELENA CULLEN     REFERRING: DB HOLLIDAY    -----------------------    PROCEDURE: Two-view abdomen    COMPARISON: CT abdomen and pelvis 9/3/2018    HISTORY: recheck, R10.9 Unspecified abdominal pain K56.41 Fecal  impaction N39.0 Urinary tract infection, site not specified N13.4  Hydroureter    TECHNIQUE: Upright and supine views of the abdomen are obtained.     FINDINGS:  Large amount of gas and stool noted throughout the colon.  Distended stool-filled rectum.  Nonspecific small bowel gas pattern.  No suspicious calcifications.  No evidence of free air.  Bone cement noted in the lower thoracic spine.  Atherosclerotic calcification is present in the abdominal aorta.      Impression:       CONCLUSION:    Large amount of gas and stool noted throughout the colon.  Distended stool-filled rectum.  Nonspecific small bowel gas pattern.    Electronically signed by:  Amador Winters MD  9/5/2018 9:39 AM CDT  Workstation: [id number]            ASSESSMENT:      ESRD  Abdominal pain   Constipation  Hydronephrosis  HTN  Anemia    PLAN:     ESRD:   Seen on HD.  USing left thigh AVG.  Discussed with outpatient HD about cannulation.   HD orders reviewed.      Anemia:  Getting epogen on HD.   Follow H/H.       Abd pain:  Constipation on studies.  Planned repeat imaging.   Getting laxatives.       Hydronephrosis:   Plan repeat imaging once constipation better.    On antibiotics for possible UTI.     Edema:   Improved.  FLuid removal on HD.  On Lasix.       Hyperphosphatemia:  Phos is 4.1.   On Renvela.        MD MOR Serrano/Transcription disclaimer:   Some parts of this note dictated by nicole, with translation of spoken language to printed text.

## 2018-09-05 NOTE — PLAN OF CARE
Problem: Patient Care Overview  Goal: Plan of Care Review  Outcome: Ongoing (interventions implemented as appropriate)   09/05/18 0308   Coping/Psychosocial   Plan of Care Reviewed With patient   Plan of Care Review   Progress no change   OTHER   Outcome Summary vss, working toward pain control, going to dialysis today, having KUB today; will continue to monitor     Goal: Individualization and Mutuality  Outcome: Ongoing (interventions implemented as appropriate)    Goal: Discharge Needs Assessment  Outcome: Ongoing (interventions implemented as appropriate)    Goal: Interprofessional Rounds/Family Conf  Outcome: Ongoing (interventions implemented as appropriate)      Problem: Fall Risk (Adult)  Goal: Identify Related Risk Factors and Signs and Symptoms  Outcome: Ongoing (interventions implemented as appropriate)    Goal: Absence of Fall  Outcome: Ongoing (interventions implemented as appropriate)      Problem: Skin Injury Risk (Adult)  Goal: Identify Related Risk Factors and Signs and Symptoms  Outcome: Ongoing (interventions implemented as appropriate)    Goal: Skin Health and Integrity  Outcome: Ongoing (interventions implemented as appropriate)      Problem: Urinary Tract Infection (Adult)  Goal: Signs and Symptoms of Listed Potential Problems Will be Absent, Minimized or Managed (Urinary Tract Infection)  Outcome: Ongoing (interventions implemented as appropriate)      Problem: Hemodialysis (Adult)  Goal: Signs and Symptoms of Listed Potential Problems Will be Absent, Minimized or Managed (Hemodialysis)  Outcome: Ongoing (interventions implemented as appropriate)      Problem: Kidney Disease, Chronic/End Stage Renal Disease (Adult)  Goal: Signs and Symptoms of Listed Potential Problems Will be Absent, Minimized or Managed (Kidney Disease, Chronic/End Stage Renal Disease)  Outcome: Ongoing (interventions implemented as appropriate)

## 2018-09-05 NOTE — CONSULTS
SUBJECTIVE:   9/5/2018    Name: Efrem Roa  DOD: 1945    REASON FOR CONSULT: Anemia.    Chief Complaint:     Chief Complaint   Patient presents with   • Abdominal Pain       Subjective     Patient is 72 y.o. female who presented for constipation.  Patient was treated for constipation with medication nausea to have multiple bowel movements patient states she is having too many bowel movements at this time.  Patient states a history of being anemic in the past.     ROS/HISTORY/ CURRENT MEDICATIONS/OBJECTIVE/VS/PE:   Review of Systems:   Review of Systems   Constitutional: Negative for activity change, appetite change, chills, diaphoresis, fatigue, fever and unexpected weight change.   HENT: Negative for sore throat and trouble swallowing.    Respiratory: Negative for shortness of breath.    Gastrointestinal: Positive for abdominal distention, abdominal pain and constipation. Negative for anal bleeding, blood in stool, diarrhea, nausea, rectal pain and vomiting.   Musculoskeletal: Negative for arthralgias.   Skin: Negative for pallor.   Neurological: Negative for light-headedness.       History:     Past Medical History:   Diagnosis Date   • Abnormal x-ray     Standard chest x ray, f/u pneumonia xray   • Acquired hypothyroidism    • Amblyopia    • Anemia of renal disease    • Anxiety    • Arteriovenous fistula (CMS/HCC)     Placed 10/15/2014   • Bipolar affective disorder (CMS/HCC)     Current episode depression   • Bipolar disorder (CMS/HCC)    • Cerebrovascular accident (CMS/HCC)    • Chest wall pain    • Chronic angle-closure glaucoma    • Chronic kidney disease     Stage 4-approaching hemodialysis   • Chronic pain syndrome    • Combined drug dependence excluding opioids (CMS/HCC)    • Constipation    • COPD (chronic obstructive pulmonary disease) (CMS/HCC)    • Cough    • Degeneration of cervical intervertebral disc    • Dementia     Multi-infarct, uncomplicated   • Depression    • Diabetes mellitus  (CMS/[facility])     No retinopathy   • Diabetes mellitus (CMS/[facility])     Without mention of complication, type 2 or unspecified type, not stated as uncontrolled   • Diabetic renal disease (CMS/HCC)    • Disc disorder of lumbar region    • DJD (degenerative joint disease)     Involving multiple joints   • Edema    • Epigastric pain    • Essential hypertension    • Exotropia    • Gastritis    • Generalized abdominal pain    • GERD (gastroesophageal reflux disease)     With Esophagitis   • Gout    • H/O screening mammography    • Hiatal hernia    • Hyperlipidemia    • Hypermetropia    • Insomnia    • Iron deficiency anemia    • Low back pain    • Lumbago    • Non-cardiac chest pain    • Nuclear cataract    • Osteoporosis    • Panic disorder without agoraphobia    • Peripheral nervous system disorder    • Radiculitis    • RLQ abdominal pain    • RUQ pain    • Sprain of ankle    • Sprain of knee     Resolving   • Superficial injury of shoulder and upper arm    • Surgical follow-up care    • Type 2 diabetes mellitus (CMS/[facility])    • UTI (urinary tract infection)    • Visit for suture removal    • Vitamin D deficiency    • Vulvovaginitis      Past Surgical History:   Procedure Laterality Date   • APPENDECTOMY     • BACK SURGERY      x2   • BYPASS GRAFT  2015    Left brachial artery to axillary vein arteriovenous graft. Venous ultrasound unilateral extremity   •  SECTION      x2   • CHOLECYSTECTOMY     • COLONOSCOPY W/ POLYPECTOMY  2015    Colitis found in the cecum. Biopsy taken. A diverticulum was found in the sigmoid colon.   • ENDOSCOPY AND COLONOSCOPY     • ESOPHAGOSCOPY / EGD  2015    Normal esophagus. Gastritis found in the stomach. Biopsy taken. Duodenitis found in the duodenum. Biopsy take.   • ESOPHAGOSCOPY / EGD  1944    With tube-Esophagus appeared normal. Nonerosive gastritis. Duodenum appeared normal   • HYSTERECTOMY     • INJECTION OF MEDICATION  2011    Aranesp   • INJECTION  OF MEDICATION  06/06/2011    Kenalog   • INTERVENTIONAL RADIOLOGY PROCEDURE Left 10/5/2017    Procedure: IR dialysis fistulagram;  Surgeon: Blane Fernandez MD;  Location: [facility];  Service:    • LEG SURGERY  06/13/2000    Cancelled. Due to uncontrolled hypertension   • OTHER SURGICAL HISTORY  07/07/2016    Tissue plasminogen activator catheter thrombolysis   • REMOVAL PERITONEAL DIALYSIS CATHETER  01/16/2014    Removal of tunneled hemodialysis catheter with cuff   • TRANSESOPHAGEAL ECHOCARDIOGRAM (ANTHONY)  03/24/2016    Mild left atrial enlargement with mild to moderate CLVH with normal aortic root size.LV systolic function well preserved with Ef of 55-60%.Mitral valve thickened.Av thickened.Aortic sclerosis.Mild mitral regurg.mild to moderate tricuspid regurg.   • TRANSESOPHAGEAL ECHOCARDIOGRAM (ANTHONY)  02/22/2012    Normal left ventricular systolic function. Moderate tricuspid regurgitation with evidenceof pulmonary hypertension     Family History   Problem Relation Age of Onset   • Coronary artery disease Other      Social History   Substance Use Topics   • Smoking status: Former Smoker   • Smokeless tobacco: Never Used   • Alcohol use No     Prescriptions Prior to Admission   Medication Sig Dispense Refill Last Dose   • acetaminophen (TYLENOL) 325 MG tablet Take 650 mg by mouth Every 4 (Four) Hours As Needed for Mild Pain  or Fever (temp > 100.3).   Taking   • aspirin 81 MG EC tablet Take 81 mg by mouth daily.   9/3/2018 at 0700   • atorvastatin (LIPITOR) 40 MG tablet Take 40 mg by mouth every night.   9/2/2018 at 1930   • B Complex-C-Folic Acid (NGA CAPS PO) Take 1 capsule by mouth daily.   9/3/2018 at 0700   • Camphor-Menthol-Methyl Sal 4-10-30 % cream Apply 1 application topically Every 4 (Four) Hours As Needed (joint arthritis pain).   Taking   • docusate sodium (COLACE) 100 MG capsule Take 1 capsule by mouth 2 (Two) Times a Day.   9/3/2018 at 0700   • famotidine (PEPCID) 20 MG  tablet Take 20 mg by mouth every night at bedtime.   9/2/2018 at 1930   • furosemide (LASIX) 40 MG tablet Take 40 mg by mouth Every Morning.   9/3/2018 at 0700   • gabapentin (NEURONTIN) 100 MG capsule Take 1 capsule by mouth 3 (Three) Times a Day. 10 capsule 0 9/3/2018 at 0700   • hydrALAZINE (APRESOLINE) 100 MG tablet Take 100 mg by mouth 3 (three) times a day.   9/3/2018 at 0700   • HYDROcodone-acetaminophen (NORCO) 7.5-325 MG per tablet Take 1 tablet by mouth Every 4 (Four) Hours As Needed for Moderate Pain . (Patient taking differently: Take 1 tablet by mouth 4 (Four) Times a Day.) 10 tablet 0 9/3/2018 at 0700   • levETIRAcetam (KEPPRA) 500 MG tablet Take 500 mg by mouth 2 (Two) Times a Day.   9/3/2018 at 0700   • levothyroxine (SYNTHROID, LEVOTHROID) 88 MCG tablet Take 88 mcg by mouth daily.   9/3/2018 at 0600   • mesalamine (APRISO) 0.375 g 24 hr capsule Take 1 capsule by mouth daily.   9/3/2018 at 0700   • mirtazapine (REMERON) 7.5 MG half tablet Take 7.5 mg by mouth Every Night.   9/2/2018 at 1930   • nitroglycerin (NITROSTAT) 0.4 MG SL tablet Place 0.4 mg under the tongue every 5 (five) minutes as needed for chest pain. Take no more than 3 doses in 15 minutes.   Taking   • polyethylene glycol (MIRALAX) packet Take 17 g by mouth Daily As Needed (constipation).   Taking   • Pramox-PE-Glycerin-Petrolatum (PREPARATION H) 1-0.25-14.4-15 % cream Insert 1 application into the rectum Every 4 (Four) Hours As Needed (hemorrhoids).   Taking   • QUEtiapine (SEROquel) 25 MG tablet Take 25 mg by mouth Every Night.   9/2/2018 at 1930   • sevelamer (RENVELA) 800 MG tablet Take 1,600 mg by mouth 3 (Three) Times a Day With Meals.   9/3/2018 at 0730   • vitamin B-6 (PYRIDOXINE) 50 MG tablet Take 50 mg by mouth Daily.   9/3/2018 at 0700   • vitamin D (ERGOCALCIFEROL) 24716 UNITS capsule capsule Take 50,000 Units by mouth Every 14 (Fourteen) Days.   8/27/2018 at 0900     Allergies:  Ambien [zolpidem tartrate]; Benadryl  [diphenhydramine]; and Penicillins    I have reviewed the patient's medical history, surgical history and family history in the available medical record system.     Current Medications:     Current Facility-Administered Medications   Medication Dose Route Frequency Provider Last Rate Last Dose   • acetaminophen (TYLENOL) tablet 650 mg  650 mg Oral Q6H PRN Pascual Saldivar MD       • albumin human 25 % IV SOLN 12.5 g  12.5 g Intravenous PRN Levill, Rosalba G, APRN       • atorvastatin (LIPITOR) tablet 40 mg  40 mg Oral Nightly Pascual Saldivar MD   40 mg at 09/04/18 2123   • cefTRIAXone (ROCEPHIN) 1 g/100 mL 0.9% NS (MBP)  1 g Intravenous Q24H Pascual Saldivar MD 0 mL/hr at 09/04/18 1947 1 g at 09/05/18 1723   • docusate sodium (ENEMEEZ) enema 1 enema  1 enema Rectal Daily Pascual Saldivar MD   1 enema at 09/04/18 1123   • famotidine (PEPCID) tablet 20 mg  20 mg Oral Daily Pascual Saldivar MD   20 mg at 09/05/18 1258   • furosemide (LASIX) tablet 40 mg  40 mg Oral QAM Pascual Saldivar MD   40 mg at 09/04/18 0603   • gabapentin (NEURONTIN) capsule 100 mg  100 mg Oral TID Pascual Saldivar MD   100 mg at 09/05/18 1723   • hydrALAZINE (APRESOLINE) tablet 75 mg  75 mg Oral TID Pascual Saldivar MD   75 mg at 09/04/18 2123   • HYDROcodone-acetaminophen (NORCO) 7.5-325 MG per tablet 1 tablet  1 tablet Oral Q4H PRN Pascual Saldivar MD   1 tablet at 09/05/18 1257   • lactulose (CHRONULAC) 10 GM/15ML solution 20 g  20 g Oral Daily Pascual Saldivar MD   20 g at 09/04/18 1123   • levETIRAcetam (KEPPRA) tablet 500 mg  500 mg Oral Q12H Pascual Saldivar MD   500 mg at 09/05/18 1258   • levothyroxine (SYNTHROID, LEVOTHROID) tablet 88 mcg  88 mcg Oral Q AM Pascual Saldivar MD   88 mcg at 09/04/18 0527   • lidocaine-prilocaine (EMLA) 2.5-2.5 % cream   Topical PRN Chi Holden MD       • magic butt ointment   Topical PRN Russell Hare MD       • mirtazapine (REMERON) half tablet 7.5 mg  7.5 mg Oral Nightly  Pascual Saldivar MD   7.5 mg at 09/04/18 2123   • morphine injection 1 mg  1 mg Intravenous Q4H PRN Pascual Saldivar MD        And   • naloxone (NARCAN) injection 0.4 mg  0.4 mg Intravenous Q5 Min PRN Pascual Saldivar MD       • nitroglycerin (NITROSTAT) SL tablet 0.4 mg  0.4 mg Sublingual Q5 Min PRN Pascual Saldivar MD       • ondansetron (ZOFRAN) injection 4 mg  4 mg Intravenous Q6H PRN Pascual Saldivar MD       • polyethylene glycol (MIRALAX) powder 17 g  17 g Oral Daily LevillRosalba, APRN       • QUEtiapine (SEROquel) tablet 25 mg  25 mg Oral Nightly Pascual Saldivar MD   25 mg at 09/04/18 2123   • sevelamer (RENAGEL) tablet 1,600 mg  1,600 mg Oral TID With Meals Chi Holden MD   1,600 mg at 09/05/18 1723   • sodium chloride 0.9 % flush 1-10 mL  1-10 mL Intravenous PRN Pascual Saldivar MD       • sodium chloride 0.9 % flush 10 mL  10 mL Intravenous PRN Amish Hernandez MD   10 mL at 09/03/18 1630   • vitamin B-6 (PYRIDOXINE) tablet 50 mg  50 mg Oral Daily Pascual Saldivar MD   50 mg at 09/05/18 1258       Objective     Physical Exam:   Temp:  [96.5 °F (35.8 °C)-98.3 °F (36.8 °C)] 97.9 °F (36.6 °C)  Heart Rate:  [61-84] 84  Resp:  [16-18] 16  BP: (105-133)/(41-69) 105/53    Physical Exam:  General Appearance:    Alert, cooperative, in no acute distress   Head:    Normocephalic, without obvious abnormality, atraumatic   Eyes:            Lids and lashes normal, conjunctivae and sclerae normal, no   icterus, no pallor, corneas clear, PERRLA   Ears:    Ears appear intact with no abnormalities noted   Throat:   No oral lesions, no thrush, oral mucosa moist   Neck:   No adenopathy, supple, trachea midline, no thyromegaly, no     carotid bruit, no JVD   Back:     No kyphosis present, no scoliosis present, no skin lesions,       erythema or scars, no tenderness to percussion or                   palpation,   range of motion normal   Lungs:     Clear to auscultation,respirations regular, even  and                   unlabored    Heart:    Regular rhythm and normal rate, normal S1 and S2, no            murmur, no gallop, no rub, no click   Breast Exam:    Deferred   Abdomen:     Normal bowel sounds, no masses, no organomegaly, soft        non-tender, non-distended, no guarding, no rebound                 tenderness   Genitalia:    Deferred   Extremities:   Moves all extremities well, no edema, no cyanosis, no              redness   Pulses:   Pulses palpable and equal bilaterally   Skin:   No bleeding, bruising or rash   Lymph nodes:   No palpable adenopathy   Neurologic:   Cranial nerves 2 - 12 grossly intact, sensation intact, DTR        present and equal bilaterally      Results Review:     Lab Results   Component Value Date    WBC 7.33 09/05/2018    WBC 5.97 09/04/2018    WBC 6.94 09/03/2018    HGB 8.9 (L) 09/05/2018    HGB 9.4 (L) 09/04/2018    HGB 8.5 (L) 09/04/2018    HCT 27.8 (L) 09/05/2018    HCT 30.0 (L) 09/04/2018    HCT 26.8 (L) 09/04/2018     09/05/2018     09/04/2018     09/03/2018       Results from last 7 days  Lab Units 09/03/18  1636   ALK PHOS U/L 185*   ALT (SGPT) U/L 17   AST (SGOT) U/L 37*       Results from last 7 days  Lab Units 09/03/18  1636   BILIRUBIN mg/dL 0.6   ALK PHOS U/L 185*     Lipase   Date Value Ref Range Status   09/03/2018 135 23 - 300 U/L Final     Lab Results   Component Value Date    INR 1.09 07/09/2018    INR 1.22 (H) 05/16/2018    INR 1.15 01/31/2018         Radiology Review:  Imaging Results (last 72 hours)     Procedure Component Value Units Date/Time    XR Abdomen KUB [[id number]] Updated:  09/05/18 1220    XR Abdomen Flat & Upright [[id number]] Collected:  09/04/18 1006     Updated:  09/05/18 0940    Narrative:       [facility] [place]    Patient Name: SIRISHA GUPTA    ORDERING: DB HOLLIDAY    ATTENDING: MARÍA ELENA CULLEN     REFERRING: DB HOLLIDAY    -----------------------    PROCEDURE: Two-view abdomen    COMPARISON: CT  abdomen and pelvis 9/3/2018    HISTORY: recheck, R10.9 Unspecified abdominal pain K56.41 Fecal  impaction N39.0 Urinary tract infection, site not specified N13.4  Hydroureter    TECHNIQUE: Upright and supine views of the abdomen are obtained.     FINDINGS:  Large amount of gas and stool noted throughout the colon.  Distended stool-filled rectum.  Nonspecific small bowel gas pattern.  No suspicious calcifications.  No evidence of free air.  Bone cement noted in the lower thoracic spine.  Atherosclerotic calcification is present in the abdominal aorta.      Impression:       CONCLUSION:    Large amount of gas and stool noted throughout the colon.  Distended stool-filled rectum.  Nonspecific small bowel gas pattern.    Electronically signed by:  Amador Winters MD  9/5/2018 9:39 AM CDT  Workstation: [id number]    CT Abdomen Pelvis Without Contrast [[id number]] Collected:  09/03/18 1648     Updated:  09/03/18 1746    Narrative:         CT Abdomen and Pelvis Without Contrast    History: Bilateral flank pain.    Axials spiral scans of the abdomen and pelvis were obtained  without contrast.  Coronal and sagital reconstructions were  preformed.      This exam was performed according to our departmental  dose-optimization program, which includes automated exposure  control, adjustment of the mA and/or kV according to patient size  and/or use of iterative reconstruction technique.    DLP: 308.40    Comparison: August 22, 2013    Linear atelectasis or scar lung bases.  Old granulomatous disease.  Coronary artery calcifications.    No acute osseous abnormality.  Degenerative changes and degenerative disc disease in the lumbar  spine.  Vertebroplasty T12.  Old moderate compression deformity L1 vertebral body.    Hepatic and splenic granulomata.  Cholecystectomy.  The pancreas is unremarkable.  The adrenal glands are unremarkable.    Unremarkable bladder.  No pelvic mass.    No abdominal aortic aneurysm.  No adenopathy.    Fecal  impaction with large amount retained feces throughout the  colon.  Appendix not identified.  No pericecal inflammatory changes.  No free air.  No free fluid.    6 cm hiatal hernia.  Subcutaneous edema or anasarca.    No renal or ureteral calculi.  Stable 5 mm mass arising posteriorly from the lower pole of the  left kidney.  Atrophic kidneys.  Minimal to moderate left hydronephrosis and hydroureter without  ureteral calculus identified.  There may be compression of the left ureter by the feces  distended rectum.      Impression:       Conclusion:  Fecal impaction with large amount retained feces throughout the  colon.  6 cm hiatal hernia.  Subcutaneous edema or anasarca.  Atrophic kidneys.  Minimal to moderate left hydronephrosis and hydroureter without  ureteral calculus identified.  There may be compression of the left ureter by the feces  distended rectum.  Cholecystectomy.  Coronary artery calcifications.    [id number]    Electronically signed by:  Torito Bautista MD  9/3/2018 5:45 PM CDT  Workstation: [id number]          I reviewed the patient's new clinical results.    I reviewed the patient's new imaging results and agree with the interpretation.     ASSESSMENT/PLAN:   ASSESSMENT: Patient with anemia.  Patient has been anemic before in the past patient had heme positive stools but after being constipated for long.  Its time and being medically induced to have a bowel movement.  Heme positive stools in this situation would be normal.    PLAN: #1 continue to monitor patient's hemoglobin hemoglobin less than 7 please transfuse 2 units packed RBCs.  #2 continue patient on proton pump inhibitor  #3 we'll continue to monitor hemoglobin with you hemoglobin decreases we'll consider endoscopic evaluation for blood loss.  The risks, benefits, and alternatives of this procedure have been discussed with the patient or the responsible party. The patient understands and agrees to proceed.         Marcos Cooney,  MD  09/05/18  6:17 PM         This document has been electronically signed by Marcos Cooney MD on September 5, 2018 6:17 PM

## 2018-09-05 NOTE — PROGRESS NOTES
[facility] Medicine Services  INPATIENT PROGRESS NOTE    Length of Stay: 0  Date of Admission: 9/3/2018  Primary Care Physician: Provider, No Known    Subjective   Chief Complaint: No complaints    HPI:      9/5/2018:  Patient had hemodialysis today.  KUB pending.  Patient noted to have several bowel movements yesterday.  Hemoglobin has dropped and stool occult positive.  Dr. Cooney consulted.      9/4/2018:  The patient states she has had several bowel movements overnight.  Abdominal x-ray is pending to recheck hydronephrosis.    H&P by Dr. Saldivar:  72 yr old female with CMH of ESRD on HD, CAD, DM II presents from dialysis after she was having some abdominal pain that started today.  Patient had a CT done in the ER which showed some fecal retention and also some hydroureter . She was being treated for UTI in the nursing as well. She reports her last bowel movement was yesterday and it is usually hard.     Review of Systems   HENT: Positive for congestion.    Gastrointestinal: Positive for abdominal distention and abdominal pain.      All pertinent negatives and positives are as above. All other systems have been reviewed and are negative unless otherwise stated.     Objective    Temp:  [96.5 °F (35.8 °C)-98.3 °F (36.8 °C)] 98.3 °F (36.8 °C)  Heart Rate:  [61-83] 71  Resp:  [16-18] 16  BP: (117-133)/(41-69) 133/41    Physical Exam   Constitutional: She is oriented to person, place, and time. She appears well-developed and well-nourished.   HENT:   Head: Normocephalic and atraumatic.   Eyes: Pupils are equal, round, and reactive to light. EOM are normal.   Neck: Normal range of motion. Neck supple.   Cardiovascular: Normal rate and regular rhythm.    Pulmonary/Chest: Effort normal and breath sounds normal.   Abdominal: Soft. Bowel sounds are normal.   Musculoskeletal: Normal range of motion.   Neurological: She is alert and oriented to person, place, and time.   Skin: Skin is  warm and dry.   Psychiatric: She has a normal mood and affect.     Results Review:  I have reviewed the labs, radiology results, and diagnostic studies.    Laboratory Data:     Results from last 7 days  Lab Units 09/05/18  0849 09/04/18  0551 09/03/18  1636   SODIUM mmol/L 137 137 140   POTASSIUM mmol/L 3.6 3.8 3.8   CHLORIDE mmol/L 99 99 96   CO2 mmol/L 28.0 29.0 34.0*   BUN mg/dL 43* 35* 28*   CREATININE mg/dL 5.80* 4.28* 3.51*   GLUCOSE mg/dL 84 59* 112*   CALCIUM mg/dL 8.1* 8.2* 8.9   BILIRUBIN mg/dL  --   --  0.6   ALK PHOS U/L  --   --  185*   ALT (SGPT) U/L  --   --  17   AST (SGOT) U/L  --   --  37*   ANION GAP mmol/L 10.0 9.0 10.0     Estimated Creatinine Clearance: 7.8 mL/min (A) (by C-G formula based on SCr of 5.8 mg/dL (H)).    Results from last 7 days  Lab Units 09/05/18  0849   PHOSPHORUS mg/dL 4.1           Results from last 7 days  Lab Units 09/05/18  0849 09/04/18  1918 09/04/18  0643 09/03/18  1636 09/03/18  1607   WBC 10*3/mm3 7.33  --  5.97 6.94 6.82   HEMOGLOBIN g/dL 8.9* 9.4* 8.5* 10.6* 10.4*   HEMATOCRIT % 27.8* 30.0* 26.8* 33.4* 32.9*   PLATELETS 10*3/mm3 239  --  235 279 284           Culture Data:   No results found for: BLOODCX  Urine Culture   Date Value Ref Range Status   09/03/2018 Mixed Umu Isolated  Final     No results found for: RESPCX  No results found for: WOUNDCX  No results found for: STOOLCX  No components found for: BODYFLD    Radiology Data:   Imaging Results (last 24 hours)     Procedure Component Value Units Date/Time    XR Abdomen KUB [[id number]] Updated:  09/05/18 1220    XR Abdomen Flat & Upright [[id number]] Collected:  09/04/18 1006     Updated:  09/05/18 0940    Narrative:       [facility] [place]    Patient Name: SIRISHA GUPTA    ORDERING: DB HOLLIDAY    ATTENDING: MARÍA ELENA CULLEN     REFERRING: DB HOLLIDAY    -----------------------    PROCEDURE: Two-view abdomen    COMPARISON: CT abdomen and pelvis 9/3/2018    HISTORY: recheck, R10.9 Unspecified  abdominal pain K56.41 Fecal  impaction N39.0 Urinary tract infection, site not specified N13.4  Hydroureter    TECHNIQUE: Upright and supine views of the abdomen are obtained.     FINDINGS:  Large amount of gas and stool noted throughout the colon.  Distended stool-filled rectum.  Nonspecific small bowel gas pattern.  No suspicious calcifications.  No evidence of free air.  Bone cement noted in the lower thoracic spine.  Atherosclerotic calcification is present in the abdominal aorta.      Impression:       CONCLUSION:    Large amount of gas and stool noted throughout the colon.  Distended stool-filled rectum.  Nonspecific small bowel gas pattern.    Electronically signed by:  Amador Winters MD  9/5/2018 9:39 AM CDT  Workstation: [id number]          I have reviewed the patient's current medications.     Assessment/Plan     Active Hospital Problems    Diagnosis Date Noted   • Abdominal pain [R10.9] 09/03/2018       Plan:    1.  Abdominal pain/ fecal retention:  Miralax, Lactulose, enemas, and colace ordered.  Disimpaction ordered.      2.  Hydronephrosis secondary to fecal retention:  KUB pending.  Patient noted to have several BMs yesterday.    3.  ESRD on HD:  Dr. Bustillo consulted.  Patient received dialysis today.   4.  Anemia:  Occult stool positive.  Dr. Cooney consulted.       Discharge Planning: I expect patient to be discharged to home in 1-2 days.      This document has been electronically signed by ELI Thorne on September 5, 2018 3:12 PM

## 2018-09-06 PROBLEM — Z99.2 ESRD (END STAGE RENAL DISEASE) ON DIALYSIS (HCC): Status: ACTIVE | Noted: 2017-09-27

## 2018-09-06 PROBLEM — N13.4 HYDROURETER ON LEFT: Status: ACTIVE | Noted: 2018-01-01

## 2018-09-06 PROBLEM — N18.9 ANEMIA IN CHRONIC KIDNEY DISEASE: Status: ACTIVE | Noted: 2018-01-01

## 2018-09-06 PROBLEM — N13.1 HYDRONEPHROSIS DUE TO OBSTRUCTION OF URETER: Status: ACTIVE | Noted: 2018-01-01

## 2018-09-06 PROBLEM — D63.1 ANEMIA IN CHRONIC KIDNEY DISEASE: Status: ACTIVE | Noted: 2018-01-01

## 2018-09-06 PROBLEM — N18.6 ESRD (END STAGE RENAL DISEASE) ON DIALYSIS (HCC): Status: ACTIVE | Noted: 2017-09-27

## 2018-09-06 PROBLEM — K56.41 FECAL IMPACTION (HCC): Status: ACTIVE | Noted: 2018-01-01

## 2018-09-06 NOTE — NURSING NOTE
Spoke with Skip Blackburn, patient guardian.  Updated on patient status.  Also spoke with Skip about patient concerns because she lost phone numbers for her family members.  Skip stated her family would call the guardian office for updates, and to direct them to the guardian office if they call to check on patient.  She stated if patient found phone numbers on her own it was okay for her to call them, but she recommended staff not get involved.

## 2018-09-06 NOTE — PROGRESS NOTES
[facility]  INPATIENT PROGRESS NOTE    Length of Stay: 0  Date of Admission: 9/3/2018  Primary Care Physician: Provider, No Known    Subjective   Chief Complaint: Abdominal pain  HPI:  72 year old female with a history of ESRD on HD, CAD, and DMII who presented with abdominal pain.  CT revealed fecal impaction and hydronephrosis/hydroureter without calculus and potentially secondary to compression from distended rectum.  The patient was admitted and initiated on stool softeners, osmotic laxatives, and enemas.  Disimpaction was ordered. The patient was reported to have had several liquid bowel movements.  KUB revealed persistent impaction.  GI was consulted for anemia with positive hemoccult.  GI consulted.  She was recommended serial H&H.  Heme positive stools are expected given the patients long standing constipation/obstipation.  She demonstrates no melena or bright red blood.  She was disimpacted today by myself with ITA Herrera a the bedside. She the received a SMOG enema and had a large bowel movement.      Review of Systems   Constitutional: Negative for chills and fever.   Respiratory: Negative for shortness of breath.    Cardiovascular: Negative for chest pain.   Gastrointestinal: Negative for abdominal pain, diarrhea, nausea and vomiting.        Loose stool        All pertinent negatives and positives are as above. All other systems have been reviewed and are negative unless otherwise stated.     Objective    Temp:  [97.3 °F (36.3 °C)-97.9 °F (36.6 °C)] 97.8 °F (36.6 °C)  Heart Rate:  [59-84] 70  Resp:  [16-18] 18  BP: ()/(52-74) 119/52    Physical Exam   Constitutional: She appears well-developed and well-nourished.   HENT:   Head: Normocephalic.   Eyes: Conjunctivae are normal.   Cardiovascular: Normal rate, regular rhythm, normal heart sounds and intact distal pulses.    Pulmonary/Chest: Effort normal and breath sounds normal. No respiratory  distress.   Abdominal: Soft. Bowel sounds are normal. She exhibits no distension. There is no tenderness.   Genitourinary:   Genitourinary Comments: External hemorrhoids, large amount of soft stool was noted in the rectum, manual disimpaction performed   Musculoskeletal: Normal range of motion.   Neurological: She is alert.   Skin: Skin is warm and dry. She is not diaphoretic.   Vitals reviewed.    Results Review:  I have reviewed the labs, radiology results, and diagnostic studies.    Laboratory Data:     Results from last 7 days  Lab Units 09/06/18  0626 09/05/18  0849 09/04/18  0551 09/03/18  1636   SODIUM mmol/L 136* 137 137 140   POTASSIUM mmol/L 4.1 3.6 3.8 3.8   CHLORIDE mmol/L 99 99 99 96   CO2 mmol/L 25.0 28.0 29.0 34.0*   BUN mg/dL 39* 43* 35* 28*   CREATININE mg/dL 5.21* 5.80* 4.28* 3.51*   GLUCOSE mg/dL 95 84 59* 112*   CALCIUM mg/dL 8.1* 8.1* 8.2* 8.9   BILIRUBIN mg/dL  --   --   --  0.6   ALK PHOS U/L  --   --   --  185*   ALT (SGPT) U/L  --   --   --  17   AST (SGOT) U/L  --   --   --  37*   ANION GAP mmol/L 12.0 10.0 9.0 10.0     Estimated Creatinine Clearance: 8.6 mL/min (A) (by C-G formula based on SCr of 5.21 mg/dL (H)).    Results from last 7 days  Lab Units 09/05/18  0849   PHOSPHORUS mg/dL 4.1           Results from last 7 days  Lab Units 09/06/18  0626 09/05/18  0849 09/04/18  1918 09/04/18  0643 09/03/18  1636 09/03/18  1607   WBC 10*3/mm3 5.37 7.33  --  5.97 6.94 6.82   HEMOGLOBIN g/dL 8.6* 8.9* 9.4* 8.5* 10.6* 10.4*   HEMATOCRIT % 27.7* 27.8* 30.0* 26.8* 33.4* 32.9*   PLATELETS 10*3/mm3 204 239  --  235 279 284           Culture Data:   No results found for: BLOODCX  Urine Culture   Date Value Ref Range Status   09/03/2018 Mixed Umu Isolated  Final     No results found for: RESPCX  No results found for: WOUNDCX  No results found for: STOOLCX  No components found for: BODYFLD    Radiology Data:   Imaging Results (last 24 hours)     Procedure Component Value Units Date/Time    XR Abdomen  KUB [[id number]] Collected:  09/05/18 1209     Updated:  09/05/18 2211    Narrative:       PROCEDURE: XR ABDOMEN KUB    VIEWS:  1    INDICATION:Abdominal pain/discomfort    COMPARISON: None    FINDINGS:      - Bowel gas pattern: Nonobstructive. Increased stool in  ascending colon and rectosigmoid    - Free air: None    - Soft tissue:No gross evidence of organomegaly,      an abdominal mass,or ascites    - Calculi:None projecting over gallbladder       fossa, renal shadows, or anticipated course of the ureters.    - Osseous: Kyphoplasty cement is seen at the thoracolumbar  junction.      Impression:       Constipation. Nonobstructive bowel gas pattern. Please note that  gas and stool limits evaluation for nephrolithiasis    If pain or symptoms persist beyond reasonable expectations, a CT  examination is suggested, as is deemed clinically appropriate.    Electronically signed by:  Katey Macias MD  9/5/2018 10:10 PM CDT  Workstation: [id number]          I have reviewed the patient's current medications.     Assessment/Plan     Hospital Problem List     * (Principal)Fecal impaction (CMS/[facility])    Constipation    ESRD (end stage renal disease) on dialysis (CMS/HCC)    Overview Signed 9/27/2017  3:05 PM by Lexi Hardy APRN     Added automatically from request for surgery [id number]         Abdominal pain    Anemia in chronic kidney disease    Overview Signed 9/6/2018  2:37 PM by Sharif Vergara APRN     Possible GI losses         Hydroureter on left    Hydronephrosis due to obstruction of ureter    Overview Signed 9/6/2018  2:38 PM by Sharif Vergara APRN     Suspected compression from distended rectum               Plan:    Rocephin day 3/3  S/P manual disimpaction  Received SMOG enema, which was effective  Repeat KUB and renal ultrasound in the AM to assess for resolution of impaction and hydronephrosis/hydrourter  PPI  Check H&H in AM  Appreciate GI consultation            This document has been  electronically signed by ELI Geller on September 6, 2018 2:41 PM

## 2018-09-06 NOTE — PROGRESS NOTES
SUBJECTIVE:   9/6/2018  Chief Complaint:     Subjective      Patient is 72 y.o. female who feels better at this time no abdominal pain nausea vomiting or constipation.  Patient tolerating a diet well.     CURRENT MEDICATIONS/OBJECTIVE/VS/PE:     Current Medications:     Current Facility-Administered Medications   Medication Dose Route Frequency Provider Last Rate Last Dose   • acetaminophen (TYLENOL) tablet 650 mg  650 mg Oral Q6H PRN Pascual Saldivar MD       • atorvastatin (LIPITOR) tablet 40 mg  40 mg Oral Nightly Pascual Saldivar MD   40 mg at 09/05/18 2059   • furosemide (LASIX) tablet 40 mg  40 mg Oral QAM Pascual Saldivar MD   40 mg at 09/06/18 0615   • gabapentin (NEURONTIN) capsule 100 mg  100 mg Oral TID Pascual Saldivar MD   100 mg at 09/06/18 1543   • hydrALAZINE (APRESOLINE) tablet 75 mg  75 mg Oral TID Pascual Saldivar MD   75 mg at 09/06/18 1543   • HYDROcodone-acetaminophen (NORCO) 7.5-325 MG per tablet 1 tablet  1 tablet Oral Q4H PRN Pascual Saldivar MD   1 tablet at 09/06/18 0955   • levETIRAcetam (KEPPRA) tablet 500 mg  500 mg Oral Q12H Pascual Saldivar MD   500 mg at 09/06/18 0939   • levothyroxine (SYNTHROID, LEVOTHROID) tablet 88 mcg  88 mcg Oral Q AM Pascual Saldivar MD   88 mcg at 09/06/18 0615   • lidocaine-prilocaine (EMLA) 2.5-2.5 % cream   Topical PRN Chi Holden MD       • magic butt ointment   Topical PRN Russell Hare MD       • mirtazapine (REMERON) half tablet 7.5 mg  7.5 mg Oral Nightly Pascual Saldivar MD   7.5 mg at 09/05/18 2059   • morphine injection 1 mg  1 mg Intravenous Q4H PRN Pascual Saldivar MD        And   • naloxone (NARCAN) injection 0.4 mg  0.4 mg Intravenous Q5 Min PRN Pascual Saldivar MD       • nitroglycerin (NITROSTAT) SL tablet 0.4 mg  0.4 mg Sublingual Q5 Min PRN Pascual Saldivar MD       • ondansetron (ZOFRAN) injection 4 mg  4 mg Intravenous Q6H PRN Pascual Saldivar MD       • [START ON 9/7/2018] pantoprazole  (PROTONIX) EC tablet 40 mg  40 mg Oral Q AM Sharif Vergara APRN       • polyethylene glycol (MIRALAX) powder 17 g  17 g Oral Daily Rosalba Major APRN       • QUEtiapine (SEROquel) tablet 25 mg  25 mg Oral Nightly Pascual Saldivar MD   25 mg at 09/05/18 2059   • sevelamer (RENAGEL) tablet 1,600 mg  1,600 mg Oral TID With Meals Chi Holden MD   1,600 mg at 09/06/18 1130   • sodium chloride 0.9 % flush 1-10 mL  1-10 mL Intravenous PRN Pascual Saldivar MD       • sodium chloride 0.9 % flush 10 mL  10 mL Intravenous PRN Amish Hernandez MD   10 mL at 09/03/18 1630   • vitamin B-6 (PYRIDOXINE) tablet 50 mg  50 mg Oral Daily Pascual Saldivar MD   50 mg at 09/06/18 0939       Objective     Physical Exam:   Temp:  [97.3 °F (36.3 °C)-97.8 °F (36.6 °C)] 97.6 °F (36.4 °C)  Heart Rate:  [59-84] 80  Resp:  [16-18] 18  BP: ()/(52-74) 113/67     Physical Exam:  General Appearance:    Alert, cooperative, in no acute distress   Head:    Normocephalic, without obvious abnormality, atraumatic   Eyes:            Lids and lashes normal, conjunctivae and sclerae normal, no   icterus, no pallor, corneas clear, PERRLA   Ears:    Ears appear intact with no abnormalities noted   Throat:   No oral lesions, no thrush, oral mucosa moist   Neck:   No adenopathy, supple, trachea midline, no thyromegaly, no     carotid bruit, no JVD   Back:     No kyphosis present, no scoliosis present, no skin lesions,       erythema or scars, no tenderness to percussion or                   palpation,   range of motion normal   Lungs:     Clear to auscultation,respirations regular, even and                   unlabored    Heart:    Regular rhythm and normal rate, normal S1 and S2, no            murmur, no gallop, no rub, no click   Breast Exam:    Deferred   Abdomen:     Normal bowel sounds, no masses, no organomegaly, soft        non-tender, non-distended, no guarding, no rebound                 tenderness   Genitalia:     Deferred   Extremities:   Moves all extremities well, no edema, no cyanosis, no              redness   Pulses:   Pulses palpable and equal bilaterally   Skin:   No bleeding, bruising or rash   Lymph nodes:   No palpable adenopathy   Neurologic:   Cranial nerves 2 - 12 grossly intact, sensation intact, DTR        present and equal bilaterally      Results Review:     Lab Results (last 24 hours)     Procedure Component Value Units Date/Time    Basic Metabolic Panel [[id number]]  (Abnormal) Collected:  09/06/18 0626    Specimen:  Blood Updated:  09/06/18 0741     Glucose 95 mg/dL      BUN 39 (H) mg/dL      Creatinine 5.21 (H) mg/dL      Sodium 136 (L) mmol/L      Potassium 4.1 mmol/L      Chloride 99 mmol/L      CO2 25.0 mmol/L      Calcium 8.1 (L) mg/dL      eGFR Non African Amer 8 (L) mL/min/1.73      BUN/Creatinine Ratio 7.5     Anion Gap 12.0 mmol/L     Narrative:       The MDRD GFR formula is only valid for adults with stable renal function between ages 18 and 70.    CBC & Differential [[id number]] Collected:  09/06/18 0626    Specimen:  Blood Updated:  09/06/18 0740    Narrative:       The following orders were created for panel order CBC & Differential.  Procedure                               Abnormality         Status                     ---------                               -----------         ------                     CBC Auto Differential[[id number]]        Abnormal            Final result                 Please view results for these tests on the individual orders.    CBC Auto Differential [[id number]]  (Abnormal) Collected:  09/06/18 0626    Specimen:  Blood Updated:  09/06/18 0740     WBC 5.37 10*3/mm3      RBC 2.96 (L) 10*6/mm3      Hemoglobin 8.6 (L) g/dL      Hematocrit 27.7 (L) %      MCV 93.6 fL      MCH 29.1 pg      MCHC 31.0 (L) g/dL      RDW 17.1 (H) %      RDW-SD 56.8 (H) fl      MPV 10.6 fL      Platelets 204 10*3/mm3      Neutrophil % 39.3 %      Lymphocyte % 37.8 %      Monocyte % 12.3 (H) %       Eosinophil % 9.3 (H) %      Basophil % 1.1 %      Immature Grans % 0.2 %      Neutrophils, Absolute 2.11 10*3/mm3      Lymphocytes, Absolute 2.03 10*3/mm3      Monocytes, Absolute 0.66 10*3/mm3      Eosinophils, Absolute 0.50 10*3/mm3      Basophils, Absolute 0.06 10*3/mm3      Immature Grans, Absolute 0.01 10*3/mm3      nRBC 0.0 /100 WBC            I reviewed the patient's new clinical results.  I reviewed the patient's new imaging results and agree with the interpretation.     ASSESSMENT/PLAN:   ASSESSMENT: Patient with anemia patient with marked improvement in constipation patient had heme positive stools which is expected after impaction.  We'll continue to follow patient's hemoglobin.    PLAN: #1 continue to monitor patient's hemoglobin if hemoglobin less than 7 please transfuse 2 units packed RBCs.  #2 we'll continue to follow with you if hemoglobin decreases we'll consider endoscopy at this point I do not believe it is necessary secondary to stabilization of her hemoglobin.  The risks, benefits, and alternatives of this procedure have been discussed with the patient or the responsible party- the patient understands and agrees to proceed.         Marcos Cooney MD  09/06/18  4:41 PM           This document has been electronically signed by Marcos Cooney MD on September 6, 2018 4:41 PM

## 2018-09-06 NOTE — PLAN OF CARE
Problem: Patient Care Overview  Goal: Plan of Care Review  Outcome: Ongoing (interventions implemented as appropriate)   09/06/18 0253   Coping/Psychosocial   Plan of Care Reviewed With patient   Plan of Care Review   Progress no change   OTHER   Outcome Summary vss, forgetful and agitated this shift; will continue to monitor     Goal: Individualization and Mutuality  Outcome: Ongoing (interventions implemented as appropriate)    Goal: Discharge Needs Assessment  Outcome: Ongoing (interventions implemented as appropriate)    Goal: Interprofessional Rounds/Family Conf  Outcome: Ongoing (interventions implemented as appropriate)      Problem: Fall Risk (Adult)  Goal: Absence of Fall  Outcome: Ongoing (interventions implemented as appropriate)      Problem: Skin Injury Risk (Adult)  Goal: Identify Related Risk Factors and Signs and Symptoms  Outcome: Ongoing (interventions implemented as appropriate)    Goal: Skin Health and Integrity  Outcome: Ongoing (interventions implemented as appropriate)      Problem: Urinary Tract Infection (Adult)  Goal: Signs and Symptoms of Listed Potential Problems Will be Absent, Minimized or Managed (Urinary Tract Infection)  Outcome: Ongoing (interventions implemented as appropriate)      Problem: Hemodialysis (Adult)  Goal: Signs and Symptoms of Listed Potential Problems Will be Absent, Minimized or Managed (Hemodialysis)  Outcome: Ongoing (interventions implemented as appropriate)      Problem: Kidney Disease, Chronic/End Stage Renal Disease (Adult)  Goal: Signs and Symptoms of Listed Potential Problems Will be Absent, Minimized or Managed (Kidney Disease, Chronic/End Stage Renal Disease)  Outcome: Ongoing (interventions implemented as appropriate)

## 2018-09-06 NOTE — PROGRESS NOTES
NEPHROLOGY PROGRESS NOTE:    History of present illness:   ESRD.  Alert and comfortable.  States that she is leaving the hospital because she cannot get some phone numbers that she requested.  States still having some abdominal discomfort.  No fever.  Patient had dialysis yesterday.    Patient Active Problem List   Diagnosis   • Constipation   • Arteriovenous fistula (CMS/HCC)   • Controlled type 2 diabetes mellitus with chronic kidney disease on chronic dialysis, without long-term current use of insulin (CMS/HCC)   • Coronary artery disease involving native coronary artery of native heart without angina pectoris   • Panlobular emphysema (CMS/HCC)   • ESRD (end stage renal disease) on dialysis (CMS/[facility])   • HCAP (healthcare-associated pneumonia)   • MRSA bacteremia   • Sepsis (CMS/[facility])   • Essential hypertension   • Hyperlipidemia   • Pseudoaneurysm (CMS/[facility])   • Pneumonia of right upper lobe due to infectious organism (CMS/[facility])   • Abdominal pain   • Fecal impaction (CMS/[facility])   • Anemia in chronic kidney disease   • Hydroureter on left   • Hydronephrosis due to obstruction of ureter       Medications:    atorvastatin 40 mg Oral Nightly   furosemide 40 mg Oral QAM   gabapentin 100 mg Oral TID   hydrALAZINE 75 mg Oral TID   levETIRAcetam 500 mg Oral Q12H   levothyroxine 88 mcg Oral Q AM   mirtazapine 7.5 mg Oral Nightly   [START ON 9/7/2018] pantoprazole 40 mg Oral Q AM   polyethylene glycol 17 g Oral Daily   QUEtiapine 25 mg Oral Nightly   sevelamer 1,600 mg Oral TID With Meals   vitamin B-6 50 mg Oral Daily        Vitals:    09/06/18 0900 09/06/18 1120 09/06/18 1543 09/06/18 1609   BP: 119/52 110/74 118/68 113/67   BP Location: Left arm Left arm  Right arm   Patient Position: Lying Lying  Lying   Pulse: 70 73 82 80   Resp: 18 16  18   Temp:  97.4 °F (36.3 °C)  97.6 °F (36.4 °C)   TempSrc:  Axillary  Oral   SpO2: 99% 100%  98%   Weight:       Height:         I/O last 3 completed shifts:  In: 120 [P.O.:120]  Out:  2500 [Other:2500]  No intake/output data recorded.    On examination:  General:  Comfortable, no distress.  Alert and comfortable.  Skin: No skin rashes or mucosal bleeding   HEENT:  Pallor present, no icterus.     Neck:  No jugular venous distention, or thyroid enlargement.  Chest:  Clear.  No rales or wheezes audible.  Air entry appears equal bilaterally.  CVS: Heart sounds are regular, there is no pericardial rub or gallop.  Abdomen: Diffuse abdominal discomfort.  No rebound or guarding.  Extremities:  No significant edema of the lower extremities.Left thigh AV graft with no significant change in examination compared to previous studies.  Musculoskeletal: No acute joint inflammation.Chronic joint changes.  Neuro:  No focal motor neurological deficits.  No asterixis.  Mentation:  Alert and oriented.  Irrational in her requests and behavior.    Past medical illness, family history, social history, medications, previous notes reviewed.       Laboratory results:      Recent Results (from the past 24 hour(s))   Basic Metabolic Panel    Collection Time: 09/06/18  6:26 AM   Result Value Ref Range    Glucose 95 60 - 100 mg/dL    BUN 39 (H) 7 - 21 mg/dL    Creatinine 5.21 (H) 0.50 - 1.00 mg/dL    Sodium 136 (L) 137 - 145 mmol/L    Potassium 4.1 3.5 - 5.1 mmol/L    Chloride 99 95 - 110 mmol/L    CO2 25.0 22.0 - 31.0 mmol/L    Calcium 8.1 (L) 8.4 - 10.2 mg/dL    eGFR Non  Amer 8 (L) 39 - 90 mL/min/1.73    BUN/Creatinine Ratio 7.5 7.0 - 25.0    Anion Gap 12.0 5.0 - 15.0 mmol/L   CBC Auto Differential    Collection Time: 09/06/18  6:26 AM   Result Value Ref Range    WBC 5.37 3.20 - 9.80 10*3/mm3    RBC 2.96 (L) 3.77 - 5.16 10*6/mm3    Hemoglobin 8.6 (L) 12.0 - 15.5 g/dL    Hematocrit 27.7 (L) 35.0 - 45.0 %    MCV 93.6 80.0 - 98.0 fL    MCH 29.1 26.5 - 34.0 pg    MCHC 31.0 (L) 31.4 - 36.0 g/dL    RDW 17.1 (H) 11.5 - 14.5 %    RDW-SD 56.8 (H) 36.4 - 46.3 fl    MPV 10.6 8.0 - 12.0 fL    Platelets 204 150 - 450 10*3/mm3     Neutrophil % 39.3 37.0 - 80.0 %    Lymphocyte % 37.8 10.0 - 50.0 %    Monocyte % 12.3 (H) 0.0 - 12.0 %    Eosinophil % 9.3 (H) 0.0 - 7.0 %    Basophil % 1.1 0.0 - 2.0 %    Immature Grans % 0.2 0.0 - 0.5 %    Neutrophils, Absolute 2.11 2.00 - 8.60 10*3/mm3    Lymphocytes, Absolute 2.03 0.60 - 4.20 10*3/mm3    Monocytes, Absolute 0.66 0.00 - 0.90 10*3/mm3    Eosinophils, Absolute 0.50 0.00 - 0.70 10*3/mm3    Basophils, Absolute 0.06 0.00 - 0.20 10*3/mm3    Immature Grans, Absolute 0.01 0.00 - 0.02 10*3/mm3    nRBC 0.0 0.0 - 0.0 /100 WBC   ]    Imaging Results (last 24 hours)     Procedure Component Value Units Date/Time    XR Abdomen KUB [[id number]] Collected:  09/05/18 1209     Updated:  09/05/18 2211    Narrative:       PROCEDURE: XR ABDOMEN KUB    VIEWS:  1    INDICATION:Abdominal pain/discomfort    COMPARISON: None    FINDINGS:      - Bowel gas pattern: Nonobstructive. Increased stool in  ascending colon and rectosigmoid    - Free air: None    - Soft tissue:No gross evidence of organomegaly,      an abdominal mass,or ascites    - Calculi:None projecting over gallbladder       fossa, renal shadows, or anticipated course of the ureters.    - Osseous: Kyphoplasty cement is seen at the thoracolumbar  junction.      Impression:       Constipation. Nonobstructive bowel gas pattern. Please note that  gas and stool limits evaluation for nephrolithiasis    If pain or symptoms persist beyond reasonable expectations, a CT  examination is suggested, as is deemed clinically appropriate.    Electronically signed by:  Katey Macias MD  9/5/2018 10:10 PM CDT  Workstation: [id number]            ASSESSMENT:      End-stage renal disease  Abdominal pain  Hydronephrosis  Essential hypertension  Anemia of chronic kidney disease  Urinary tract infection    PLAN:     ESRD.  Patient had dialysis yesterday.  Volume status appears stable.  Patient plan for dialysis tomorrow.     Constipation: Getting laxatives and stool softeners.  Has had  some bowel movements.  Repeat KUB has been requested.    Anemia: Hemoglobin is low at 8.6 with a hematocrit of 27.7.  No active bleeding is been noted.  Gastroenterology is evaluating.  Erythropoietin injections on dialysis.  Follow H&H.    Essential hypertension: Currently on diuretics, hydralazine.  Not on ACE inhibitors or ARB at this time.    Hyperphosphatemia: On Renvela.  I will hold phosphate binder still significant constipation and abdominal complaints have resolved.    Previous imaging studies showed moderate left hydronephrosis and hydroureter, could be related to significant constipation.  Once constipation is improved, repeat renal imaging studies.  If persistent, may need urology to see.    Discussed with patient in detail.      MD MOR Serrano/Transcription disclaimer:   Some parts of this note dictated by nicole, with translation of spoken language to printed text.

## 2018-09-06 NOTE — PLAN OF CARE
Problem: Patient Care Overview  Goal: Plan of Care Review  Outcome: Ongoing (interventions implemented as appropriate)   09/06/18 1327   Coping/Psychosocial   Plan of Care Reviewed With patient   Plan of Care Review   Progress improving   OTHER   Outcome Summary pt. had large BM after enema; VS stable     Goal: Individualization and Mutuality  Outcome: Ongoing (interventions implemented as appropriate)    Goal: Discharge Needs Assessment  Outcome: Ongoing (interventions implemented as appropriate)    Goal: Interprofessional Rounds/Family Conf  Outcome: Ongoing (interventions implemented as appropriate)      Problem: Fall Risk (Adult)  Goal: Identify Related Risk Factors and Signs and Symptoms  Outcome: Outcome(s) achieved Date Met: 09/06/18    Goal: Absence of Fall  Outcome: Ongoing (interventions implemented as appropriate)      Problem: Skin Injury Risk (Adult)  Goal: Identify Related Risk Factors and Signs and Symptoms  Outcome: Outcome(s) achieved Date Met: 09/06/18    Goal: Skin Health and Integrity  Outcome: Ongoing (interventions implemented as appropriate)      Problem: Urinary Tract Infection (Adult)  Goal: Signs and Symptoms of Listed Potential Problems Will be Absent, Minimized or Managed (Urinary Tract Infection)  Outcome: Ongoing (interventions implemented as appropriate)      Problem: Hemodialysis (Adult)  Goal: Signs and Symptoms of Listed Potential Problems Will be Absent, Minimized or Managed (Hemodialysis)  Outcome: Ongoing (interventions implemented as appropriate)      Problem: Kidney Disease, Chronic/End Stage Renal Disease (Adult)  Goal: Signs and Symptoms of Listed Potential Problems Will be Absent, Minimized or Managed (Kidney Disease, Chronic/End Stage Renal Disease)  Outcome: Ongoing (interventions implemented as appropriate)

## 2018-09-06 NOTE — CONSULTS
Adult Nutrition  Assessment    Patient Name:  Efrem Roa  YOB: 1945  MRN: [id number]  Admit Date:  9/3/2018    Assessment Date:  9/6/2018    Comments: Pt reports a fair appetite.  She does not really like the Nepro but she will drink it.  She is very voiceful regarding food preferences and choices.  She has been disimpacted and reports loose stools.  She reports no GI distress at this time.  Dialysis continues.  RD will monitor          Reason for Assessment     Row Name 09/06/18 1610          Reason for Assessment    Reason For Assessment follow-up protocol               Nutrition/Diet History     Row Name 09/06/18 1610          Nutrition/Diet History    Typical Food/Fluid Intake Pt still requesting fried chicken.  SHe said that she didn't like the Nepro very much but she will drink it.  She does not like eggs like we fix them.  Her sto               Labs/Tests/Procedures/Meds     Row Name 09/06/18 1612          Labs/Procedures/Meds    Lab Results Reviewed reviewed, pertinent        Diagnostic Tests/Procedures    Diagnostic Test/Procedure Reviewed reviewed, pertinent     Diagnostic Test/Procedures Comments GI consult        Medications    Pertinent Medications Reviewed reviewed, pertinent             Physical Findings     Row Name 09/06/18 1617          Physical Findings    Gastrointestinal constipation               Nutrition Prescription Ordered     Row Name 09/06/18 1613          Nutrition Prescription PO    Current PO Diet Regular     Fluid Consistency Thin     Supplement Nepro     Supplement Frequency 3 times a day     Common Modifiers Cardiac;Consistent Carbohydrate;Renal             Evaluation of Received Nutrient/Fluid Intake     Row Name 09/06/18 1613          PO Evaluation    Number of Days PO Intake Evaluated 2 days     Number of Meals 3     % PO Intake 25% average             Electronically signed by:  Alyx Ojeda RD  09/06/18 4:17 PM

## 2018-09-07 NOTE — PLAN OF CARE
Problem: Patient Care Overview  Goal: Plan of Care Review  Outcome: Ongoing (interventions implemented as appropriate)   09/07/18 0329   Coping/Psychosocial   Plan of Care Reviewed With patient   Plan of Care Review   Progress improving   OTHER   Outcome Summary vss,pain is controlled; will continue to monitor     Goal: Individualization and Mutuality  Outcome: Ongoing (interventions implemented as appropriate)    Goal: Discharge Needs Assessment  Outcome: Ongoing (interventions implemented as appropriate)    Goal: Interprofessional Rounds/Family Conf  Outcome: Ongoing (interventions implemented as appropriate)      Problem: Fall Risk (Adult)  Goal: Absence of Fall  Outcome: Ongoing (interventions implemented as appropriate)      Problem: Skin Injury Risk (Adult)  Goal: Skin Health and Integrity  Outcome: Ongoing (interventions implemented as appropriate)      Problem: Urinary Tract Infection (Adult)  Goal: Signs and Symptoms of Listed Potential Problems Will be Absent, Minimized or Managed (Urinary Tract Infection)  Outcome: Ongoing (interventions implemented as appropriate)      Problem: Hemodialysis (Adult)  Goal: Signs and Symptoms of Listed Potential Problems Will be Absent, Minimized or Managed (Hemodialysis)  Outcome: Ongoing (interventions implemented as appropriate)      Problem: Kidney Disease, Chronic/End Stage Renal Disease (Adult)  Goal: Signs and Symptoms of Listed Potential Problems Will be Absent, Minimized or Managed (Kidney Disease, Chronic/End Stage Renal Disease)  Outcome: Ongoing (interventions implemented as appropriate)

## 2018-09-07 NOTE — PROGRESS NOTES
NEPHROLOGY PROGRESS NOTE:    History of present illness:   ESRD.  Sleeping, difficult to arouse.  Has been awake and talking this morning according to primary team.  No shortness of breath.  Planned dialysis today.    Patient Active Problem List   Diagnosis   • Constipation   • Arteriovenous fistula (CMS/[facility])   • Controlled type 2 diabetes mellitus with chronic kidney disease on chronic dialysis, without long-term current use of insulin (CMS/H[facility])   • Coronary artery disease involving native coronary artery of native heart without angina pectoris   • Panlobular emphysema (CMS/[facility])   • ESRD (end stage renal disease) on dialysis (CMS/[facility])   • HCAP (healthcare-associated pneumonia)   • MRSA bacteremia   • Sepsis (CMS/[facility])   • Essential hypertension   • Hyperlipidemia   • Pseudoaneurysm (CMS/[facility])   • Pneumonia of right upper lobe due to infectious organism (CMS/[facility])   • Abdominal pain   • Fecal impaction (CMS/[facility])   • Anemia in chronic kidney disease   • Hydroureter on left   • Hydronephrosis due to obstruction of ureter       Medications:    atorvastatin 40 mg Oral Nightly   epoetin dagmar 6,000 Units Intravenous Once per day on Mon Wed Fri   furosemide 40 mg Oral QAM   gabapentin 100 mg Oral TID   hydrALAZINE 75 mg Oral TID   hydrocortisone  Rectal BID   levETIRAcetam 500 mg Oral Q12H   levothyroxine 88 mcg Oral Q AM   mirtazapine 7.5 mg Oral Nightly   pantoprazole 40 mg Oral Q AM   polyethylene glycol 17 g Oral Daily   QUEtiapine 25 mg Oral Nightly   vitamin B-6 50 mg Oral Daily        Vitals:    09/07/18 0849 09/07/18 0850 09/07/18 1129 09/07/18 1546   BP: 124/55  103/73 141/51   BP Location: Left arm  Left arm Right leg   Patient Position:   Lying Lying   Pulse: 63 64 63 70   Resp: 16 18 18   Temp: 96.8 °F (36 °C)  97.3 °F (36.3 °C)    TempSrc: Oral  Oral    SpO2: 100%  96%    Weight:       Height:         No intake/output data recorded.  I/O this shift:  In: -   Out: 1800 [Other:1800]    On examination:  General:   Comfortable, no distress.  Resting comfortably.  Skin: No skin rashes or mucosal bleeding   HEENT:  Pallor present, no icterus.     Neck:  No jugular venous distention, or thyroid enlargement.  Chest:  Clear.  No rales or wheezes audible.  Air entry appears equal bilaterally.  CVS: Heart sounds are regular, there is no pericardial rub or gallop.  Abdomen: Diffuse abdominal discomfort.  No rebound or guarding.  Extremities:  No significant edema of the lower extremities.Left thigh AV graft with no significant change in examination compared to previous studies.  Musculoskeletal: No acute joint inflammation.Chronic joint changes.  Neuro:  Limited examination, resting comfortably  Mentation:  Limited examination, resting comfortably.    Past medical illness, family history, social history, medications, previous notes reviewed.       Laboratory results:      Recent Results (from the past 24 hour(s))   CBC Auto Differential    Collection Time: 09/07/18 12:21 PM   Result Value Ref Range    WBC 6.33 3.20 - 9.80 10*3/mm3    RBC 3.32 (L) 3.77 - 5.16 10*6/mm3    Hemoglobin 9.7 (L) 12.0 - 15.5 g/dL    Hematocrit 30.3 (L) 35.0 - 45.0 %    MCV 91.3 80.0 - 98.0 fL    MCH 29.2 26.5 - 34.0 pg    MCHC 32.0 31.4 - 36.0 g/dL    RDW 17.2 (H) 11.5 - 14.5 %    RDW-SD 55.2 (H) 36.4 - 46.3 fl    MPV 9.4 8.0 - 12.0 fL    Platelets 221 150 - 450 10*3/mm3    Neutrophil % 60.0 37.0 - 80.0 %    Lymphocyte % 23.1 10.0 - 50.0 %    Monocyte % 6.8 0.0 - 12.0 %    Eosinophil % 9.2 (H) 0.0 - 7.0 %    Basophil % 0.6 0.0 - 2.0 %    Immature Grans % 0.3 0.0 - 0.5 %    Neutrophils, Absolute 3.80 2.00 - 8.60 10*3/mm3    Lymphocytes, Absolute 1.46 0.60 - 4.20 10*3/mm3    Monocytes, Absolute 0.43 0.00 - 0.90 10*3/mm3    Eosinophils, Absolute 0.58 0.00 - 0.70 10*3/mm3    Basophils, Absolute 0.04 0.00 - 0.20 10*3/mm3    Immature Grans, Absolute 0.02 0.00 - 0.02 10*3/mm3   Basic Metabolic Panel    Collection Time: 09/07/18 12:21 PM   Result Value Ref Range     Glucose 111 (H) 60 - 100 mg/dL    BUN 50 (H) 7 - 21 mg/dL    Creatinine 6.47 (H) 0.50 - 1.00 mg/dL    Sodium 134 (L) 137 - 145 mmol/L    Potassium 4.3 3.5 - 5.1 mmol/L    Chloride 99 95 - 110 mmol/L    CO2 23.0 22.0 - 31.0 mmol/L    Calcium 8.2 (L) 8.4 - 10.2 mg/dL    eGFR Non  Amer 6 (L) 39 - 90 mL/min/1.73    BUN/Creatinine Ratio 7.7 7.0 - 25.0    Anion Gap 12.0 5.0 - 15.0 mmol/L   ]    Imaging Results (last 24 hours)     Procedure Component Value Units Date/Time    US Renal Bilateral [[id number]] Collected:  09/07/18 0745     Updated:  09/07/18 1316    Narrative:         EXAMINATION: Ultrasound, retroperitoneal / renal     CLINICAL INDICATION / HISTORY: hydronephrosis follow-up, R10.9  Unspecified abdominal pain K56.41 Fecal impaction N39.0 Urinary  tract infection, site not specified N13.4 Hydroureter  COMPARISON:  None  TECHNIQUE:  Grayscale and color Doppler ultrasound.    FINDINGS:  The right kidney is atrophic measuring 5.2 cm in length. The left  kidney is not visualized. There is no hydronephrosis in the right  kidney. The cortex of the right kidney appears echogenic  compatible with renal parenchymal disease.    The urinary bladder contains a small amount of urine and appears  unremarkable.         Impression:       CONCLUSION: The left kidney is not visualized. The right kidney  is atrophic measuring 5.2 cm in length. There is no  hydronephrosis in the right kidney. The cortex of the right  kidney appears echogenic compatible with renal parenchymal  disease.    Electronically signed by:  Amador Winters MD  9/7/2018 12:38 PM CDT  Workstation: [id number]    XR Abdomen KUB [[id number]] Collected:  09/07/18 0758     Updated:  09/07/18 1105    Narrative:       [facility] [place]    Patient Name: SIRISHA GUPTA    ORDERING: DEIRDRE SKINNER    ATTENDING: MARÍA ELENA CULLEN     REFERRING: DEIRDRE SKINNER    -----------------------    PROCEDURE: Single view abdomen/KUB    COMPARISON: 9/5/18,  9/4/18    HISTORY: abdominal pain, impaction, R10.9 Unspecified abdominal  pain K56.41 Fecal impaction N39.0 Urinary tract infection, site  not specified N13.4 Hydroureter    TECHNIQUE: Single frontal view of the abdomen and pelvis.     FINDINGS:  Stool burden decreased from prior exam.  There is fecal debris and bowel gas in nondilated colon.  Nonobstructive small bowel gas pattern.  No suspicious calcifications.      Impression:       CONCLUSION:    Unremarkable exam. Stool burden decreased from prior exam.    Electronically signed by:  Amador Winters MD  9/7/2018 11:04 AM CDT  Workstation: [id number]            ASSESSMENT:      End-stage renal disease  Abdominal pain  Hydronephrosis  Essential hypertension  Anemia of chronic kidney disease  Urinary tract infection    PLAN:     ESRD.   Patient on dialysis on Monday Wednesday and Friday.  She is scheduled for dialysis this afternoon.  Previous electrolytes have been stable.    Constipation: Getting laxatives and stool softeners.  Has had some bowel movements.  Repeat KUB looks better.    Anemia: H&H stable, Getting erythropoietin injections on dialysis.    Gastroneurology is following.  Follow repeat H&H.    Essential hypertension: Currently on diuretics, hydralazine.  Not on ACE inhibitors or ARB at this time.    Hyperphosphatemia: On Renvela.  Will hold phosphorus binders till issues with constipation not improved.    Previous imaging studies showed moderate left hydronephrosis and hydroureter, could be related to significant constipation. Patient had repeat ultrasound this morning and the results to be followed.      MD MOR Serrano/Transcription disclaimer:   Some parts of this note dictated by nicole, with translation of spoken language to printed text.

## 2018-09-07 NOTE — PROGRESS NOTES
SUBJECTIVE:   9/7/2018  Chief Complaint:     Subjective      Patient is 72 y.o. female who denies any bleeding at this time having normal bowel movements and patient's hemoglobin is stable.      CURRENT MEDICATIONS/OBJECTIVE/VS/PE:     Current Medications:     Current Facility-Administered Medications   Medication Dose Route Frequency Provider Last Rate Last Dose   • acetaminophen (TYLENOL) tablet 650 mg  650 mg Oral Q6H PRN Pascual Saldivar MD       • atorvastatin (LIPITOR) tablet 40 mg  40 mg Oral Nightly Pascual Saldivar MD   40 mg at 09/06/18 2235   • furosemide (LASIX) tablet 40 mg  40 mg Oral QAM Pascual Saldivar MD   40 mg at 09/06/18 0615   • gabapentin (NEURONTIN) capsule 100 mg  100 mg Oral TID Pascual Saldivar MD   100 mg at 09/06/18 2235   • hydrALAZINE (APRESOLINE) tablet 75 mg  75 mg Oral TID Pascual Saldivar MD   75 mg at 09/06/18 1543   • HYDROcodone-acetaminophen (NORCO) 7.5-325 MG per tablet 1 tablet  1 tablet Oral Q4H PRN Pascual Saldivar MD   1 tablet at 09/06/18 2240   • hydrocortisone (ANUSOL-HC) 2.5 % rectal cream   Rectal BID Sharif Vergara APRN       • levETIRAcetam (KEPPRA) tablet 500 mg  500 mg Oral Q12H Pascual Saldivar MD   500 mg at 09/06/18 2235   • levothyroxine (SYNTHROID, LEVOTHROID) tablet 88 mcg  88 mcg Oral Q AM Pascual Saldivar MD   88 mcg at 09/07/18 0617   • lidocaine-prilocaine (EMLA) 2.5-2.5 % cream   Topical PRN Chi Holden MD       • magic butt ointment   Topical PRN Russell Hare MD       • mirtazapine (REMERON) half tablet 7.5 mg  7.5 mg Oral Nightly Pascual Saldivar MD   7.5 mg at 09/06/18 2235   • morphine injection 1 mg  1 mg Intravenous Q4H PRN Pascual Saldivar MD        And   • naloxone (NARCAN) injection 0.4 mg  0.4 mg Intravenous Q5 Min PRN Pascual Saldivar MD       • nitroglycerin (NITROSTAT) SL tablet 0.4 mg  0.4 mg Sublingual Q5 Min PRN Pascual Saldivar MD       • ondansetron (ZOFRAN) injection 4 mg  4 mg  Intravenous Q6H PRN Pascual Saldivar MD       • pantoprazole (PROTONIX) EC tablet 40 mg  40 mg Oral Q AM Sharif Vergara APRN   40 mg at 09/07/18 0617   • polyethylene glycol (MIRALAX) powder 17 g  17 g Oral Daily Rosalba Major, APRN       • QUEtiapine (SEROquel) tablet 25 mg  25 mg Oral Nightly Pascual Saldivar MD   25 mg at 09/06/18 2235   • sodium chloride 0.9 % flush 1-10 mL  1-10 mL Intravenous PRN Pascual Saldivar MD       • sodium chloride 0.9 % flush 10 mL  10 mL Intravenous PRN Amish Hernandez MD   10 mL at 09/03/18 1630   • vitamin B-6 (PYRIDOXINE) tablet 50 mg  50 mg Oral Daily Pascual Saldivar MD   50 mg at 09/06/18 0939       Objective     Physical Exam:   Temp:  [96.2 °F (35.7 °C)-97.7 °F (36.5 °C)] 96.8 °F (36 °C)  Heart Rate:  [63-86] 64  Resp:  [16-18] 16  BP: ()/(47-74) 124/55     Physical Exam:  General Appearance:    Alert, cooperative, in no acute distress   Head:    Normocephalic, without obvious abnormality, atraumatic   Eyes:            Lids and lashes normal, conjunctivae and sclerae normal, no   icterus, no pallor, corneas clear, PERRLA   Ears:    Ears appear intact with no abnormalities noted   Throat:   No oral lesions, no thrush, oral mucosa moist   Neck:   No adenopathy, supple, trachea midline, no thyromegaly, no     carotid bruit, no JVD   Back:     No kyphosis present, no scoliosis present, no skin lesions,       erythema or scars, no tenderness to percussion or                   palpation,   range of motion normal   Lungs:     Clear to auscultation,respirations regular, even and                   unlabored    Heart:    Regular rhythm and normal rate, normal S1 and S2, no            murmur, no gallop, no rub, no click   Breast Exam:    Deferred   Abdomen:     Normal bowel sounds, no masses, no organomegaly, soft        non-tender, non-distended, no guarding, no rebound                 tenderness   Genitalia:    Deferred   Extremities:   Moves all  extremities well, no edema, no cyanosis, no              redness   Pulses:   Pulses palpable and equal bilaterally   Skin:   No bleeding, bruising or rash   Lymph nodes:   No palpable adenopathy   Neurologic:   Cranial nerves 2 - 12 grossly intact, sensation intact, DTR        present and equal bilaterally      Results Review:     Lab Results (last 24 hours)     ** No results found for the last 24 hours. **           I reviewed the patient's new clinical results.  I reviewed the patient's new imaging results and agree with the interpretation.     ASSESSMENT/PLAN:   ASSESSMENT: Patient with anemia status post disimpaction colon.  Patient has not had any bleeding and is having normal bowel movements no abdominal pain tolerating a diet.    PLAN: #1 continue to monitor patient's hemoglobin if hemoglobin less than 7 please transfuse 2 units packed RBCs.  #2 we'll sign off from a GI standpoint patient does not appear to be bleeding with stable hemoglobin.  Please reconsult if patient has any further GI problems  The risks, benefits, and alternatives of this procedure have been discussed with the patient or the responsible party- the patient understands and agrees to proceed.         Marcos Cooney MD  09/07/18  10:34 AM           This document has been electronically signed by Marcos Cooney MD on September 7, 2018 10:34 AM

## 2018-10-15 NOTE — ED NOTES
Attempted to Call Emergency line for [place] state guardianship. No response.      Ban Correia, RN  10/15/18 9482

## 2018-10-15 NOTE — ED NOTES
Pt presents to the ED via EMS from Dialysis c/o AMS and an episode of unresponsiveness at Dialysis. Patient is disoriented at this time. She refuses IV and flu swab.      Tabby Troncoso, RN  10/15/18 0018

## 2018-10-15 NOTE — ED PROVIDER NOTES
Subjective   Patient developed hypotension with systolic blood pressure in the 70s while at dialysis today.  During this time she became unresponsive.  Patient did finish her dialysis, however, an additional small bolus was given which led to a resolution of her hypertension.        Hypotension   Severity:  Moderate  Onset quality:  Sudden  Duration:  1 hour  Timing:  Constant  Progression:  Resolved  Chronicity:  Recurrent  Context:  Dialtysis  Associated symptoms: loss of consciousness    Altered Mental Status       Review of Systems   Unable to perform ROS: Mental status change   Neurological: Positive for loss of consciousness.       Past Medical History:   Diagnosis Date   • Abnormal x-ray     Standard chest x ray, f/u pneumonia xray   • Acquired hypothyroidism    • Amblyopia    • Anemia of renal disease    • Anxiety    • Arteriovenous fistula (CMS/[facility])     Placed 10/15/2014   • Bipolar affective disorder (CMS/[facility])     Current episode depression   • Bipolar disorder (CMS/[facility])    • Cerebrovascular accident (CMS/[facility])    • Chest wall pain    • Chronic angle-closure glaucoma    • Chronic kidney disease     Stage 4-approaching hemodialysis   • Chronic pain syndrome    • Combined drug dependence excluding opioids (CMS/[facility])    • Constipation    • COPD (chronic obstructive pulmonary disease) (CMS/[facility])    • Cough    • Degeneration of cervical intervertebral disc    • Dementia     Multi-infarct, uncomplicated   • Depression    • Diabetes mellitus (CMS/[facility])     No retinopathy   • Diabetes mellitus (CMS/[facility])     Without mention of complication, type 2 or unspecified type, not stated as uncontrolled   • Diabetic renal disease (CMS/[facility])    • Disc disorder of lumbar region    • DJD (degenerative joint disease)     Involving multiple joints   • Edema    • Epigastric pain    • Essential hypertension    • Exotropia    • Gastritis    • Generalized abdominal pain    • GERD (gastroesophageal reflux disease)     With Esophagitis   •  Gout    • H/O screening mammography    • Hiatal hernia    • Hyperlipidemia    • Hypermetropia    • Insomnia    • Iron deficiency anemia    • Low back pain    • Lumbago    • Non-cardiac chest pain    • Nuclear cataract    • Osteoporosis    • Panic disorder without agoraphobia    • Peripheral nervous system disorder    • Radiculitis    • RLQ abdominal pain    • RUQ pain    • Sprain of ankle    • Sprain of knee     Resolving   • Superficial injury of shoulder and upper arm    • Surgical follow-up care    • Type 2 diabetes mellitus (CMS/HCC)    • UTI (urinary tract infection)    • Visit for suture removal    • Vitamin D deficiency    • Vulvovaginitis        Allergies   Allergen Reactions   • Ambien [Zolpidem Tartrate]    • Benadryl [Diphenhydramine]    • Penicillins        Past Surgical History:   Procedure Laterality Date   • APPENDECTOMY     • BACK SURGERY      x2   • BYPASS GRAFT  2015    Left brachial artery to axillary vein arteriovenous graft. Venous ultrasound unilateral extremity   •  SECTION      x2   • CHOLECYSTECTOMY     • COLONOSCOPY W/ POLYPECTOMY  2015    Colitis found in the cecum. Biopsy taken. A diverticulum was found in the sigmoid colon.   • ENDOSCOPY AND COLONOSCOPY     • ESOPHAGOSCOPY / EGD  2015    Normal esophagus. Gastritis found in the stomach. Biopsy taken. Duodenitis found in the duodenum. Biopsy take.   • ESOPHAGOSCOPY / EGD  1944    With tube-Esophagus appeared normal. Nonerosive gastritis. Duodenum appeared normal   • HYSTERECTOMY     • INJECTION OF MEDICATION  2011    Aranesp   • INJECTION OF MEDICATION  2011    Kenalog   • INTERVENTIONAL RADIOLOGY PROCEDURE Left 10/5/2017    Procedure: IR dialysis fistulagram;  Surgeon: Blane Fernandez MD;  Location: [facility] INVASIVE LOCATION;  Service:    • LEG SURGERY  2000    Cancelled. Due to uncontrolled hypertension   • OTHER SURGICAL HISTORY  2016    Tissue plasminogen  activator catheter thrombolysis   • REMOVAL PERITONEAL DIALYSIS CATHETER  01/16/2014    Removal of tunneled hemodialysis catheter with cuff   • TRANSESOPHAGEAL ECHOCARDIOGRAM (ANTHONY)  03/24/2016    Mild left atrial enlargement with mild to moderate CLVH with normal aortic root size.LV systolic function well preserved with Ef of 55-60%.Mitral valve thickened.Av thickened.Aortic sclerosis.Mild mitral regurg.mild to moderate tricuspid regurg.   • TRANSESOPHAGEAL ECHOCARDIOGRAM (ANTHONY)  02/22/2012    Normal left ventricular systolic function. Moderate tricuspid regurgitation with evidenceof pulmonary hypertension       Family History   Problem Relation Age of Onset   • Coronary artery disease Other        Social History     Social History   • Marital status:      Social History Main Topics   • Smoking status: Former Smoker   • Smokeless tobacco: Never Used   • Alcohol use No   • Drug use: No     Other Topics Concern   • Not on file           Objective   Physical Exam   Constitutional: She is oriented to person, place, and time. She appears well-developed and well-nourished.   HENT:   Head: Normocephalic and atraumatic.   Nose: Nose normal.   Mouth/Throat: Oropharynx is clear and moist.   Eyes: Pupils are equal, round, and reactive to light. Conjunctivae and EOM are normal. Right eye exhibits no discharge. Left eye exhibits no discharge. No scleral icterus.   Neck: Normal range of motion. Neck supple. No tracheal deviation present.   Cardiovascular: Normal rate, regular rhythm and normal heart sounds.    No murmur heard.  Pulmonary/Chest: Effort normal and breath sounds normal. No stridor. No respiratory distress. She has no wheezes. She has no rales.   Abdominal: Soft. Bowel sounds are normal. She exhibits no distension and no mass. There is no tenderness. There is no rebound and no guarding.   Musculoskeletal: She exhibits edema.   Neurological: She is alert and oriented to person, place, and time. Coordination  normal.   Skin: Skin is warm and dry. No rash noted. No erythema.   Psychiatric: Her affect is blunt.   Nursing note and vitals reviewed.      Procedures           ED Course  ED Course as of Oct 15 1856   Mon Oct 15, 2018   1853 Dr. Mireles presented to the emergency department and has seen and evaluated the patient.  From a nephrology standpoint, he states as long as the patient returns her baseline she can go back to the nursing home.  Patient has returned to her baseline and has been refusing additional lab draws.  She is stable, with no significant lab abnormalities from her baseline.  [CB]      ED Course User Index  [CB] Benjamin Kraus MD          Labs Reviewed   COMPREHENSIVE METABOLIC PANEL - Abnormal; Notable for the following:        Result Value    Glucose 133 (*)     BUN 29 (*)     Creatinine 3.60 (*)     Sodium 130 (*)     Chloride 85 (*)     CO2 32.0 (*)     AST (SGOT) 40 (*)     Alkaline Phosphatase 230 (*)     eGFR Non African Amer 12 (*)     Globulin 4.4 (*)     A/G Ratio 1.0 (*)     All other components within normal limits    Narrative:     The MDRD GFR formula is only valid for adults with stable renal function between ages 18 and 70.   CBC WITH AUTO DIFFERENTIAL - Abnormal; Notable for the following:     WBC 12.37 (*)     Hemoglobin 11.7 (*)     RDW 15.5 (*)     RDW-SD 52.1 (*)     Neutrophil % 86.0 (*)     Lymphocyte % 4.5 (*)     Neutrophils, Absolute 10.64 (*)     Lymphocytes, Absolute 0.56 (*)     Monocytes, Absolute 1.10 (*)     Immature Grans, Absolute 0.03 (*)     All other components within normal limits   INFLUENZA ANTIGEN, RAPID - Normal   CK - Normal   MAGNESIUM - Normal   CK MB - Normal   TROPONIN (IN-HOUSE) - Normal   URINALYSIS W/ CULTURE IF INDICATED   CBC AND DIFFERENTIAL    Narrative:     The following orders were created for panel order CBC & Differential.  Procedure                               Abnormality         Status                     ---------                                -----------         ------                     CBC Auto Differential[[id number]]        Abnormal            Final result                 Please view results for these tests on the individual orders.       XR Chest 1 View   Final Result   1.  Stable hiatal hernia.   2.  Stable chronic bibasilar interstitial stranding.   3.  No acute cardiopulmonary abnormality.      Electronically signed by:  Cedrick Kinney MD  10/15/2018 4:40 PM CDT   Workstation: [id number]      CT Head Without Contrast   Final Result   1.  No acute intracranial abnormality.   2.  Old large infarct involving the posterior right frontal lobe,   right parietal lobe, right occipital lobe, and posterior right   temporal lobe.   3.  Stable moderate cerebral and cerebellar involutional changes.      Electronically signed by:  Cedrick Kinney MD  10/15/2018 4:29 PM CDT   Workstation: [id number]                    [facility]      Final diagnoses:   Stage 5 chronic kidney disease on chronic dialysis (CMS/HCC)   Hypotension due to hypovolemia            Benjamin Kraus MD  10/15/18 1858

## 2018-10-15 NOTE — ED NOTES
Maurisio Feldman with [employer] gave verbal consent to treat patient to this RN and ITA Hughes.     Ban Correia, RN  10/15/18 6805

## 2018-10-15 NOTE — ED NOTES
Provider notified of no IV access. Mai, JUSTINE obtained blood. Charge Nurse to attempt IV.     Ban Correia RN  10/15/18 5211

## 2018-10-16 NOTE — ED NOTES
Belongings sent with patient back to Rehab center. Attempted to call report to Rehab center again, no response.     Ban Correia RN  10/15/18 9221

## 2018-10-16 NOTE — ED NOTES
[facility], [facility]     Ban Correia, RN  10/15/18 1374

## 2018-10-16 NOTE — ED NOTES
"Patient stated to Agata Scott, during the attempt of taking vitals signs  \"go leave, leave me alone, dont touch me\"  Marjorie Malik stopped the attempt of taking vitals immediately and notifed Lynnette King  10/15/18 1933    Lynnette Rodriguez  10/15/18 1934    "

## 2019-01-01 ENCOUNTER — APPOINTMENT (OUTPATIENT)
Dept: GENERAL RADIOLOGY | Facility: HOSPITAL | Age: 74
End: 2019-01-01

## 2019-01-01 ENCOUNTER — OUTSIDE FACILITY SERVICE (OUTPATIENT)
Dept: FAMILY MEDICINE CLINIC | Facility: CLINIC | Age: 74
End: 2019-01-01

## 2019-01-01 ENCOUNTER — APPOINTMENT (OUTPATIENT)
Dept: INTERVENTIONAL RADIOLOGY/VASCULAR | Facility: HOSPITAL | Age: 74
End: 2019-01-01

## 2019-01-01 ENCOUNTER — HOSPITAL ENCOUNTER (OUTPATIENT)
Facility: HOSPITAL | Age: 74
Setting detail: OBSERVATION
Discharge: NURSING, REHAB OR LONG-TERM CARE | End: 2019-03-12
Attending: EMERGENCY MEDICINE | Admitting: INTERNAL MEDICINE

## 2019-01-01 ENCOUNTER — EPISODE CHANGES (OUTPATIENT)
Dept: CASE MANAGEMENT | Facility: OTHER | Age: 74
End: 2019-01-01

## 2019-01-01 ENCOUNTER — APPOINTMENT (OUTPATIENT)
Dept: ULTRASOUND IMAGING | Facility: HOSPITAL | Age: 74
End: 2019-01-01

## 2019-01-01 ENCOUNTER — HOSPITAL ENCOUNTER (OUTPATIENT)
Facility: HOSPITAL | Age: 74
Setting detail: HOSPITAL OUTPATIENT SURGERY
Discharge: HOME | End: 2019-03-13
Attending: THORACIC SURGERY (CARDIOTHORACIC VASCULAR SURGERY) | Admitting: THORACIC SURGERY (CARDIOTHORACIC VASCULAR SURGERY)

## 2019-01-01 ENCOUNTER — APPOINTMENT (OUTPATIENT)
Dept: CT IMAGING | Facility: HOSPITAL | Age: 74
End: 2019-01-01

## 2019-01-01 ENCOUNTER — ANESTHESIA EVENT (OUTPATIENT)
Dept: INTERVENTIONAL RADIOLOGY/VASCULAR | Facility: HOSPITAL | Age: 74
End: 2019-01-01

## 2019-01-01 ENCOUNTER — HOSPITAL ENCOUNTER (EMERGENCY)
Facility: HOSPITAL | Age: 74
Discharge: HOME | End: 2019-02-04
Attending: FAMILY MEDICINE | Admitting: FAMILY MEDICINE

## 2019-01-01 ENCOUNTER — APPOINTMENT (OUTPATIENT)
Dept: CARDIOLOGY | Facility: HOSPITAL | Age: 74
End: 2019-01-01

## 2019-01-01 ENCOUNTER — PATIENT OUTREACH (OUTPATIENT)
Dept: CASE MANAGEMENT | Facility: OTHER | Age: 74
End: 2019-01-01

## 2019-01-01 ENCOUNTER — HOSPITAL ENCOUNTER (OUTPATIENT)
Dept: CT IMAGING | Facility: HOSPITAL | Age: 74
Discharge: HOME | End: 2019-02-19
Admitting: FAMILY MEDICINE

## 2019-01-01 ENCOUNTER — APPOINTMENT (OUTPATIENT)
Dept: SLEEP MEDICINE | Facility: HOSPITAL | Age: 74
End: 2019-01-01

## 2019-01-01 ENCOUNTER — ANESTHESIA (OUTPATIENT)
Dept: INTERVENTIONAL RADIOLOGY/VASCULAR | Facility: HOSPITAL | Age: 74
End: 2019-01-01

## 2019-01-01 ENCOUNTER — PREP FOR SURGERY (OUTPATIENT)
Dept: OTHER | Facility: HOSPITAL | Age: 74
End: 2019-01-01

## 2019-01-01 ENCOUNTER — HOSPITAL ENCOUNTER (INPATIENT)
Facility: HOSPITAL | Age: 74
LOS: 2 days | End: 2019-03-15
Attending: EMERGENCY MEDICINE | Admitting: THORACIC SURGERY (CARDIOTHORACIC VASCULAR SURGERY)

## 2019-01-01 VITALS
RESPIRATION RATE: 18 BRPM | BODY MASS INDEX: 21.61 KG/M2 | SYSTOLIC BLOOD PRESSURE: 101 MMHG | TEMPERATURE: 97 F | WEIGHT: 118.17 LBS | HEART RATE: 84 BPM | DIASTOLIC BLOOD PRESSURE: 70 MMHG | OXYGEN SATURATION: 97 %

## 2019-01-01 VITALS
TEMPERATURE: 97.3 F | HEART RATE: 65 BPM | RESPIRATION RATE: 23 BRPM | BODY MASS INDEX: 19.46 KG/M2 | OXYGEN SATURATION: 98 % | SYSTOLIC BLOOD PRESSURE: 128 MMHG | DIASTOLIC BLOOD PRESSURE: 90 MMHG | WEIGHT: 106.4 LBS

## 2019-01-01 VITALS
BODY MASS INDEX: 24.14 KG/M2 | DIASTOLIC BLOOD PRESSURE: 24 MMHG | OXYGEN SATURATION: 60 % | RESPIRATION RATE: 14 BRPM | SYSTOLIC BLOOD PRESSURE: 64 MMHG | WEIGHT: 131.17 LBS | HEIGHT: 62 IN | TEMPERATURE: 97.4 F

## 2019-01-01 VITALS
BODY MASS INDEX: 21.77 KG/M2 | HEART RATE: 89 BPM | OXYGEN SATURATION: 96 % | SYSTOLIC BLOOD PRESSURE: 157 MMHG | TEMPERATURE: 97.5 F | DIASTOLIC BLOOD PRESSURE: 70 MMHG | RESPIRATION RATE: 18 BRPM | WEIGHT: 118.3 LBS | HEIGHT: 62 IN

## 2019-01-01 DIAGNOSIS — I25.10 ATHEROSCLEROSIS OF NATIVE CORONARY ARTERY OF NATIVE HEART, ANGINA PRESENCE UNSPECIFIED: ICD-10-CM

## 2019-01-01 DIAGNOSIS — I46.9 CARDIAC ARREST (HCC): Primary | ICD-10-CM

## 2019-01-01 DIAGNOSIS — R10.30 LOWER ABDOMINAL PAIN: ICD-10-CM

## 2019-01-01 DIAGNOSIS — K59.00 CONSTIPATION, UNSPECIFIED CONSTIPATION TYPE: Primary | ICD-10-CM

## 2019-01-01 DIAGNOSIS — N18.6 ESRD (END STAGE RENAL DISEASE) ON DIALYSIS (HCC): Primary | ICD-10-CM

## 2019-01-01 DIAGNOSIS — R10.33 PERIUMBILICAL ABDOMINAL PAIN: ICD-10-CM

## 2019-01-01 DIAGNOSIS — R18.8 OTHER ASCITES: ICD-10-CM

## 2019-01-01 DIAGNOSIS — J96.01 ACUTE RESPIRATORY FAILURE WITH HYPOXIA (HCC): ICD-10-CM

## 2019-01-01 DIAGNOSIS — Z99.2 ESRD (END STAGE RENAL DISEASE) ON DIALYSIS (HCC): Primary | ICD-10-CM

## 2019-01-01 DIAGNOSIS — N18.9 CHRONIC RENAL IMPAIRMENT, UNSPECIFIED CKD STAGE: ICD-10-CM

## 2019-01-01 DIAGNOSIS — R07.9 CHEST PAIN, UNSPECIFIED TYPE: Primary | ICD-10-CM

## 2019-01-01 DIAGNOSIS — N18.6 ESRD (END STAGE RENAL DISEASE) ON DIALYSIS (HCC): ICD-10-CM

## 2019-01-01 DIAGNOSIS — R10.2 ADNEXAL TENDERNESS: ICD-10-CM

## 2019-01-01 DIAGNOSIS — I10 ESSENTIAL HYPERTENSION, BENIGN: ICD-10-CM

## 2019-01-01 DIAGNOSIS — Z99.2 ESRD (END STAGE RENAL DISEASE) ON DIALYSIS (HCC): ICD-10-CM

## 2019-01-01 LAB
ALBUMIN SERPL-MCNC: 1.8 G/DL (ref 3.4–4.8)
ALBUMIN SERPL-MCNC: 2.7 G/DL (ref 3.4–4.8)
ALBUMIN SERPL-MCNC: 3.1 G/DL (ref 3.4–4.8)
ALBUMIN SERPL-MCNC: 3.6 G/DL (ref 3.4–4.8)
ALBUMIN/GLOB SERPL: 0.7 G/DL (ref 1.1–1.8)
ALBUMIN/GLOB SERPL: 0.8 G/DL (ref 1.1–1.8)
ALBUMIN/GLOB SERPL: 0.8 G/DL (ref 1.1–1.8)
ALBUMIN/GLOB SERPL: 1 G/DL (ref 1.1–1.8)
ALP SERPL-CCNC: 110 U/L (ref 38–126)
ALP SERPL-CCNC: 163 U/L (ref 38–126)
ALP SERPL-CCNC: 166 U/L (ref 38–126)
ALP SERPL-CCNC: 174 U/L (ref 38–126)
ALT SERPL W P-5'-P-CCNC: 44 U/L (ref 9–52)
ALT SERPL W P-5'-P-CCNC: 51 U/L (ref 9–52)
ALT SERPL W P-5'-P-CCNC: <6 U/L (ref 9–52)
ALT SERPL W P-5'-P-CCNC: <6 U/L (ref 9–52)
ANION GAP SERPL CALCULATED.3IONS-SCNC: 10 MMOL/L (ref 5–15)
ANION GAP SERPL CALCULATED.3IONS-SCNC: 11 MMOL/L (ref 5–15)
ANION GAP SERPL CALCULATED.3IONS-SCNC: 12 MMOL/L (ref 5–15)
ANION GAP SERPL CALCULATED.3IONS-SCNC: 14 MMOL/L (ref 5–15)
ANION GAP SERPL CALCULATED.3IONS-SCNC: 18 MMOL/L (ref 5–15)
APTT PPP: 58.1 SECONDS (ref 20–40.3)
ARTERIAL PATENCY WRIST A: ABNORMAL
AST SERPL-CCNC: 210 U/L (ref 14–36)
AST SERPL-CCNC: 234 U/L (ref 14–36)
AST SERPL-CCNC: 31 U/L (ref 14–36)
AST SERPL-CCNC: 48 U/L (ref 14–36)
ATMOSPHERIC PRESS: 741 MMHG
ATMOSPHERIC PRESS: 746 MMHG
BACTERIA BLD CULT: ABNORMAL
BACTERIA SPEC AEROBE CULT: ABNORMAL
BACTERIA UR QL AUTO: ABNORMAL /HPF
BASE EXCESS BLDA CALC-SCNC: -2.1 MMOL/L (ref 0–2)
BASE EXCESS BLDA CALC-SCNC: -3.3 MMOL/L (ref 0–2)
BASE EXCESS BLDA CALC-SCNC: -3.6 MMOL/L (ref 0–2)
BASE EXCESS BLDA CALC-SCNC: -6.4 MMOL/L (ref 0–2)
BASOPHILS # BLD AUTO: 0.02 10*3/MM3 (ref 0–0.2)
BASOPHILS # BLD AUTO: 0.04 10*3/MM3 (ref 0–0.2)
BASOPHILS # BLD AUTO: 0.04 10*3/MM3 (ref 0–0.2)
BASOPHILS # BLD AUTO: 0.06 10*3/MM3 (ref 0–0.2)
BASOPHILS # BLD AUTO: 0.09 10*3/MM3 (ref 0–0.2)
BASOPHILS NFR BLD AUTO: 0.1 % (ref 0–2)
BASOPHILS NFR BLD AUTO: 0.3 % (ref 0–1.5)
BASOPHILS NFR BLD AUTO: 0.4 % (ref 0–1.5)
BASOPHILS NFR BLD AUTO: 0.5 % (ref 0–1.5)
BASOPHILS NFR BLD AUTO: 0.5 % (ref 0–1.5)
BDY SITE: ABNORMAL
BH CV ECHO MEAS - ACS: 1.5 CM
BH CV ECHO MEAS - ACS: 1.5 CM
BH CV ECHO MEAS - AO MAX PG (FULL): 1.9 MMHG
BH CV ECHO MEAS - AO MAX PG: 4.8 MMHG
BH CV ECHO MEAS - AO MAX PG: 4.9 MMHG
BH CV ECHO MEAS - AO MEAN PG (FULL): 1 MMHG
BH CV ECHO MEAS - AO MEAN PG: 3 MMHG
BH CV ECHO MEAS - AO ROOT AREA (BSA CORRECTED): 1.8
BH CV ECHO MEAS - AO ROOT AREA (BSA CORRECTED): 1.9
BH CV ECHO MEAS - AO ROOT AREA: 6.2 CM^2
BH CV ECHO MEAS - AO ROOT AREA: 7.5 CM^2
BH CV ECHO MEAS - AO ROOT DIAM: 2.8 CM
BH CV ECHO MEAS - AO ROOT DIAM: 3.1 CM
BH CV ECHO MEAS - AO V2 MAX: 109 CM/SEC
BH CV ECHO MEAS - AO V2 MAX: 111 CM/SEC
BH CV ECHO MEAS - AO V2 MEAN: 72.9 CM/SEC
BH CV ECHO MEAS - AO V2 VTI: 17.9 CM
BH CV ECHO MEAS - AVA(I,A): 2.1 CM^2
BH CV ECHO MEAS - AVA(I,D): 2.1 CM^2
BH CV ECHO MEAS - AVA(V,A): 2.2 CM^2
BH CV ECHO MEAS - AVA(V,D): 2.2 CM^2
BH CV ECHO MEAS - BSA(HAYCOCK): 1.5 M^2
BH CV ECHO MEAS - BSA(HAYCOCK): 1.7 M^2
BH CV ECHO MEAS - BSA: 1.5 M^2
BH CV ECHO MEAS - BSA: 1.7 M^2
BH CV ECHO MEAS - BZI_BMI: 21.6 KILOGRAMS/M^2
BH CV ECHO MEAS - BZI_BMI: 26.5 KILOGRAMS/M^2
BH CV ECHO MEAS - BZI_METRIC_HEIGHT: 157.5 CM
BH CV ECHO MEAS - BZI_METRIC_HEIGHT: 157.5 CM
BH CV ECHO MEAS - BZI_METRIC_WEIGHT: 53.5 KG
BH CV ECHO MEAS - BZI_METRIC_WEIGHT: 65.8 KG
BH CV ECHO MEAS - EDV(CUBED): 25.2 ML
BH CV ECHO MEAS - EDV(CUBED): 45.9 ML
BH CV ECHO MEAS - EDV(TEICH): 33 ML
BH CV ECHO MEAS - EDV(TEICH): 53.7 ML
BH CV ECHO MEAS - EF(CUBED): 75.6 %
BH CV ECHO MEAS - EF(CUBED): 92.8 %
BH CV ECHO MEAS - EF(TEICH): 69.3 %
BH CV ECHO MEAS - EF(TEICH): 88.9 %
BH CV ECHO MEAS - ESV(CUBED): 3.3 ML
BH CV ECHO MEAS - ESV(CUBED): 6.1 ML
BH CV ECHO MEAS - ESV(TEICH): 10.1 ML
BH CV ECHO MEAS - ESV(TEICH): 6 ML
BH CV ECHO MEAS - FS: 37.5 %
BH CV ECHO MEAS - FS: 58.4 %
BH CV ECHO MEAS - IVS/LVPW: 0.88
BH CV ECHO MEAS - IVS/LVPW: 0.98
BH CV ECHO MEAS - IVSD: 0.71 CM
BH CV ECHO MEAS - IVSD: 0.78 CM
BH CV ECHO MEAS - LA DIMENSION: 2.6 CM
BH CV ECHO MEAS - LA/AO: 0.84
BH CV ECHO MEAS - LV MASS(C)D: 52.7 GRAMS
BH CV ECHO MEAS - LV MASS(C)D: 76.3 GRAMS
BH CV ECHO MEAS - LV MASS(C)DI: 34.5 GRAMS/M^2
BH CV ECHO MEAS - LV MASS(C)DI: 45.8 GRAMS/M^2
BH CV ECHO MEAS - LV MAX PG: 3 MMHG
BH CV ECHO MEAS - LV MEAN PG: 2 MMHG
BH CV ECHO MEAS - LV V1 MAX: 87.2 CM/SEC
BH CV ECHO MEAS - LV V1 MEAN: 55.5 CM/SEC
BH CV ECHO MEAS - LV V1 VTI: 13.4 CM
BH CV ECHO MEAS - LVIDD: 2.9 CM
BH CV ECHO MEAS - LVIDD: 3.6 CM
BH CV ECHO MEAS - LVIDS: 1.5 CM
BH CV ECHO MEAS - LVIDS: 1.8 CM
BH CV ECHO MEAS - LVOT AREA (M): 2.5 CM^2
BH CV ECHO MEAS - LVOT AREA (M): 2.8 CM^2
BH CV ECHO MEAS - LVOT AREA: 2.5 CM^2
BH CV ECHO MEAS - LVOT AREA: 2.8 CM^2
BH CV ECHO MEAS - LVOT DIAM: 1.8 CM
BH CV ECHO MEAS - LVOT DIAM: 1.9 CM
BH CV ECHO MEAS - LVPWD: 0.8 CM
BH CV ECHO MEAS - LVPWD: 0.81 CM
BH CV ECHO MEAS - MV A MAX VEL: 92.6 CM/SEC
BH CV ECHO MEAS - MV DEC SLOPE: 282 CM/SEC^2
BH CV ECHO MEAS - MV E MAX VEL: 66.5 CM/SEC
BH CV ECHO MEAS - MV E/A: 0.72
BH CV ECHO MEAS - MV P1/2T MAX VEL: 72.5 CM/SEC
BH CV ECHO MEAS - MV P1/2T: 75.3 MSEC
BH CV ECHO MEAS - MVA P1/2T LCG: 3 CM^2
BH CV ECHO MEAS - MVA(P1/2T): 2.9 CM^2
BH CV ECHO MEAS - RAP SYSTOLE: 5 MMHG
BH CV ECHO MEAS - RVDD: 2.9 CM
BH CV ECHO MEAS - RVSP: 27.5 MMHG
BH CV ECHO MEAS - SI(AO): 72.2 ML/M^2
BH CV ECHO MEAS - SI(CUBED): 12.5 ML/M^2
BH CV ECHO MEAS - SI(CUBED): 25.5 ML/M^2
BH CV ECHO MEAS - SI(LVOT): 24.9 ML/M^2
BH CV ECHO MEAS - SI(TEICH): 15 ML/M^2
BH CV ECHO MEAS - SI(TEICH): 28.6 ML/M^2
BH CV ECHO MEAS - SV(AO): 110.2 ML
BH CV ECHO MEAS - SV(CUBED): 19 ML
BH CV ECHO MEAS - SV(CUBED): 42.6 ML
BH CV ECHO MEAS - SV(LVOT): 38 ML
BH CV ECHO MEAS - SV(TEICH): 22.9 ML
BH CV ECHO MEAS - SV(TEICH): 47.8 ML
BH CV ECHO MEAS - TR MAX VEL: 237 CM/SEC
BH CV ECHO MEAS - TR MAX VEL: 244 CM/SEC
BILIRUB SERPL-MCNC: 0.2 MG/DL (ref 0.2–1.3)
BILIRUB SERPL-MCNC: 0.5 MG/DL (ref 0.2–1.3)
BILIRUB SERPL-MCNC: 0.5 MG/DL (ref 0.2–1.3)
BILIRUB SERPL-MCNC: 0.6 MG/DL (ref 0.2–1.3)
BILIRUB UR QL STRIP: ABNORMAL
BUN BLD-MCNC: 19 MG/DL (ref 7–21)
BUN BLD-MCNC: 25 MG/DL (ref 7–21)
BUN BLD-MCNC: 29 MG/DL (ref 7–21)
BUN BLD-MCNC: 31 MG/DL (ref 7–21)
BUN BLD-MCNC: 62 MG/DL (ref 7–21)
BUN/CREAT SERPL: 11.4 (ref 7–25)
BUN/CREAT SERPL: 6.6 (ref 7–25)
BUN/CREAT SERPL: 6.9 (ref 7–25)
BUN/CREAT SERPL: 7.2 (ref 7–25)
BUN/CREAT SERPL: 7.3 (ref 7–25)
CA-I BLD-MCNC: 4.11 MG/DL (ref 4.6–5.6)
CALCIUM SPEC-SCNC: 7 MG/DL (ref 8.4–10.2)
CALCIUM SPEC-SCNC: 7 MG/DL (ref 8.4–10.2)
CALCIUM SPEC-SCNC: 7.5 MG/DL (ref 8.4–10.2)
CALCIUM SPEC-SCNC: 8.5 MG/DL (ref 8.4–10.2)
CALCIUM SPEC-SCNC: 8.8 MG/DL (ref 8.4–10.2)
CHLORIDE SERPL-SCNC: 100 MMOL/L (ref 95–110)
CHLORIDE SERPL-SCNC: 105 MMOL/L (ref 95–110)
CHLORIDE SERPL-SCNC: 89 MMOL/L (ref 95–110)
CHLORIDE SERPL-SCNC: 94 MMOL/L (ref 95–110)
CHLORIDE SERPL-SCNC: 99 MMOL/L (ref 95–110)
CLARITY UR: CLEAR
CO2 SERPL-SCNC: 11 MMOL/L (ref 22–31)
CO2 SERPL-SCNC: 19 MMOL/L (ref 22–31)
CO2 SERPL-SCNC: 24 MMOL/L (ref 22–31)
CO2 SERPL-SCNC: 31 MMOL/L (ref 22–31)
CO2 SERPL-SCNC: 34 MMOL/L (ref 22–31)
COLOR UR: ABNORMAL
CPAP: 26 CMH2O
CRE SCREEN PCR: NOT DETECTED
CRE SCREEN PCR: NOT DETECTED
CREAT BLD-MCNC: 2.9 MG/DL (ref 0.5–1)
CREAT BLD-MCNC: 3.64 MG/DL (ref 0.5–1)
CREAT BLD-MCNC: 3.96 MG/DL (ref 0.5–1)
CREAT BLD-MCNC: 4.32 MG/DL (ref 0.5–1)
CREAT BLD-MCNC: 5.43 MG/DL (ref 0.5–1)
CRP SERPL-MCNC: 7.5 MG/DL (ref 0–1)
D-LACTATE SERPL-SCNC: 4.5 MMOL/L (ref 0.5–2)
D-LACTATE SERPL-SCNC: 7.3 MMOL/L (ref 0.5–2)
D-LACTATE SERPL-SCNC: 8.9 MMOL/L (ref 0.5–2)
DEPRECATED RDW RBC AUTO: 61.2 FL (ref 36.4–46.3)
DEPRECATED RDW RBC AUTO: 63.1 FL (ref 37–54)
DEPRECATED RDW RBC AUTO: 64.5 FL (ref 37–54)
DEPRECATED RDW RBC AUTO: 64.7 FL (ref 37–54)
DEPRECATED RDW RBC AUTO: 69.2 FL (ref 37–54)
DEPRECATED RDW RBC AUTO: 70.7 FL (ref 37–54)
EOSINOPHIL # BLD AUTO: 0 10*3/MM3 (ref 0–0.4)
EOSINOPHIL # BLD AUTO: 0 10*3/MM3 (ref 0–0.4)
EOSINOPHIL # BLD AUTO: 0.02 10*3/MM3 (ref 0–0.4)
EOSINOPHIL # BLD AUTO: 0.03 10*3/MM3 (ref 0–0.7)
EOSINOPHIL # BLD AUTO: 0.04 10*3/MM3 (ref 0–0.4)
EOSINOPHIL NFR BLD AUTO: 0 % (ref 0.3–6.2)
EOSINOPHIL NFR BLD AUTO: 0 % (ref 0.3–6.2)
EOSINOPHIL NFR BLD AUTO: 0.2 % (ref 0.3–6.2)
EOSINOPHIL NFR BLD AUTO: 0.2 % (ref 0–7)
EOSINOPHIL NFR BLD AUTO: 0.5 % (ref 0.3–6.2)
ERYTHROCYTE [DISTWIDTH] IN BLOOD BY AUTOMATED COUNT: 17.2 % (ref 11.5–14.5)
ERYTHROCYTE [DISTWIDTH] IN BLOOD BY AUTOMATED COUNT: 18.6 % (ref 12.3–15.4)
ERYTHROCYTE [DISTWIDTH] IN BLOOD BY AUTOMATED COUNT: 19.6 % (ref 12.3–15.4)
GFR SERPL CREATININE-BSD FRML MDRD: 10 ML/MIN/1.73 (ref 39–90)
GFR SERPL CREATININE-BSD FRML MDRD: 11 ML/MIN/1.73 (ref 39–90)
GFR SERPL CREATININE-BSD FRML MDRD: 12 ML/MIN/1.73 (ref 39–90)
GFR SERPL CREATININE-BSD FRML MDRD: 16 ML/MIN/1.73 (ref 39–90)
GFR SERPL CREATININE-BSD FRML MDRD: 8 ML/MIN/1.73 (ref 39–90)
GFR SERPL CREATININE-BSD FRML MDRD: ABNORMAL ML/MIN/1.73 (ref 39–90)
GLOBULIN UR ELPH-MCNC: 2.5 GM/DL (ref 2.3–3.5)
GLOBULIN UR ELPH-MCNC: 3.5 GM/DL (ref 2.3–3.5)
GLOBULIN UR ELPH-MCNC: 3.7 GM/DL (ref 2.3–3.5)
GLOBULIN UR ELPH-MCNC: 4 GM/DL (ref 2.3–3.5)
GLUCOSE BLD-MCNC: 100 MG/DL (ref 60–100)
GLUCOSE BLD-MCNC: 117 MG/DL (ref 60–100)
GLUCOSE BLD-MCNC: 151 MG/DL (ref 60–100)
GLUCOSE BLD-MCNC: 169 MG/DL (ref 60–100)
GLUCOSE BLD-MCNC: 94 MG/DL (ref 60–100)
GLUCOSE BLDC GLUCOMTR-MCNC: 101 MG/DL (ref 70–130)
GLUCOSE BLDC GLUCOMTR-MCNC: 111 MG/DL (ref 70–130)
GLUCOSE BLDC GLUCOMTR-MCNC: 120 MG/DL (ref 70–130)
GLUCOSE BLDC GLUCOMTR-MCNC: 151 MG/DL (ref 70–130)
GLUCOSE BLDC GLUCOMTR-MCNC: 184 MG/DL (ref 70–130)
GLUCOSE BLDC GLUCOMTR-MCNC: 197 MG/DL (ref 70–130)
GLUCOSE BLDC GLUCOMTR-MCNC: 245 MG/DL (ref 70–130)
GLUCOSE BLDC GLUCOMTR-MCNC: 91 MG/DL (ref 70–130)
GLUCOSE BLDC GLUCOMTR-MCNC: 97 MG/DL (ref 70–130)
GLUCOSE UR STRIP-MCNC: NEGATIVE MG/DL
GRAM STN SPEC: ABNORMAL
HBV SURFACE AG SERPL QL IA: NEGATIVE
HCO3 BLDA-SCNC: 20.1 MMOL/L (ref 20–26)
HCO3 BLDA-SCNC: 20.4 MMOL/L (ref 20–26)
HCO3 BLDA-SCNC: 20.6 MMOL/L (ref 20–26)
HCO3 BLDA-SCNC: 21.5 MMOL/L (ref 20–26)
HCT VFR BLD AUTO: 28.3 % (ref 34–46.6)
HCT VFR BLD AUTO: 34.7 % (ref 34–46.6)
HCT VFR BLD AUTO: 35.4 % (ref 34–46.6)
HCT VFR BLD AUTO: 35.7 % (ref 35–45)
HCT VFR BLD AUTO: 36.8 % (ref 34–46.6)
HCT VFR BLD AUTO: 37.3 % (ref 34–46.6)
HGB BLD-MCNC: 10.9 G/DL (ref 12–15.9)
HGB BLD-MCNC: 11.3 G/DL (ref 12–15.9)
HGB BLD-MCNC: 11.6 G/DL (ref 12–15.5)
HGB BLD-MCNC: 12 G/DL (ref 12–15.9)
HGB BLD-MCNC: 12.1 G/DL (ref 12–15.9)
HGB BLD-MCNC: 8.3 G/DL (ref 12–15.9)
HGB UR QL STRIP.AUTO: NEGATIVE
HOLD SPECIMEN: NORMAL
HOROWITZ INDEX BLD+IHG-RTO: 30 %
HOROWITZ INDEX BLD+IHG-RTO: 85 %
HYALINE CASTS UR QL AUTO: ABNORMAL /LPF
IMM GRANULOCYTES # BLD AUTO: 0.04 10*3/MM3 (ref 0–0.05)
IMM GRANULOCYTES # BLD AUTO: 0.05 10*3/MM3 (ref 0–0.02)
IMM GRANULOCYTES # BLD AUTO: 0.12 10*3/MM3 (ref 0–0.05)
IMM GRANULOCYTES # BLD AUTO: 0.13 10*3/MM3 (ref 0–0.05)
IMM GRANULOCYTES # BLD AUTO: 0.3 10*3/MM3 (ref 0–0.05)
IMM GRANULOCYTES NFR BLD AUTO: 0.3 % (ref 0–0.5)
IMM GRANULOCYTES NFR BLD AUTO: 0.4 % (ref 0–0.5)
IMM GRANULOCYTES NFR BLD AUTO: 0.7 % (ref 0–0.5)
IMM GRANULOCYTES NFR BLD AUTO: 0.7 % (ref 0–0.5)
IMM GRANULOCYTES NFR BLD AUTO: 3.6 % (ref 0–0.5)
IMP STRAIN: NOT DETECTED
IMP STRAIN: NOT DETECTED
INR PPP: 2.07 (ref 0.8–1.2)
INR PPP: 2.14 (ref 0.8–1.2)
INR PPP: 7.06 (ref 0.8–1.2)
KETONES UR QL STRIP: NEGATIVE
KPC STRAIN: NOT DETECTED
KPC STRAIN: NOT DETECTED
LARGE PLATELETS: ABNORMAL
LEUKOCYTE ESTERASE UR QL STRIP.AUTO: NEGATIVE
LIPASE SERPL-CCNC: 79 U/L (ref 23–300)
LV EF 2D ECHO EST: 65 %
LYMPHOCYTES # BLD AUTO: 0.42 10*3/MM3 (ref 0.7–3.1)
LYMPHOCYTES # BLD AUTO: 0.5 10*3/MM3 (ref 0.7–3.1)
LYMPHOCYTES # BLD AUTO: 0.91 10*3/MM3 (ref 0.6–4.2)
LYMPHOCYTES # BLD AUTO: 0.97 10*3/MM3 (ref 0.7–3.1)
LYMPHOCYTES # BLD AUTO: 2.38 10*3/MM3 (ref 0.7–3.1)
LYMPHOCYTES # BLD MANUAL: 0.82 10*3/MM3 (ref 0.7–3.1)
LYMPHOCYTES NFR BLD AUTO: 10.8 % (ref 19.6–45.3)
LYMPHOCYTES NFR BLD AUTO: 2.3 % (ref 19.6–45.3)
LYMPHOCYTES NFR BLD AUTO: 29 % (ref 19.6–45.3)
LYMPHOCYTES NFR BLD AUTO: 3 % (ref 19.6–45.3)
LYMPHOCYTES NFR BLD AUTO: 5.3 % (ref 10–50)
LYMPHOCYTES NFR BLD MANUAL: 22 % (ref 5–12)
LYMPHOCYTES NFR BLD MANUAL: 5 % (ref 19.6–45.3)
Lab: ABNORMAL
MAGNESIUM SERPL-MCNC: 2 MG/DL (ref 1.6–2.3)
MAXIMAL PREDICTED HEART RATE: 146 BPM
MAXIMAL PREDICTED HEART RATE: 146 BPM
MCH RBC QN AUTO: 31.2 PG (ref 26.5–34)
MCH RBC QN AUTO: 31.2 PG (ref 26.6–33)
MCH RBC QN AUTO: 31.7 PG (ref 26.6–33)
MCH RBC QN AUTO: 31.8 PG (ref 26.6–33)
MCHC RBC AUTO-ENTMCNC: 29.3 G/DL (ref 31.5–35.7)
MCHC RBC AUTO-ENTMCNC: 30.8 G/DL (ref 31.5–35.7)
MCHC RBC AUTO-ENTMCNC: 32.4 G/DL (ref 31.5–35.7)
MCHC RBC AUTO-ENTMCNC: 32.5 G/DL (ref 31.4–36)
MCHC RBC AUTO-ENTMCNC: 32.6 G/DL (ref 31.5–35.7)
MCHC RBC AUTO-ENTMCNC: 32.6 G/DL (ref 31.5–35.7)
MCV RBC AUTO: 101.4 FL (ref 79–97)
MCV RBC AUTO: 106.4 FL (ref 79–97)
MCV RBC AUTO: 95.6 FL (ref 79–97)
MCV RBC AUTO: 96 FL (ref 80–98)
MCV RBC AUTO: 97.2 FL (ref 79–97)
MCV RBC AUTO: 97.9 FL (ref 79–97)
MODALITY: ABNORMAL
MONOCYTES # BLD AUTO: 0.44 10*3/MM3 (ref 0–0.9)
MONOCYTES # BLD AUTO: 0.47 10*3/MM3 (ref 0.1–0.9)
MONOCYTES # BLD AUTO: 0.69 10*3/MM3 (ref 0.1–0.9)
MONOCYTES # BLD AUTO: 0.72 10*3/MM3 (ref 0.1–0.9)
MONOCYTES # BLD AUTO: 0.9 10*3/MM3 (ref 0.1–0.9)
MONOCYTES # BLD AUTO: 3.63 10*3/MM3 (ref 0.1–0.9)
MONOCYTES NFR BLD AUTO: 2.6 % (ref 0–12)
MONOCYTES NFR BLD AUTO: 4.3 % (ref 5–12)
MONOCYTES NFR BLD AUTO: 5 % (ref 5–12)
MONOCYTES NFR BLD AUTO: 5.7 % (ref 5–12)
MONOCYTES NFR BLD AUTO: 7.7 % (ref 5–12)
NDM STRAIN: NOT DETECTED
NDM STRAIN: NOT DETECTED
NEUTROPHILS # BLD AUTO: 12.04 10*3/MM3 (ref 1.4–7)
NEUTROPHILS # BLD AUTO: 15.44 10*3/MM3 (ref 1.4–7)
NEUTROPHILS # BLD AUTO: 15.56 10*3/MM3 (ref 2–8.6)
NEUTROPHILS # BLD AUTO: 16.39 10*3/MM3 (ref 1.4–7)
NEUTROPHILS # BLD AUTO: 4.99 10*3/MM3 (ref 1.4–7)
NEUTROPHILS # BLD AUTO: 7.25 10*3/MM3 (ref 1.4–7)
NEUTROPHILS NFR BLD AUTO: 60.7 % (ref 42.7–76)
NEUTROPHILS NFR BLD AUTO: 80.5 % (ref 42.7–76)
NEUTROPHILS NFR BLD AUTO: 91.5 % (ref 37–80)
NEUTROPHILS NFR BLD AUTO: 91.5 % (ref 42.7–76)
NEUTROPHILS NFR BLD AUTO: 91.7 % (ref 42.7–76)
NEUTROPHILS NFR BLD MANUAL: 70 % (ref 42.7–76)
NEUTS BAND NFR BLD MANUAL: 3 % (ref 0–5)
NITRITE UR QL STRIP: NEGATIVE
NRBC BLD AUTO-RTO: 0.2 /100 WBC (ref 0–0)
NRBC BLD AUTO-RTO: 1 /100 WBC (ref 0–0)
NRBC BLD AUTO-RTO: 1.2 /100 WBC (ref 0–0)
NRBC BLD AUTO-RTO: 1.2 /100 WBC (ref 0–0)
NRBC SPEC MANUAL: 10 /100 WBC (ref 0–0)
NT-PROBNP SERPL-MCNC: 5480 PG/ML (ref 0–900)
NT-PROBNP SERPL-MCNC: 8650 PG/ML (ref 0–900)
OXA 48 STRAIN: NOT DETECTED
OXA 48 STRAIN: NOT DETECTED
PAW @ PEAK INSP FLOW SETTING VENT: 26 CMH2O
PAW @ PEAK INSP FLOW SETTING VENT: 32 CMH2O
PCO2 BLDA: 32.4 MM HG (ref 35–45)
PCO2 BLDA: 46.5 MM HG (ref 35–45)
PCO2 BLDA: ABNORMAL MM HG (ref 35–45)
PCO2 BLDA: ABNORMAL MM HG (ref 35–45)
PEEP RESPIRATORY: 5 CM[H2O]
PH BLDA: 7.25 PH UNITS (ref 7.35–7.45)
PH BLDA: 7.43 PH UNITS (ref 7.35–7.45)
PH BLDA: ABNORMAL PH UNITS (ref 7.35–7.45)
PH BLDA: ABNORMAL PH UNITS (ref 7.35–7.45)
PH UR STRIP.AUTO: 7.5 [PH] (ref 5–9)
PHOSPHATE SERPL-MCNC: 3.6 MG/DL (ref 2.4–4.4)
PLATELET # BLD AUTO: 163 10*3/MM3 (ref 140–450)
PLATELET # BLD AUTO: 179 10*3/MM3 (ref 140–450)
PLATELET # BLD AUTO: 218 10*3/MM3 (ref 140–450)
PLATELET # BLD AUTO: 220 10*3/MM3 (ref 140–450)
PLATELET # BLD AUTO: 286 10*3/MM3 (ref 150–450)
PLATELET # BLD AUTO: 291 10*3/MM3 (ref 140–450)
PMV BLD AUTO: 10.4 FL (ref 8–12)
PMV BLD AUTO: 10.8 FL (ref 6–12)
PMV BLD AUTO: 10.8 FL (ref 6–12)
PMV BLD AUTO: 11.7 FL (ref 6–12)
PMV BLD AUTO: 11.9 FL (ref 6–12)
PMV BLD AUTO: 11.9 FL (ref 6–12)
PO2 BLDA: 100 MM HG (ref 83–108)
PO2 BLDA: 395 MM HG (ref 83–108)
PO2 BLDA: ABNORMAL MM HG (ref 83–108)
PO2 BLDA: ABNORMAL MM HG (ref 83–108)
POLYCHROMASIA BLD QL SMEAR: ABNORMAL
POTASSIUM BLD-SCNC: 3.2 MMOL/L (ref 3.5–5.1)
POTASSIUM BLD-SCNC: 3.9 MMOL/L (ref 3.5–5.1)
POTASSIUM BLD-SCNC: 4.1 MMOL/L (ref 3.5–5.1)
POTASSIUM BLD-SCNC: 4.7 MMOL/L (ref 3.5–5.1)
POTASSIUM BLD-SCNC: 4.9 MMOL/L (ref 3.5–5.1)
PROT SERPL-MCNC: 4.3 G/DL (ref 6.3–8.6)
PROT SERPL-MCNC: 6.2 G/DL (ref 6.3–8.6)
PROT SERPL-MCNC: 7.1 G/DL (ref 6.3–8.6)
PROT SERPL-MCNC: 7.3 G/DL (ref 6.3–8.6)
PROT UR QL STRIP: ABNORMAL
PROTHROMBIN TIME: 22.5 SECONDS (ref 11.1–15.3)
PROTHROMBIN TIME: 23 SECONDS (ref 11.1–15.3)
PROTHROMBIN TIME: 56.5 SECONDS (ref 11.1–15.3)
RBC # BLD AUTO: 2.66 10*6/MM3 (ref 3.77–5.28)
RBC # BLD AUTO: 3.49 10*6/MM3 (ref 3.77–5.28)
RBC # BLD AUTO: 3.57 10*6/MM3 (ref 3.77–5.28)
RBC # BLD AUTO: 3.72 10*6/MM3 (ref 3.77–5.16)
RBC # BLD AUTO: 3.81 10*6/MM3 (ref 3.77–5.28)
RBC # BLD AUTO: 3.85 10*6/MM3 (ref 3.77–5.28)
RBC # UR: ABNORMAL /HPF
REF LAB TEST METHOD: ABNORMAL
SAO2 % BLDCOA: 97.9 % (ref 94–99)
SAO2 % BLDCOA: 99.7 % (ref 94–99)
SAO2 % BLDCOA: ABNORMAL % (ref 94–99)
SAO2 % BLDCOA: ABNORMAL % (ref 94–99)
SET MECH RESP RATE: 12
SET MECH RESP RATE: 14
SODIUM BLD-SCNC: 133 MMOL/L (ref 137–145)
SODIUM BLD-SCNC: 133 MMOL/L (ref 137–145)
SODIUM BLD-SCNC: 134 MMOL/L (ref 137–145)
SODIUM BLD-SCNC: 134 MMOL/L (ref 137–145)
SODIUM BLD-SCNC: 137 MMOL/L (ref 137–145)
SP GR UR STRIP: 1.02 (ref 1–1.03)
SQUAMOUS #/AREA URNS HPF: ABNORMAL /HPF
STRESS TARGET HR: 124 BPM
STRESS TARGET HR: 124 BPM
TROPONIN I SERPL-MCNC: 0.01 NG/ML
TROPONIN I SERPL-MCNC: 0.02 NG/ML
TROPONIN I SERPL-MCNC: <0.012 NG/ML
UROBILINOGEN UR QL STRIP: ABNORMAL
VENTILATOR MODE: AC
VIM STRAIN: NOT DETECTED
VIM STRAIN: NOT DETECTED
VT ON VENT VENT: 450 ML
VT ON VENT VENT: 450 ML
VT ON VENT VENT: 500 ML
VT ON VENT VENT: 500 ML
WBC MORPH BLD: NORMAL
WBC NRBC COR # BLD: 16.49 10*3/MM3 (ref 3.4–10.8)
WBC NRBC COR # BLD: 16.87 10*3/MM3 (ref 3.4–10.8)
WBC NRBC COR # BLD: 17.01 10*3/MM3 (ref 3.2–9.8)
WBC NRBC COR # BLD: 17.9 10*3/MM3 (ref 3.4–10.8)
WBC NRBC COR # BLD: 8.22 10*3/MM3 (ref 3.4–10.8)
WBC NRBC COR # BLD: 9.01 10*3/MM3 (ref 3.4–10.8)
WBC UR QL AUTO: ABNORMAL /HPF
WHOLE BLOOD HOLD SPECIMEN: NORMAL

## 2019-01-01 PROCEDURE — G0378 HOSPITAL OBSERVATION PER HR: HCPCS

## 2019-01-01 PROCEDURE — 36600 WITHDRAWAL OF ARTERIAL BLOOD: CPT

## 2019-01-01 PROCEDURE — 71045 X-RAY EXAM CHEST 1 VIEW: CPT

## 2019-01-01 PROCEDURE — 93005 ELECTROCARDIOGRAM TRACING: CPT | Performed by: PHYSICIAN ASSISTANT

## 2019-01-01 PROCEDURE — 25010000002 MORPHINE PER 10 MG: Performed by: FAMILY MEDICINE

## 2019-01-01 PROCEDURE — 94799 UNLISTED PULMONARY SVC/PX: CPT

## 2019-01-01 PROCEDURE — 25010000002 HEPARIN (PORCINE) PER 1000 UNITS: Performed by: NURSE ANESTHETIST, CERTIFIED REGISTERED

## 2019-01-01 PROCEDURE — 82962 GLUCOSE BLOOD TEST: CPT

## 2019-01-01 PROCEDURE — 80053 COMPREHEN METABOLIC PANEL: CPT | Performed by: INTERNAL MEDICINE

## 2019-01-01 PROCEDURE — 25010000002 LEVETIRACETAM IN NACL 0.82% 500 MG/100ML SOLUTION: Performed by: INTERNAL MEDICINE

## 2019-01-01 PROCEDURE — C2623 CATH, TRANSLUMIN, DRUG-COAT: HCPCS | Performed by: THORACIC SURGERY (CARDIOTHORACIC VASCULAR SURGERY)

## 2019-01-01 PROCEDURE — C1894 INTRO/SHEATH, NON-LASER: HCPCS | Performed by: THORACIC SURGERY (CARDIOTHORACIC VASCULAR SURGERY)

## 2019-01-01 PROCEDURE — 87040 BLOOD CULTURE FOR BACTERIA: CPT | Performed by: EMERGENCY MEDICINE

## 2019-01-01 PROCEDURE — 82803 BLOOD GASES ANY COMBINATION: CPT

## 2019-01-01 PROCEDURE — 87081 CULTURE SCREEN ONLY: CPT | Performed by: INTERNAL MEDICINE

## 2019-01-01 PROCEDURE — 81001 URINALYSIS AUTO W/SCOPE: CPT | Performed by: FAMILY MEDICINE

## 2019-01-01 PROCEDURE — 94003 VENT MGMT INPAT SUBQ DAY: CPT

## 2019-01-01 PROCEDURE — 85025 COMPLETE CBC W/AUTO DIFF WBC: CPT | Performed by: EMERGENCY MEDICINE

## 2019-01-01 PROCEDURE — 51702 INSERT TEMP BLADDER CATH: CPT

## 2019-01-01 PROCEDURE — 93321 DOPPLER ECHO F-UP/LMTD STD: CPT | Performed by: INTERNAL MEDICINE

## 2019-01-01 PROCEDURE — 99307 SBSQ NF CARE SF MDM 10: CPT | Performed by: FAMILY MEDICINE

## 2019-01-01 PROCEDURE — 83605 ASSAY OF LACTIC ACID: CPT | Performed by: EMERGENCY MEDICINE

## 2019-01-01 PROCEDURE — 25010000002 MORPHINE PER 10 MG: Performed by: EMERGENCY MEDICINE

## 2019-01-01 PROCEDURE — 93306 TTE W/DOPPLER COMPLETE: CPT

## 2019-01-01 PROCEDURE — 85610 PROTHROMBIN TIME: CPT | Performed by: INTERNAL MEDICINE

## 2019-01-01 PROCEDURE — 99285 EMERGENCY DEPT VISIT HI MDM: CPT

## 2019-01-01 PROCEDURE — 74174 CTA ABD&PLVS W/CONTRAST: CPT

## 2019-01-01 PROCEDURE — 93010 ELECTROCARDIOGRAM REPORT: CPT | Performed by: INTERNAL MEDICINE

## 2019-01-01 PROCEDURE — 87150 DNA/RNA AMPLIFIED PROBE: CPT | Performed by: EMERGENCY MEDICINE

## 2019-01-01 PROCEDURE — 25010000002 MORPHINE PER 10 MG: Performed by: INTERNAL MEDICINE

## 2019-01-01 PROCEDURE — 84484 ASSAY OF TROPONIN QUANT: CPT | Performed by: EMERGENCY MEDICINE

## 2019-01-01 PROCEDURE — 84484 ASSAY OF TROPONIN QUANT: CPT | Performed by: FAMILY MEDICINE

## 2019-01-01 PROCEDURE — 85025 COMPLETE CBC W/AUTO DIFF WBC: CPT | Performed by: INTERNAL MEDICINE

## 2019-01-01 PROCEDURE — C1751 CATH, INF, PER/CENT/MIDLINE: HCPCS

## 2019-01-01 PROCEDURE — 0 DIATRIZOATE MEGLUMINE & SODIUM PER 1 ML: Performed by: FAMILY MEDICINE

## 2019-01-01 PROCEDURE — 36902 INTRO CATH DIALYSIS CIRCUIT: CPT | Performed by: THORACIC SURGERY (CARDIOTHORACIC VASCULAR SURGERY)

## 2019-01-01 PROCEDURE — 80053 COMPREHEN METABOLIC PANEL: CPT | Performed by: FAMILY MEDICINE

## 2019-01-01 PROCEDURE — 85007 BL SMEAR W/DIFF WBC COUNT: CPT | Performed by: INTERNAL MEDICINE

## 2019-01-01 PROCEDURE — 31500 INSERT EMERGENCY AIRWAY: CPT

## 2019-01-01 PROCEDURE — 76937 US GUIDE VASCULAR ACCESS: CPT

## 2019-01-01 PROCEDURE — 067Y3ZZ DILATION OF LOWER VEIN, PERCUTANEOUS APPROACH: ICD-10-PCS | Performed by: THORACIC SURGERY (CARDIOTHORACIC VASCULAR SURGERY)

## 2019-01-01 PROCEDURE — 84100 ASSAY OF PHOSPHORUS: CPT | Performed by: EMERGENCY MEDICINE

## 2019-01-01 PROCEDURE — 99308 SBSQ NF CARE LOW MDM 20: CPT | Performed by: FAMILY MEDICINE

## 2019-01-01 PROCEDURE — 93325 DOPPLER ECHO COLOR FLOW MAPG: CPT | Performed by: INTERNAL MEDICINE

## 2019-01-01 PROCEDURE — 25010000002 MORPHINE PER 10 MG

## 2019-01-01 PROCEDURE — 87340 HEPATITIS B SURFACE AG IA: CPT | Performed by: INTERNAL MEDICINE

## 2019-01-01 PROCEDURE — 0 IOPAMIDOL PER 1 ML: Performed by: INTERNAL MEDICINE

## 2019-01-01 PROCEDURE — 99284 EMERGENCY DEPT VISIT MOD MDM: CPT

## 2019-01-01 PROCEDURE — P9612 CATHETERIZE FOR URINE SPEC: HCPCS

## 2019-01-01 PROCEDURE — 83880 ASSAY OF NATRIURETIC PEPTIDE: CPT | Performed by: PHYSICIAN ASSISTANT

## 2019-01-01 PROCEDURE — 74176 CT ABD & PELVIS W/O CONTRAST: CPT

## 2019-01-01 PROCEDURE — C1769 GUIDE WIRE: HCPCS | Performed by: THORACIC SURGERY (CARDIOTHORACIC VASCULAR SURGERY)

## 2019-01-01 PROCEDURE — 93321 DOPPLER ECHO F-UP/LMTD STD: CPT

## 2019-01-01 PROCEDURE — 05HY33Z INSERTION OF INFUSION DEVICE INTO UPPER VEIN, PERCUTANEOUS APPROACH: ICD-10-PCS | Performed by: INTERNAL MEDICINE

## 2019-01-01 PROCEDURE — 36415 COLL VENOUS BLD VENIPUNCTURE: CPT | Performed by: FAMILY MEDICINE

## 2019-01-01 PROCEDURE — 5A1945Z RESPIRATORY VENTILATION, 24-96 CONSECUTIVE HOURS: ICD-10-PCS | Performed by: INTERNAL MEDICINE

## 2019-01-01 PROCEDURE — C1725 CATH, TRANSLUMIN NON-LASER: HCPCS | Performed by: THORACIC SURGERY (CARDIOTHORACIC VASCULAR SURGERY)

## 2019-01-01 PROCEDURE — 36410 VNPNXR 3YR/> PHY/QHP DX/THER: CPT

## 2019-01-01 PROCEDURE — 25010000002 PHENYLEPHRINE PER 1 ML: Performed by: NURSE ANESTHETIST, CERTIFIED REGISTERED

## 2019-01-01 PROCEDURE — 76705 ECHO EXAM OF ABDOMEN: CPT

## 2019-01-01 PROCEDURE — 71275 CT ANGIOGRAPHY CHEST: CPT

## 2019-01-01 PROCEDURE — 85025 COMPLETE CBC W/AUTO DIFF WBC: CPT | Performed by: FAMILY MEDICINE

## 2019-01-01 PROCEDURE — 93005 ELECTROCARDIOGRAM TRACING: CPT | Performed by: FAMILY MEDICINE

## 2019-01-01 PROCEDURE — 96374 THER/PROPH/DIAG INJ IV PUSH: CPT

## 2019-01-01 PROCEDURE — 80048 BASIC METABOLIC PNL TOTAL CA: CPT | Performed by: INTERNAL MEDICINE

## 2019-01-01 PROCEDURE — 83880 ASSAY OF NATRIURETIC PEPTIDE: CPT | Performed by: FAMILY MEDICINE

## 2019-01-01 PROCEDURE — 93308 TTE F-UP OR LMTD: CPT

## 2019-01-01 PROCEDURE — 25010000002 ONDANSETRON PER 1 MG: Performed by: FAMILY MEDICINE

## 2019-01-01 PROCEDURE — 85025 COMPLETE CBC W/AUTO DIFF WBC: CPT | Performed by: PHYSICIAN ASSISTANT

## 2019-01-01 PROCEDURE — 74019 RADEX ABDOMEN 2 VIEWS: CPT

## 2019-01-01 PROCEDURE — 25010000002 PHENYLEPHRINE 10 MG/ML SOLUTION: Performed by: INTERNAL MEDICINE

## 2019-01-01 PROCEDURE — 96375 TX/PRO/DX INJ NEW DRUG ADDON: CPT

## 2019-01-01 PROCEDURE — 86140 C-REACTIVE PROTEIN: CPT | Performed by: EMERGENCY MEDICINE

## 2019-01-01 PROCEDURE — 93325 DOPPLER ECHO COLOR FLOW MAPG: CPT

## 2019-01-01 PROCEDURE — 83735 ASSAY OF MAGNESIUM: CPT | Performed by: EMERGENCY MEDICINE

## 2019-01-01 PROCEDURE — 85610 PROTHROMBIN TIME: CPT | Performed by: EMERGENCY MEDICINE

## 2019-01-01 PROCEDURE — 80053 COMPREHEN METABOLIC PANEL: CPT | Performed by: EMERGENCY MEDICINE

## 2019-01-01 PROCEDURE — 93005 ELECTROCARDIOGRAM TRACING: CPT | Performed by: EMERGENCY MEDICINE

## 2019-01-01 PROCEDURE — 94002 VENT MGMT INPAT INIT DAY: CPT

## 2019-01-01 PROCEDURE — 84484 ASSAY OF TROPONIN QUANT: CPT | Performed by: PHYSICIAN ASSISTANT

## 2019-01-01 PROCEDURE — 25010000002 PROPOFOL 10 MG/ML EMULSION: Performed by: NURSE ANESTHETIST, CERTIFIED REGISTERED

## 2019-01-01 PROCEDURE — 92950 HEART/LUNG RESUSCITATION CPR: CPT

## 2019-01-01 PROCEDURE — 0BH17EZ INSERTION OF ENDOTRACHEAL AIRWAY INTO TRACHEA, VIA NATURAL OR ARTIFICIAL OPENING: ICD-10-PCS | Performed by: EMERGENCY MEDICINE

## 2019-01-01 PROCEDURE — 82330 ASSAY OF CALCIUM: CPT | Performed by: EMERGENCY MEDICINE

## 2019-01-01 PROCEDURE — 25010000002 LORAZEPAM PER 2 MG: Performed by: INTERNAL MEDICINE

## 2019-01-01 PROCEDURE — 96376 TX/PRO/DX INJ SAME DRUG ADON: CPT

## 2019-01-01 PROCEDURE — 80053 COMPREHEN METABOLIC PANEL: CPT | Performed by: PHYSICIAN ASSISTANT

## 2019-01-01 PROCEDURE — 93308 TTE F-UP OR LMTD: CPT | Performed by: INTERNAL MEDICINE

## 2019-01-01 PROCEDURE — 36415 COLL VENOUS BLD VENIPUNCTURE: CPT | Performed by: PHYSICIAN ASSISTANT

## 2019-01-01 PROCEDURE — 94640 AIRWAY INHALATION TREATMENT: CPT

## 2019-01-01 PROCEDURE — B51W1ZZ FLUOROSCOPY OF DIALYSIS SHUNT/FISTULA USING LOW OSMOLAR CONTRAST: ICD-10-PCS | Performed by: THORACIC SURGERY (CARDIOTHORACIC VASCULAR SURGERY)

## 2019-01-01 PROCEDURE — 85730 THROMBOPLASTIN TIME PARTIAL: CPT | Performed by: EMERGENCY MEDICINE

## 2019-01-01 PROCEDURE — 83605 ASSAY OF LACTIC ACID: CPT | Performed by: INTERNAL MEDICINE

## 2019-01-01 PROCEDURE — 83690 ASSAY OF LIPASE: CPT | Performed by: PHYSICIAN ASSISTANT

## 2019-01-01 PROCEDURE — 25010000002 IOPAMIDOL 61 % SOLUTION: Performed by: THORACIC SURGERY (CARDIOTHORACIC VASCULAR SURGERY)

## 2019-01-01 RX ORDER — PANTOPRAZOLE SODIUM 40 MG/10ML
40 INJECTION, POWDER, LYOPHILIZED, FOR SOLUTION INTRAVENOUS EVERY 12 HOURS SCHEDULED
Status: CANCELLED | OUTPATIENT
Start: 2019-01-01

## 2019-01-01 RX ORDER — SODIUM CHLORIDE 9 MG/ML
50 INJECTION, SOLUTION INTRAVENOUS CONTINUOUS
Status: DISCONTINUED | OUTPATIENT
Start: 2019-01-01 | End: 2019-01-01 | Stop reason: HOSPADM

## 2019-01-01 RX ORDER — SODIUM CHLORIDE 0.9 % (FLUSH) 0.9 %
3-10 SYRINGE (ML) INJECTION AS NEEDED
Status: DISCONTINUED | OUTPATIENT
Start: 2019-01-01 | End: 2019-01-01 | Stop reason: HOSPADM

## 2019-01-01 RX ORDER — MORPHINE SULFATE 2 MG/ML
2 INJECTION, SOLUTION INTRAMUSCULAR; INTRAVENOUS EVERY 4 HOURS PRN
Status: DISCONTINUED | OUTPATIENT
Start: 2019-01-01 | End: 2019-01-01

## 2019-01-01 RX ORDER — SODIUM CHLORIDE 9 MG/ML
50 INJECTION, SOLUTION INTRAVENOUS CONTINUOUS
Status: CANCELLED | OUTPATIENT
Start: 2019-01-01

## 2019-01-01 RX ORDER — GLYCOPYRROLATE 0.2 MG/ML
0.4 INJECTION INTRAMUSCULAR; INTRAVENOUS EVERY 6 HOURS PRN
Status: DISCONTINUED | OUTPATIENT
Start: 2019-01-01 | End: 2019-01-01 | Stop reason: HOSPADM

## 2019-01-01 RX ORDER — SODIUM CHLORIDE 0.9 % (FLUSH) 0.9 %
10 SYRINGE (ML) INJECTION AS NEEDED
Status: DISCONTINUED | OUTPATIENT
Start: 2019-01-01 | End: 2019-01-01

## 2019-01-01 RX ORDER — SODIUM CHLORIDE 0.9 % (FLUSH) 0.9 %
3 SYRINGE (ML) INJECTION EVERY 12 HOURS SCHEDULED
Status: DISCONTINUED | OUTPATIENT
Start: 2019-01-01 | End: 2019-01-01 | Stop reason: HOSPADM

## 2019-01-01 RX ORDER — PROMETHAZINE HYDROCHLORIDE 25 MG/1
25 TABLET ORAL ONCE AS NEEDED
Status: DISCONTINUED | OUTPATIENT
Start: 2019-01-01 | End: 2019-01-01 | Stop reason: HOSPADM

## 2019-01-01 RX ORDER — DOCUSATE SODIUM 100 MG/1
100 CAPSULE, LIQUID FILLED ORAL 2 TIMES DAILY
Status: DISCONTINUED | OUTPATIENT
Start: 2019-01-01 | End: 2019-01-01 | Stop reason: HOSPADM

## 2019-01-01 RX ORDER — LEVETIRACETAM 5 MG/ML
500 INJECTION INTRAVASCULAR EVERY 12 HOURS SCHEDULED
Status: DISCONTINUED | OUTPATIENT
Start: 2019-01-01 | End: 2019-01-01

## 2019-01-01 RX ORDER — DEXTROSE AND SODIUM CHLORIDE 5; .45 G/100ML; G/100ML
INJECTION, SOLUTION INTRAVENOUS
Status: DISPENSED
Start: 2019-01-01 | End: 2019-01-01

## 2019-01-01 RX ORDER — PROMETHAZINE HYDROCHLORIDE 25 MG/ML
12.5 INJECTION, SOLUTION INTRAMUSCULAR; INTRAVENOUS ONCE AS NEEDED
Status: DISCONTINUED | OUTPATIENT
Start: 2019-01-01 | End: 2019-01-01 | Stop reason: HOSPADM

## 2019-01-01 RX ORDER — ACETAMINOPHEN 325 MG/1
650 TABLET ORAL EVERY 6 HOURS PRN
Status: DISCONTINUED | OUTPATIENT
Start: 2019-01-01 | End: 2019-01-01 | Stop reason: HOSPADM

## 2019-01-01 RX ORDER — ACETAMINOPHEN 650 MG/1
650 SUPPOSITORY RECTAL ONCE AS NEEDED
Status: DISCONTINUED | OUTPATIENT
Start: 2019-01-01 | End: 2019-01-01 | Stop reason: HOSPADM

## 2019-01-01 RX ORDER — MORPHINE SULFATE 2 MG/ML
2 INJECTION, SOLUTION INTRAMUSCULAR; INTRAVENOUS EVERY 4 HOURS PRN
Status: DISCONTINUED | OUTPATIENT
Start: 2019-01-01 | End: 2019-01-01 | Stop reason: HOSPADM

## 2019-01-01 RX ORDER — FLUMAZENIL 0.1 MG/ML
0.2 INJECTION INTRAVENOUS AS NEEDED
Status: DISCONTINUED | OUTPATIENT
Start: 2019-01-01 | End: 2019-01-01 | Stop reason: HOSPADM

## 2019-01-01 RX ORDER — SODIUM CHLORIDE 9 MG/ML
75 INJECTION, SOLUTION INTRAVENOUS CONTINUOUS
Status: DISCONTINUED | OUTPATIENT
Start: 2019-01-01 | End: 2019-01-01

## 2019-01-01 RX ORDER — ESOMEPRAZOLE MAGNESIUM 40 MG/1
40 CAPSULE, DELAYED RELEASE ORAL
COMMUNITY

## 2019-01-01 RX ORDER — GABAPENTIN 100 MG/1
100 CAPSULE ORAL 3 TIMES DAILY
Qty: 10 CAPSULE | Refills: 0 | Status: SHIPPED | OUTPATIENT
Start: 2019-01-01

## 2019-01-01 RX ORDER — PHENYLEPHRINE HCL IN 0.9% NACL 0.5 MG/5ML
.5-3 SYRINGE (ML) INTRAVENOUS
Status: DISCONTINUED | OUTPATIENT
Start: 2019-01-01 | End: 2019-01-01

## 2019-01-01 RX ORDER — ALBUTEROL SULFATE 90 UG/1
2 AEROSOL, METERED RESPIRATORY (INHALATION)
Status: DISCONTINUED | OUTPATIENT
Start: 2019-01-01 | End: 2019-01-01

## 2019-01-01 RX ORDER — SEVELAMER HYDROCHLORIDE 800 MG/1
1600 TABLET, FILM COATED ORAL
Status: DISCONTINUED | OUTPATIENT
Start: 2019-01-01 | End: 2019-01-01 | Stop reason: HOSPADM

## 2019-01-01 RX ORDER — DEXTROSE AND SODIUM CHLORIDE 5; .9 G/100ML; G/100ML
75 INJECTION, SOLUTION INTRAVENOUS CONTINUOUS
Status: DISCONTINUED | OUTPATIENT
Start: 2019-01-01 | End: 2019-01-01

## 2019-01-01 RX ORDER — MORPHINE SULFATE 2 MG/ML
2 INJECTION, SOLUTION INTRAMUSCULAR; INTRAVENOUS
Status: DISCONTINUED | OUTPATIENT
Start: 2019-01-01 | End: 2019-01-01

## 2019-01-01 RX ORDER — LEVOTHYROXINE SODIUM 88 UG/1
88 TABLET ORAL
Status: DISCONTINUED | OUTPATIENT
Start: 2019-01-01 | End: 2019-01-01 | Stop reason: HOSPADM

## 2019-01-01 RX ORDER — CEFTRIAXONE 1 G/1
INJECTION, POWDER, FOR SOLUTION INTRAMUSCULAR; INTRAVENOUS
Status: DISCONTINUED
Start: 2019-01-01 | End: 2019-01-01 | Stop reason: HOSPADM

## 2019-01-01 RX ORDER — ONDANSETRON 2 MG/ML
4 INJECTION INTRAMUSCULAR; INTRAVENOUS EVERY 6 HOURS PRN
Status: DISCONTINUED | OUTPATIENT
Start: 2019-01-01 | End: 2019-01-01 | Stop reason: HOSPADM

## 2019-01-01 RX ORDER — MORPHINE SULFATE 2 MG/ML
2 INJECTION, SOLUTION INTRAMUSCULAR; INTRAVENOUS ONCE
Status: COMPLETED | OUTPATIENT
Start: 2019-01-01 | End: 2019-01-01

## 2019-01-01 RX ORDER — LIDOCAINE HYDROCHLORIDE 20 MG/ML
INJECTION, SOLUTION INFILTRATION; PERINEURAL AS NEEDED
Status: DISCONTINUED | OUTPATIENT
Start: 2019-01-01 | End: 2019-01-01 | Stop reason: SURG

## 2019-01-01 RX ORDER — POLYETHYLENE GLYCOL 3350 17 G/17G
17 POWDER, FOR SOLUTION ORAL DAILY PRN
Status: DISCONTINUED | OUTPATIENT
Start: 2019-01-01 | End: 2019-01-01 | Stop reason: HOSPADM

## 2019-01-01 RX ORDER — SODIUM CHLORIDE 0.9 % (FLUSH) 0.9 %
10 SYRINGE (ML) INJECTION EVERY 12 HOURS SCHEDULED
Status: DISCONTINUED | OUTPATIENT
Start: 2019-01-01 | End: 2019-01-01

## 2019-01-01 RX ORDER — FAMOTIDINE 20 MG/1
20 TABLET, FILM COATED ORAL 2 TIMES DAILY
Status: DISCONTINUED | OUTPATIENT
Start: 2019-01-01 | End: 2019-01-01 | Stop reason: HOSPADM

## 2019-01-01 RX ORDER — LABETALOL HYDROCHLORIDE 5 MG/ML
5 INJECTION, SOLUTION INTRAVENOUS
Status: DISCONTINUED | OUTPATIENT
Start: 2019-01-01 | End: 2019-01-01 | Stop reason: HOSPADM

## 2019-01-01 RX ORDER — ONDANSETRON 2 MG/ML
4 INJECTION INTRAMUSCULAR; INTRAVENOUS ONCE
Status: COMPLETED | OUTPATIENT
Start: 2019-01-01 | End: 2019-01-01

## 2019-01-01 RX ORDER — ACETAMINOPHEN 325 MG/1
650 TABLET ORAL ONCE AS NEEDED
Status: DISCONTINUED | OUTPATIENT
Start: 2019-01-01 | End: 2019-01-01 | Stop reason: HOSPADM

## 2019-01-01 RX ORDER — HYDROCODONE BITARTRATE AND ACETAMINOPHEN 7.5; 325 MG/1; MG/1
1 TABLET ORAL EVERY 4 HOURS PRN
Status: DISCONTINUED | OUTPATIENT
Start: 2019-01-01 | End: 2019-01-01 | Stop reason: HOSPADM

## 2019-01-01 RX ORDER — ASPIRIN 81 MG/1
81 TABLET ORAL DAILY
Status: DISCONTINUED | OUTPATIENT
Start: 2019-01-01 | End: 2019-01-01 | Stop reason: HOSPADM

## 2019-01-01 RX ORDER — CLINDAMYCIN PHOSPHATE 900 MG/50ML
900 INJECTION INTRAVENOUS ONCE
Status: COMPLETED | OUTPATIENT
Start: 2019-01-01 | End: 2019-01-01

## 2019-01-01 RX ORDER — LORAZEPAM 2 MG/ML
INJECTION INTRAMUSCULAR
Status: DISCONTINUED
Start: 2019-01-01 | End: 2019-01-01 | Stop reason: HOSPADM

## 2019-01-01 RX ORDER — SODIUM CHLORIDE 0.9 % (FLUSH) 0.9 %
3-10 SYRINGE (ML) INJECTION AS NEEDED
Status: DISCONTINUED | OUTPATIENT
Start: 2019-01-01 | End: 2019-01-01

## 2019-01-01 RX ORDER — DOXYCYCLINE HYCLATE 100 MG
100 TABLET ORAL 2 TIMES DAILY
COMMUNITY

## 2019-01-01 RX ORDER — SCOLOPAMINE TRANSDERMAL SYSTEM 1 MG/1
1 PATCH, EXTENDED RELEASE TRANSDERMAL
Status: DISCONTINUED | OUTPATIENT
Start: 2019-01-01 | End: 2019-01-01 | Stop reason: HOSPADM

## 2019-01-01 RX ORDER — CHLORHEXIDINE GLUCONATE 0.12 MG/ML
15 RINSE ORAL EVERY 12 HOURS SCHEDULED
Status: DISCONTINUED | OUTPATIENT
Start: 2019-01-01 | End: 2019-01-01

## 2019-01-01 RX ORDER — FUROSEMIDE 40 MG/1
40 TABLET ORAL EVERY MORNING
Status: DISCONTINUED | OUTPATIENT
Start: 2019-01-01 | End: 2019-01-01 | Stop reason: HOSPADM

## 2019-01-01 RX ORDER — SODIUM CHLORIDE 0.9 % (FLUSH) 0.9 %
10 SYRINGE (ML) INJECTION AS NEEDED
Status: DISCONTINUED | OUTPATIENT
Start: 2019-01-01 | End: 2019-01-01 | Stop reason: HOSPADM

## 2019-01-01 RX ORDER — HYDROCODONE BITARTRATE AND ACETAMINOPHEN 7.5; 325 MG/1; MG/1
1 TABLET ORAL EVERY 4 HOURS PRN
Qty: 10 TABLET | Refills: 0 | Status: SHIPPED | OUTPATIENT
Start: 2019-01-01

## 2019-01-01 RX ORDER — PROMETHAZINE HYDROCHLORIDE 25 MG/1
25 SUPPOSITORY RECTAL ONCE AS NEEDED
Status: DISCONTINUED | OUTPATIENT
Start: 2019-01-01 | End: 2019-01-01 | Stop reason: HOSPADM

## 2019-01-01 RX ORDER — CEFPODOXIME PROXETIL 200 MG/1
200 TABLET, FILM COATED ORAL DAILY
Start: 2019-01-01

## 2019-01-01 RX ORDER — PROPOFOL 10 MG/ML
VIAL (ML) INTRAVENOUS AS NEEDED
Status: DISCONTINUED | OUTPATIENT
Start: 2019-01-01 | End: 2019-01-01 | Stop reason: SURG

## 2019-01-01 RX ORDER — QUETIAPINE FUMARATE 25 MG/1
25 TABLET, FILM COATED ORAL NIGHTLY
Status: DISCONTINUED | OUTPATIENT
Start: 2019-01-01 | End: 2019-01-01 | Stop reason: HOSPADM

## 2019-01-01 RX ORDER — ACETAMINOPHEN 650 MG/1
650 SUPPOSITORY RECTAL EVERY 4 HOURS PRN
Status: DISCONTINUED | OUTPATIENT
Start: 2019-01-01 | End: 2019-01-01 | Stop reason: HOSPADM

## 2019-01-01 RX ORDER — SODIUM CHLORIDE 0.9 % (FLUSH) 0.9 %
3 SYRINGE (ML) INJECTION EVERY 12 HOURS SCHEDULED
Status: CANCELLED | OUTPATIENT
Start: 2019-01-01

## 2019-01-01 RX ORDER — DEXTROSE MONOHYDRATE 25 G/50ML
50 INJECTION, SOLUTION INTRAVENOUS
Status: DISCONTINUED | OUTPATIENT
Start: 2019-01-01 | End: 2019-01-01

## 2019-01-01 RX ORDER — LIDOCAINE HYDROCHLORIDE 10 MG/ML
INJECTION, SOLUTION EPIDURAL; INFILTRATION; INTRACAUDAL; PERINEURAL
Status: DISCONTINUED
Start: 2019-01-01 | End: 2019-01-01 | Stop reason: HOSPADM

## 2019-01-01 RX ORDER — LORAZEPAM 2 MG/ML
0.5 INJECTION INTRAMUSCULAR
Status: DISCONTINUED | OUTPATIENT
Start: 2019-01-01 | End: 2019-01-01 | Stop reason: HOSPADM

## 2019-01-01 RX ORDER — ONDANSETRON 2 MG/ML
4 INJECTION INTRAMUSCULAR; INTRAVENOUS ONCE AS NEEDED
Status: DISCONTINUED | OUTPATIENT
Start: 2019-01-01 | End: 2019-01-01 | Stop reason: HOSPADM

## 2019-01-01 RX ORDER — DEXTROSE MONOHYDRATE 25 G/50ML
INJECTION, SOLUTION INTRAVENOUS
Status: DISPENSED
Start: 2019-01-01 | End: 2019-01-01

## 2019-01-01 RX ORDER — BUPIVACAINE HCL/0.9 % NACL/PF 0.1 %
2 PLASTIC BAG, INJECTION (ML) EPIDURAL ONCE
Status: CANCELLED | OUTPATIENT
Start: 2019-01-01 | End: 2019-01-01

## 2019-01-01 RX ORDER — HEPARIN SODIUM 1000 [USP'U]/ML
INJECTION, SOLUTION INTRAVENOUS; SUBCUTANEOUS AS NEEDED
Status: DISCONTINUED | OUTPATIENT
Start: 2019-01-01 | End: 2019-01-01 | Stop reason: SURG

## 2019-01-01 RX ORDER — ACETAMINOPHEN 325 MG/1
650 TABLET ORAL EVERY 4 HOURS PRN
Status: DISCONTINUED | OUTPATIENT
Start: 2019-01-01 | End: 2019-01-01 | Stop reason: HOSPADM

## 2019-01-01 RX ORDER — FAMOTIDINE 10 MG/ML
20 INJECTION, SOLUTION INTRAVENOUS EVERY 12 HOURS SCHEDULED
Status: DISCONTINUED | OUTPATIENT
Start: 2019-01-01 | End: 2019-01-01

## 2019-01-01 RX ORDER — DEXAMETHASONE SODIUM PHOSPHATE 4 MG/ML
8 INJECTION, SOLUTION INTRA-ARTICULAR; INTRALESIONAL; INTRAMUSCULAR; INTRAVENOUS; SOFT TISSUE ONCE AS NEEDED
Status: DISCONTINUED | OUTPATIENT
Start: 2019-01-01 | End: 2019-01-01 | Stop reason: HOSPADM

## 2019-01-01 RX ORDER — NALOXONE HCL 0.4 MG/ML
0.4 VIAL (ML) INJECTION AS NEEDED
Status: DISCONTINUED | OUTPATIENT
Start: 2019-01-01 | End: 2019-01-01 | Stop reason: HOSPADM

## 2019-01-01 RX ORDER — AZITHROMYCIN 500 MG/1
500 TABLET, FILM COATED ORAL DAILY
Start: 2019-01-01

## 2019-01-01 RX ORDER — HEPARIN SODIUM 5000 [USP'U]/ML
5000 INJECTION, SOLUTION INTRAVENOUS; SUBCUTANEOUS EVERY 8 HOURS SCHEDULED
Status: DISCONTINUED | OUTPATIENT
Start: 2019-01-01 | End: 2019-01-01 | Stop reason: HOSPADM

## 2019-01-01 RX ORDER — EPHEDRINE SULFATE 50 MG/ML
5 INJECTION, SOLUTION INTRAVENOUS ONCE AS NEEDED
Status: DISCONTINUED | OUTPATIENT
Start: 2019-01-01 | End: 2019-01-01 | Stop reason: HOSPADM

## 2019-01-01 RX ORDER — SODIUM CHLORIDE 0.9 % (FLUSH) 0.9 %
3-10 SYRINGE (ML) INJECTION AS NEEDED
Status: CANCELLED | OUTPATIENT
Start: 2019-01-01

## 2019-01-01 RX ORDER — MORPHINE SULFATE 2 MG/ML
2 INJECTION, SOLUTION INTRAMUSCULAR; INTRAVENOUS
Status: DISCONTINUED | OUTPATIENT
Start: 2019-01-01 | End: 2019-01-01 | Stop reason: HOSPADM

## 2019-01-01 RX ORDER — LEVETIRACETAM 500 MG/1
500 TABLET ORAL DAILY
Status: DISCONTINUED | OUTPATIENT
Start: 2019-01-01 | End: 2019-01-01 | Stop reason: HOSPADM

## 2019-01-01 RX ORDER — ATORVASTATIN CALCIUM 40 MG/1
40 TABLET, FILM COATED ORAL NIGHTLY
Status: CANCELLED | OUTPATIENT
Start: 2019-01-01

## 2019-01-01 RX ORDER — SODIUM CHLORIDE 0.9 % (FLUSH) 0.9 %
3 SYRINGE (ML) INJECTION EVERY 12 HOURS SCHEDULED
Status: DISCONTINUED | OUTPATIENT
Start: 2019-01-01 | End: 2019-01-01

## 2019-01-01 RX ORDER — POTASSIUM CHLORIDE 750 MG/1
20 CAPSULE, EXTENDED RELEASE ORAL ONCE
Status: COMPLETED | OUTPATIENT
Start: 2019-01-01 | End: 2019-01-01

## 2019-01-01 RX ORDER — GABAPENTIN 100 MG/1
100 CAPSULE ORAL 3 TIMES DAILY
Status: DISCONTINUED | OUTPATIENT
Start: 2019-01-01 | End: 2019-01-01 | Stop reason: HOSPADM

## 2019-01-01 RX ORDER — BUPIVACAINE HCL/0.9 % NACL/PF 0.1 %
2 PLASTIC BAG, INJECTION (ML) EPIDURAL ONCE
Status: COMPLETED | OUTPATIENT
Start: 2019-01-01 | End: 2019-01-01

## 2019-01-01 RX ORDER — MORPHINE SULFATE 20 MG/ML
5 SOLUTION ORAL EVERY 4 HOURS PRN
Status: DISCONTINUED | OUTPATIENT
Start: 2019-01-01 | End: 2019-01-01 | Stop reason: HOSPADM

## 2019-01-01 RX ADMIN — ONDANSETRON HYDROCHLORIDE 4 MG: 2 INJECTION, SOLUTION INTRAMUSCULAR; INTRAVENOUS at 18:22

## 2019-01-01 RX ADMIN — FAMOTIDINE 20 MG: 10 INJECTION INTRAVENOUS at 08:45

## 2019-01-01 RX ADMIN — PHENYLEPHRINE HYDROCHLORIDE 2.5 MCG/KG/MIN: 10 INJECTION INTRAVENOUS at 12:10

## 2019-01-01 RX ADMIN — MORPHINE SULFATE 2 MG: 2 INJECTION, SOLUTION INTRAMUSCULAR; INTRAVENOUS at 20:13

## 2019-01-01 RX ADMIN — NOREPINEPHRINE BITARTRATE 0.04 MCG/KG/MIN: 1 INJECTION INTRAVENOUS at 21:15

## 2019-01-01 RX ADMIN — RENAGEL 1600 MG: 800 TABLET ORAL at 11:04

## 2019-01-01 RX ADMIN — PHENYLEPHRINE HYDROCHLORIDE 3 MCG/KG/MIN: 10 INJECTION INTRAVENOUS at 11:40

## 2019-01-01 RX ADMIN — PHENYLEPHRINE HYDROCHLORIDE 100 MCG: 10 INJECTION INTRAVENOUS at 07:43

## 2019-01-01 RX ADMIN — CHLORHEXIDINE GLUCONATE 0.12% ORAL RINSE 15 ML: 1.2 LIQUID ORAL at 20:13

## 2019-01-01 RX ADMIN — ALBUTEROL SULFATE 2 PUFF: 90 AEROSOL, METERED RESPIRATORY (INHALATION) at 15:33

## 2019-01-01 RX ADMIN — SODIUM CHLORIDE, PRESERVATIVE FREE 3 ML: 5 INJECTION INTRAVENOUS at 20:52

## 2019-01-01 RX ADMIN — GABAPENTIN 100 MG: 100 CAPSULE ORAL at 09:23

## 2019-01-01 RX ADMIN — IOPAMIDOL 40 ML: 755 INJECTION, SOLUTION INTRAVENOUS at 18:08

## 2019-01-01 RX ADMIN — FAMOTIDINE 20 MG: 10 INJECTION INTRAVENOUS at 20:28

## 2019-01-01 RX ADMIN — SODIUM CHLORIDE 500 ML: 9 INJECTION, SOLUTION INTRAVENOUS at 15:00

## 2019-01-01 RX ADMIN — ASPIRIN 81 MG: 81 TABLET, COATED ORAL at 09:23

## 2019-01-01 RX ADMIN — SODIUM CHLORIDE, PRESERVATIVE FREE 3 ML: 5 INJECTION INTRAVENOUS at 09:30

## 2019-01-01 RX ADMIN — SODIUM CHLORIDE, PRESERVATIVE FREE 3 ML: 5 INJECTION INTRAVENOUS at 09:05

## 2019-01-01 RX ADMIN — Medication 2 G: at 07:41

## 2019-01-01 RX ADMIN — MORPHINE SULFATE 2 MG: 2 INJECTION, SOLUTION INTRAMUSCULAR; INTRAVENOUS at 18:22

## 2019-01-01 RX ADMIN — CHLORHEXIDINE GLUCONATE 0.12% ORAL RINSE 15 ML: 1.2 LIQUID ORAL at 09:00

## 2019-01-01 RX ADMIN — SODIUM CHLORIDE, PRESERVATIVE FREE 10 ML: 5 INJECTION INTRAVENOUS at 06:36

## 2019-01-01 RX ADMIN — LEVETIRACETAM 500 MG: 500 TABLET ORAL at 09:23

## 2019-01-01 RX ADMIN — FAMOTIDINE 20 MG: 20 TABLET ORAL at 09:23

## 2019-01-01 RX ADMIN — LEVETIRACETAM 500 MG: 5 INJECTION INTRAVENOUS at 08:48

## 2019-01-01 RX ADMIN — IOPAMIDOL 50 ML: 755 INJECTION, SOLUTION INTRAVENOUS at 18:07

## 2019-01-01 RX ADMIN — SODIUM CHLORIDE, PRESERVATIVE FREE 3 ML: 5 INJECTION INTRAVENOUS at 09:00

## 2019-01-01 RX ADMIN — DEXTROSE AND SODIUM CHLORIDE 75 ML/HR: 5; 900 INJECTION, SOLUTION INTRAVENOUS at 16:09

## 2019-01-01 RX ADMIN — RENAGEL 1600 MG: 800 TABLET ORAL at 09:23

## 2019-01-01 RX ADMIN — PHENYLEPHRINE HYDROCHLORIDE 100 MCG: 10 INJECTION INTRAVENOUS at 07:46

## 2019-01-01 RX ADMIN — CHLORHEXIDINE GLUCONATE 0.12% ORAL RINSE 15 ML: 1.2 LIQUID ORAL at 20:28

## 2019-01-01 RX ADMIN — PHENYLEPHRINE HYDROCHLORIDE 3 MCG/KG/MIN: 10 INJECTION INTRAVENOUS at 07:14

## 2019-01-01 RX ADMIN — PHENYLEPHRINE HYDROCHLORIDE 3 MCG/KG/MIN: 10 INJECTION INTRAVENOUS at 17:12

## 2019-01-01 RX ADMIN — MORPHINE SULFATE 2 MG: 2 INJECTION, SOLUTION INTRAMUSCULAR; INTRAVENOUS at 00:57

## 2019-01-01 RX ADMIN — SODIUM CHLORIDE, PRESERVATIVE FREE 3 ML: 5 INJECTION INTRAVENOUS at 20:53

## 2019-01-01 RX ADMIN — NOREPINEPHRINE BITARTRATE 0.3 MCG/KG/MIN: 1 INJECTION INTRAVENOUS at 03:15

## 2019-01-01 RX ADMIN — NOREPINEPHRINE BITARTRATE 0.25 MCG/KG/MIN: 1 INJECTION INTRAVENOUS at 15:59

## 2019-01-01 RX ADMIN — DEXTROSE AND SODIUM CHLORIDE 75 ML/HR: 5; 900 INJECTION, SOLUTION INTRAVENOUS at 05:24

## 2019-01-01 RX ADMIN — ALBUTEROL SULFATE 2 PUFF: 90 AEROSOL, METERED RESPIRATORY (INHALATION) at 19:11

## 2019-01-01 RX ADMIN — VASOPRESSIN 0.03 UNITS/MIN: 20 INJECTION INTRAVENOUS at 20:19

## 2019-01-01 RX ADMIN — SODIUM CHLORIDE 75 ML/HR: 9 INJECTION, SOLUTION INTRAVENOUS at 20:26

## 2019-01-01 RX ADMIN — MORPHINE SULFATE 2 MG: 2 INJECTION, SOLUTION INTRAMUSCULAR; INTRAVENOUS at 09:01

## 2019-01-01 RX ADMIN — SODIUM CHLORIDE, PRESERVATIVE FREE 3 ML: 5 INJECTION INTRAVENOUS at 09:06

## 2019-01-01 RX ADMIN — PROPOFOL 60 MG: 10 INJECTION, EMULSION INTRAVENOUS at 07:30

## 2019-01-01 RX ADMIN — MORPHINE SULFATE 2 MG: 2 INJECTION, SOLUTION INTRAMUSCULAR; INTRAVENOUS at 00:43

## 2019-01-01 RX ADMIN — ALBUTEROL SULFATE 2 PUFF: 90 AEROSOL, METERED RESPIRATORY (INHALATION) at 07:39

## 2019-01-01 RX ADMIN — ALBUTEROL SULFATE 2 PUFF: 90 AEROSOL, METERED RESPIRATORY (INHALATION) at 11:13

## 2019-01-01 RX ADMIN — NOREPINEPHRINE BITARTRATE 0.3 MCG/KG/MIN: 1 INJECTION INTRAVENOUS at 10:10

## 2019-01-01 RX ADMIN — CLINDAMYCIN IN 5 PERCENT DEXTROSE 900 MG: 18 INJECTION, SOLUTION INTRAVENOUS at 14:35

## 2019-01-01 RX ADMIN — VASOPRESSIN 0.03 UNITS/MIN: 20 INJECTION INTRAVENOUS at 22:36

## 2019-01-01 RX ADMIN — FAMOTIDINE 20 MG: 10 INJECTION INTRAVENOUS at 20:13

## 2019-01-01 RX ADMIN — ALBUTEROL SULFATE 2 PUFF: 90 AEROSOL, METERED RESPIRATORY (INHALATION) at 19:38

## 2019-01-01 RX ADMIN — ALBUTEROL SULFATE 2 PUFF: 90 AEROSOL, METERED RESPIRATORY (INHALATION) at 13:13

## 2019-01-01 RX ADMIN — MORPHINE SULFATE 4 MG: 4 INJECTION INTRAVENOUS at 15:35

## 2019-01-01 RX ADMIN — VASOPRESSIN 0.03 UNITS/MIN: 20 INJECTION INTRAVENOUS at 05:55

## 2019-01-01 RX ADMIN — DIATRIZOATE MEGLUMINE AND DIATRIZOATE SODIUM 10 ML: 660; 100 LIQUID ORAL; RECTAL at 17:21

## 2019-01-01 RX ADMIN — LEVETIRACETAM 500 MG: 5 INJECTION INTRAVENOUS at 20:13

## 2019-01-01 RX ADMIN — NOREPINEPHRINE BITARTRATE 0.3 MCG/KG/MIN: 1 INJECTION INTRAVENOUS at 06:25

## 2019-01-01 RX ADMIN — HYDROCODONE BITARTRATE AND ACETAMINOPHEN 1 TABLET: 7.5; 325 TABLET ORAL at 14:59

## 2019-01-01 RX ADMIN — SODIUM CHLORIDE 500 ML: 9 INJECTION, SOLUTION INTRAVENOUS at 12:54

## 2019-01-01 RX ADMIN — LIDOCAINE HYDROCHLORIDE 20 MG: 20 INJECTION, SOLUTION INFILTRATION; PERINEURAL at 07:30

## 2019-01-01 RX ADMIN — FUROSEMIDE 40 MG: 40 TABLET ORAL at 09:23

## 2019-01-01 RX ADMIN — SODIUM CHLORIDE 50 ML/HR: 9 INJECTION, SOLUTION INTRAVENOUS at 06:36

## 2019-01-01 RX ADMIN — NOREPINEPHRINE BITARTRATE 0.02 MCG/KG/MIN: 1 INJECTION INTRAVENOUS at 13:41

## 2019-01-01 RX ADMIN — POTASSIUM CHLORIDE 20 MEQ: 750 CAPSULE, EXTENDED RELEASE ORAL at 09:24

## 2019-01-01 RX ADMIN — MINERAL OIL AND PETROLATUM: 150; 830 OINTMENT OPHTHALMIC at 08:00

## 2019-01-01 RX ADMIN — LEVOTHYROXINE SODIUM 88 MCG: 88 TABLET ORAL at 09:23

## 2019-01-01 RX ADMIN — ALBUTEROL SULFATE 2 PUFF: 90 AEROSOL, METERED RESPIRATORY (INHALATION) at 09:30

## 2019-01-01 RX ADMIN — SODIUM BICARBONATE 150 MEQ: 84 INJECTION, SOLUTION INTRAVENOUS at 13:01

## 2019-01-01 RX ADMIN — MORPHINE SULFATE 4 MG: 4 INJECTION INTRAVENOUS at 13:39

## 2019-01-01 RX ADMIN — PHENYLEPHRINE HYDROCHLORIDE 100 MCG: 10 INJECTION INTRAVENOUS at 08:09

## 2019-01-01 RX ADMIN — SERTRALINE HYDROCHLORIDE 50 MG: 50 TABLET ORAL at 09:23

## 2019-01-01 RX ADMIN — PHENYLEPHRINE HYDROCHLORIDE 0.8 MCG/KG/MIN: 10 INJECTION INTRAVENOUS at 17:23

## 2019-01-01 RX ADMIN — VASOPRESSIN 0.05 UNITS/MIN: 20 INJECTION INTRAVENOUS at 16:02

## 2019-01-01 RX ADMIN — CHLORHEXIDINE GLUCONATE 0.12% ORAL RINSE 15 ML: 1.2 LIQUID ORAL at 08:45

## 2019-01-01 RX ADMIN — DEXTROSE MONOHYDRATE 50 ML: 25 INJECTION, SOLUTION INTRAVENOUS at 15:30

## 2019-01-01 RX ADMIN — LORAZEPAM 0.5 MG: 2 INJECTION, SOLUTION INTRAMUSCULAR; INTRAVENOUS at 15:35

## 2019-01-01 RX ADMIN — SODIUM CHLORIDE 500 ML: 9 INJECTION, SOLUTION INTRAVENOUS at 14:52

## 2019-01-01 RX ADMIN — FAMOTIDINE 20 MG: 10 INJECTION INTRAVENOUS at 09:00

## 2019-01-01 RX ADMIN — HEPARIN SODIUM 3000 UNITS: 1000 INJECTION, SOLUTION INTRAVENOUS; SUBCUTANEOUS at 08:00

## 2019-01-01 RX ADMIN — MORPHINE SULFATE 2 MG: 2 INJECTION, SOLUTION INTRAMUSCULAR; INTRAVENOUS at 20:06

## 2019-02-04 NOTE — ED PROVIDER NOTES
Subjective     Chest Pain   Pain location:  Substernal area and L chest  Pain quality: sharp and stabbing    Pain radiates to:  Upper back (abdomen and pelvis)  Pain severity:  Mild  Onset quality:  Unable to specify  Duration:  2 days  Timing:  Intermittent  Progression:  Waxing and waning  Chronicity:  New  Context: at rest    Relieved by:  Nothing  Worsened by:  Deep breathing  Ineffective treatments:  None tried  Associated symptoms: no abdominal pain, no cough, no diaphoresis, no dizziness, no dysphagia, no fatigue, no fever, no headache, no nausea, no shortness of breath, no vomiting and no weakness    Risk factors: coronary artery disease    Risk factors: not male, not obese and no smoking (former smoker, quit 25 years ago)        Review of Systems   Constitutional: Negative for appetite change, chills, diaphoresis, fatigue and fever.   HENT: Negative for congestion, ear discharge, ear pain, nosebleeds, rhinorrhea, sinus pressure, sore throat and trouble swallowing.    Eyes: Negative for discharge and redness.   Respiratory: Negative for apnea, cough, chest tightness, shortness of breath and wheezing.    Cardiovascular: Positive for chest pain.   Gastrointestinal: Negative for abdominal pain, diarrhea, nausea and vomiting.   Endocrine: Negative for polyuria.   Genitourinary: Negative for dysuria, frequency and urgency.   Musculoskeletal: Negative for myalgias and neck pain.   Skin: Negative for color change and rash.   Allergic/Immunologic: Negative for immunocompromised state.   Neurological: Negative for dizziness, seizures, syncope, weakness, light-headedness and headaches.   Hematological: Negative for adenopathy. Does not bruise/bleed easily.   Psychiatric/Behavioral: Negative for behavioral problems and confusion.   All other systems reviewed and are negative.      Past Medical History:   Diagnosis Date   • Abnormal x-ray     Standard chest x ray, f/u pneumonia xray   • Acquired hypothyroidism    •  Amblyopia    • Anemia of renal disease    • Anxiety    • Arteriovenous fistula (CMS/[facility])     Placed 10/15/2014   • Bipolar affective disorder (CMS/[facility])     Current episode depression   • Bipolar disorder (CMS/[facility])    • Cerebrovascular accident (CMS/[facility])    • Chest wall pain    • Chronic angle-closure glaucoma    • Chronic kidney disease     Stage 4-approaching hemodialysis   • Chronic pain syndrome    • Combined drug dependence excluding opioids (CMS/[facility])    • Constipation    • COPD (chronic obstructive pulmonary disease) (CMS/[facility])    • Cough    • Degeneration of cervical intervertebral disc    • Dementia     Multi-infarct, uncomplicated   • Depression    • Diabetes mellitus (CMS/[facility])     No retinopathy   • Diabetes mellitus (CMS/[facility])     Without mention of complication, type 2 or unspecified type, not stated as uncontrolled   • Diabetic renal disease (CMS/[facility])    • Disc disorder of lumbar region    • DJD (degenerative joint disease)     Involving multiple joints   • Edema    • Epigastric pain    • Essential hypertension    • Exotropia    • Gastritis    • Generalized abdominal pain    • GERD (gastroesophageal reflux disease)     With Esophagitis   • Gout    • H/O screening mammography    • Hiatal hernia    • Hyperlipidemia    • Hypermetropia    • Insomnia    • Iron deficiency anemia    • Low back pain    • Lumbago    • Non-cardiac chest pain    • Nuclear cataract    • Osteoporosis    • Panic disorder without agoraphobia    • Peripheral nervous system disorder    • Radiculitis    • RLQ abdominal pain    • RUQ pain    • Sprain of ankle    • Sprain of knee     Resolving   • Superficial injury of shoulder and upper arm    • Surgical follow-up care    • Type 2 diabetes mellitus (CMS/[facility])    • UTI (urinary tract infection)    • Visit for suture removal    • Vitamin D deficiency    • Vulvovaginitis        Allergies   Allergen Reactions   • Ambien [Zolpidem Tartrate]    • Benadryl [Diphenhydramine]    • Penicillins         Past Surgical History:   Procedure Laterality Date   • APPENDECTOMY     • BACK SURGERY      x2   • BYPASS GRAFT  2015    Left brachial artery to axillary vein arteriovenous graft. Venous ultrasound unilateral extremity   •  SECTION      x2   • CHOLECYSTECTOMY     • COLONOSCOPY W/ POLYPECTOMY  2015    Colitis found in the cecum. Biopsy taken. A diverticulum was found in the sigmoid colon.   • ENDOSCOPY AND COLONOSCOPY     • ESOPHAGOSCOPY / EGD  2015    Normal esophagus. Gastritis found in the stomach. Biopsy taken. Duodenitis found in the duodenum. Biopsy take.   • ESOPHAGOSCOPY / EGD  1944    With tube-Esophagus appeared normal. Nonerosive gastritis. Duodenum appeared normal   • HYSTERECTOMY     • INJECTION OF MEDICATION  2011    Aranesp   • INJECTION OF MEDICATION  2011    Kenalog   • INTERVENTIONAL RADIOLOGY PROCEDURE Left 10/5/2017    Procedure: IR dialysis fistulagram;  Surgeon: Blane Fernandez MD;  Location: [facility] INVASIVE LOCATION;  Service:    • LEG SURGERY  2000    Cancelled. Due to uncontrolled hypertension   • OTHER SURGICAL HISTORY  2016    Tissue plasminogen activator catheter thrombolysis   • REMOVAL PERITONEAL DIALYSIS CATHETER  2014    Removal of tunneled hemodialysis catheter with cuff   • TRANSESOPHAGEAL ECHOCARDIOGRAM (ANTHONY)  2016    Mild left atrial enlargement with mild to moderate CLVH with normal aortic root size.LV systolic function well preserved with Ef of 55-60%.Mitral valve thickened.Av thickened.Aortic sclerosis.Mild mitral regurg.mild to moderate tricuspid regurg.   • TRANSESOPHAGEAL ECHOCARDIOGRAM (ANTHONY)  2012    Normal left ventricular systolic function. Moderate tricuspid regurgitation with evidenceof pulmonary hypertension       Family History   Problem Relation Age of Onset   • Coronary artery disease Other        Social History     Socioeconomic History   • Marital status:       Spouse name: Not on file   • Number of children: Not on file   • Years of education: Not on file   • Highest education level: Not on file   Tobacco Use   • Smoking status: Former Smoker   • Smokeless tobacco: Never Used   Substance and Sexual Activity   • Alcohol use: No   • Drug use: No           Objective   Physical Exam   Constitutional: She is oriented to person, place, and time. She appears well-developed and well-nourished.   HENT:   Head: Normocephalic and atraumatic.   Nose: Nose normal.   Mouth/Throat: Oropharynx is clear and moist.   Eyes: Conjunctivae and EOM are normal. Pupils are equal, round, and reactive to light. Right eye exhibits no discharge. Left eye exhibits no discharge. No scleral icterus.   Neck: Normal range of motion. Neck supple. No tracheal deviation present.   Cardiovascular: Normal rate, regular rhythm and normal heart sounds.   No murmur heard.  Systolic murmurs:                     .  Pulmonary/Chest: Effort normal and breath sounds normal. No stridor. No respiratory distress. She has no wheezes. She has no rales.   Abdominal: Soft. Bowel sounds are normal. She exhibits no distension and no mass. There is no tenderness. There is no rebound and no guarding.   Musculoskeletal:        Left lower leg: She exhibits edema.   Neurological: She is alert and oriented to person, place, and time. Coordination normal.   Skin: Skin is warm and dry. No rash noted. No erythema.   Psychiatric: She has a normal mood and affect. Her behavior is normal. Thought content normal.   Nursing note and vitals reviewed.      Procedures           ED Course  ED Course as of Feb 04 1817   Mon Feb 04, 2019   2014 Patient's initial complaints of chest pain has changed to abdominal pain that radiates down to her lower abdomen.  She has eaten her dinner tray that was given to her the emergency department without difficulty.  Some constipation was noted on her abdominal films.  Dr. Mireles has made attempts at arranging  dialysis for patient tomorrow but is having difficulty finding transportation.  He recommends discharging patient back to nursing home and will continue to search for transportation for dialysis tomorrow morning.    [CB]   1817 Patient is requesting pain medications for her abdominal cramps.  [CB]      ED Course User Index  [CB] Benjamin Kraus MD            Labs Reviewed   COMPREHENSIVE METABOLIC PANEL - Abnormal; Notable for the following components:       Result Value    Glucose 117 (*)     BUN 62 (*)     Creatinine 5.43 (*)     Chloride 94 (*)     ALT (SGPT) <6 (*)     Alkaline Phosphatase 163 (*)     eGFR Non  Amer 8 (*)     Globulin 3.7 (*)     A/G Ratio 1.0 (*)     All other components within normal limits    Narrative:     The MDRD GFR formula is only valid for adults with stable renal function between ages 18 and 70.   BNP (IN-HOUSE) - Abnormal; Notable for the following components:    proBNP 8,650.0 (*)     All other components within normal limits   CBC WITH AUTO DIFFERENTIAL - Abnormal; Notable for the following components:    WBC 17.01 (*)     RBC 3.72 (*)     Hemoglobin 11.6 (*)     RDW 17.2 (*)     RDW-SD 61.2 (*)     Neutrophil % 91.5 (*)     Lymphocyte % 5.3 (*)     Neutrophils, Absolute 15.56 (*)     Immature Grans, Absolute 0.05 (*)     All other components within normal limits   URINALYSIS W/ CULTURE IF INDICATED - Abnormal; Notable for the following components:    Bilirubin, UA Small (1+) (*)     Protein, UA 30 mg/dL (1+) (*)     All other components within normal limits   URINALYSIS, MICROSCOPIC ONLY - Abnormal; Notable for the following components:    RBC, UA 0-2 (*)     All other components within normal limits   TROPONIN (IN-HOUSE) - Normal   TROPONIN (IN-HOUSE) - Normal   RAINBOW DRAW    Narrative:     The following orders were created for panel order [place] Draw.  Procedure                               Abnormality         Status                     ---------                                -----------         ------                     Light Blue Top[[id number]]                                   Final result               Green Top (Gel)[[id number]]                                  Final result               Lavender Top[[id number]]                                     Final result               Gold Top - SST[[id number]]                                   Final result                 Please view results for these tests on the individual orders.   CBC AND DIFFERENTIAL    Narrative:     The following orders were created for panel order CBC & Differential.  Procedure                               Abnormality         Status                     ---------                               -----------         ------                     CBC Auto Differential[[id number]]        Abnormal            Final result                 Please view results for these tests on the individual orders.   LIGHT BLUE TOP   GREEN TOP   LAVENDER TOP   GOLD TOP - SST       XR Abdomen Flat & Upright   Final Result   Impression:       Moderate amount of constipation, without any small or large   bowel obstruction or ileus        Location of Interpretation:    [id number]      Electronically signed by:  Manny Zhu MD  2/4/2019 5:25 PM CST   Workstation: [email or web address]   Final Result   CONCLUSION:   No Acute Disease.   8.2 cm hiatal hernia.      [id number]      Electronically signed by:  Torito Bautista MD  2/4/2019 12:18 PM CST   Workstation: [id number]                  [facility]      Final diagnoses:   Constipation, unspecified constipation type   Periumbilical abdominal pain            Benjamin Kraus MD  02/04/19 1745       Benjamin Kraus MD  02/04/19 1749       Benjamin Kraus MD  02/04/19 6055

## 2019-02-04 NOTE — ED NOTES
This RN asked if patient could have something to drink, MD stated this was okay. Diet Cola provided. No other needs reported or noted at this time.     Ban Correia, RN  02/04/19 1715

## 2019-02-04 NOTE — ED NOTES
Consent to treat given to this RN and ITA Mcnair from [facility] at [employer].     Ban Correia, RN  02/04/19 4860

## 2019-02-05 NOTE — ED NOTES
Called and spoke with Darius at the Ambulance service and explained patient needs to be taken back to her facility, [facility].   Explained patient is a gutierrez of the [facility] and facility will not provide a ride even though she can ride in vehicle.  I called PACS, there was no answer as it is after hours.  Ambulance eta 30 mins.

## 2019-02-05 NOTE — ED NOTES
This RN spoke with personnel at [facility]. This RN informed personnel that patient is up for discharge and is ready to be picked up. Rehab facility stated they did not have a vehicle to pick patient up and they would have to have EMS to pick patient up. Case Management notified.      Ban Correia, RN  02/04/19 7816

## 2019-02-05 NOTE — ED NOTES
This RN gave report to DANIA Tipton. Patient discharged.     aBn Correia, RN  02/04/19 1923    Resume all nursing home orders, patient to have dialysis tomorrow.     Ban Correia RN  02/04/19 1924

## 2019-02-05 NOTE — OUTREACH NOTE
[phone] stating patient is receiving services as a long term care patient.

## 2019-03-11 PROBLEM — R07.9 CHEST PAIN: Status: ACTIVE | Noted: 2019-01-01

## 2019-03-11 NOTE — H&P
Efrem Radford Crispin  1945     Chief Complaint:  ESRD        HPI:       PCP:  Provider, No Known  Nephrology:  Dr Mireles,  [facility]     73yr woman with ESRD on hemodialysis, HTN, COPD, sleep apnea, Stroke, GERD, LEFT arm fracture 20yrs ago.  moderate chronic pain lower back, LEFT leg.  occasional bleeding after decannulation.  recent clotted graft, unable to declot.  possible central venous occlusion.  LEFT femoral tunnel catheter. Recent new AV fistula placement. . End stage renal disease, No clotted access or problems during dialysis,   L Femoral AV graft functioning well.  Has memory and falling balance problems.  In WC today.  Returning for fistula evaluation.  No other associated signs, symptoms or modifying factors.     7/29/2013 Upper extremity vein mapping:  RIGHT cephalic 1.3-4.5mm, basilic 2.5-3.5mm.  LEFT cephalic 2.0-3.3mm, basilic 2.1-3.2mm.  8/19/13: Placement RIGHT chest Tunnel Cath  10/15/13 RIGHT Brachial-Axillary Vein AV graft  11/26/13 RIGHT AV duplex: Anast dist 517, dist 446, mid 93, prx 146, Ax Anast 183. Lg hematoma prx arm >7cm  3/24/14: RIGHT AV duplex: Patent fistula. maxPSV 870cm/s (distal). Size 3.2-6.6mm. Flows 887-2394ml/m. Prx hematoma 3.96cm  7/30/14  RIGHT AV duplex: Patent fistula. maxPSV 869cm/s (distal). Size 9.3mm. Flows 2472ml/m.  elevated velocity at elbow level  1/6/15: LEFT Brachial-axillary AV artegraft  3/11/15: LEFT AV Duplex: Patent fistula. maxPSV 605cm/s. Size 1.7-6.9mm. Flows 165-1219ml/m.   8/3/2015 LEFT AV Duplex: Patent fistula. maxPSV 255cm/s. Size 2.1-7.5mm. Flows 193-1007ml/m.   2/2016 LEFT fistulagram:  fistula end of life.  tunnel cath placed.  10/4/16: LEFT Leg AV graft SFA-SFV  12/08/16  Left AV Fistula  Duplex:  Patent fistula. maxPSV 446cm/s (arterial anast). Size 2.3-5.6mm. Flows 236-1319 ml/m.  1/03/16   Left AV Fistula  Duplex:  Patent fistula. maxPSV 586cm/s (arterial anast). Size 3.4-5.9mm. Flows 767-1567 ml/m.  2/23/17  Tunneled  catheter removed.  3/23/17   Left AV Fistula  Duplex:  Patent fistula. maxPSV 398cm/s (mid ). Size 0.31-0.59 cm. Flows 549-1842 ml/m.  Sm pseudoaneurysm 1.5cm  Hematoma distal surrounding graft 5.64cm   6/06/17  Left AV Fistula  Duplex:  Patent fistula. maxPSV 384cm/s (mid ). Size 0.31-0.59 cm. Flows 549-1842 ml/m.  Sm pseudoaneurysm 1.1cm  Hematoma distal surrounding graft 4.1cm, second hematoma 2.2 (distal)   2/01/18   Left AV Fistula  Duplex:  Patent fistula. maxPSV 470cm/s (mid ). Size3.4-6.3  mm. Flows 453-1688 ml/min Distal thigh hematoma 5.6cm.   4/30/18  Left AV Fistula  Duplex:  Patent fistula. maxPSV 470cm/s (mid ). Size2.7-6.2  mm. Flows 349-1258 ml/min Distal thigh hematoma 6.58cm   Pseudoaneurysm mid thight 2.56 X 1.40cm         5/2018 revision AV fistula, excision and reroute thigh AV graft ([place])       8/2018  Left AV Fistula  Duplex:  Patent fistula. maxPSV 371cm/s (art anast). Size 2.0-6.8  mm. Flows 338-1778 ml/min Distal thigh hematoma 6.2cm hematoma.       The following portions of the patient's history were reviewed and updated as appropriate: allergies, current medications, past family history, past medical history, past social history, past surgical history and problem list.  Recent images independently reviewed.  Available laboratory values reviewed.     PMH:  Medical History        Past Medical History:   Diagnosis Date   • Abnormal x-ray       Standard chest x ray, f/u pneumonia xray   • Acquired hypothyroidism     • Amblyopia     • Anemia of renal disease     • Anxiety     • Arteriovenous fistula (CMS/HCC)       Placed 10/15/2014   • Bipolar affective disorder (CMS/HCC)       Current episode depression   • Bipolar disorder (CMS/HCC)     • Cerebrovascular accident (CMS/HCC)     • Chest wall pain     • Chronic angle-closure glaucoma     • Chronic kidney disease       Stage 4-approaching hemodialysis   • Chronic pain syndrome     • Combined drug dependence excluding opioids (CMS/HCC)      • Constipation     • COPD (chronic obstructive pulmonary disease) (CMS/[facility])     • Cough     • Degeneration of cervical intervertebral disc     • Dementia       Multi-infarct, uncomplicated   • Depression     • Diabetes mellitus (CMS/[facility])       No retinopathy   • Diabetes mellitus (CMS/[facility])       Without mention of complication, type 2 or unspecified type, not stated as uncontrolled   • Diabetic renal disease (CMS/[facility])     • Disc disorder of lumbar region     • DJD (degenerative joint disease)       Involving multiple joints   • Edema     • Epigastric pain     • Essential hypertension     • Exotropia     • Gastritis     • Generalized abdominal pain     • GERD (gastroesophageal reflux disease)       With Esophagitis   • Gout     • H/O screening mammography     • Hiatal hernia     • Hyperlipidemia     • Hypermetropia     • Insomnia     • Iron deficiency anemia     • Low back pain     • Lumbago     • Non-cardiac chest pain     • Nuclear cataract     • Osteoporosis     • Panic disorder without agoraphobia     • Peripheral nervous system disorder     • Radiculitis     • RLQ abdominal pain     • RUQ pain     • Sprain of ankle     • Sprain of knee       Resolving   • Superficial injury of shoulder and upper arm     • Surgical follow-up care     • Type 2 diabetes mellitus (CMS/[facility])     • UTI (urinary tract infection)     • Visit for suture removal     • Vitamin D deficiency     • Vulvovaginitis              ALLERGIES:       Allergies   Allergen Reactions   • Ambien [Zolpidem Tartrate]     • Benadryl [Diphenhydramine]     • Penicillins              MEDICATIONS:     Current Outpatient Prescriptions:   •  acetaminophen (TYLENOL) 325 MG tablet, Take 650 mg by mouth Every 6 (Six) Hours As Needed for Mild Pain ., Disp: , Rfl:   •  aspirin 81 MG EC tablet, Take 81 mg by mouth daily., Disp: , Rfl:   •  atorvastatin (LIPITOR) 40 MG tablet, Take 40 mg by mouth every night., Disp: , Rfl:   •  B Complex-C-Folic Acid (NGA CAPS PO),  Take 1 capsule by mouth daily., Disp: , Rfl:   •  Camphor-Menthol-Methyl Sal 4-10-30 % cream, Apply 1 application topically Every 4 (Four) Hours As Needed (joint arthritis pain)., Disp: , Rfl:   •  Docusate Sodium (COLACE PO), Take 1 capsule by mouth 2 (Two) Times a Day., Disp: , Rfl:   •  famotidine (PEPCID) 20 MG tablet, Take 20 mg by mouth every night at bedtime., Disp: , Rfl:   •  furosemide (LASIX) 40 MG tablet, Take 40 mg by mouth Every Morning., Disp: , Rfl:   •  gabapentin (NEURONTIN) 100 MG capsule, Take 1 capsule by mouth 3 (Three) Times a Day., Disp: 10 capsule, Rfl: 0  •  hydrALAZINE (APRESOLINE) 100 MG tablet, Take 100 mg by mouth 3 (three) times a day., Disp: , Rfl:   •  HYDROcodone-acetaminophen (NORCO) 7.5-325 MG per tablet, Take 1 tablet by mouth Every 4 (Four) Hours As Needed for Moderate Pain ., Disp: 10 tablet, Rfl: 0  •  levETIRAcetam (KEPPRA) 500 MG tablet, Take 500 mg by mouth 2 (Two) Times a Day., Disp: , Rfl:   •  levothyroxine (SYNTHROID, LEVOTHROID) 88 MCG tablet, Take 88 mcg by mouth daily., Disp: , Rfl:   •  Mesalamine (APRISO PO), Take 1 capsule by mouth daily., Disp: , Rfl:   •  mirtazapine (REMERON) 7.5 MG half tablet, Take 7.5 mg by mouth Every Night., Disp: , Rfl:   •  nitroglycerin (NITROSTAT) 0.4 MG SL tablet, Place 0.4 mg under the tongue every 5 (five) minutes as needed for chest pain. Take no more than 3 doses in 15 minutes., Disp: , Rfl:   •  polyethylene glycol (MIRALAX) packet, Take 17 g by mouth Daily As Needed (constipation)., Disp: , Rfl:   •  Pramox-PE-Glycerin-Petrolatum (PREPARATION H) 1-0.25-14.4-15 % cream, Insert 1 application into the rectum Every 4 (Four) Hours As Needed (hemorrhoids)., Disp: , Rfl:   •  QUEtiapine (SEROquel) 25 MG tablet, Take 25 mg by mouth Every Night., Disp: , Rfl:   •  sevelamer (RENVELA) 800 MG tablet, Take 1,600 mg by mouth 3 (Three) Times a Day With Meals., Disp: , Rfl:   •  vitamin B-6 (PYRIDOXINE) 50 MG tablet, Take 50 mg by mouth  Daily., Disp: , Rfl:   •  vitamin D (ERGOCALCIFEROL) 49596 UNITS capsule capsule, Take 50,000 Units by mouth Every 14 (Fourteen) Days., Disp: , Rfl:      Review of Systems   Review of Systems   Constitution: Positive for weakness and malaise/fatigue. Negative for weight loss.   Cardiovascular: Positive for dyspnea on exertion. Negative for chest pain and claudication.   Respiratory: Negative for cough and shortness of breath.    Skin: Positive for poor wound healing. Negative for color change.   Musculoskeletal: Positive for arthritis, muscle cramps and muscle weakness.   Neurological: Negative for dizziness and numbness.         Physical Exam   Physical Exam   Constitutional: She is oriented to person, place, and time. She is active and cooperative. She does not appear ill. No distress.   HENT:   Right Ear: Hearing normal.   Left Ear: Hearing normal.   Nose: No nasal deformity. No epistaxis.   Mouth/Throat: She does not have dentures. Normal dentition.   Cardiovascular: Normal rate and regular rhythm.    No murmur heard.  Pulses:       Carotid pulses are 2+ on the right side, and 2+ on the left side.       Radial pulses are 2+ on the right side, and 2+ on the left side.        Dorsalis pedis pulses are 1+ on the right side, and 1+ on the left side.   Good thrill  No open wounds.   Pulmonary/Chest: Effort normal and breath sounds normal.   Abdominal: Soft. She exhibits no distension and no mass. There is no tenderness.   Musculoskeletal: She exhibits no deformity.   Gait normal.    Neurological: She is alert and oriented to person, place, and time. She has normal strength.   Skin: Skin is warm and dry. No cyanosis or erythema. No pallor.   No venous staining   Psychiatric: She has a normal mood and affect. Her speech is normal. Judgment and thought content normal.      Results for GUPTA SIRISHA ADAMS (MRN [id number]) as of 8/23/2018 15:08  GFR 23 Ref. Range 7/13/2018 16:07   Creatinine Latest Ref Range: 0.50 - 1.00  mg/dL 2.13 (H)   BUN Latest Ref Range: 7 - 21 mg/dL 16      ASSESSMENT:  Efrem was seen today for hemodialysis access.     Diagnoses and all orders for this visit:     ESRD (end stage renal disease) on dialysis (CMS/[facility])  -     Duplex Hemodialysis Access CAR; Future     Mixed hyperlipidemia     Essential hypertension     Coronary artery disease involving native coronary artery of native heart without angina pectoris     Panlobular emphysema (CMS/[facility])     Controlled type 2 diabetes mellitus with chronic kidney disease on chronic dialysis, without long-term current use of insulin (CMS/[facility])     PLAN:  Detailed discussion with Efrem Roa regarding situation and options.  functional problems at dialysis (increased venous pressures, recirculation, low flows on circuit, difficulty with bleeding after treatment) clotted access.    Risks including but not limited to bleeding, infection, blood vessel or nerve injury, inability to maintain patent graft, need for tunnel catheter placement, open revision of fistula.  Benefits:  maintenance of functional dialysis access.  Options:  surgical vs endovascular evaluation and treatment.  Understands and wishes to proceed.    LEFT thigh fistulagram, possible thrombolysis, angioplasty, stent, tunnel cath.  Local/IVsedation.  SDS.  3/11/2019          This document has been electronically signed by Blane Fernandez MD on March 11, 2019 11:53 AM         EMR Dragon/Transcription disclaimer:   Much of this encounter note is an electronic transcription/translation of spoken language to printed text. The electronic translation of spoken language may permit erroneous, or at times, nonsensical words or phrases to be inadvertently transcribed; Although I have reviewed the note for such errors, some may still exist.

## 2019-03-12 PROBLEM — R07.9 CHEST PAIN: Status: RESOLVED | Noted: 2019-01-01 | Resolved: 2019-01-01

## 2019-03-13 PROBLEM — I46.9 CARDIAC ARREST (HCC): Status: ACTIVE | Noted: 2019-01-01

## 2019-03-13 NOTE — ANESTHESIA POSTPROCEDURE EVALUATION
Patient: Efrem Roa    Procedure Summary     Date:  03/13/19 Room / Location:  [facility] IR ROOM 5 / Health system ANGIO INVASIVE LOCATION    Anesthesia Start:  0721 Anesthesia Stop:  0907    Procedure:  LEFT thigh dialysis fistulagram possible angioplasty stent thrombolysis tunnel catheter placement (N/A ) Diagnosis:       ESRD (end stage renal disease) on dialysis (CMS/[facility])      (pt is going to have anesthesia)    Provider:  Blane Fernandez MD Provider:  Oral Torres MD    Anesthesia Type:  MAC ASA Status:  4          Anesthesia Type: MAC  Last vitals  BP   127/53 (03/13/19 0617)   Temp   97.7 °F (36.5 °C) (03/13/19 0617)   Pulse   91 (03/13/19 0617)   Resp   18 (03/13/19 0617)     SpO2   98 % (03/13/19 0617)     Post Anesthesia Care and Evaluation    Patient location during evaluation: PACU  Patient participation: waiting for patient participation  Level of consciousness: awake  Pain score: 0  Pain management: adequate  Airway patency: patent  Anesthetic complications: No anesthetic complications  PONV Status: none  Cardiovascular status: acceptable  Respiratory status: acceptable

## 2019-03-13 NOTE — ANESTHESIA PREPROCEDURE EVALUATION
Anesthesia Evaluation     no history of anesthetic complications:  NPO Solid Status: > 8 hours  NPO Liquid Status: > 2 hours           Airway   Mallampati: II  TM distance: >3 FB  Neck ROM: limited  Possible difficult intubation  Dental    (+) poor dentition        Pulmonary    (+) pneumonia resolved , COPD, decreased breath sounds,   (-) sleep apnea, not a smoker    ROS comment: 1. Chronic changes with new opacities in the right perihilar  region which may represent either atelectasis or pneumonia..  2. Old displaced left humerus fracture.  Cardiovascular   Exercise tolerance: poor (<4 METS)    ECG reviewed  Rhythm: regular  Rate: normal    (+) hypertension well controlled, dysrhythmias, angina, PVD (pseudoaneurysm), hyperlipidemia,   (-) CHF, cardiac stents    ROS comment: Comparison: compared with previous ECG from 2/4/2019  Similar to previous ECG  Rhythm: sinus rhythm and A-V block  Rate: normal  BPM: 91  Conduction: 1st degree  Clinical impression: abnormal ECG and low voltage    EF 56-60%  · Left ventricular wall thickness is consistent with moderate concentric hypertrophy.  · Left ventricular diastolic dysfunction (grade I) consistent with impaired relaxation.  · Right ventricular cavity is borderline dilated.  · Left atrial cavity size is borderline dilated.  · Mild tricuspid valve regurgitation is present.    Neuro/Psych  (+) seizures (states one seizure in the past over 1 yr ago) well controlled, CVA (left sided weakness) residual symptoms, headaches (occ), weakness, numbness, psychiatric history Anxiety, Depression and Bipolar,     GI/Hepatic/Renal/Endo    (+)  GERD well controlled,  renal disease dialysis, diabetes mellitus type 2, hypothyroidism,   (-) hepatitis, liver disease    Musculoskeletal     (+) back pain, chronic pain,   Abdominal    Substance History - negative use     OB/GYN          Other   (+) arthritis (back and knees)     (-) history of cancer  ROS/Med Hx Other: Anemia  AAOx2 not to  day                Anesthesia Plan    ASA 4     MAC   total IV anesthesia  intravenous induction   Anesthetic plan, all risks, benefits, and alternatives have been provided, discussed and informed consent has been obtained with: patient.

## 2019-03-13 NOTE — ANESTHESIA PROCEDURE NOTES
Airway  Urgency: elective    Airway not difficult    General Information and Staff    Patient location during procedure: OR  CRNA: Quan Leung CRNA    Indications and Patient Condition  Indications for airway management: airway protection    Preoxygenated: yes  Mask difficulty assessment: 0 - not attempted    Final Airway Details  Final airway type: supraglottic airway      Successful airway: classic  Size 4    Number of attempts at approach: 1

## 2019-03-18 LAB — BACTERIA SPEC AEROBE CULT: NORMAL

## 2023-03-24 NOTE — H&P
[facility] Medicine Admission      Date of Admission: 9/3/2018      Primary Care Physician: Provider, No Known      Chief Complaint: abdominal pain    HPI:    72 yr old female with CMH of ESRD on HD, CAD, DM II presents from dialysis after she was having some abdominal pain that started today.  Patient had a CT done in the ER which showed some fecal retention and also some hydroureter . She was being treated for UTI in the nursing as well. She reports her last bowel movement was yesterday and it is usually hard.     Past Medical History:  has a past medical history of Abnormal x-ray; Acquired hypothyroidism; Amblyopia; Anemia of renal disease; Anxiety; Arteriovenous fistula (CMS/[facility]); Bipolar affective disorder (CMS/HCC); Bipolar disorder (CMS/HCC); Cerebrovascular accident (CMS/[facility]); Chest wall pain; Chronic angle-closure glaucoma; Chronic kidney disease; Chronic pain syndrome; Combined drug dependence excluding opioids (CMS/[facility]); Constipation; COPD (chronic obstructive pulmonary disease) (CMS/[facility]); Cough; Degeneration of cervical intervertebral disc; Dementia; Depression; Diabetes mellitus (CMS/HCC); Diabetes mellitus (CMS/HCC); Diabetic renal disease (CMS/[facility]); Disc disorder of lumbar region; DJD (degenerative joint disease); Edema; Epigastric pain; Essential hypertension; Exotropia; Gastritis; Generalized abdominal pain; GERD (gastroesophageal reflux disease); Gout; H/O screening mammography; Hiatal hernia; Hyperlipidemia; Hypermetropia; Insomnia; Iron deficiency anemia; Low back pain; Lumbago; Non-cardiac chest pain; Nuclear cataract; Osteoporosis; Panic disorder without agoraphobia; Peripheral nervous system disorder; Radiculitis; RLQ abdominal pain; RUQ pain; Sprain of ankle; Sprain of knee; Superficial injury of shoulder and upper arm; Surgical follow-up care; Type 2 diabetes mellitus (CMS/HCC); UTI (urinary tract infection); Visit for suture removal; Vitamin D  deficiency; and Vulvovaginitis.    Past Surgical History:  has a past surgical history that includes Appendectomy; Injection of Medication (2011); Bypass Graft (2015);  section; Cholecystectomy; endoscopy and colonoscopy (); Colonoscopy w/ polypectomy (2015); Peritoneal Dialysis Catheter Removal (2014); transesophageal echocardiogram (hazel) (2016); transesophageal echocardiogram (hazel) (2012); Esophagoscopy / EGD (2015); Esophagoscopy / EGD (1944); Injection of Medication (2011); Leg Surgery (2000); Back surgery; Other surgical history (2016); Hysterectomy; and Interventional radiology procedure (Left, 10/5/2017).    Family History: family history includes Coronary artery disease in her other.    Social History:  reports that she has quit smoking. She has never used smokeless tobacco. She reports that she does not drink alcohol or use drugs.    Allergies:   Allergies   Allergen Reactions   • Ambien [Zolpidem Tartrate]    • Benadryl [Diphenhydramine]    • Penicillins        Medications: Scheduled Meds:   Continuous Infusions:   PRN Meds:.•  sodium chloride  No current facility-administered medications on file prior to encounter.      Current Outpatient Prescriptions on File Prior to Encounter   Medication Sig Dispense Refill   • acetaminophen (TYLENOL) 325 MG tablet Take 650 mg by mouth Every 6 (Six) Hours As Needed for Mild Pain .     • aspirin 81 MG EC tablet Take 81 mg by mouth daily.     • atorvastatin (LIPITOR) 40 MG tablet Take 40 mg by mouth every night.     • B Complex-C-Folic Acid (NGA CAPS PO) Take 1 capsule by mouth daily.     • Camphor-Menthol-Methyl Sal 4-10-30 % cream Apply 1 application topically Every 4 (Four) Hours As Needed (joint arthritis pain).     • Docusate Sodium (COLACE PO) Take 1 capsule by mouth 2 (Two) Times a Day.     • famotidine (PEPCID) 20 MG tablet Take 20 mg by mouth every night at bedtime.     • furosemide  (LASIX) 40 MG tablet Take 40 mg by mouth Every Morning.     • gabapentin (NEURONTIN) 100 MG capsule Take 1 capsule by mouth 3 (Three) Times a Day. 10 capsule 0   • hydrALAZINE (APRESOLINE) 100 MG tablet Take 100 mg by mouth 3 (three) times a day.     • HYDROcodone-acetaminophen (NORCO) 7.5-325 MG per tablet Take 1 tablet by mouth Every 4 (Four) Hours As Needed for Moderate Pain . 10 tablet 0   • levETIRAcetam (KEPPRA) 500 MG tablet Take 500 mg by mouth 2 (Two) Times a Day.     • levothyroxine (SYNTHROID, LEVOTHROID) 88 MCG tablet Take 88 mcg by mouth daily.     • Mesalamine (APRISO PO) Take 1 capsule by mouth daily.     • mirtazapine (REMERON) 7.5 MG half tablet Take 7.5 mg by mouth Every Night.     • nitroglycerin (NITROSTAT) 0.4 MG SL tablet Place 0.4 mg under the tongue every 5 (five) minutes as needed for chest pain. Take no more than 3 doses in 15 minutes.     • polyethylene glycol (MIRALAX) packet Take 17 g by mouth Daily As Needed (constipation).     • Pramox-PE-Glycerin-Petrolatum (PREPARATION H) 1-0.25-14.4-15 % cream Insert 1 application into the rectum Every 4 (Four) Hours As Needed (hemorrhoids).     • QUEtiapine (SEROquel) 25 MG tablet Take 25 mg by mouth Every Night.     • sevelamer (RENVELA) 800 MG tablet Take 1,600 mg by mouth 3 (Three) Times a Day With Meals.     • vitamin B-6 (PYRIDOXINE) 50 MG tablet Take 50 mg by mouth Daily.     • vitamin D (ERGOCALCIFEROL) 37368 UNITS capsule capsule Take 50,000 Units by mouth Every 14 (Fourteen) Days.         Review of Systems:  Review of Systems   Respiratory: Negative for chest tightness.    Cardiovascular: Negative for chest pain.   Gastrointestinal: Positive for abdominal pain.   Genitourinary: Negative for dysuria.      Otherwise complete ROS is negative except as mentioned above.    Physical Exam:   Temp:  [98.2 °F (36.8 °C)] 98.2 °F (36.8 °C)  Heart Rate:  [83-93] 83  Resp:  [16] 16  BP: (106-120)/(58-69) 106/69  Physical Exam   Constitutional: She  appears well-developed and well-nourished. No distress.   HENT:   Head: Normocephalic and atraumatic.   Cardiovascular: Normal rate.    Pulmonary/Chest: Effort normal. No respiratory distress. She has no wheezes.   Abdominal: Soft. She exhibits no distension. There is tenderness.   Musculoskeletal: Normal range of motion. She exhibits no edema.   Neurological: She is alert. No cranial nerve deficit.   Skin: Skin is warm and dry. She is not diaphoretic.   Psychiatric: She has a normal mood and affect. Her behavior is normal. Judgment and thought content normal.   Vitals reviewed.        Results Reviewed:  I have personally reviewed current lab, radiology, and data and agree with results.  Lab Results (last 24 hours)     Procedure Component Value Units Date/Time    Extra Tubes [[id number]] Collected:  09/03/18 1636    Specimen:  Blood from Blood, Venous Line Updated:  09/03/18 1745    Narrative:       The following orders were created for panel order Extra Tubes.  Procedure                               Abnormality         Status                     ---------                               -----------         ------                     Light Blue Top[[id number]]                                   Final result               Lavender Top[[id number]]                                     Final result                 Please view results for these tests on the individual orders.    Light Blue Top [[id number]] Collected:  09/03/18 1636    Specimen:  Blood from Arm, Left Updated:  09/03/18 1745     Extra Tube hold for add-on     Comment: Auto resulted       Lavender Top [[id number]] Collected:  09/03/18 1636    Specimen:  Blood from Arm, Left Updated:  09/03/18 1745     Extra Tube hold for add-on     Comment: Auto resulted       Urinalysis, Microscopic Only - Urine, Clean Catch [[id number]]  (Abnormal) Collected:  09/03/18 1719    Specimen:  Urine from Urine, Clean Catch Updated:  09/03/18 1743     RBC, UA 13-20 (A) /HPF      WBC, UA  6-12 (A) /HPF      Bacteria, UA Trace (A) /HPF      Squamous Epithelial Cells, UA 3-5 (A) /HPF      Hyaline Casts, UA 0-2 /LPF      Granular Casts, UA 0-2 /LPF      Amorphous Crystals, UA Small/1+ /HPF      Methodology Manual Light Microscopy    Urinalysis With Microscopic If Indicated (No Culture) - Urine, Clean Catch [[id number]]  (Abnormal) Collected:  09/03/18 1719    Specimen:  Urine from Urine, Clean Catch Updated:  09/03/18 1729     Color, UA Dark Yellow     Appearance, UA Cloudy (A)     pH, UA <=5.0     Specific Gravity, UA 1.022     Glucose, UA Negative     Ketones, UA Trace (A)     Bilirubin, UA Moderate (2+) (A)     Blood, UA Negative     Protein,  mg/dL (2+) (A)     Leuk Esterase, UA Moderate (2+) (A)     Nitrite, UA Negative     Urobilinogen, UA 0.2 E.U./dL    [place] Draw [[id number]] Collected:  09/03/18 1607    Specimen:  Blood Updated:  09/03/18 1715    Narrative:       The following orders were created for panel order [place] Draw.  Procedure                               Abnormality         Status                     ---------                               -----------         ------                     Light Blue Top[[id number]]                                   Final result               Green Top (Gel)[[id number]]                                                             Lavender Top[[id number]]                                     Final result               Gold Top - SST[[id number]]                                                                Please view results for these tests on the individual orders.    Lavender Top [[id number]] Collected:  09/03/18 1607    Specimen:  Blood Updated:  09/03/18 1715     Extra Tube hold for add-on     Comment: Auto resulted       Light Blue Top [[id number]] Collected:  09/03/18 1607    Specimen:  Blood Updated:  09/03/18 1715     Extra Tube hold for add-on     Comment: Auto resulted       Comprehensive Metabolic Panel [[id number]]  (Abnormal) Collected:   09/03/18 1636    Specimen:  Blood from Arm, Left Updated:  09/03/18 1658     Glucose 112 (H) mg/dL      BUN 28 (H) mg/dL      Creatinine 3.51 (H) mg/dL      Sodium 140 mmol/L      Potassium 3.8 mmol/L      Chloride 96 mmol/L      CO2 34.0 (H) mmol/L      Calcium 8.9 mg/dL      Total Protein 8.5 g/dL      Albumin 4.00 g/dL      ALT (SGPT) 17 U/L      AST (SGOT) 37 (H) U/L      Alkaline Phosphatase 185 (H) U/L      Total Bilirubin 0.6 mg/dL      eGFR Non African Amer 13 (L) mL/min/1.73      Globulin 4.5 (H) gm/dL      A/G Ratio 0.9 (L) g/dL      BUN/Creatinine Ratio 8.0     Anion Gap 10.0 mmol/L     Narrative:       The MDRD GFR formula is only valid for adults with stable renal function between ages 18 and 70.    Lipase [[id number]]  (Normal) Collected:  09/03/18 1636    Specimen:  Blood from Arm, Left Updated:  09/03/18 1658     Lipase 135 U/L     Lactic Acid, Plasma [[id number]]  (Normal) Collected:  09/03/18 1636    Specimen:  Blood from Arm, Left Updated:  09/03/18 1656     Lactate 1.1 mmol/L     CBC & Differential [[id number]] Collected:  09/03/18 1607    Specimen:  Blood Updated:  09/03/18 1610    Narrative:       The following orders were created for panel order CBC & Differential.  Procedure                               Abnormality         Status                     ---------                               -----------         ------                     CBC Auto Differential[[id number]]        Abnormal            Final result                 Please view results for these tests on the individual orders.    CBC Auto Differential [[id number]]  (Abnormal) Collected:  09/03/18 1607    Specimen:  Blood Updated:  09/03/18 1610     WBC 6.82 10*3/mm3      RBC 3.58 (L) 10*6/mm3      Hemoglobin 10.4 (L) g/dL      Hematocrit 32.9 (L) %      MCV 91.9 fL      MCH 29.1 pg      MCHC 31.6 g/dL      RDW 16.4 (H) %      RDW-SD 54.4 (H) fl      MPV 9.9 fL      Platelets 284 10*3/mm3      Neutrophil % 69.1 %      Lymphocyte % 17.7 %       Monocyte % 10.1 %      Eosinophil % 2.2 %      Basophil % 0.6 %      Immature Grans % 0.3 %      Neutrophils, Absolute 4.71 10*3/mm3      Lymphocytes, Absolute 1.21 10*3/mm3      Monocytes, Absolute 0.69 10*3/mm3      Eosinophils, Absolute 0.15 10*3/mm3      Basophils, Absolute 0.04 10*3/mm3      Immature Grans, Absolute 0.02 10*3/mm3         Imaging Results (last 24 hours)     Procedure Component Value Units Date/Time    CT Abdomen Pelvis Without Contrast [[id number]] Collected:  09/03/18 1648     Updated:  09/03/18 1746    Narrative:         CT Abdomen and Pelvis Without Contrast    History: Bilateral flank pain.    Axials spiral scans of the abdomen and pelvis were obtained  without contrast.  Coronal and sagital reconstructions were  preformed.      This exam was performed according to our departmental  dose-optimization program, which includes automated exposure  control, adjustment of the mA and/or kV according to patient size  and/or use of iterative reconstruction technique.    DLP: 308.40    Comparison: August 22, 2013    Linear atelectasis or scar lung bases.  Old granulomatous disease.  Coronary artery calcifications.    No acute osseous abnormality.  Degenerative changes and degenerative disc disease in the lumbar  spine.  Vertebroplasty T12.  Old moderate compression deformity L1 vertebral body.    Hepatic and splenic granulomata.  Cholecystectomy.  The pancreas is unremarkable.  The adrenal glands are unremarkable.    Unremarkable bladder.  No pelvic mass.    No abdominal aortic aneurysm.  No adenopathy.    Fecal impaction with large amount retained feces throughout the  colon.  Appendix not identified.  No pericecal inflammatory changes.  No free air.  No free fluid.    6 cm hiatal hernia.  Subcutaneous edema or anasarca.    No renal or ureteral calculi.  Stable 5 mm mass arising posteriorly from the lower pole of the  left kidney.  Atrophic kidneys.  Minimal to moderate left hydronephrosis and  hydroureter without  ureteral calculus identified.  There may be compression of the left ureter by the feces  distended rectum.      Impression:       Conclusion:  Fecal impaction with large amount retained feces throughout the  colon.  6 cm hiatal hernia.  Subcutaneous edema or anasarca.  Atrophic kidneys.  Minimal to moderate left hydronephrosis and hydroureter without  ureteral calculus identified.  There may be compression of the left ureter by the feces  distended rectum.  Cholecystectomy.  Coronary artery calcifications.    [id number]    Electronically signed by:  Torito Bautista MD  9/3/2018 5:45 PM CDT  Workstation: [id number]            Assessment:    Hospital Problem List     Abdominal pain        Abdominal pain - she has fecal retention, will give enema and lactulose.    Hydroureter - will bladder scan and catheterize if needed    UTI - urine culture, rocephin    ESRD - HD - Dr. Zurita called    HTN - hydralazine    DVT prophylaxis - SCD JOSUE              Pascual Saldivar MD  09/03/18  6:39 PM                 yes

## 2024-02-14 NOTE — PLAN OF CARE
Chief Complaint   Patient presents with    Tachycardia    Other     TIA     Vitals:    02/14/24 0835   BP: 110/60   Site: Left Upper Arm   Position: Sitting   Cuff Size: Medium Adult   Pulse: 95   SpO2: 94%   Weight: 78.5 kg (173 lb)   Height: 1.651 m (5' 5\")      /60 (Site: Left Upper Arm, Position: Sitting, Cuff Size: Medium Adult)   Pulse 95   Ht 1.651 m (5' 5\")   Wt 78.5 kg (173 lb)   SpO2 94%   BMI 28.79 kg/m²         Problem: Sepsis (Adult)  Goal: Signs and Symptoms of Listed Potential Problems Will be Absent or Manageable (Sepsis)  Outcome: Ongoing (interventions implemented as appropriate)    11/08/17 1552   Sepsis   Problems Assessed (Sepsis) all   Problems Present (Sepsis) none         Problem: Fall Risk (Adult)  Goal: Absence of Falls  Outcome: Ongoing (interventions implemented as appropriate)    Problem: Renal Replacement, Continuous (Adult)  Goal: Signs and Symptoms of Listed Potential Problems Will be Absent or Manageable (Renal Replacement, Continuous)  Outcome: Ongoing (interventions implemented as appropriate)    Problem: Patient Care Overview (Adult)  Goal: Plan of Care Review  Outcome: Ongoing (interventions implemented as appropriate)  Goal: Adult Individualization and Mutuality  Outcome: Ongoing (interventions implemented as appropriate)  Goal: Discharge Needs Assessment  Outcome: Ongoing (interventions implemented as appropriate)    Problem: Pressure Ulcer (Adult)  Goal: Signs and Symptoms of Listed Potential Problems Will be Absent or Manageable (Pressure Ulcer)  Outcome: Ongoing (interventions implemented as appropriate)    Problem: Infection, Risk/Actual (Adult)  Goal: Infection Prevention/Resolution  Outcome: Ongoing (interventions implemented as appropriate)

## (undated) DEVICE — PINNACLE INTRODUCER SHEATH: Brand: PINNACLE

## (undated) DEVICE — CONQUEST® PTA BALLOON DILATATION CATHETER 8 MM X 40 MM, 75 CM CATHETER: Brand: CONQUEST®

## (undated) DEVICE — TBG HI PRESSURE 500PSI 20IN

## (undated) DEVICE — SUT MNCRYL 3/0 PS2 27IN Y427H

## (undated) DEVICE — BALN PTA LUTONIX AV DRUGCOAT 6F 6X40MM 75CM

## (undated) DEVICE — DRP FISTULAGRAM 45X60

## (undated) DEVICE — RADIFOCUS GLIDEWIRE: Brand: GLIDEWIRE

## (undated) DEVICE — KT INTRO MINISTICK MAX W/GW PALLADIUM ECHO 4F 21G 7CM

## (undated) DEVICE — SYR MEDALLION LL PLUNGR/LT BLU 1CC

## (undated) DEVICE — PK ANGIO LF 60

## (undated) DEVICE — GW TUNGSTEN NITINL .018 45CM

## (undated) DEVICE — ULTRAVERSE 035 PTA DILATATION CATHETER 6.0MM X 40MM BALLOON, 75CM SHAFT: Brand: ULTRAVERSE® 035 PTA DILATATION CATHETER

## (undated) DEVICE — Device

## (undated) DEVICE — CONQUEST® PTA BALLOON DILATATION CATHETER 6 MM X 40 MM, 50 CM CATHETER: Brand: CONQUEST®

## (undated) DEVICE — DEV TORQ GW SEADRAGON .018 TO .038IN